# Patient Record
Sex: FEMALE | Race: WHITE | NOT HISPANIC OR LATINO | Employment: OTHER | URBAN - METROPOLITAN AREA
[De-identification: names, ages, dates, MRNs, and addresses within clinical notes are randomized per-mention and may not be internally consistent; named-entity substitution may affect disease eponyms.]

---

## 2017-04-19 ENCOUNTER — HOSPITAL ENCOUNTER (OUTPATIENT)
Dept: MAMMOGRAPHY | Facility: HOSPITAL | Age: 77
Discharge: HOME | End: 2017-04-19
Attending: FAMILY MEDICINE

## 2017-04-19 DIAGNOSIS — Z12.31 VISIT FOR SCREENING MAMMOGRAM: ICD-10-CM

## 2017-11-13 ENCOUNTER — RECORDS - HEALTHEAST (OUTPATIENT)
Dept: LAB | Facility: CLINIC | Age: 77
End: 2017-11-13

## 2017-11-13 ENCOUNTER — RECORDS - HEALTHEAST (OUTPATIENT)
Dept: ADMINISTRATIVE | Facility: OTHER | Age: 77
End: 2017-11-13

## 2017-11-13 LAB
CHOLEST SERPL-MCNC: 140 MG/DL
FASTING STATUS PATIENT QL REPORTED: NORMAL
HDLC SERPL-MCNC: 59 MG/DL
LDLC SERPL CALC-MCNC: 68 MG/DL
TRIGL SERPL-MCNC: 65 MG/DL

## 2018-05-30 ENCOUNTER — COMMUNICATION - HEALTHEAST (OUTPATIENT)
Dept: PULMONOLOGY | Facility: OTHER | Age: 78
End: 2018-05-30

## 2018-05-30 ENCOUNTER — AMBULATORY - HEALTHEAST (OUTPATIENT)
Dept: PULMONOLOGY | Facility: OTHER | Age: 78
End: 2018-05-30

## 2018-05-30 DIAGNOSIS — J44.9 COPD (CHRONIC OBSTRUCTIVE PULMONARY DISEASE) (H): ICD-10-CM

## 2018-05-31 ENCOUNTER — COMMUNICATION - HEALTHEAST (OUTPATIENT)
Dept: RESPIRATORY THERAPY | Facility: HOSPITAL | Age: 78
End: 2018-05-31

## 2018-06-04 ENCOUNTER — RECORDS - HEALTHEAST (OUTPATIENT)
Dept: LAB | Facility: CLINIC | Age: 78
End: 2018-06-04

## 2018-06-04 ENCOUNTER — COMMUNICATION - HEALTHEAST (OUTPATIENT)
Dept: RESPIRATORY THERAPY | Facility: HOSPITAL | Age: 78
End: 2018-06-04

## 2018-06-04 ENCOUNTER — RECORDS - HEALTHEAST (OUTPATIENT)
Dept: ADMINISTRATIVE | Facility: OTHER | Age: 78
End: 2018-06-04

## 2018-06-04 LAB
ANION GAP SERPL CALCULATED.3IONS-SCNC: 9 MMOL/L (ref 5–18)
BUN SERPL-MCNC: 16 MG/DL (ref 8–28)
CALCIUM SERPL-MCNC: 8.9 MG/DL (ref 8.5–10.5)
CHLORIDE BLD-SCNC: 98 MMOL/L (ref 98–107)
CO2 SERPL-SCNC: 29 MMOL/L (ref 22–31)
CREAT SERPL-MCNC: 0.93 MG/DL (ref 0.6–1.1)
GFR SERPL CREATININE-BSD FRML MDRD: 58 ML/MIN/1.73M2
GLUCOSE BLD-MCNC: 103 MG/DL (ref 70–125)
POTASSIUM BLD-SCNC: 4.5 MMOL/L (ref 3.5–5)
SODIUM SERPL-SCNC: 136 MMOL/L (ref 136–145)

## 2018-06-05 ENCOUNTER — COMMUNICATION - HEALTHEAST (OUTPATIENT)
Dept: RESPIRATORY THERAPY | Facility: HOSPITAL | Age: 78
End: 2018-06-05

## 2018-06-07 ENCOUNTER — HOSPITAL ENCOUNTER (OUTPATIENT)
Dept: MAMMOGRAPHY | Facility: CLINIC | Age: 78
Discharge: HOME | End: 2018-06-07
Attending: FAMILY MEDICINE

## 2018-06-07 DIAGNOSIS — Z12.31 VISIT FOR SCREENING MAMMOGRAM: ICD-10-CM

## 2018-06-12 ENCOUNTER — COMMUNICATION - HEALTHEAST (OUTPATIENT)
Dept: RESPIRATORY THERAPY | Facility: HOSPITAL | Age: 78
End: 2018-06-12

## 2018-06-29 ENCOUNTER — COMMUNICATION - HEALTHEAST (OUTPATIENT)
Dept: RESPIRATORY THERAPY | Facility: HOSPITAL | Age: 78
End: 2018-06-29

## 2018-07-18 ENCOUNTER — RECORDS - HEALTHEAST (OUTPATIENT)
Dept: ADMINISTRATIVE | Facility: OTHER | Age: 78
End: 2018-07-18

## 2018-07-24 ENCOUNTER — RECORDS - HEALTHEAST (OUTPATIENT)
Dept: ADMINISTRATIVE | Facility: OTHER | Age: 78
End: 2018-07-24

## 2018-07-24 ENCOUNTER — OFFICE VISIT - HEALTHEAST (OUTPATIENT)
Dept: PULMONOLOGY | Facility: OTHER | Age: 78
End: 2018-07-24

## 2018-07-24 ENCOUNTER — RECORDS - HEALTHEAST (OUTPATIENT)
Dept: PULMONOLOGY | Facility: OTHER | Age: 78
End: 2018-07-24

## 2018-07-24 DIAGNOSIS — J44.9 COPD (CHRONIC OBSTRUCTIVE PULMONARY DISEASE) (H): ICD-10-CM

## 2018-07-24 DIAGNOSIS — J44.9 CHRONIC OBSTRUCTIVE PULMONARY DISEASE, UNSPECIFIED (H): ICD-10-CM

## 2018-07-24 LAB — HGB BLD-MCNC: 13.2 G/DL

## 2018-07-24 ASSESSMENT — MIFFLIN-ST. JEOR: SCORE: 1044.9

## 2018-07-30 ENCOUNTER — COMMUNICATION - HEALTHEAST (OUTPATIENT)
Dept: RESPIRATORY THERAPY | Facility: HOSPITAL | Age: 78
End: 2018-07-30

## 2018-11-06 ENCOUNTER — OFFICE VISIT - HEALTHEAST (OUTPATIENT)
Dept: PULMONOLOGY | Facility: OTHER | Age: 78
End: 2018-11-06

## 2018-11-06 DIAGNOSIS — Z72.0 TOBACCO ABUSE: ICD-10-CM

## 2018-11-06 ASSESSMENT — MIFFLIN-ST. JEOR: SCORE: 1073.02

## 2018-11-19 ENCOUNTER — RECORDS - HEALTHEAST (OUTPATIENT)
Dept: LAB | Facility: CLINIC | Age: 78
End: 2018-11-19

## 2018-11-19 LAB
ALBUMIN SERPL-MCNC: 3.8 G/DL (ref 3.5–5)
ALP SERPL-CCNC: 83 U/L (ref 45–120)
ALT SERPL W P-5'-P-CCNC: 9 U/L (ref 0–45)
ANION GAP SERPL CALCULATED.3IONS-SCNC: 12 MMOL/L (ref 5–18)
AST SERPL W P-5'-P-CCNC: 15 U/L (ref 0–40)
BILIRUB SERPL-MCNC: 0.6 MG/DL (ref 0–1)
BUN SERPL-MCNC: 20 MG/DL (ref 8–28)
CALCIUM SERPL-MCNC: 9.3 MG/DL (ref 8.5–10.5)
CHLORIDE BLD-SCNC: 102 MMOL/L (ref 98–107)
CHOLEST SERPL-MCNC: 152 MG/DL
CO2 SERPL-SCNC: 24 MMOL/L (ref 22–31)
CREAT SERPL-MCNC: 0.92 MG/DL (ref 0.6–1.1)
FASTING STATUS PATIENT QL REPORTED: NORMAL
GFR SERPL CREATININE-BSD FRML MDRD: 59 ML/MIN/1.73M2
GLUCOSE BLD-MCNC: 98 MG/DL (ref 70–125)
HDLC SERPL-MCNC: 77 MG/DL
LDLC SERPL CALC-MCNC: 63 MG/DL
POTASSIUM BLD-SCNC: 4.2 MMOL/L (ref 3.5–5)
PROT SERPL-MCNC: 7.3 G/DL (ref 6–8)
SODIUM SERPL-SCNC: 138 MMOL/L (ref 136–145)
TRIGL SERPL-MCNC: 58 MG/DL
TSH SERPL DL<=0.005 MIU/L-ACNC: 7.36 UIU/ML (ref 0.3–5)

## 2018-11-26 ENCOUNTER — HOSPITAL ENCOUNTER (EMERGENCY)
Facility: CLINIC | Age: 78
Discharge: ANOTHER HOSPITAL | End: 2018-11-26
Attending: EMERGENCY MEDICINE | Admitting: EMERGENCY MEDICINE
Payer: COMMERCIAL

## 2018-11-26 ENCOUNTER — HOSPITAL ENCOUNTER (INPATIENT)
Facility: CLINIC | Age: 78
LOS: 4 days | Discharge: HOME | DRG: 247 | End: 2018-11-30
Attending: INTERNAL MEDICINE | Admitting: INTERNAL MEDICINE
Payer: COMMERCIAL

## 2018-11-26 VITALS
TEMPERATURE: 97.8 F | OXYGEN SATURATION: 96 % | SYSTOLIC BLOOD PRESSURE: 142 MMHG | WEIGHT: 135 LBS | BODY MASS INDEX: 23.05 KG/M2 | HEIGHT: 64 IN | DIASTOLIC BLOOD PRESSURE: 79 MMHG | RESPIRATION RATE: 23 BRPM

## 2018-11-26 DIAGNOSIS — I73.9 PAD (PERIPHERAL ARTERY DISEASE) (H): ICD-10-CM

## 2018-11-26 DIAGNOSIS — J44.1 COPD EXACERBATION (H): ICD-10-CM

## 2018-11-26 DIAGNOSIS — I48.92 ATRIAL FLUTTER WITH RAPID VENTRICULAR RESPONSE (H): ICD-10-CM

## 2018-11-26 DIAGNOSIS — I21.4 NSTEMI (NON-ST ELEVATED MYOCARDIAL INFARCTION) (H): ICD-10-CM

## 2018-11-26 DIAGNOSIS — R79.89 ELEVATED TROPONIN: ICD-10-CM

## 2018-11-26 DIAGNOSIS — I48.91 ATRIAL FIBRILLATION WITH RVR (H): Primary | ICD-10-CM

## 2018-11-26 LAB
ALBUMIN SERPL-MCNC: 3.6 G/DL (ref 3.4–5)
ALBUMIN UR-MCNC: NEGATIVE MG/DL
ALP SERPL-CCNC: 79 U/L (ref 40–150)
ALT SERPL W P-5'-P-CCNC: 26 U/L (ref 0–50)
ANION GAP SERPL CALCULATED.3IONS-SCNC: 6 MMOL/L (ref 3–14)
APPEARANCE UR: CLEAR
AST SERPL W P-5'-P-CCNC: 24 U/L (ref 0–45)
BASOPHILS # BLD AUTO: 0.1 10E9/L (ref 0–0.2)
BASOPHILS NFR BLD AUTO: 0.6 %
BILIRUB SERPL-MCNC: 0.4 MG/DL (ref 0.2–1.3)
BILIRUB UR QL STRIP: NEGATIVE
BUN SERPL-MCNC: 23 MG/DL (ref 7–30)
CALCIUM SERPL-MCNC: 8.5 MG/DL (ref 8.5–10.1)
CHLORIDE SERPL-SCNC: 104 MMOL/L (ref 94–109)
CHOLEST SERPL-MCNC: 107 MG/DL
CO2 SERPL-SCNC: 29 MMOL/L (ref 20–32)
COLOR UR AUTO: NORMAL
CREAT SERPL-MCNC: 1.01 MG/DL (ref 0.52–1.04)
DIFFERENTIAL METHOD BLD: ABNORMAL
EOSINOPHIL # BLD AUTO: 0.3 10E9/L (ref 0–0.7)
EOSINOPHIL NFR BLD AUTO: 3.3 %
ERYTHROCYTE [DISTWIDTH] IN BLOOD BY AUTOMATED COUNT: 16.3 % (ref 10–15)
GFR SERPL CREATININE-BSD FRML MDRD: 72 ML/MIN/1.7M2
GLUCOSE SERPL-MCNC: 99 MG/DL (ref 70–99)
GLUCOSE UR STRIP-MCNC: NEGATIVE MG/DL
HCT VFR BLD AUTO: 46.2 % (ref 35–47)
HDLC SERPL-MCNC: 62 MG/DL
HGB BLD-MCNC: 15 G/DL (ref 11.7–15.7)
HGB UR QL STRIP: NEGATIVE
IMM GRANULOCYTES # BLD: 0 10E9/L (ref 0–0.4)
IMM GRANULOCYTES NFR BLD: 0.2 %
KETONES UR STRIP-MCNC: NEGATIVE MG/DL
LDLC SERPL CALC-MCNC: 32 MG/DL
LEUKOCYTE ESTERASE UR QL STRIP: NEGATIVE
LMWH PPP CHRO-ACNC: 0.2 IU/ML
LYMPHOCYTES # BLD AUTO: 2.2 10E9/L (ref 0.8–5.3)
LYMPHOCYTES NFR BLD AUTO: 22.8 %
MCH RBC QN AUTO: 32.1 PG (ref 26.5–33)
MCHC RBC AUTO-ENTMCNC: 32.5 G/DL (ref 31.5–36.5)
MCV RBC AUTO: 99 FL (ref 78–100)
MONOCYTES # BLD AUTO: 1.2 10E9/L (ref 0–1.3)
MONOCYTES NFR BLD AUTO: 12 %
NEUTROPHILS # BLD AUTO: 5.9 10E9/L (ref 1.6–8.3)
NEUTROPHILS NFR BLD AUTO: 61.1 %
NITRATE UR QL: NEGATIVE
NONHDLC SERPL-MCNC: 45 MG/DL
NRBC # BLD AUTO: 0 10*3/UL
NRBC BLD AUTO-RTO: 0 /100
PH UR STRIP: 6 PH (ref 5–7)
PLATELET # BLD AUTO: 269 10E9/L (ref 150–450)
POTASSIUM SERPL-SCNC: 4.1 MMOL/L (ref 3.4–5.3)
PROT SERPL-MCNC: 7.5 G/DL (ref 6.8–8.8)
RBC # BLD AUTO: 4.67 10E12/L (ref 3.8–5.2)
SODIUM SERPL-SCNC: 139 MMOL/L (ref 133–144)
SOURCE: NORMAL
SP GR UR STRIP: 1 (ref 1–1.03)
T4 FREE SERPL-MCNC: 1.23 NG/DL (ref 0.76–1.46)
TRIGL SERPL-MCNC: 65 MG/DL
TROPONIN I SERPL-MCNC: 0.17 UG/L (ref 0–0.04)
TROPONIN I SERPL-MCNC: 0.18 UG/L (ref 0–0.04)
TROPONIN I SERPL-MCNC: 0.19 UG/L (ref 0–0.04)
TSH SERPL DL<=0.005 MIU/L-ACNC: 8.11 MU/L (ref 0.4–4)
UROBILINOGEN UR STRIP-MCNC: 0 MG/DL (ref 0–2)
WBC # BLD AUTO: 9.7 10E9/L (ref 4–11)

## 2018-11-26 PROCEDURE — 99232 SBSQ HOSP IP/OBS MODERATE 35: CPT | Performed by: INTERNAL MEDICINE

## 2018-11-26 PROCEDURE — 84439 ASSAY OF FREE THYROXINE: CPT | Performed by: EMERGENCY MEDICINE

## 2018-11-26 PROCEDURE — 84443 ASSAY THYROID STIM HORMONE: CPT | Performed by: EMERGENCY MEDICINE

## 2018-11-26 PROCEDURE — 96374 THER/PROPH/DIAG INJ IV PUSH: CPT | Performed by: EMERGENCY MEDICINE

## 2018-11-26 PROCEDURE — 80053 COMPREHEN METABOLIC PANEL: CPT | Performed by: EMERGENCY MEDICINE

## 2018-11-26 PROCEDURE — 25000125 ZZHC RX 250: Performed by: EMERGENCY MEDICINE

## 2018-11-26 PROCEDURE — 93005 ELECTROCARDIOGRAM TRACING: CPT

## 2018-11-26 PROCEDURE — 93005 ELECTROCARDIOGRAM TRACING: CPT | Performed by: EMERGENCY MEDICINE

## 2018-11-26 PROCEDURE — 25000132 ZZH RX MED GY IP 250 OP 250 PS 637: Performed by: INTERNAL MEDICINE

## 2018-11-26 PROCEDURE — 93010 ELECTROCARDIOGRAM REPORT: CPT | Performed by: INTERNAL MEDICINE

## 2018-11-26 PROCEDURE — 21000001 ZZH R&B HEART CARE

## 2018-11-26 PROCEDURE — 84484 ASSAY OF TROPONIN QUANT: CPT | Performed by: EMERGENCY MEDICINE

## 2018-11-26 PROCEDURE — 93010 ELECTROCARDIOGRAM REPORT: CPT | Mod: Z6 | Performed by: EMERGENCY MEDICINE

## 2018-11-26 PROCEDURE — 96361 HYDRATE IV INFUSION ADD-ON: CPT | Performed by: EMERGENCY MEDICINE

## 2018-11-26 PROCEDURE — 80061 LIPID PANEL: CPT | Performed by: INTERNAL MEDICINE

## 2018-11-26 PROCEDURE — 99285 EMERGENCY DEPT VISIT HI MDM: CPT | Mod: 25 | Performed by: EMERGENCY MEDICINE

## 2018-11-26 PROCEDURE — 85520 HEPARIN ASSAY: CPT | Performed by: INTERNAL MEDICINE

## 2018-11-26 PROCEDURE — 85025 COMPLETE CBC W/AUTO DIFF WBC: CPT | Performed by: EMERGENCY MEDICINE

## 2018-11-26 PROCEDURE — 99207 ZZC CDG-HISTORY COMP: MEETS EXP. PROBLEM FOCUSED-DOWN CODED LACK OF ROS: CPT | Performed by: INTERNAL MEDICINE

## 2018-11-26 PROCEDURE — 96376 TX/PRO/DX INJ SAME DRUG ADON: CPT | Performed by: EMERGENCY MEDICINE

## 2018-11-26 PROCEDURE — 81003 URINALYSIS AUTO W/O SCOPE: CPT | Performed by: EMERGENCY MEDICINE

## 2018-11-26 PROCEDURE — 25000128 H RX IP 250 OP 636: Performed by: EMERGENCY MEDICINE

## 2018-11-26 PROCEDURE — 96375 TX/PRO/DX INJ NEW DRUG ADDON: CPT | Performed by: EMERGENCY MEDICINE

## 2018-11-26 PROCEDURE — 84484 ASSAY OF TROPONIN QUANT: CPT | Performed by: INTERNAL MEDICINE

## 2018-11-26 PROCEDURE — 25000132 ZZH RX MED GY IP 250 OP 250 PS 637: Performed by: EMERGENCY MEDICINE

## 2018-11-26 PROCEDURE — 36415 COLL VENOUS BLD VENIPUNCTURE: CPT | Performed by: INTERNAL MEDICINE

## 2018-11-26 RX ORDER — AMOXICILLIN 250 MG
2 CAPSULE ORAL 2 TIMES DAILY PRN
Status: DISCONTINUED | OUTPATIENT
Start: 2018-11-26 | End: 2018-11-30 | Stop reason: HOSPADM

## 2018-11-26 RX ORDER — LIDOCAINE 40 MG/G
CREAM TOPICAL
Status: DISCONTINUED | OUTPATIENT
Start: 2018-11-26 | End: 2018-11-28

## 2018-11-26 RX ORDER — DORZOLAMIDE HCL 20 MG/ML
1 SOLUTION/ DROPS OPHTHALMIC 3 TIMES DAILY
Status: DISCONTINUED | OUTPATIENT
Start: 2018-11-26 | End: 2018-11-30 | Stop reason: HOSPADM

## 2018-11-26 RX ORDER — ACEBUTOLOL HYDROCHLORIDE 200 MG/1
400 CAPSULE ORAL EVERY MORNING
Status: DISCONTINUED | OUTPATIENT
Start: 2018-11-27 | End: 2018-11-30 | Stop reason: HOSPADM

## 2018-11-26 RX ORDER — LEVOTHYROXINE SODIUM 125 UG/1
125 TABLET ORAL DAILY
COMMUNITY
End: 2021-11-22

## 2018-11-26 RX ORDER — ALBUTEROL SULFATE 90 UG/1
2 AEROSOL, METERED RESPIRATORY (INHALATION) EVERY 4 HOURS PRN
COMMUNITY
End: 2021-09-23

## 2018-11-26 RX ORDER — METOPROLOL TARTRATE 1 MG/ML
5 INJECTION, SOLUTION INTRAVENOUS
Status: COMPLETED | OUTPATIENT
Start: 2018-11-26 | End: 2018-11-26

## 2018-11-26 RX ORDER — ALUMINA, MAGNESIA, AND SIMETHICONE 2400; 2400; 240 MG/30ML; MG/30ML; MG/30ML
30 SUSPENSION ORAL EVERY 4 HOURS PRN
Status: DISCONTINUED | OUTPATIENT
Start: 2018-11-26 | End: 2018-11-30 | Stop reason: HOSPADM

## 2018-11-26 RX ORDER — IRBESARTAN 150 MG/1
300 TABLET ORAL DAILY
Status: DISCONTINUED | OUTPATIENT
Start: 2018-11-27 | End: 2018-11-29

## 2018-11-26 RX ORDER — LATANOPROST 50 UG/ML
1 SOLUTION/ DROPS OPHTHALMIC EVERY EVENING
COMMUNITY

## 2018-11-26 RX ORDER — ACETAMINOPHEN 650 MG/1
650 SUPPOSITORY RECTAL EVERY 4 HOURS PRN
Status: DISCONTINUED | OUTPATIENT
Start: 2018-11-26 | End: 2018-11-30 | Stop reason: HOSPADM

## 2018-11-26 RX ORDER — ACEBUTOLOL HYDROCHLORIDE 400 MG/1
400 CAPSULE ORAL DAILY
COMMUNITY

## 2018-11-26 RX ORDER — AMLODIPINE BESYLATE 5 MG/1
5 TABLET ORAL DAILY
Status: ON HOLD | COMMUNITY
End: 2018-11-30

## 2018-11-26 RX ORDER — DILTIAZEM HYDROCHLORIDE 120 MG/1
120 CAPSULE, EXTENDED RELEASE ORAL DAILY
Status: DISCONTINUED | OUTPATIENT
Start: 2018-11-26 | End: 2018-11-30 | Stop reason: HOSPADM

## 2018-11-26 RX ORDER — ASPIRIN 81 MG/1
81 TABLET ORAL DAILY
Status: DISCONTINUED | OUTPATIENT
Start: 2018-11-27 | End: 2018-11-28

## 2018-11-26 RX ORDER — MULTIPLE VITAMINS W/ MINERALS TAB 9MG-400MCG
1 TAB ORAL DAILY
Status: ON HOLD | COMMUNITY
End: 2019-08-14

## 2018-11-26 RX ORDER — ONDANSETRON 2 MG/ML
4 INJECTION INTRAMUSCULAR; INTRAVENOUS EVERY 6 HOURS PRN
Status: DISCONTINUED | OUTPATIENT
Start: 2018-11-26 | End: 2018-11-30 | Stop reason: HOSPADM

## 2018-11-26 RX ORDER — LATANOPROST 50 UG/ML
1 SOLUTION/ DROPS OPHTHALMIC EVERY EVENING
Status: DISCONTINUED | OUTPATIENT
Start: 2018-11-26 | End: 2018-11-30 | Stop reason: HOSPADM

## 2018-11-26 RX ORDER — ASPIRIN 81 MG/1
162 TABLET ORAL DAILY
Status: DISCONTINUED | OUTPATIENT
Start: 2018-11-27 | End: 2018-11-26

## 2018-11-26 RX ORDER — AMOXICILLIN 250 MG
1 CAPSULE ORAL 2 TIMES DAILY PRN
Status: DISCONTINUED | OUTPATIENT
Start: 2018-11-26 | End: 2018-11-30 | Stop reason: HOSPADM

## 2018-11-26 RX ORDER — DILTIAZEM HYDROCHLORIDE 5 MG/ML
0.25 INJECTION INTRAVENOUS ONCE
Status: COMPLETED | OUTPATIENT
Start: 2018-11-26 | End: 2018-11-26

## 2018-11-26 RX ORDER — BISACODYL 10 MG
10 SUPPOSITORY, RECTAL RECTAL DAILY PRN
Status: DISCONTINUED | OUTPATIENT
Start: 2018-11-26 | End: 2018-11-30 | Stop reason: HOSPADM

## 2018-11-26 RX ORDER — PROCHLORPERAZINE MALEATE 5 MG
5 TABLET ORAL EVERY 6 HOURS PRN
Status: DISCONTINUED | OUTPATIENT
Start: 2018-11-26 | End: 2018-11-30 | Stop reason: HOSPADM

## 2018-11-26 RX ORDER — SIMVASTATIN 20 MG
20 TABLET ORAL AT BEDTIME
Status: DISCONTINUED | OUTPATIENT
Start: 2018-11-26 | End: 2018-11-26

## 2018-11-26 RX ORDER — BRIMONIDINE TARTRATE 2 MG/ML
1 SOLUTION/ DROPS OPHTHALMIC 3 TIMES DAILY
Status: DISCONTINUED | OUTPATIENT
Start: 2018-11-26 | End: 2018-11-30 | Stop reason: HOSPADM

## 2018-11-26 RX ORDER — ATORVASTATIN CALCIUM 10 MG/1
10 TABLET, FILM COATED ORAL EVERY EVENING
Status: DISCONTINUED | OUTPATIENT
Start: 2018-11-26 | End: 2018-11-30 | Stop reason: HOSPADM

## 2018-11-26 RX ORDER — ACETAMINOPHEN 500 MG
500-1000 TABLET ORAL EVERY 6 HOURS PRN
COMMUNITY

## 2018-11-26 RX ORDER — NICOTINE 21 MG/24HR
1 PATCH, TRANSDERMAL 24 HOURS TRANSDERMAL DAILY
Status: DISCONTINUED | OUTPATIENT
Start: 2018-11-26 | End: 2018-11-30 | Stop reason: HOSPADM

## 2018-11-26 RX ORDER — ASPIRIN 81 MG/1
324 TABLET, CHEWABLE ORAL ONCE
Status: COMPLETED | OUTPATIENT
Start: 2018-11-26 | End: 2018-11-26

## 2018-11-26 RX ORDER — ASPIRIN 81 MG/1
324 TABLET, CHEWABLE ORAL ONCE
Status: DISCONTINUED | OUTPATIENT
Start: 2018-11-26 | End: 2018-11-26

## 2018-11-26 RX ORDER — METOPROLOL TARTRATE 1 MG/ML
5 INJECTION, SOLUTION INTRAVENOUS ONCE
Status: COMPLETED | OUTPATIENT
Start: 2018-11-26 | End: 2018-11-26

## 2018-11-26 RX ORDER — ACETAMINOPHEN 325 MG/1
650 TABLET ORAL EVERY 4 HOURS PRN
Status: DISCONTINUED | OUTPATIENT
Start: 2018-11-26 | End: 2018-11-29

## 2018-11-26 RX ORDER — ADENOSINE 3 MG/ML
6 INJECTION, SOLUTION INTRAVENOUS ONCE
Status: COMPLETED | OUTPATIENT
Start: 2018-11-26 | End: 2018-11-26

## 2018-11-26 RX ORDER — LEVOTHYROXINE SODIUM 125 UG/1
125 TABLET ORAL DAILY
Status: DISCONTINUED | OUTPATIENT
Start: 2018-11-27 | End: 2018-11-30 | Stop reason: HOSPADM

## 2018-11-26 RX ORDER — ASPIRIN 81 MG/1
TABLET, CHEWABLE ORAL
Status: DISCONTINUED
Start: 2018-11-26 | End: 2018-11-26 | Stop reason: HOSPADM

## 2018-11-26 RX ORDER — ALBUTEROL SULFATE 90 UG/1
2 AEROSOL, METERED RESPIRATORY (INHALATION) EVERY 4 HOURS PRN
Status: DISCONTINUED | OUTPATIENT
Start: 2018-11-26 | End: 2018-11-30 | Stop reason: HOSPADM

## 2018-11-26 RX ORDER — SIMVASTATIN 20 MG
20 TABLET ORAL AT BEDTIME
Status: ON HOLD | COMMUNITY
End: 2018-11-30

## 2018-11-26 RX ORDER — PROCHLORPERAZINE 25 MG
12.5 SUPPOSITORY, RECTAL RECTAL EVERY 12 HOURS PRN
Status: DISCONTINUED | OUTPATIENT
Start: 2018-11-26 | End: 2018-11-30 | Stop reason: HOSPADM

## 2018-11-26 RX ORDER — ACETAMINOPHEN 500 MG
1000 TABLET ORAL EVERY 6 HOURS PRN
Status: DISCONTINUED | OUTPATIENT
Start: 2018-11-26 | End: 2018-11-26

## 2018-11-26 RX ORDER — SODIUM CHLORIDE 9 MG/ML
1000 INJECTION, SOLUTION INTRAVENOUS CONTINUOUS
Status: DISCONTINUED | OUTPATIENT
Start: 2018-11-26 | End: 2018-11-26 | Stop reason: HOSPADM

## 2018-11-26 RX ORDER — NITROGLYCERIN 0.4 MG/1
0.4 TABLET SUBLINGUAL EVERY 5 MIN PRN
Status: DISCONTINUED | OUTPATIENT
Start: 2018-11-26 | End: 2018-11-29

## 2018-11-26 RX ORDER — ONDANSETRON 4 MG/1
4 TABLET, ORALLY DISINTEGRATING ORAL EVERY 6 HOURS PRN
Status: DISCONTINUED | OUTPATIENT
Start: 2018-11-26 | End: 2018-11-30 | Stop reason: HOSPADM

## 2018-11-26 RX ORDER — ACEBUTOLOL HYDROCHLORIDE 400 MG/1
400 CAPSULE ORAL EVERY MORNING
COMMUNITY
End: 2019-01-28

## 2018-11-26 RX ORDER — DORZOLAMIDE HCL 20 MG/ML
1 SOLUTION/ DROPS OPHTHALMIC 3 TIMES DAILY
COMMUNITY

## 2018-11-26 RX ORDER — BRIMONIDINE TARTRATE 1.5 MG/ML
1 SOLUTION/ DROPS OPHTHALMIC 3 TIMES DAILY
COMMUNITY

## 2018-11-26 RX ORDER — IRBESARTAN 300 MG/1
300 TABLET ORAL DAILY
Status: ON HOLD | COMMUNITY
End: 2018-11-30

## 2018-11-26 RX ADMIN — ASPIRIN 81 MG 324 MG: 81 TABLET ORAL at 13:39

## 2018-11-26 RX ADMIN — SODIUM CHLORIDE 1000 ML: 9 INJECTION, SOLUTION INTRAVENOUS at 10:45

## 2018-11-26 RX ADMIN — DILTIAZEM HYDROCHLORIDE 120 MG: 120 CAPSULE, EXTENDED RELEASE ORAL at 20:41

## 2018-11-26 RX ADMIN — METOPROLOL TARTRATE 5 MG: 1 INJECTION, SOLUTION INTRAVENOUS at 10:41

## 2018-11-26 RX ADMIN — HEPARIN SODIUM 12 UNITS/KG/HR: 10000 INJECTION, SOLUTION INTRAVENOUS at 14:09

## 2018-11-26 RX ADMIN — SODIUM CHLORIDE 1000 ML: 9 INJECTION, SOLUTION INTRAVENOUS at 10:03

## 2018-11-26 RX ADMIN — DILTIAZEM HYDROCHLORIDE 5 MG: 5 INJECTION INTRAVENOUS at 13:15

## 2018-11-26 RX ADMIN — Medication 3400 UNITS: at 14:10

## 2018-11-26 RX ADMIN — ADENOSINE 6 MG: 3 INJECTION, SOLUTION INTRAVENOUS at 10:08

## 2018-11-26 RX ADMIN — METOPROLOL TARTRATE 5 MG: 1 INJECTION, SOLUTION INTRAVENOUS at 11:37

## 2018-11-26 RX ADMIN — METOPROLOL TARTRATE 5 MG: 1 INJECTION, SOLUTION INTRAVENOUS at 10:56

## 2018-11-26 RX ADMIN — ATORVASTATIN CALCIUM 10 MG: 10 TABLET, FILM COATED ORAL at 20:41

## 2018-11-26 ASSESSMENT — ENCOUNTER SYMPTOMS
EYE DISCHARGE: 0
ARTHRALGIAS: 0
CONFUSION: 0
MYALGIAS: 0
SINUS PRESSURE: 0
DYSURIA: 0
HEADACHES: 0
NECK STIFFNESS: 0
COUGH: 0
FATIGUE: 0
BRUISES/BLEEDS EASILY: 0
DIFFICULTY URINATING: 0
BLOOD IN STOOL: 0
FEVER: 0
EYE REDNESS: 0
ABDOMINAL PAIN: 0
SHORTNESS OF BREATH: 0
TROUBLE SWALLOWING: 0
CHILLS: 0
VOMITING: 0
SORE THROAT: 0
CHEST TIGHTNESS: 0
WEAKNESS: 0
WHEEZING: 0

## 2018-11-26 ASSESSMENT — ACTIVITIES OF DAILY LIVING (ADL): ADLS_ACUITY_SCORE: 9

## 2018-11-26 NOTE — ED NOTES
Called to give report, unable to take pt until after 1600. Will continue to monitor pt here until she is transferred out.

## 2018-11-26 NOTE — ED PROVIDER NOTES
History     Chief Complaint   Patient presents with     Palpitations     increased HR in 120's; some heartburn last night, resolved with antiacid;      HPI     Zee Mireles is a 78 year old female who presents for evaluation of chest discomfort and palpitations.  The patient reports from 1130 last night experiencing substernal chest discomfort described initially as indigestion and then became more pressure-like.  She also noted interscapular discomfort.  Rates discomfort 4-5/10 intensity.  At around 2 AM she noted palpitations with her heart racing.  Noted heart rate in the 120s-130s.  She has had no shortness of breath.  Since recognition of palpitations she also has had no further chest discomfort.  Patient has ongoing tobacco use disorder.  History for COPD hypertension hyperlipidemia.  No coronary disease.  Patient reports to the hypothyroidism dosing she was recently increased and Synthroid because of an elevated TSH a low and having complaint of fatigue.  Patient denies having any exertional symptoms of dizziness, lightheadedness, chest discomfort or shortness of breath.  Has not noted cold air exposure affecting her breathing.    Problem List:    There are no active problems to display for this patient.       Past Medical History:    Hypertension  Tobacco use disorder  COPD  Hyperlipidemia  Glaucoma  Past Surgical History:    No past surgical history on file.    Family History:    No family history on file.    Social History:  Marital Status:   [4]  Social History   Substance Use Topics     Smoking status: Not on file     Smokeless tobacco: Not on file     Alcohol use Not on file        Medications:      No current outpatient prescriptions on file.      Review of Systems   Constitutional: Negative for chills, fatigue and fever.   HENT: Negative for congestion, ear pain, sinus pressure, sore throat and trouble swallowing.    Eyes: Negative for discharge and redness.   Respiratory: Negative for  "cough, chest tightness, shortness of breath and wheezing.    Cardiovascular: Negative for chest pain and leg swelling.   Gastrointestinal: Negative for abdominal pain, blood in stool and vomiting.   Genitourinary: Negative for difficulty urinating, dysuria and vaginal discharge.   Musculoskeletal: Negative for arthralgias, myalgias and neck stiffness.   Skin: Negative for pallor and rash.   Neurological: Negative for weakness and headaches.   Hematological: Does not bruise/bleed easily.   Psychiatric/Behavioral: Negative for confusion.       Physical Exam   BP: (!) 154/92  Heart Rate: 133  Temp: 97.8  F (36.6  C)  Resp: 18  Height: 161.3 cm (5' 3.5\")  Weight: 61.2 kg (135 lb)  SpO2: 98 %      Physical Exam   Constitutional: He is oriented to person, place, and time. Vital signs are normal. He appears well-nourished. He does not appear ill.   HENT:   Head: Normocephalic and atraumatic.   Right Ear: External ear normal.   Left Ear: External ear normal.   Nose: Nose normal.   Mouth/Throat: Oropharynx is clear and moist and mucous membranes are normal.   Eyes: Conjunctivae, EOM and lids are normal. Pupils are equal, round, and reactive to light. No scleral icterus.   Fundoscopic exam:       The right eye shows no hemorrhage.        The left eye shows no hemorrhage.   Neck: Trachea normal. Neck supple. No JVD present. Carotid bruit is not present.   Cardiovascular: Regular rhythm, S1 normal, S2 normal and intact distal pulses.   No extrasystoles are present. Tachycardia present.  Exam reveals no friction rub.    No murmur heard.  Pulmonary/Chest: Effort normal. No respiratory distress. He has no wheezes. He has no rales.    Prolonged expiratory phase.   Abdominal: Soft. Bowel sounds are normal. He exhibits no distension. There is no splenomegaly or hepatomegaly. There is no tenderness. There is no rebound. No hernia.   Musculoskeletal: Normal range of motion.   Lymphadenopathy:     He has no cervical adenopathy. "   Neurological: He is alert and oriented to person, place, and time. He has normal strength and normal reflexes. No sensory deficit.   Skin: Skin is warm and dry. No rash noted. No pallor.   Psychiatric: He has a normal mood and affect. His speech is normal and behavior is normal. Cognition and memory are normal.   Nursing note and vitals reviewed.             ED Course     ED Course     Procedures          EKG:  Interpretation by Prem Franz DO.   Indication: Chest discomfort with palpitations.  Narrow complex tachycardia with rate of 130-140.  No old EKG available for comparison.  Impressions: Abnormal EKG         No results found for this or any previous visit (from the past 24 hour(s)).    Medications   0.9% sodium chloride BOLUS (1,000 mLs Intravenous New Bag 11/26/18 1003)     Followed by   sodium chloride 0.9% infusion (not administered)   adenosine (ADENOCARD) injection 6 mg (6 mg Intravenous Given 11/26/18 1008)       Assessments & Plan (with Medical Decision Making)  10:36 AM   78-year-old female presents with chest discomfort and recognize palpitations.  Discomfort described as indigestion and pressure started around 11 PM followed by palpitations around 2 AM.  Patient presents with no chest discomfort.  Is continued to complain of recognize tachycardia.  Examination notes patient appear comfortable.  Smiling and laughing.  She is normotensive.  Heart rates in the 130s with a narrow complex tachycardia.  Patient received adenosine 6 mg IV rapid push.  This caused a transient slowing of her heart rate down to as low as 20 bpm before it increased back to baseline of 130s.  During that slowing we were able to recognize a flutter wave consistent with underlying atrial flutter.  This does not represent an SVT.  The patient be treated with IV metoprolol to see if we can achieve persistent rate control.  If this is ineffective we will have to consider IV diltiazem.  Troponin is pending to evaluate myocardial  ischemia could either be primary process triggering the palpitations or could be related to demand ischemia from prolonged tachycardia.  Her EKG at this time is not showing any acute ST-T changes consistent with ACS.  12:44 PM  Patient remains in persistent air complex tachycardia the rate of 130s.  She is not responded to IV metoprolol 5 mg x3 doses.  For rate control will try diltiazem.  The patient does have an elevated troponin(0.182).she remains symptom-free with no complaint of dyspnea or chest discomfort.  I am uncertain if this is a primary acute coronary syndrome causing elevated troponin followed by myocardial irritation triggering your complex tachycardia/atrial flutter or if this is demand ischemia from her tachycardia.  Will discuss with cardiology next care step and transfer plan to [facility].  1:26 PM  Patient responded favorably to diltiazem.  15 mg bolus was ordered and after 5 mg rate dropped to low 80s.  No additional diltiazem administered.  Monitor appears to be atrial flutter.  Repeat EKG is pending.  2:05 PM  Rate control was achieved with IV diltiazem.  We stopped bolus at 5 mg because her heart rate dropped into the low 80s and has been maintaining a normal rate.  She is not converted and is currently in atrial flutter.  Patient has received aspirin.  Starting IV heparin low intensity cardiac protocol.  Patient be admitted at [facility] after ground transfer to Dr. Grant the hospitalist.  Cardiology has been consulted.  Spoke with cardiology by phone.  Patient and family in agreement.       I have reviewed the nursing notes.    I have reviewed the findings, diagnosis, plan and need for follow up with the patient.      New Prescriptions    No medications on file       Final diagnoses:   Elevated troponin   Atrial flutter with rapid ventricular response (H)   COPD exacerbation (H)       11/26/2018   [facility] EMERGENCY DEPARTMENT     Prem Franz,  DO  11/26/18 1407

## 2018-11-26 NOTE — ED NOTES
"Woke up during night with heartburn and felt like heart rate was increased, had pain during night between shoulder blades with a sensation under left arm, lasted until 2AM then fell asleep, pain was gone when woke up, took \"acebutalol\" at 2am and then again at 9am  "

## 2018-11-26 NOTE — IP AVS SNAPSHOT
MRN:[id number]                      After Visit Summary   11/26/2018    Zee Mireles    MRN: [id number]           Thank you!     Thank you for choosing [facility] for your care. Our goal is always to provide you with excellent care. Hearing back from our patients is one way we can continue to improve our services. Please take a few minutes to complete the written survey that you may receive in the mail after you visit with us. Thank you!        Patient Information     Date Of Birth          1940        Designated Caregiver       Most Recent Value    Caregiver    Will someone help with your care after discharge? no      About your hospital stay     You were admitted on:  November 26, 2018 You last received care in the:  [facility]    You were discharged on:  November 30, 2018        Reason for your hospital stay       Palpitations, you were identified to have an abnormal heart rhythm called atrial fibrillation. You had a workup done that indicated poor blood flow to an area of your heart that required stenting. This was likely the cause of your abnormal heart rhythm. You have been put on medications to prevent recurrence of the atrial fibrillation and to ensure the cardiac stent succeeds. You will need to follow with cardiology as scheduled for ongoing monitoring. Your medications have changed for various reasons.    1. You will need to stop aspirin to avoid significant increased risk of bleeding  2. You have been started on xarelto to prevent stroke, and plavix to prevent your cardiac stent from failing  3. You have been started on diltiazem to prevent recurrence of atrial fibrillation  4. Your cholesterol medication has been changed because your previous one interacts with the diltiazem  5. Your irbesartan has been changed due to a recent medication recall by the                   Who to Call     For medical emergencies, please call 911.  For  non-urgent questions about your medical care, please call your primary care provider or clinic, [phone]          Attending Provider     Provider Specialty    Declan Grant DO Internal Medicine    Sophia Hairston MD Internal Medicine       Primary Care Provider Office Phone # Fax #    Jessica Fitzgerald [phone]      After Care Instructions     Activity       Your activity upon discharge: activity as tolerated            Diet       Follow this diet upon discharge: Orders Placed This Encounter      Low Saturated Fat Na <2400 mg                  Follow-up Appointments     Follow-up and recommended labs and tests        Follow up with primary care provider, Jessica Fitzgerald, within 1-2 weeks, to evaluate medication change and for hospital follow- up. The following labs/tests are recommended: monitor BMP given recent addition of HCTZ.  Please call to schedule this appointment:     Primary Care Provider     Jessica Fitzgerald   [phone] [facility] [address], [place] *        Cardiology follow-up:  Karly Wood PA-C 11am with labs at 9:30 am on 12/3/18    Dr. Villanueva 9:30 am on 1/28/19 with MARTHA study beforehand                     Your next 10 appointments already scheduled     Dec 03, 2018  8:55 AM CST   LAB with [place] [facility]   [facility]    [address] [address]   [phone]           Please do not eat 10-12 hours before your appointment if you are coming in fasting for labs on lipids, cholesterol, or glucose (sugar). This does not apply to pregnant women. Water, hot tea and black coffee (with nothing added) are okay. Do not drink other fluids, diet soda or chew gum.            Dec 03, 2018  9:00 AM CST   Cardiac Evaluation with [place] CARDIAC REHAB EVALUATIONS   [facility] ([facility])    [address] [address]   [phone]            Dec 03, 2018  11:00 AM CST   Return Visit with Brianne Ching PA-C   [facility] ([facility])    [address] [address]   [phone]            Jan 03, 2019  9:40 AM CST   US MARTHA DOPPLER WITH EXERCISE BILATERAL with MEENAKSHIDANNI   [facility] ([facility])    [address] [address]   [phone]           How do I prepare for my exam? (Food and drink instructions) No caffeine or tobacco for 1 hour prior to exam.  What should I wear: Wear comfortable clothes.  How long does the exam take: Most ultrasounds take 30 to 60 minutes.  What should I bring: Bring a list of your medicines, including vitamins, minerals and over-the-counter drugs. It is safest to leave personal items at home.  Do I need a :  No  is needed.  What do I need to tell my doctor: Tell your doctor about any allergies you may have.  What should I do after the exam: No restrictions, You may resume normal activities.  What is this test: An ultrasound uses sound waves to make pictures of the body. Sound waves do not cause pain. The only discomfort may be the pressure of the wand against your skin or full bladder.  Who should I call with questions: If you have any questions, please call the Imaging Department where you will have your exam. Directions, parking instructions, and other information is available on our website, [email or web address].            Jan 28, 2019  9:30 AM CST   Return Visit with Jono Villanueva MD   [facility] ([facility])    [address] [address]   [phone]              Additional Services     CARDIAC REHAB REFERRAL       Patient may choose their preference of the site for Cardiac Rehab.            Follow-Up with Cardiac Advanced Practice Provider           Follow-Up with Cardiologist                 Future tests that were  ordered for you     Basic metabolic panel           US MARTHA Doppler with Exercise                 Further instructions from your care team       Cardiac Angiogram Discharge Instructions - FEMORAL    After you go home:      Have an adult stay with you until tomorrow.    Drink extra fluids for 2 days.    You may resume your normal diet.    No smoking       For 24 hours - due to the sedation you received:    Relax and take it easy.    Do NOT make any important or legal decisions.    Do NOT drive or operate machines at home or at work.    Do NOT drink alcohol.    Care of Wrist Puncture Site:      For the first 24 hrs - check the puncture site every 1-2 hours while awake.    It is normal to have soreness at the puncture site and mild tingling in your hand for up to 3 days.    Remove the bandaid after 24 hours. If there is minor oozing, apply another bandaid and remove it after 12 hours.    You may shower tomorrow.  Do NOT take a bath, or use a hot tub or pool for at least 3 days. Do NOT scrub the site. Do not use lotion or powder near the puncture site.           Activity:        For 2 days:      Do NOT do any heavy activity such as exercise, lifting, or straining.     Bleeding:      If you start bleeding from the site in your GROIN,  lay down and press firmly on/above the site for 10 minutes.     Once bleeding stops, keep leg still for 2 hours.     Call [facility] as soon as you can.       Call 911 right away if you have heavy bleeding or bleeding that does not stop.      Medicines:      If you are taking an antiplatelet medication such as Plavix, Brilinta or Effient, do not stop taking it until you talk to your cardiologist.        If you are on Metformin (Glucophage), do not restart it until you have blood tests (within 2 to 3 days after discharge).  After you have your blood drawn, you may restart the Metformin.     Take your medications, including blood thinners, unless your provider tells you not to.  If you take  "Coumadin (Warfarin), have your INR checked by your provider in  3-5 days. Call your clinic to schedule this.    If you have stopped any medicines, check with your provider about when to restart them.    Follow Up Appointments:      Follow up with [facility] Nurse Practitioner at [facility] of patient preference in 7-10 days.    Call the clinic if:      You have a large or growing hard lump around the site.    The site is red, swollen, hot or tender.    Blood or fluid is draining from the site.    You have chills or a fever greater than 101 F (38 C).    Your arm feels numb, cool or changes color.    You have hives, a rash or unusual itching.    Any questions or concerns.          [facility] [place]:    [phone] [facility] (7 days a week)            Pending Results     Date and Time Order Name Status Description    11/29/2018 1219 EKG 12-lead, tracing only  Preliminary     11/28/2018 0744 EKG 12-lead, tracing only Preliminary     11/26/2018 1945 EKG 12-lead, tracing only Preliminary     11/26/2018 1014 T4 free In process             Statement of Approval     Ordered          11/30/18 1140  I have reviewed and agree with all the recommendations and orders detailed in this document.  EFFECTIVE NOW     Approved and electronically signed by:  Mychal Urbina PA-C             Admission Information     Date & Time Provider Department Dept. Phone    11/26/2018 Sophia Hairston MD [facility] [phone]      Your Vitals Were     Blood Pressure Temperature Respirations Height Weight Pulse Oximetry    152/58 98.7  F (37.1  C) 18 1.613 m (5' 3.5\") 59 kg (130 lb 1.6 oz) 98%    BMI (Body Mass Index)                   22.68 kg/m2           MyChart Information     [facility] lets you send messages to your doctor, view your test results, renew your prescriptions, schedule appointments and more. To sign up, go to [email or web address] . Click on \"Log in\" on the left side " "of the screen, which will take you to the Welcome page. Then click on \"Sign up Now\" on the right side of the page.     You will be asked to enter the access code listed below, as well as some personal information. Please follow the directions to create your username and password.     Your access code is: [email or web address]  Expires: 2019  1:52 PM     Your access code will  in 90 days. If you need help or a new code, please call your [place] clinic or [phone].        Care EveryWhere ID     This is your Care EveryWhere ID. This could be used by other organizations to access your [place] medical records  [id number]        Equal Access to Services     LEEANNA TRAVIS : Owen Galo, katie ramirez, sandor gracia, elen nolen. So [facility] [phone].    ATENCIÓN: Si habla español, tiene a chowdary disposición servicios gratuitos de asistencia lingüística. Llame al [phone].    We comply with applicable federal civil rights laws and [place] laws. We do not discriminate on the basis of race, color, national origin, age, disability, sex, sexual orientation, or gender identity.               Review of your medicines      START taking        Dose / Directions    atorvastatin 10 MG tablet   Commonly known as:  LIPITOR   Used for:  NSTEMI (non-ST elevated myocardial infarction) (H)        Dose:  10 mg   Take 1 tablet (10 mg) by mouth every evening   Quantity:  30 tablet   Refills:  0       clopidogrel 75 MG tablet   Commonly known as:  PLAVIX   Used for:  NSTEMI (non-ST elevated myocardial infarction) (H)        Dose:  75 mg   Start taking on:  2018   Take 1 tablet (75 mg) by mouth daily   Quantity:  30 tablet   Refills:  0       diltiazem  MG 24 hr capsule   Commonly known as:  DILT-XR        Dose:  120 mg   Start taking on:  2018   Take 1 capsule (120 mg) by mouth daily   Quantity:  30 capsule   Refills:  0       hydrochlorothiazide 25 MG " tablet   Commonly known as:  HYDRODIURIL   Used for:  NSTEMI (non-ST elevated myocardial infarction) (H)        Dose:  25 mg   Start taking on:  12/1/2018   Take 1 tablet (25 mg) by mouth daily   Quantity:  30 tablet   Refills:  0       losartan 100 MG tablet   Commonly known as:  COZAAR   Used for:  NSTEMI (non-ST elevated myocardial infarction) (H)        Dose:  100 mg   Start taking on:  12/1/2018   Take 1 tablet (100 mg) by mouth daily   Quantity:  30 tablet   Refills:  0       rivaroxaban ANTICOAGULANT 15 MG Tabs tablet   Commonly known as:  XARELTO        Dose:  15 mg   Take 1 tablet (15 mg) by mouth daily (with dinner)   Quantity:  60 tablet   Refills:  0         CONTINUE these medicines which have NOT CHANGED        Dose / Directions    * acebutolol 400 MG capsule   Commonly known as:  SECTRAL        Dose:  400 mg   Take 400 mg by mouth every morning At 0900, may take 1 later in the day if needed   Refills:  0       * acebutolol 400 MG capsule   Commonly known as:  SECTRAL        Dose:  400 mg   Take 400 mg by mouth daily as needed (Palpitations) = extra dose in addition to scheduled daily dose   Refills:  0       acetaminophen 500 MG tablet   Commonly known as:  TYLENOL        Dose:  1000 mg   Take 1,000 mg by mouth every 6 hours as needed for mild pain   Refills:  0       albuterol 108 (90 Base) MCG/ACT inhaler   Commonly known as:  PROAIR HFA/PROVENTIL HFA/VENTOLIN HFA        Dose:  2 puff   Inhale 2 puffs into the lungs every 4 hours as needed for shortness of breath / dyspnea or wheezing   Refills:  0       BEVESPI AEROSPHERE 9-4.8 MCG/ACT oral inhaler   Generic drug:  Glycopyrrolate-Formoterol        Dose:  2 puff   Inhale 2 puffs into the lungs every evening   Refills:  0       brimonidine 0.15 % ophthalmic solution   Commonly known as:  ALPHAGAN-P        Dose:  1 drop   Place 1 drop into both eyes 3 times daily   Refills:  0       dorzolamide 2 % ophthalmic solution   Commonly known as:  TRUSOPT         Dose:  1 drop   Place 1 drop into both eyes 3 times daily   Refills:  0       latanoprost 0.005 % ophthalmic solution   Commonly known as:  XALATAN        Dose:  1 drop   Place 1 drop into both eyes every evening   Refills:  0       levothyroxine 125 MCG tablet   Commonly known as:  SYNTHROID/LEVOTHROID        Dose:  125 mcg   Take 125 mcg by mouth daily   Refills:  0       Multi-vitamin tablet   Notes to Patient:  Not given in hospital        Dose:  1 tablet   Take 1 tablet by mouth daily   Refills:  0       * Notice:  This list has 2 medication(s) that are the same as other medications prescribed for you. Read the directions carefully, and ask your doctor or other care provider to review them with you.      STOP taking     amLODIPine 5 MG tablet   Commonly known as:  NORVASC           aspirin 81 MG tablet   Commonly known as:  ASA           irbesartan 300 MG tablet   Commonly known as:  AVAPRO           simvastatin 20 MG tablet   Commonly known as:  ZOCOR                Where to get your medicines      These medications were sent to [facility] [address]  [address] [facility] [address], [facility] [address]     Phone:  [phone]     atorvastatin 10 MG tablet    clopidogrel 75 MG tablet    diltiazem  MG 24 hr capsule    hydrochlorothiazide 25 MG tablet    losartan 100 MG tablet    rivaroxaban ANTICOAGULANT 15 MG Tabs tablet                Protect others around you: Learn how to safely use, store and throw away your medicines at www.disposemymeds.org.             Medication List: This is a list of all your medications and when to take them. Check marks below indicate your daily home schedule. Keep this list as a reference.      Medications           Morning Afternoon Evening Bedtime As Needed    * acebutolol 400 MG capsule   Commonly known as:  SECTRAL   Take 400 mg by mouth every morning At 0900, may take 1 later in the day if needed   Last time this was given:  400 mg on  11/30/2018  9:32 AM   Next Dose Due:  12/1                                * acebutolol 400 MG capsule   Commonly known as:  SECTRAL   Take 400 mg by mouth daily as needed (Palpitations) = extra dose in addition to scheduled daily dose   Last time this was given:  400 mg on 11/30/2018  9:32 AM                                   acetaminophen 500 MG tablet   Commonly known as:  TYLENOL   Take 1,000 mg by mouth every 6 hours as needed for mild pain   Last time this was given:  650 mg on 11/29/2018  8:49 PM                                   albuterol 108 (90 Base) MCG/ACT inhaler   Commonly known as:  PROAIR HFA/PROVENTIL HFA/VENTOLIN HFA   Inhale 2 puffs into the lungs every 4 hours as needed for shortness of breath / dyspnea or wheezing                                   atorvastatin 10 MG tablet   Commonly known as:  LIPITOR   Take 1 tablet (10 mg) by mouth every evening   Last time this was given:  40 mg on 11/29/2018 10:14 AM   Next Dose Due:  11/30                                   BEVESPI AEROSPHERE 9-4.8 MCG/ACT oral inhaler   Inhale 2 puffs into the lungs every evening   Generic drug:  Glycopyrrolate-Formoterol   Next Dose Due:  11/30                                   brimonidine 0.15 % ophthalmic solution   Commonly known as:  ALPHAGAN-P   Place 1 drop into both eyes 3 times daily   Last time this was given:  You took a dose at 1100 11/30/18   Next Dose Due:  2nd dose today 11/30                                         clopidogrel 75 MG tablet   Commonly known as:  PLAVIX   Take 1 tablet (75 mg) by mouth daily   Start taking on:  12/1/2018   Last time this was given:  300 mg on 11/30/2018 11:13 AM   Next Dose Due:  12/1                                   diltiazem  MG 24 hr capsule   Commonly known as:  DILT-XR   Take 1 capsule (120 mg) by mouth daily   Start taking on:  12/1/2018   Last time this was given:  120 mg on 11/30/2018  9:32 AM   Next Dose Due:  12/1                                   dorzolamide  2 % ophthalmic solution   Commonly known as:  TRUSOPT   Place 1 drop into both eyes 3 times daily   Last time this was given:  1 drop on 11/30/2018 11:16 AM   Next Dose Due:  2nd dose today 11/30                                         hydrochlorothiazide 25 MG tablet   Commonly known as:  HYDRODIURIL   Take 1 tablet (25 mg) by mouth daily   Start taking on:  12/1/2018   Last time this was given:  25 mg on 11/30/2018  9:32 AM   Next Dose Due:  12/1                                   latanoprost 0.005 % ophthalmic solution   Commonly known as:  XALATAN   Place 1 drop into both eyes every evening   Last time this was given:  1 drop on 11/29/2018  9:54 PM   Next Dose Due:  11/30                                   levothyroxine 125 MCG tablet   Commonly known as:  SYNTHROID/LEVOTHROID   Take 125 mcg by mouth daily   Last time this was given:  125 mcg on 11/30/2018  7:07 AM   Next Dose Due:  12/1                                   losartan 100 MG tablet   Commonly known as:  COZAAR   Take 1 tablet (100 mg) by mouth daily   Start taking on:  12/1/2018   Last time this was given:  100 mg on 11/30/2018  9:32 AM   Next Dose Due:  12/1                                   Multi-vitamin tablet   Take 1 tablet by mouth daily   Next Dose Due:  12/1   Notes to Patient:  Not given in hospital                                   rivaroxaban ANTICOAGULANT 15 MG Tabs tablet   Commonly known as:  XARELTO   Take 1 tablet (15 mg) by mouth daily (with dinner)                    With dinner               * Notice:  This list has 2 medication(s) that are the same as other medications prescribed for you. Read the directions carefully, and ask your doctor or other care provider to review them with you.              More Information        Low-Fat Diet    A low-fat diet will help you lose weight. It also can lower cholesterol and prevent symptoms of gallbladder disease. The average American diet contains up to 50% fat. This means that 50% of all  calories come from fat (about 80 grams to 100 grams of fat per day). Choosing normal portions of foods from the list below can help lower your fat intake. Experts recommend that only 20% to 35% of your daily calories come from fat. The remaining 65% to 80% of calories will come from protein and carbohydrates. This is much healthier for you.  Breads  Ok: Whole-wheat or rye bread, ana or soda crackers, sarah toast, plain rolls, bagels, English muffins  Avoid: Rolls and breads containing whole milk or egg; waffles, pancakes, biscuits, corn bread; cheese crackers, other flavored crackers, pastries, doughnuts  Cereals  Ok: Oatmeal, whole-wheat, bran, multigrain, rice  Avoid: Granola or other cereals that have oil, coconut, or more than 2 grams of fat per serving  Cheese and eggs  Ok: Cheeses labeled low-fat; 3 whole eggs per week; egg whites and egg substitutes as desired  Avoid: All other cheeses  Desserts  Ok: Gelatin, slushy, pipo food cake, meringues, nonfat yogurt, and puddings or sherbet made with nonfat milk  Avoid: Any other store-bought desserts, or desserts that have fat, whole milk, cream, chocolate, and coconut  Drinks  Ok: Nonfat milk, coffee, tea, fizzy (carbonated) drinks  Avoid: Whole and reduced-fat milk, evaporated and condensed milk, hot chocolate mixes, milk shakes, malts, eggnog  Fats  Ok: You may have up to 3 teaspoons of fat daily. This can be butter, margarine, mayonnaise, or healthy oils (canola or olive)  Avoid: Cream, nondairy creams, cream cheese, gravies, and cream sauces  Fruits  Ok: All fruits made without fat  Avoid: Coconut, olives  Meats, poultry, fish  Ok: Limit meat to 6 ounces daily (broiled, roasted, baked, grilled, or boiled). Buy lean cuts, and trim off the fat. Try beef, fish, lamb, pork, and canned fish packed in water; also chicken and turkey with the skin removed.  Avoid: Fried meats, fish, or poultry; fried eggs, and fish canned in oils; fatty meats such as philip, sausage,  corned beef, hot dogs, and lunch meats; meats with gravies and sauces  Potatoes, beans, pasta  Ok: Dried beans, split peas, lentils, potatoes, rice, pasta made without added fat  Avoid: French fries, potato chips, potatoes prepared with butter, refried beans  Soups  Ok: Clear broth soups without fat and with allowed vegetables  Avoid: Cream-based soups  Vegetables  Ok: Fresh, frozen, canned or dried vegetables, all made without added fat  Avoid: Fried vegetables and those prepared with butter, cream, sauces  Miscellaneous  Ok: Salt, sugar, jelly, hard candy, marshmallows, honey, syrup, spices and herbs, mustard, ketchup, lemon, and vinegar. Try to limit sweets and added sugars.  Avoid: Chocolate, nuts, coconut, and cream candies; sunflower, sesame, and other seeds; fried foods; cream sauces and gravies; pizza  Date Last Reviewed: 8/1/2016 2000-2018 [employer]. [address] [place] [address]. All rights reserved. This information is not intended as a substitute for professional medical care. Always follow your healthcare professional's instructions.

## 2018-11-26 NOTE — ED NOTES
Cardizem given only gave 5mg IV and pt's HR down to 85-90, pt denies CP, SOB feels good. Monitor family has gone home and will return

## 2018-11-26 NOTE — H&P
History and Physical     Zee Mireles MRN# [id number]   YOB: 1940 Age: 78 year old      Date of Admission:  (Not on file)    Primary care provider: Jessica Fitzgerald          Assessment and Plan:   Zee Mireles is a 78 year old female with history of HTN, COPD, smoking, HLD who presents after an episode of chest and back discomfort followed by palpitations and is found to be in Afib with RVR in the 130's. Troponin elevated at 0.190. Transferred from outside [facility] ED.    Atrial fibrillation with RVR, converted to NSR without ischemic changes on subsequent EKG. CHADSVasc 4   NSTEMI, with CP preceding palpitations  HTN, HLD  - Continue PTA ASA, acebutolol 400 mg daily, irbesartan  - Stop dilt gtt and replace PTA amlodipine with diltiazem  mg daily.  - Change PTA simvastatin 20 to atorvastatin 10 mg (interaction btw diltiazem + simvastatin)   - Given preceding chest and back discomfort, multiple CAD risk factors, mild troponin elevation question underlying ischemic disease leading to atrial fibrillation, continue heparin gtt and cardiology consulted regarding ischemic workup with angiogram vs stress test.   - Tele, follow troponin.   - Echocardiogram   - Pharmacy consult for DOAC coverage at discharge, and discussed this with patient.     Hypothyroidism - TSH up  - continue PTA synthroid, dose recently increased    COPD (stable), smoker - Down to 8 cigs per day. Patient has already made a plan with her provider and has nicotine replacement at home.   - patch ordered.   - continue PRN albuterol    DVT prophylaxis   - on heparin    Code Status   - discussed, wants to be DNI. She would want only want initial attempts made at cardiovascular resuscitation but would never want to end up on a ventilator and would not want prolonged resuscitation.                   Chief Complaint:   Rapid heart rate.          History of Present Illness:   Zee Mireles is a 78 year old female with history  of HTN, COPD, smoking, HLD who presents after an episode of chest and back discomfort followed by palpitations and is found to be in afib with RVR in the 130's. Troponin elevated at 0.190. Transferred from outside [facility] ED.     In ED:  NS bolus 1 L x 1.    mg x 1  Received 6 mg IV x 2   Metoprolol 5 mg IV x 3   Diltiazem 15 mg IV bolus > 5 mg/h infusion. HR's in the 80's.   IV heparin gtt started.              Past Medical History:   HTN  COPD, smoker   Hypothyroid   Hyperlipidemia   Glaucoma         Past Surgical History:   No surgeries other than dermatologic and eye surgeries          Social History:   Lives with son and daughter in law.   Smokes 8 cigarettes today.   Ambulates independently, drives            Family History:   No known h/o heart disease but mother  suddenly in her 50's of unknown cause. She had a goiter.   Father  of stomach cancer.           Immunizations:     There is no immunization history on file for this patient.         Allergies:   No Known Allergies          Medications:     Prescription Medications as of 2018             acebutolol (SECTRAL) 400 MG capsule Take 400 mg by mouth every morning At 0900, may take 1 later in the day if needed    acetaminophen (TYLENOL) 500 MG tablet Take 1,000 mg by mouth every 6 hours as needed for mild pain    amLODIPine (NORVASC) 5 MG tablet Take 5 mg by mouth daily    aspirin (ASA) 81 MG tablet Take 81 mg by mouth daily    brimonidine (ALPHAGAN-P) 0.15 % ophthalmic solution Place 1 drop into both eyes 3 times daily    dorzolamide (TRUSOPT) 2 % ophthalmic solution Place 1 drop into both eyes 3 times daily    Glycopyrrolate-Formoterol (BEVESPI AEROSPHERE) 9-4.8 MCG/ACT oral inhaler Inhale 2 puffs into the lungs every evening    irbesartan (AVAPRO) 300 MG tablet Take 300 mg by mouth daily    latanoprost (XALATAN) 0.005 % ophthalmic solution Place 1 drop into both eyes every evening     levothyroxine (SYNTHROID/LEVOTHROID) 125  "MCG tablet Take 125 mcg by mouth daily    multivitamin, therapeutic with minerals (MULTI-VITAMIN) TABS tablet Take 1 tablet by mouth daily    simvastatin (ZOCOR) 20 MG tablet Take 20 mg by mouth At Bedtime    albuterol (PROAIR HFA/PROVENTIL HFA/VENTOLIN HFA) 108 (90 Base) MCG/ACT inhaler Inhale 2 puffs into the lungs every 4 hours as needed for shortness of breath / dyspnea or wheezing      Facility Administered Medications as of 11/26/2018             0.9% sodium chloride BOLUS Inject 1,000 mLs into the vein once    Linked Group 1:  \"Followed by\" Linked Group Details     adenosine (ADENOCARD) injection 6 mg Inject 2 mLs (6 mg) into the vein once    aspirin (ASA) 81 MG chewable tablet     aspirin (ASA) chewable tablet 324 mg Take 4 tablets (324 mg) by mouth once    diltiazem (CARDIZEM) injection 15.3 mg Inject 3.06 mLs (15.3 mg) into the vein once    heparin infusion 25,000 units in 0.45% NaCl 250 mL Inject 700 Units/hr into the vein continuous    heparin Loading Dose bolus dose from infusion pump 3,400 Units Inject 3,400 Units into the vein once    metoprolol (LOPRESSOR) injection 5 mg Inject 5 mLs (5 mg) into the vein every 5 minutes    metoprolol (LOPRESSOR) injection 5 mg Inject 5 mLs (5 mg) into the vein once    sodium chloride 0.9% infusion Inject 1,000 mLs into the vein continuous    Linked Group 1:  \"Followed by\" Linked Group Details     aspirin (ASA) chewable tablet 324 mg (Discontinued) Take 4 tablets (324 mg) by mouth once                Review of Systems:   The 10 point Review of Systems is negative other than noted in the HPI              Physical Exam:   There were no vitals taken for this visit.    Constitutional:   awake, alert, cooperative, no apparent distress, and appears stated age     Eyes:   Lids and lashes normal, pupils equal, round and reactive to light, extra ocular muscles intact, sclera clear, conjunctiva normal     ENT:   Normocephalic, without obvious abnormality, atramatic, oral " pharynx with moist mucus membranes, tonsils without erythema or exudates .     Neck:   Supple, symmetrical, trachea midline, no adenopathy, thyroid symmetric, not enlarged and no tenderness, skin normal     Lungs:   No increased work of breathing, good air exchange, clear to auscultation bilaterally, no crackles or wheezing     Cardiovascular:   Regular rate and rhythm, normal S1 and S2, no S3 or S4, and no murmur noted. Extremities are warm. No edema.      Abdomen:   Normal bowel sounds, soft, non-distended, non-tender, no masses palpated, no hepatosplenomegally     Musculoskeletal:   There is no redness, warmth, or swelling of the joints. Normal build for her age.      Neurologic:   Awake, alert, oriented to name, place and time.  Cranial nerves II-XII are grossly intact.  Moving a 4 extremities without gross focal weakness.     Neuropsychiatric:   General: normal, calm and normal eye contact, Very pleasant      Skin:   no bruising or bleeding, no redness, warmth, or swelling and no rashes            Data:     Results for orders placed or performed during the hospital encounter of 11/26/18   CBC with platelets differential   Result Value Ref Range    WBC 9.7 4.0 - 11.0 10e9/L    RBC Count 4.67 3.8 - 5.2 10e12/L    Hemoglobin 15.0 11.7 - 15.7 g/dL    Hematocrit 46.2 35.0 - 47.0 %    MCV 99 78 - 100 fl    MCH 32.1 26.5 - 33.0 pg    MCHC 32.5 31.5 - 36.5 g/dL    RDW 16.3 (H) 10.0 - 15.0 %    Platelet Count 269 150 - 450 10e9/L    Diff Method Automated Method     % Neutrophils 61.1 %    % Lymphocytes 22.8 %    % Monocytes 12.0 %    % Eosinophils 3.3 %    % Basophils 0.6 %    % Immature Granulocytes 0.2 %    Nucleated RBCs 0 0 /100    Absolute Neutrophil 5.9 1.6 - 8.3 10e9/L    Absolute Lymphocytes 2.2 0.8 - 5.3 10e9/L    Absolute Monocytes 1.2 0.0 - 1.3 10e9/L    Absolute Eosinophils 0.3 0.0 - 0.7 10e9/L    Absolute Basophils 0.1 0.0 - 0.2 10e9/L    Abs Immature Granulocytes 0.0 0 - 0.4 10e9/L    Absolute Nucleated  RBC 0.0    Comprehensive metabolic panel   Result Value Ref Range    Sodium 139 133 - 144 mmol/L    Potassium 4.1 3.4 - 5.3 mmol/L    Chloride 104 94 - 109 mmol/L    Carbon Dioxide 29 20 - 32 mmol/L    Anion Gap 6 3 - 14 mmol/L    Glucose 99 70 - 99 mg/dL    Urea Nitrogen 23 7 - 30 mg/dL    Creatinine 1.01 0.52 - 1.04 mg/dL    GFR Estimate 72 >60 mL/min/1.7m2    GFR Estimate If Black 87 >60 mL/min/1.7m2    Calcium 8.5 8.5 - 10.1 mg/dL    Bilirubin Total 0.4 0.2 - 1.3 mg/dL    Albumin 3.6 3.4 - 5.0 g/dL    Protein Total 7.5 6.8 - 8.8 g/dL    Alkaline Phosphatase 79 40 - 150 U/L    ALT 26 0 - 50 U/L    AST 24 0 - 45 U/L   Troponin I   Result Value Ref Range    Troponin I ES 0.182 (HH) 0.000 - 0.045 ug/L   TSH with free T4 reflex   Result Value Ref Range    TSH 8.11 (H) 0.40 - 4.00 mU/L   UA without Microscopic   Result Value Ref Range    Color Urine Straw     Appearance Urine Clear     Glucose Urine Negative NEG^Negative mg/dL    Bilirubin Urine Negative NEG^Negative    Ketones Urine Negative NEG^Negative mg/dL    Specific Gravity Urine 1.003 1.003 - 1.035    Blood Urine Negative NEG^Negative    pH Urine 6.0 5.0 - 7.0 pH    Protein Albumin Urine Negative NEG^Negative mg/dL    Urobilinogen mg/dL 0.0 0.0 - 2.0 mg/dL    Nitrite Urine Negative NEG^Negative    Leukocyte Esterase Urine Negative NEG^Negative    Source Midstream Urine    T4 free   Result Value Ref Range    T4 Free 1.23 0.76 - 1.46 ng/dL

## 2018-11-26 NOTE — PHARMACY-ANTICOAGULATION SERVICE
Patient to start the heparin C-V/Vascular protocol with a goal anti 10a level of 0.15-0.35. Using a HT of 63.5 inches and a WT of 61.2 kg, a dosing WT of 56.6 kg was calculated. Based on this dosing WT, give patient a heparin bolus of 3400 units from the bag and then start a drip at 700 units/hr. Check the patient's anti 10a level 6 hours after starting the drip at ~8 pm.   Per C-V/Vascular protocol.    Lida Pierre Pharm.D.

## 2018-11-26 NOTE — ED NOTES
Pt remains a/o x 4, no distress note, family at bedside with MD in room talking about plan of care. Monitor

## 2018-11-26 NOTE — ED NOTES
DATE:  11/26/2018   TIME OF RECEIPT FROM LAB:  1058  LAB TEST:  Troponin  LAB VALUE:  0.182  RESULTS GIVEN WITH READ-BACK TO (PROVIDER):  Prem Franz, *  TIME LAB VALUE REPORTED TO PROVIDER:   9726

## 2018-11-26 NOTE — ED NOTES
"Pt woke last noc 2200 with upper chest/gastric pain, she thought it was GERD, she used Pepto and had some relief, she had more upper back pain and denies chest pain, states it was \"gas like\" felt better on her right side. Had spaghetti and pie and was sure that was what caused her upper gastric pain    "

## 2018-11-26 NOTE — IP AVS SNAPSHOT
[facility]    [address] [place] [address]., [address]    [place] [address]    Phone:  [phone]                                       After Visit Summary   11/26/2018    Zee Mireles    MRN: [id number]           After Visit Summary Signature Page     I have received my discharge instructions, and my questions have been answered. I have discussed any challenges I see with this plan with the nurse or doctor.    ..........................................................................................................................................  Patient/Patient Representative Signature      ..........................................................................................................................................  Patient Representative Print Name and Relationship to Patient    ..................................................               ................................................  Date                                   Time    ..........................................................................................................................................  Reviewed by Signature/Title    ...................................................              ..............................................  Date                                               Time          22EPIC Rev 08/18

## 2018-11-26 NOTE — ED NOTES
Pt remains a/o x 4, denies Cp, SOB was up to Br with SBA only, she tolerated activity well. Monitor

## 2018-11-27 ENCOUNTER — APPOINTMENT (OUTPATIENT)
Dept: NUCLEAR MEDICINE | Facility: CLINIC | Age: 78
DRG: 247 | End: 2018-11-27
Attending: INTERNAL MEDICINE
Payer: COMMERCIAL

## 2018-11-27 ENCOUNTER — APPOINTMENT (OUTPATIENT)
Dept: CARDIOLOGY | Facility: CLINIC | Age: 78
DRG: 247 | End: 2018-11-27
Attending: INTERNAL MEDICINE
Payer: COMMERCIAL

## 2018-11-27 LAB
ANION GAP SERPL CALCULATED.3IONS-SCNC: 6 MMOL/L (ref 3–14)
BUN SERPL-MCNC: 20 MG/DL (ref 7–30)
CALCIUM SERPL-MCNC: 8.1 MG/DL (ref 8.5–10.1)
CHLORIDE SERPL-SCNC: 111 MMOL/L (ref 94–109)
CO2 SERPL-SCNC: 25 MMOL/L (ref 20–32)
CREAT SERPL-MCNC: 0.89 MG/DL (ref 0.52–1.04)
ERYTHROCYTE [DISTWIDTH] IN BLOOD BY AUTOMATED COUNT: 16.3 % (ref 10–15)
GFR SERPL CREATININE-BSD FRML MDRD: 62 ML/MIN/1.7M2
GLUCOSE SERPL-MCNC: 85 MG/DL (ref 70–99)
HCT VFR BLD AUTO: 36.8 % (ref 35–47)
HGB BLD-MCNC: 12.4 G/DL (ref 11.7–15.7)
LMWH PPP CHRO-ACNC: 0.3 IU/ML
MCH RBC QN AUTO: 31.6 PG (ref 26.5–33)
MCHC RBC AUTO-ENTMCNC: 33.7 G/DL (ref 31.5–36.5)
MCV RBC AUTO: 94 FL (ref 78–100)
PLATELET # BLD AUTO: 185 10E9/L (ref 150–450)
POTASSIUM SERPL-SCNC: 3.7 MMOL/L (ref 3.4–5.3)
RBC # BLD AUTO: 3.92 10E12/L (ref 3.8–5.2)
SODIUM SERPL-SCNC: 142 MMOL/L (ref 133–144)
TROPONIN I SERPL-MCNC: 0.14 UG/L (ref 0–0.04)
WBC # BLD AUTO: 7.6 10E9/L (ref 4–11)

## 2018-11-27 PROCEDURE — 99232 SBSQ HOSP IP/OBS MODERATE 35: CPT | Performed by: PHYSICIAN ASSISTANT

## 2018-11-27 PROCEDURE — 25000132 ZZH RX MED GY IP 250 OP 250 PS 637: Performed by: INTERNAL MEDICINE

## 2018-11-27 PROCEDURE — 93306 TTE W/DOPPLER COMPLETE: CPT | Mod: 26 | Performed by: INTERNAL MEDICINE

## 2018-11-27 PROCEDURE — 25500064 ZZH RX 255 OP 636: Performed by: INTERNAL MEDICINE

## 2018-11-27 PROCEDURE — 40000264 ECHO COMPLETE WITH OPTISON

## 2018-11-27 PROCEDURE — 80048 BASIC METABOLIC PNL TOTAL CA: CPT | Performed by: INTERNAL MEDICINE

## 2018-11-27 PROCEDURE — 84484 ASSAY OF TROPONIN QUANT: CPT | Performed by: INTERNAL MEDICINE

## 2018-11-27 PROCEDURE — 25000128 H RX IP 250 OP 636: Performed by: INTERNAL MEDICINE

## 2018-11-27 PROCEDURE — 78452 HT MUSCLE IMAGE SPECT MULT: CPT

## 2018-11-27 PROCEDURE — 78452 HT MUSCLE IMAGE SPECT MULT: CPT | Mod: 26 | Performed by: INTERNAL MEDICINE

## 2018-11-27 PROCEDURE — 85520 HEPARIN ASSAY: CPT | Performed by: INTERNAL MEDICINE

## 2018-11-27 PROCEDURE — 93016 CV STRESS TEST SUPVJ ONLY: CPT | Performed by: INTERNAL MEDICINE

## 2018-11-27 PROCEDURE — A9502 TC99M TETROFOSMIN: HCPCS | Performed by: INTERNAL MEDICINE

## 2018-11-27 PROCEDURE — 99221 1ST HOSP IP/OBS SF/LOW 40: CPT | Mod: 25 | Performed by: INTERNAL MEDICINE

## 2018-11-27 PROCEDURE — 93018 CV STRESS TEST I&R ONLY: CPT | Performed by: INTERNAL MEDICINE

## 2018-11-27 PROCEDURE — 21000001 ZZH R&B HEART CARE

## 2018-11-27 PROCEDURE — 34300033 ZZH RX 343: Performed by: INTERNAL MEDICINE

## 2018-11-27 PROCEDURE — 36415 COLL VENOUS BLD VENIPUNCTURE: CPT | Performed by: INTERNAL MEDICINE

## 2018-11-27 PROCEDURE — 40000855 ZZH STATISTIC ECHO STRESS OR NM NPI

## 2018-11-27 PROCEDURE — 85027 COMPLETE CBC AUTOMATED: CPT | Performed by: INTERNAL MEDICINE

## 2018-11-27 PROCEDURE — 25000125 ZZHC RX 250: Performed by: INTERNAL MEDICINE

## 2018-11-27 RX ORDER — ACYCLOVIR 200 MG/1
0-1 CAPSULE ORAL
Status: DISCONTINUED | OUTPATIENT
Start: 2018-11-27 | End: 2018-11-27

## 2018-11-27 RX ORDER — ALBUTEROL SULFATE 90 UG/1
2 AEROSOL, METERED RESPIRATORY (INHALATION) EVERY 5 MIN PRN
Status: DISCONTINUED | OUTPATIENT
Start: 2018-11-27 | End: 2018-11-27

## 2018-11-27 RX ORDER — REGADENOSON 0.08 MG/ML
0.4 INJECTION, SOLUTION INTRAVENOUS ONCE
Status: COMPLETED | OUTPATIENT
Start: 2018-11-27 | End: 2018-11-27

## 2018-11-27 RX ORDER — AMINOPHYLLINE 25 MG/ML
50-100 INJECTION, SOLUTION INTRAVENOUS
Status: DISCONTINUED | OUTPATIENT
Start: 2018-11-27 | End: 2018-11-27

## 2018-11-27 RX ADMIN — BRIMONIDINE TARTRATE 1 DROP: 2 SOLUTION OPHTHALMIC at 18:18

## 2018-11-27 RX ADMIN — DORZOLAMIDE HCL 1 DROP: 20 SOLUTION/ DROPS OPHTHALMIC at 18:19

## 2018-11-27 RX ADMIN — REGADENOSON 0.4 MG: 0.08 INJECTION, SOLUTION INTRAVENOUS at 14:05

## 2018-11-27 RX ADMIN — TETROFOSMIN 26.8 MCI.: 1.38 INJECTION, POWDER, LYOPHILIZED, FOR SOLUTION INTRAVENOUS at 14:08

## 2018-11-27 RX ADMIN — BRIMONIDINE TARTRATE 1 DROP: 2 SOLUTION OPHTHALMIC at 21:08

## 2018-11-27 RX ADMIN — LATANOPROST 1 DROP: 50 SOLUTION OPHTHALMIC at 21:08

## 2018-11-27 RX ADMIN — ATORVASTATIN CALCIUM 10 MG: 10 TABLET, FILM COATED ORAL at 21:08

## 2018-11-27 RX ADMIN — BRIMONIDINE TARTRATE 1 DROP: 2 SOLUTION OPHTHALMIC at 08:44

## 2018-11-27 RX ADMIN — HEPARIN SODIUM 700 UNITS/HR: 10000 INJECTION, SOLUTION INTRAVENOUS at 21:12

## 2018-11-27 RX ADMIN — DORZOLAMIDE HCL 1 DROP: 20 SOLUTION/ DROPS OPHTHALMIC at 21:08

## 2018-11-27 RX ADMIN — ASPIRIN 81 MG: 81 TABLET, COATED ORAL at 08:44

## 2018-11-27 RX ADMIN — IRBESARTAN 300 MG: 150 TABLET ORAL at 08:45

## 2018-11-27 RX ADMIN — UMECLIDINIUM BROMIDE AND VILANTEROL TRIFENATATE 1 PUFF: 62.5; 25 POWDER RESPIRATORY (INHALATION) at 21:08

## 2018-11-27 RX ADMIN — TETROFOSMIN 9.2 MCI.: 1.38 INJECTION, POWDER, LYOPHILIZED, FOR SOLUTION INTRAVENOUS at 12:00

## 2018-11-27 RX ADMIN — DILTIAZEM HYDROCHLORIDE 120 MG: 120 CAPSULE, EXTENDED RELEASE ORAL at 08:44

## 2018-11-27 RX ADMIN — HUMAN ALBUMIN MICROSPHERES AND PERFLUTREN 9 ML: 10; .22 INJECTION, SOLUTION INTRAVENOUS at 10:59

## 2018-11-27 RX ADMIN — LEVOTHYROXINE SODIUM 125 MCG: 125 TABLET ORAL at 08:45

## 2018-11-27 RX ADMIN — ACEBUTOLOL HYDROCHLORIDE 400 MG: 200 CAPSULE ORAL at 08:44

## 2018-11-27 RX ADMIN — DORZOLAMIDE HCL 1 DROP: 20 SOLUTION/ DROPS OPHTHALMIC at 08:45

## 2018-11-27 ASSESSMENT — ACTIVITIES OF DAILY LIVING (ADL)
ADLS_ACUITY_SCORE: 9

## 2018-11-27 NOTE — PROGRESS NOTES
Lexiscan Stress: Pt tolerated well. VSS. Pt denied chest pain or pressure prior to injection. After injection pt reported level 3/10 lateral neck & posterior jaw discomfort that radiated then totally into jaw - resolved within 5 minutes. Pt denied chest pain or pressure.     1415 Pt transferred back to  259-1 per w/c. Detailed report called to Alba Gutierrez RN.

## 2018-11-27 NOTE — CONSULTS
Consult Date:  11/27/2018      REASON FOR CONSULTATION:  Paroxysmal atrial fibrillation and mildly elevated troponin.       HISTORY OF PRESENT ILLNESS:  The patient is a 78-year-old female with history of hypertension, COPD, nicotine dependence and hyperlipidemia who presented to [facility] yesterday morning with complaints of heart racing.  The night before, on her presentation around 11:00, she noted a burning epigastric discomfort.  She felt like she needed to belch.  She tried that, but the discomfort persisted.  She took Tums, which did not relieve her symptoms.  She also took Pepto-Bismol, which ultimately resolved her discomfort. Around 1 am this morning she woke up with palpitations and racing heartbeat.  She checked her heart rate and it was in the 130s.       Her symptoms persisted there fore she came to the Emergency Department around 9:00 this morning.  EKG demonstrated atrial fibrillation with rapid ventricular response.  Her troponin was mildly elevated at 0.190.  This subsequently trended down to 0.136.      She denies any recent exertional chest pain or chest pressure.  She denies exertional shortness of breath.  This is the first episode of atrial fibrillation as far she knows.  The epigastric discomfort that she had is now resolved.      ALLERGIES:  NO KNOWN DRUG ALLERGIES.      REVIEW OF SYSTEMS:  Compressive review of systems was performed and essentially negative except as mentioned in the HPI.      CURRENT MEDICATIONS:   1.  Heparin infusion.   2.  Aspirin 81 mg daily.   3.  Lipitor 10 mg daily.   4.  Diltiazem 120 mg daily.   5.  Irbesartan 300 mg daily.   6.  Levothyroxine 125 mcg daily.      PAST MEDICAL HISTORY:  Hypertension, COPD, hypothyroidism, hyperlipidemia and glaucoma.      SOCIAL HISTORY:  She lives with her son.  She smokes about half pack a day.  Denies excessive alcohol use.      FAMILY HISTORY:  Negative for premature coronary artery disease.      REVIEW OF  SYSTEMS:  Comprehensive review of systems was performed and essentially negative except as mentioned in the HPI.      PHYSICAL EXAMINATION:   VITAL SIGNS:  Blood pressure is 150/66, pulse is 71.   GENERAL:  She is resting, appears to be in no acute distress.   NECK:  Jugular venous pressure is normal.  Carotid upstrokes brisk.   LUNGS:  Clear to auscultation bilaterally.   HEART:  Normal S1, S2, no murmurs, rubs or gallops.   ABDOMEN:  Soft, nontender.   EXTREMITIES:  Warm, no edema.   VESSELS:  2+ radial, 2+ femoral.   NEUROLOGIC:  No focal deficits.      IMPRESSION AND PLAN:   1.  Atrial fibrillation with rapid ventricular response, now in sinus rhythm.   2.  Mild troponin elevation, likely demand related.   3.  Chronic obstructive pulmonary disease.   4.  Nicotine dependence.   5.  Hypertension.   6.  Hyperlipidemia.      The patient spontaneously converted to sinus rhythm upon admission to the hospital and remains in normal sinus rhythm.  Post-AFib EKG shows no evidence of acute ST-T change.  There is also no evidence of Q-waves to suggest prior myocardial infarction.      She has not had any recurrent epigastric burning which she had the night before admission.  The mild troponin elevation without significant delta is likely related to demand from the atrial fibrillation.  However, given her risk factors, we will perform nuclear stress test to rule out significant underlying CAD.  If the stress test shows ischemia, then we will proceed with an angiogram.      Her CHADS-VASc score is high, therefore she will benefit from anticoagulation.  We will most likely discharge her on DOAC. Continue aspirin and beta blocker.      I appreciate the opportunity to participate in this patient's care.         THIERRY TAM MD             D: 2018   T: 2018   MT: JH      Name:     MATT JENNINGS   MRN:      [id number]        Account:       [id number]   :      1940           Consult Date:  2018       Document: [id number]

## 2018-11-27 NOTE — PLAN OF CARE
Problem: Patient Care Overview  Goal: Plan of Care/Patient Progress Review  Outcome: No Change  VSS. Monitor remains Sinus rhythm. Pt. Denies pain. Heparin drip continues at 700 unit(s)/hr. Lexiscan stress test per order. Continue to monitor.

## 2018-11-27 NOTE — PHARMACY-ADMISSION MEDICATION HISTORY
Admission Medication History    Admission medication history interview status for the 11/26/2018 admission is complete. See EPIC admission navigator for prior to admission medications     Medication history source reliability:Good    Actions taken by pharmacist (provider contacted, etc):None     Additional medication history information not noted on PTA med list :None    Medication reconciliation/reorder completed by provider prior to medication history? No    Time spent in this activity: 15 minutes    Prior to Admission medications    Medication Sig Last Dose Taking? Auth Provider   acebutolol (SECTRAL) 400 MG capsule Take 400 mg by mouth every morning At 0900, may take 1 later in the day if needed 11/26/2018 at AM Yes Reported, Patient   acebutolol (SECTRAL) 400 MG capsule Take 400 mg by mouth daily as needed (Palpitations) = extra dose in addition to scheduled daily dose 11/26/2018 at AM Yes Unknown, Entered By History   acetaminophen (TYLENOL) 500 MG tablet Take 1,000 mg by mouth every 6 hours as needed for mild pain  at PRN Yes Reported, Patient   albuterol (PROAIR HFA/PROVENTIL HFA/VENTOLIN HFA) 108 (90 Base) MCG/ACT inhaler Inhale 2 puffs into the lungs every 4 hours as needed for shortness of breath / dyspnea or wheezing  at PRN Yes Reported, Patient   amLODIPine (NORVASC) 5 MG tablet Take 5 mg by mouth daily 11/26/2018 at AM Yes Reported, Patient   aspirin (ASA) 81 MG tablet Take 81 mg by mouth daily 11/26/2018 at AM Yes Reported, Patient   brimonidine (ALPHAGAN-P) 0.15 % ophthalmic solution Place 1 drop into both eyes 3 times daily 11/26/2018 at x1 Yes Reported, Patient   dorzolamide (TRUSOPT) 2 % ophthalmic solution Place 1 drop into both eyes 3 times daily 11/26/2018 at x1 Yes Reported, Patient   Glycopyrrolate-Formoterol (BEVESPI AEROSPHERE) 9-4.8 MCG/ACT oral inhaler Inhale 2 puffs into the lungs every evening 11/25/2018 at PM Yes Deb(Chirag)[place]   irbesartan (AVAPRO) 300 MG tablet Take 300 mg  by mouth daily 11/26/2018 at AM Yes Reported, Patient   latanoprost (XALATAN) 0.005 % ophthalmic solution Place 1 drop into both eyes every evening  11/25/2018 at HS Yes Reported, Patient   levothyroxine (SYNTHROID/LEVOTHROID) 125 MCG tablet Take 125 mcg by mouth daily 11/26/2018 at AM Yes Reported, Patient   multivitamin, therapeutic with minerals (MULTI-VITAMIN) TABS tablet Take 1 tablet by mouth daily 11/25/2018 at AM Yes Reported, Patient   simvastatin (ZOCOR) 20 MG tablet Take 20 mg by mouth At Bedtime 11/25/2018 at HS Yes Reported, Patient       Mike Fuentes, PharmD

## 2018-11-27 NOTE — PLAN OF CARE
Problem: Patient Care Overview  Goal: Plan of Care/Patient Progress Review  Outcome: No Change  AOx4. VSS on RA. Heparin @ 700 units/hr. Tele SR, Denies chest pain or SOB. Up Independent to bathroom. Echo to be done this am, cards to consult/follow, discharge pending

## 2018-11-27 NOTE — PLAN OF CARE
Problem: Arrhythmia/Dysrhythmia (Symptomatic) (Adult)  Goal: Signs and Symptoms of Listed Potential Problems Will be Absent, Minimized or Managed (Arrhythmia/Dysrhythmia)  Signs and symptoms of listed potential problems will be absent, minimized or managed by discharge/transition of care (reference Arrhythmia/Dysrhythmia (Symptomatic) (Adult) CPG).   Outcome: Improving  Transfer from [facility] at 1830. A/o x4. VSS. RA. IVF heparin. Converted from Aflutter to NSR in ambulance. Tele continues to be NSR. Denies chest pain or SOB. Up Independent to bathroom. Following troponin's. Fasting Labs ordered for AM.Cardiology to consult. Echocardiogram to be completed.

## 2018-11-27 NOTE — PROGRESS NOTES
Critical Lab Result.  Troponin 2312. - 0.173.   No call to provider due to this is a decline in troponin.   Will continue to monitor patient.  Mabel Renteria RN BSN

## 2018-11-27 NOTE — CONSULTS
Medication coverage check for NOAC. Eliquis or Xarelto cost $149 monthly due to the patient being in the coverage gap. Eligible for one month free trial from discharge pharmacy. Pradaxa not preferred. Please note that in the new year, the patient will likely have a deductible to meet again.  Mery Crowley CphT  [facility] Discharge Pharmacy Liaison  Liaison Cell: [phone]

## 2018-11-27 NOTE — PROGRESS NOTES
[facility]    Hospitalist Progress Note    Assessment & Plan   Zee Mireles is a 78 year old female with history of HTN, COPD, smoking, HLD who presents after an episode of chest and back discomfort followed by palpitations and is found to be in Afib with RVR in the 130's. Troponin elevated at 0.190. Transferred from outside [facility] ED.     Atrial fibrillation with RVR, converted to NSR without ischemic changes on subsequent EKG. CHADSVasc 4   NSTEMI, with CP preceding palpitations  HTN, HLD  Troponin down-trending from peak 0.190.   - Continue PTA ASA, acebutolol 400 mg daily, irbesartan  - Stop dilt gtt and replace PTA amlodipine with diltiazem  mg daily  - Change PTA simvastatin 20 to atorvastatin 10 mg (interaction btw diltiazem + simvastatin)   - Cardiology consulted to evaluate for possible ischemic workup given multiple CAD risk factors and troponin elevation  - Remains on heparin drip  - Tele  - Echocardiogram   - Pharmacy consult for DOAC coverage at discharge     Hypothyroidism   TSH 8.11, free T4 1.23  - continue PTA synthroid, dose recently increased     COPD (stable), smoker   Down to 8 cigs per day. Patient has already made a plan with her provider and has nicotine replacement at home.   - Patch ordered.   - Continue PRN albuterol    DVT Prophylaxis: heparin drip  Code Status: DNI    Disposition: Expected discharge in 1-2 days pending cardiac workup.    Mychal Urbina PA-C    Interval History   Pt seen & evaluated. Denies further sensation of tachycardia, palpitations, chest pressure. Feels comfortable. Awaiting cardiac evaluation. IV site is bothersome and oozing, otherwise no acute concerns.    -Data reviewed today: I reviewed all new labs and imaging results over the last 24 hours. I personally reviewed no images or EKG's today.    Physical Exam   Temp: 98.5  F (36.9  C) Temp src: Oral BP: 150/66   Heart Rate: 71 Resp: 16 SpO2: 96 % O2 Device: None (Room air)     Vitals:    11/26/18 1900 11/27/18 0616   Weight: 61.7 kg (136 lb) 61.2 kg (135 lb)     Vital Signs with Ranges  Temp:  [97.8  F (36.6  C)-98.6  F (37  C)] 98.5  F (36.9  C)  Heart Rate:  [] 71  Resp:  [11-40] 16  BP: ()/() 150/66  SpO2:  [93 %-99 %] 96 %  I/O last 3 completed shifts:  In: 240 [P.O.:240]  Out: 650 [Urine:650]    GEN: well-developed, well-nourished, appears comfortable  PULM: lungs CTA bilaterally, no increased work of breathing, no wheeze, crackles, rhonchi  CV: RRR, S1 & S2, no murmur  GI: soft, nontender, nondistended  SKIN: warm & dry without rash, no pedal edema    Medications     HEParin 700 Units/hr (11/27/18 0838)     - MEDICATION INSTRUCTIONS -       - MEDICATION INSTRUCTIONS -         acebutolol  400 mg Oral QAM     aspirin  81 mg Oral Daily     atorvastatin  10 mg Oral QPM     brimonidine  1 drop Both Eyes TID     diltiazem  120 mg Oral Daily     dorzolamide  1 drop Both Eyes TID     irbesartan  300 mg Oral Daily     latanoprost  1 drop Both Eyes QPM     levothyroxine  125 mcg Oral Daily     nicotine  1 patch Transdermal Daily     nicotine   Transdermal Q8H     nicotine   Transdermal Daily     sodium chloride (PF)  3 mL Intracatheter Q8H     umeclidinium-vilanterol  1 puff Inhalation QPM       Data     Recent Labs  Lab 11/27/18  0600 11/27/18  0150 11/26/18  2205 11/26/18  1458 11/26/18  1014   WBC 7.6  --   --   --  9.7   HGB 12.4  --   --   --  15.0   MCV 94  --   --   --  99     --   --   --  269     --   --   --  139   POTASSIUM 3.7  --   --   --  4.1   CHLORIDE 111*  --   --   --  104   CO2 25  --   --   --  29   BUN 20  --   --   --  23   CR 0.89  --   --   --  1.01   ANIONGAP 6  --   --   --  6   KARI 8.1*  --   --   --  8.5   GLC 85  --   --   --  99   ALBUMIN  --   --   --   --  3.6   PROTTOTAL  --   --   --   --  7.5   BILITOTAL  --   --   --   --  0.4   ALKPHOS  --   --   --   --  79   ALT  --   --   --   --  26   AST  --   --   --   --  24   TROPI   --  0.136* 0.173* 0.190* 0.182*       No results found for this or any previous visit (from the past 24 hour(s)).

## 2018-11-28 LAB — LMWH PPP CHRO-ACNC: 0.24 IU/ML

## 2018-11-28 PROCEDURE — 25000132 ZZH RX MED GY IP 250 OP 250 PS 637: Performed by: INTERNAL MEDICINE

## 2018-11-28 PROCEDURE — 25000128 H RX IP 250 OP 636: Performed by: INTERNAL MEDICINE

## 2018-11-28 PROCEDURE — 99232 SBSQ HOSP IP/OBS MODERATE 35: CPT | Performed by: PHYSICIAN ASSISTANT

## 2018-11-28 PROCEDURE — 36415 COLL VENOUS BLD VENIPUNCTURE: CPT | Performed by: PHYSICIAN ASSISTANT

## 2018-11-28 PROCEDURE — 99233 SBSQ HOSP IP/OBS HIGH 50: CPT | Performed by: NURSE PRACTITIONER

## 2018-11-28 PROCEDURE — 93010 ELECTROCARDIOGRAM REPORT: CPT | Performed by: INTERNAL MEDICINE

## 2018-11-28 PROCEDURE — 93005 ELECTROCARDIOGRAM TRACING: CPT

## 2018-11-28 PROCEDURE — B2111ZZ FLUOROSCOPY OF MULTIPLE CORONARY ARTERIES USING LOW OSMOLAR CONTRAST: ICD-10-PCS | Performed by: INTERNAL MEDICINE

## 2018-11-28 PROCEDURE — 85520 HEPARIN ASSAY: CPT | Performed by: PHYSICIAN ASSISTANT

## 2018-11-28 PROCEDURE — 027034Z DILATION OF CORONARY ARTERY, ONE ARTERY WITH DRUG-ELUTING INTRALUMINAL DEVICE, PERCUTANEOUS APPROACH: ICD-10-PCS | Performed by: INTERNAL MEDICINE

## 2018-11-28 PROCEDURE — 21000001 ZZH R&B HEART CARE

## 2018-11-28 RX ORDER — SODIUM CHLORIDE 9 MG/ML
INJECTION, SOLUTION INTRAVENOUS CONTINUOUS
Status: DISCONTINUED | OUTPATIENT
Start: 2018-11-28 | End: 2018-11-29 | Stop reason: HOSPADM

## 2018-11-28 RX ORDER — POTASSIUM CHLORIDE 1500 MG/1
20 TABLET, EXTENDED RELEASE ORAL
Status: COMPLETED | OUTPATIENT
Start: 2018-11-28 | End: 2018-11-29

## 2018-11-28 RX ORDER — LORAZEPAM 0.5 MG/1
0.5 TABLET ORAL
Status: DISCONTINUED | OUTPATIENT
Start: 2018-11-28 | End: 2018-11-29 | Stop reason: HOSPADM

## 2018-11-28 RX ORDER — LIDOCAINE 40 MG/G
CREAM TOPICAL
Status: DISCONTINUED | OUTPATIENT
Start: 2018-11-28 | End: 2018-11-29 | Stop reason: HOSPADM

## 2018-11-28 RX ORDER — LORAZEPAM 2 MG/ML
0.5 INJECTION INTRAMUSCULAR
Status: DISCONTINUED | OUTPATIENT
Start: 2018-11-28 | End: 2018-11-29 | Stop reason: HOSPADM

## 2018-11-28 RX ADMIN — BRIMONIDINE TARTRATE 1 DROP: 2 SOLUTION OPHTHALMIC at 09:22

## 2018-11-28 RX ADMIN — SODIUM CHLORIDE: 9 INJECTION, SOLUTION INTRAVENOUS at 06:44

## 2018-11-28 RX ADMIN — DORZOLAMIDE HCL 1 DROP: 20 SOLUTION/ DROPS OPHTHALMIC at 09:22

## 2018-11-28 RX ADMIN — ATORVASTATIN CALCIUM 10 MG: 10 TABLET, FILM COATED ORAL at 20:37

## 2018-11-28 RX ADMIN — LATANOPROST 1 DROP: 50 SOLUTION OPHTHALMIC at 20:36

## 2018-11-28 RX ADMIN — DORZOLAMIDE HCL 1 DROP: 20 SOLUTION/ DROPS OPHTHALMIC at 21:35

## 2018-11-28 RX ADMIN — BRIMONIDINE TARTRATE 1 DROP: 2 SOLUTION OPHTHALMIC at 15:43

## 2018-11-28 RX ADMIN — DORZOLAMIDE HCL 1 DROP: 20 SOLUTION/ DROPS OPHTHALMIC at 15:43

## 2018-11-28 RX ADMIN — IRBESARTAN 300 MG: 150 TABLET ORAL at 09:18

## 2018-11-28 RX ADMIN — ACEBUTOLOL HYDROCHLORIDE 400 MG: 200 CAPSULE ORAL at 09:18

## 2018-11-28 RX ADMIN — ASPIRIN 325 MG: 325 TABLET, DELAYED RELEASE ORAL at 09:18

## 2018-11-28 RX ADMIN — LEVOTHYROXINE SODIUM 125 MCG: 125 TABLET ORAL at 09:18

## 2018-11-28 RX ADMIN — BRIMONIDINE TARTRATE 1 DROP: 2 SOLUTION OPHTHALMIC at 21:35

## 2018-11-28 RX ADMIN — DILTIAZEM HYDROCHLORIDE 120 MG: 120 CAPSULE, EXTENDED RELEASE ORAL at 09:18

## 2018-11-28 ASSESSMENT — ACTIVITIES OF DAILY LIVING (ADL)
ADLS_ACUITY_SCORE: 9

## 2018-11-28 NOTE — PLAN OF CARE
Problem: Patient Care Overview  Goal: Plan of Care/Patient Progress Review  Outcome: No Change  A&O. VSS. Denies pain, SOB, dizziness. LS dim. Tele: SR. Up Ind. Plan for angio today, consent on front of chart.

## 2018-11-28 NOTE — PROGRESS NOTES
[facility]    Hospitalist Progress Note    Assessment & Plan   Zee Mireles is a 78 year old female with history of HTN, COPD, smoking, HLD who presents after an episode of chest and back discomfort followed by palpitations and is found to be in Afib with RVR in the 130's. Troponin elevated at 0.190. Transferred from outside [facility] ED.      Atrial fibrillation with RVR, converted to NSR without ischemic changes on subsequent EKG. CHADSVasc 4   NSTEMI, with CP preceding palpitations  HTN, HLD  Troponin down-trending from peak 0.190.  TTE with EF 65-70%, mild LVH, no valvular abnormalities. Lexiscan 11/27 indicating mildly reversible mild to mod mid-distal anteroseptal defect.  - Continue PTA ASA, acebutolol 400 mg daily, irbesartan  - Continues on diltiazem  mg daily initiated on admission (discontinued PTA amlodipine)  - Change PTA simvastatin 20 to atorvastatin 10 mg (interaction btw diltiazem + simvastatin)   - Cardiology consulted, ischemic workup as noted above, planning angiogram today  - Remains on heparin drip  - Tele  - Discussed AC with patient and cost of appx $149/mo for NOAC, with which patient is agreeable. Will plan to initiate on Eliquis when able following angiogram.      Hypothyroidism   TSH 8.11, free T4 1.23  - continue PTA synthroid, dose recently increased      COPD (stable), smoker   Down to 8 cigs per day. Patient has already made a plan with her provider and has nicotine replacement at home.   - Patch ordered.   - Continue PRN albuterol    DVT Prophylaxis: heparin drip, plan to transition to NOAC prior to discharge  Code Status: DNI    Disposition: Expected discharge today versus tomorrow pending angiogram results.     Mychal Urbina PA-C    Interval History   Pt seen & evaluated. Continues to feel well. No cp, sob, tachycardia, palpitations. Aware of plan for angiogram today. Discussed AC and the indication for lifelong therapy given elevated  [id number].    -Data reviewed today: I reviewed all new labs and imaging results over the last 24 hours. I personally reviewed no images or EKG's today.    Physical Exam   Temp: 98.8  F (37.1  C) Temp src: Oral BP: 150/64   Heart Rate: 69 Resp: 15 SpO2: 97 % O2 Device: None (Room air)    Vitals:    11/26/18 1900 11/27/18 0616 11/28/18 0624   Weight: 61.7 kg (136 lb) 61.2 kg (135 lb) 60.4 kg (133 lb 3.2 oz)     Vital Signs with Ranges  Temp:  [98.4  F (36.9  C)-98.8  F (37.1  C)] 98.8  F (37.1  C)  Heart Rate:  [63-78] 69  Resp:  [14-16] 15  BP: (139-157)/(64-75) 150/64  SpO2:  [97 %-100 %] 97 %  I/O last 3 completed shifts:  In: 540 [P.O.:540]  Out: 900 [Urine:900]    GEN: well-developed, well-nourished, appears comfortable  PULM: lungs CTA bilaterally, no increased work of breathing, no wheeze, crackles, rhonchi  CV: RRR, S1 & S2, no murmur  GI: soft, nontender, nondistended, +BS in all 4 quadrants  SKIN: warm & dry without rash, no pedal edema    Medications     HEParin 700 Units/hr (11/28/18 0917)     - MEDICATION INSTRUCTIONS -       - MEDICATION INSTRUCTIONS -       - MEDICATION INSTRUCTIONS -       sodium chloride 75 mL/hr at 11/28/18 0916       acebutolol  400 mg Oral QAM     aspirin  325 mg Oral Daily     atorvastatin  10 mg Oral QPM     brimonidine  1 drop Both Eyes TID     diltiazem  120 mg Oral Daily     dorzolamide  1 drop Both Eyes TID     irbesartan  300 mg Oral Daily     latanoprost  1 drop Both Eyes QPM     levothyroxine  125 mcg Oral Daily     nicotine  1 patch Transdermal Daily     nicotine   Transdermal Q8H     nicotine   Transdermal Daily     sodium chloride (PF)  3 mL Intracatheter Q8H     umeclidinium-vilanterol  1 puff Inhalation QPM       Data     Recent Labs  Lab 11/27/18  0600 11/27/18  0150 11/26/18  2205 11/26/18  1458 11/26/18  1014   WBC 7.6  --   --   --  9.7   HGB 12.4  --   --   --  15.0   MCV 94  --   --   --  99     --   --   --  269     --   --   --  139   POTASSIUM  3.7  --   --   --  4.1   CHLORIDE 111*  --   --   --  104   CO2 25  --   --   --  29   BUN 20  --   --   --  23   CR 0.89  --   --   --  1.01   ANIONGAP 6  --   --   --  6   KARI 8.1*  --   --   --  8.5   GLC 85  --   --   --  99   ALBUMIN  --   --   --   --  3.6   PROTTOTAL  --   --   --   --  7.5   BILITOTAL  --   --   --   --  0.4   ALKPHOS  --   --   --   --  79   ALT  --   --   --   --  26   AST  --   --   --   --  24   TROPI  --  0.136* 0.173* 0.190* 0.182*       Recent Results (from the past 24 hour(s))   NM Lexiscan stress test (nuc card)    Narrative    GATED MYOCARDIAL PERFUSION SCINTIGRAPHY WITH INTRAVENOUS PHARMACOLOGIC  VASODILATATION LEXISCAN -ONE DAY STUDY     11/27/2018 3:24 PM  MATT JENNINGS  78 years  Female  1940.    Indication/Clinical History: Atypical chest pain    Impression  1.  Myocardial perfusion imaging using single isotope technique  demonstrated mild to moderate mid to distal anteroseptal defect with  mild reversibility. (Normal wall motion in this area, however due to  mild reversibility cannot exclude mild ischemia in this territory)   2. Gated images demonstrated normal LV cavity size with hyperdynamic  LV systolic function and normal wall motion.  The left ventricular  systolic function is 81%.  3. Compared to the prior study from no prior study .    Procedure  Pharmacologic stress testing was performed with Lexiscan at a rate of  0.08 mg/ml rapid bolus injection, for 15 seconds, 0.4 mg/5ml  intravenously. Low-level exercise was not performed along with the  vasodilator infusion.  The heart rate was 63 at baseline and magdalena to  78 beats per minute during the Lexiscan infusion. The rest blood  pressure was 156/68 mmHg and was 154/68 mm Hg during Lexiscan  infusion. The patient experienced neck pain  during the test.    Myocardial perfusion imaging was performed at rest, approximately 45  minutes after the injection intravenously of 9.2 mCi of Tc-99m  Myoview. At peak  pharmacologic effect, 10-20 seconds after Lexiscan,   the patient was injected intravenously with 26.8 mCi of  Tc-99m  Myoview. The post-stress tomographic imaging was performed  approximately 60 minutes after stress.    EKG Findings  The resting EKG demonstrated normal sinus rhythm. The stress EKG  demonstrated less than half millimeter of upsloping ST segment  depression.    Tomographic Findings  Overall, the study quality is adequate . On the stress images, mild to  moderate count reduction is seen in the mid to distal anterior septal  wall. On the rest images, mild to moderate count reduction is again  seen in the distal anteroseptal wall. There is mild reversibility  therefore cannot exclude mild ischemia in this area . Gated images  demonstrated normal LV cavity size with end-diastolic volumes  measuring 50 and 52 mL and rest and stress respectively. LV systolic  function appears hyperdynamic. The left ventricular ejection fraction  was calculated to be 81%. TID was absent.    BRIONNA WHITFEILD MD

## 2018-11-28 NOTE — PROGRESS NOTES
[facility]  Cardiology Progress Note  Date of Service: 11/28/2018    Assessment & Plan    Zee Mireles is a 78 year old female with past medical history significant for HTN, COPD, smoking, HLD, PAD admitted from [facility] on 11/26/2018 with palpitations and chest (epigastric) and back discomfort. She was found to be in atrial fibrillation with RVR.    Assessment and Plan:   1. NSTEMI: Troponin peak at 0.19. Stress test revealed mild to moderate mid to distal anteroseptal defect with mild reversibility. LVEF 81%.     Angiogram today    She reports that she needs dental extractions and will have to discuss management further if placed on DAPT.    2. Atrial fibrillation with RVR: Spontaneously converted to SR. CHADS2-VASc 5 (age, gender, HTN, vascular disease)    3. HTN:     Continue PTA Diltazem 120 mg daily and irbesartan 300 mg daily    4. HLD: Fasting labs from 11/26/18: , LDL 32, HDL 45,  Trigs 65    Continue PTA Lipitor 10 mg daily    5. Tobacco abuse: Down to 8 cigarettes daily. Nicotine patches ordered    6. PAD: patient reports iliac and lower extremity stenting in the past. Unable to find reports in Care Everywhere    DIANA ANNE CNP  Pager:  [phone]   Text Page  (7am - 5pm, M-F)    Interval History   No further epigastric discomfort. Remain in SR.    Physical Exam   Temp: 98.4  F (36.9  C) Temp src: Oral BP: 139/75   Heart Rate: 66 Resp: 15 SpO2: 98 % O2 Device: None (Room air)    Vitals:    11/26/18 1900 11/27/18 0616 11/28/18 0624   Weight: 61.7 kg (136 lb) 61.2 kg (135 lb) 60.4 kg (133 lb 3.2 oz)       Constitutional:   NAD   Skin:   Warm and dry   Head:   Nontraumatic   Neck:   no JVD   Lungs:   symmetric, clear to auscultation   Cardiovascular:   regular rate and rhythm, normal S1 and S2 and no murmur noted   Abdomen:   Benign   Extremities and Back:   No edema   Neurological:   Grossly nonfocal       Medications     HEParin 700 Units/hr (11/27/18 2112)     -  MEDICATION INSTRUCTIONS -       - MEDICATION INSTRUCTIONS -       - MEDICATION INSTRUCTIONS -       sodium chloride 150 mL/hr at 11/28/18 0644       acebutolol  400 mg Oral QAM     aspirin  325 mg Oral Daily     atorvastatin  10 mg Oral QPM     brimonidine  1 drop Both Eyes TID     diltiazem  120 mg Oral Daily     dorzolamide  1 drop Both Eyes TID     irbesartan  300 mg Oral Daily     latanoprost  1 drop Both Eyes QPM     levothyroxine  125 mcg Oral Daily     nicotine  1 patch Transdermal Daily     nicotine   Transdermal Q8H     nicotine   Transdermal Daily     sodium chloride (PF)  10 mL Intravenous Once     sodium chloride (PF)  3 mL Intracatheter Q8H     umeclidinium-vilanterol  1 puff Inhalation QPM       Data     Most Recent 3 BMP's:  Recent Labs   Lab Test  11/27/18   0600  11/26/18   1014   NA  142  139   POTASSIUM  3.7  4.1   CHLORIDE  111*  104   CO2  25  29   BUN  20  23   CR  0.89  1.01   ANIONGAP  6  6   KARI  8.1*  8.5   GLC  85  99

## 2018-11-28 NOTE — PLAN OF CARE
Problem: Patient Care Overview  Goal: Plan of Care/Patient Progress Review  Outcome: Improving  A/o x4. VSS. RA. Tele NSR. Denies CP or SOB. IVF Heparin. Cardiac diet tolerating well. Up Independent to bathroom.  Trish scan completed today. Echo completed today. Pt has orders for Angiogram, Cards still need to consent her. Pt aware to be NPO at midnight.

## 2018-11-29 ENCOUNTER — APPOINTMENT (OUTPATIENT)
Dept: CARDIOLOGY | Facility: CLINIC | Age: 78
DRG: 247 | End: 2018-11-29
Attending: INTERNAL MEDICINE
Payer: COMMERCIAL

## 2018-11-29 LAB
CHOLEST SERPL-MCNC: 141 MG/DL
HDLC SERPL-MCNC: 76 MG/DL
KCT BLD-ACNC: 156 SEC (ref 75–150)
KCT BLD-ACNC: 188 SEC (ref 75–150)
KCT BLD-ACNC: 217 SEC (ref 75–150)
KCT BLD-ACNC: 530 SEC (ref 75–150)
LDLC SERPL CALC-MCNC: 49 MG/DL
LMWH PPP CHRO-ACNC: 0.25 IU/ML
NONHDLC SERPL-MCNC: 65 MG/DL
POTASSIUM SERPL-SCNC: 3.8 MMOL/L (ref 3.4–5.3)
TRIGL SERPL-MCNC: 79 MG/DL

## 2018-11-29 PROCEDURE — C1769 GUIDE WIRE: HCPCS

## 2018-11-29 PROCEDURE — 27210759 ZZH DEVICE INFLATION CR6

## 2018-11-29 PROCEDURE — 27210744 ZZH CATH CR12

## 2018-11-29 PROCEDURE — 27210787 ZZH MANIFOLD CR2

## 2018-11-29 PROCEDURE — 84132 ASSAY OF SERUM POTASSIUM: CPT | Performed by: PHYSICIAN ASSISTANT

## 2018-11-29 PROCEDURE — 25000132 ZZH RX MED GY IP 250 OP 250 PS 637: Performed by: INTERNAL MEDICINE

## 2018-11-29 PROCEDURE — C1725 CATH, TRANSLUMIN NON-LASER: HCPCS

## 2018-11-29 PROCEDURE — 27211089 ZZH KIT ACIST INJECTOR CR3

## 2018-11-29 PROCEDURE — 99232 SBSQ HOSP IP/OBS MODERATE 35: CPT | Performed by: PHYSICIAN ASSISTANT

## 2018-11-29 PROCEDURE — C1874 STENT, COATED/COV W/DEL SYS: HCPCS

## 2018-11-29 PROCEDURE — 99152 MOD SED SAME PHYS/QHP 5/>YRS: CPT

## 2018-11-29 PROCEDURE — 93454 CORONARY ARTERY ANGIO S&I: CPT | Mod: XU

## 2018-11-29 PROCEDURE — 25000132 ZZH RX MED GY IP 250 OP 250 PS 637: Performed by: PHYSICIAN ASSISTANT

## 2018-11-29 PROCEDURE — 27210946 ZZH KIT HC TOTES DISP CR8

## 2018-11-29 PROCEDURE — 25000125 ZZHC RX 250: Performed by: INTERNAL MEDICINE

## 2018-11-29 PROCEDURE — 93005 ELECTROCARDIOGRAM TRACING: CPT

## 2018-11-29 PROCEDURE — 27210998 ZZH ACCESS HEART CATH CR6

## 2018-11-29 PROCEDURE — 25000128 H RX IP 250 OP 636: Performed by: INTERNAL MEDICINE

## 2018-11-29 PROCEDURE — 21000001 ZZH R&B HEART CARE

## 2018-11-29 PROCEDURE — 93010 ELECTROCARDIOGRAM REPORT: CPT | Performed by: INTERNAL MEDICINE

## 2018-11-29 PROCEDURE — 92928 PRQ TCAT PLMT NTRAC ST 1 LES: CPT | Mod: LD | Performed by: INTERNAL MEDICINE

## 2018-11-29 PROCEDURE — 99153 MOD SED SAME PHYS/QHP EA: CPT

## 2018-11-29 PROCEDURE — 93454 CORONARY ARTERY ANGIO S&I: CPT | Mod: 26 | Performed by: INTERNAL MEDICINE

## 2018-11-29 PROCEDURE — C1887 CATHETER, GUIDING: HCPCS

## 2018-11-29 PROCEDURE — 85520 HEPARIN ASSAY: CPT | Performed by: PHYSICIAN ASSISTANT

## 2018-11-29 PROCEDURE — C9600 PERC DRUG-EL COR STENT SING: HCPCS

## 2018-11-29 PROCEDURE — 80061 LIPID PANEL: CPT | Performed by: PHYSICIAN ASSISTANT

## 2018-11-29 PROCEDURE — 36415 COLL VENOUS BLD VENIPUNCTURE: CPT | Performed by: PHYSICIAN ASSISTANT

## 2018-11-29 PROCEDURE — 99152 MOD SED SAME PHYS/QHP 5/>YRS: CPT | Performed by: INTERNAL MEDICINE

## 2018-11-29 PROCEDURE — 99233 SBSQ HOSP IP/OBS HIGH 50: CPT | Mod: 25 | Performed by: INTERNAL MEDICINE

## 2018-11-29 PROCEDURE — 27210795 ZZH PAD DEFIB QUICK CR4

## 2018-11-29 PROCEDURE — 85347 COAGULATION TIME ACTIVATED: CPT

## 2018-11-29 RX ORDER — POTASSIUM CHLORIDE 29.8 MG/ML
20 INJECTION INTRAVENOUS
Status: DISCONTINUED | OUTPATIENT
Start: 2018-11-29 | End: 2018-11-29 | Stop reason: HOSPADM

## 2018-11-29 RX ORDER — PROPOFOL 10 MG/ML
5-75 INJECTION, EMULSION INTRAVENOUS CONTINUOUS
Status: DISCONTINUED | OUTPATIENT
Start: 2018-11-29 | End: 2018-11-29 | Stop reason: HOSPADM

## 2018-11-29 RX ORDER — METHYLPREDNISOLONE SODIUM SUCCINATE 125 MG/2ML
125 INJECTION, POWDER, LYOPHILIZED, FOR SOLUTION INTRAMUSCULAR; INTRAVENOUS
Status: DISCONTINUED | OUTPATIENT
Start: 2018-11-29 | End: 2018-11-29 | Stop reason: HOSPADM

## 2018-11-29 RX ORDER — HYDRALAZINE HYDROCHLORIDE 20 MG/ML
10-20 INJECTION INTRAMUSCULAR; INTRAVENOUS
Status: DISCONTINUED | OUTPATIENT
Start: 2018-11-29 | End: 2018-11-29 | Stop reason: HOSPADM

## 2018-11-29 RX ORDER — NALOXONE HYDROCHLORIDE 0.4 MG/ML
0.4 INJECTION, SOLUTION INTRAMUSCULAR; INTRAVENOUS; SUBCUTANEOUS EVERY 5 MIN PRN
Status: DISCONTINUED | OUTPATIENT
Start: 2018-11-29 | End: 2018-11-29 | Stop reason: HOSPADM

## 2018-11-29 RX ORDER — LIDOCAINE HYDROCHLORIDE 10 MG/ML
30 INJECTION, SOLUTION EPIDURAL; INFILTRATION; INTRACAUDAL; PERINEURAL
Status: DISCONTINUED | OUTPATIENT
Start: 2018-11-29 | End: 2018-11-29 | Stop reason: HOSPADM

## 2018-11-29 RX ORDER — DEXTROSE MONOHYDRATE 25 G/50ML
12.5-5 INJECTION, SOLUTION INTRAVENOUS EVERY 30 MIN PRN
Status: DISCONTINUED | OUTPATIENT
Start: 2018-11-29 | End: 2018-11-29 | Stop reason: HOSPADM

## 2018-11-29 RX ORDER — HYDROCHLOROTHIAZIDE 25 MG/1
25 TABLET ORAL DAILY
Status: DISCONTINUED | OUTPATIENT
Start: 2018-11-30 | End: 2018-11-30 | Stop reason: HOSPADM

## 2018-11-29 RX ORDER — CLOPIDOGREL BISULFATE 75 MG/1
75 TABLET ORAL
Status: DISCONTINUED | OUTPATIENT
Start: 2018-11-29 | End: 2018-11-29 | Stop reason: HOSPADM

## 2018-11-29 RX ORDER — PROPOFOL 10 MG/ML
10-20 INJECTION, EMULSION INTRAVENOUS EVERY 30 MIN PRN
Status: DISCONTINUED | OUTPATIENT
Start: 2018-11-29 | End: 2018-11-29 | Stop reason: HOSPADM

## 2018-11-29 RX ORDER — SODIUM CHLORIDE 9 MG/ML
INJECTION, SOLUTION INTRAVENOUS CONTINUOUS
Status: ACTIVE | OUTPATIENT
Start: 2018-11-29 | End: 2018-11-29

## 2018-11-29 RX ORDER — FLUMAZENIL 0.1 MG/ML
0.2 INJECTION, SOLUTION INTRAVENOUS
Status: DISCONTINUED | OUTPATIENT
Start: 2018-11-29 | End: 2018-11-29 | Stop reason: HOSPADM

## 2018-11-29 RX ORDER — OLMESARTAN MEDOXOMIL 20 MG/1
40 TABLET ORAL DAILY
Status: DISCONTINUED | OUTPATIENT
Start: 2018-11-30 | End: 2018-11-29

## 2018-11-29 RX ORDER — NITROGLYCERIN 20 MG/100ML
.07-2 INJECTION INTRAVENOUS CONTINUOUS PRN
Status: DISCONTINUED | OUTPATIENT
Start: 2018-11-29 | End: 2018-11-29 | Stop reason: HOSPADM

## 2018-11-29 RX ORDER — ENALAPRILAT 1.25 MG/ML
1.25-2.5 INJECTION INTRAVENOUS
Status: DISCONTINUED | OUTPATIENT
Start: 2018-11-29 | End: 2018-11-29 | Stop reason: HOSPADM

## 2018-11-29 RX ORDER — NALOXONE HYDROCHLORIDE 0.4 MG/ML
.1-.4 INJECTION, SOLUTION INTRAMUSCULAR; INTRAVENOUS; SUBCUTANEOUS
Status: DISCONTINUED | OUTPATIENT
Start: 2018-11-29 | End: 2018-11-30 | Stop reason: HOSPADM

## 2018-11-29 RX ORDER — HYDROCODONE BITARTRATE AND ACETAMINOPHEN 5; 325 MG/1; MG/1
1-2 TABLET ORAL EVERY 4 HOURS PRN
Status: DISCONTINUED | OUTPATIENT
Start: 2018-11-29 | End: 2018-11-30 | Stop reason: HOSPADM

## 2018-11-29 RX ORDER — ACETAMINOPHEN 325 MG/1
325-650 TABLET ORAL EVERY 4 HOURS PRN
Status: DISCONTINUED | OUTPATIENT
Start: 2018-11-29 | End: 2018-11-30 | Stop reason: HOSPADM

## 2018-11-29 RX ORDER — FENTANYL CITRATE 50 UG/ML
25-50 INJECTION, SOLUTION INTRAMUSCULAR; INTRAVENOUS
Status: DISPENSED | OUTPATIENT
Start: 2018-11-29 | End: 2018-11-30

## 2018-11-29 RX ORDER — FENTANYL CITRATE 50 UG/ML
25-50 INJECTION, SOLUTION INTRAMUSCULAR; INTRAVENOUS
Status: DISCONTINUED | OUTPATIENT
Start: 2018-11-29 | End: 2018-11-29 | Stop reason: HOSPADM

## 2018-11-29 RX ORDER — NITROGLYCERIN 0.4 MG/1
0.4 TABLET SUBLINGUAL EVERY 5 MIN PRN
Status: DISCONTINUED | OUTPATIENT
Start: 2018-11-29 | End: 2018-11-30 | Stop reason: HOSPADM

## 2018-11-29 RX ORDER — ATORVASTATIN CALCIUM 40 MG/1
40 TABLET, FILM COATED ORAL ONCE
Status: COMPLETED | OUTPATIENT
Start: 2018-11-29 | End: 2018-11-29

## 2018-11-29 RX ORDER — MORPHINE SULFATE 2 MG/ML
1-2 INJECTION, SOLUTION INTRAMUSCULAR; INTRAVENOUS EVERY 5 MIN PRN
Status: DISCONTINUED | OUTPATIENT
Start: 2018-11-29 | End: 2018-11-29 | Stop reason: HOSPADM

## 2018-11-29 RX ORDER — DOPAMINE HYDROCHLORIDE 160 MG/100ML
2-20 INJECTION, SOLUTION INTRAVENOUS CONTINUOUS PRN
Status: DISCONTINUED | OUTPATIENT
Start: 2018-11-29 | End: 2018-11-29 | Stop reason: HOSPADM

## 2018-11-29 RX ORDER — HEPARIN SODIUM 1000 [USP'U]/ML
1000-10000 INJECTION, SOLUTION INTRAVENOUS; SUBCUTANEOUS EVERY 5 MIN PRN
Status: DISCONTINUED | OUTPATIENT
Start: 2018-11-29 | End: 2018-11-29 | Stop reason: HOSPADM

## 2018-11-29 RX ORDER — BUPIVACAINE HYDROCHLORIDE 2.5 MG/ML
1-10 INJECTION, SOLUTION EPIDURAL; INFILTRATION; INTRACAUDAL
Status: DISCONTINUED | OUTPATIENT
Start: 2018-11-29 | End: 2018-11-29 | Stop reason: HOSPADM

## 2018-11-29 RX ORDER — NALOXONE HYDROCHLORIDE 0.4 MG/ML
.2-.4 INJECTION, SOLUTION INTRAMUSCULAR; INTRAVENOUS; SUBCUTANEOUS
Status: ACTIVE | OUTPATIENT
Start: 2018-11-29 | End: 2018-11-30

## 2018-11-29 RX ORDER — ASPIRIN 81 MG/1
81 TABLET ORAL DAILY
Status: DISCONTINUED | OUTPATIENT
Start: 2018-11-29 | End: 2018-11-30

## 2018-11-29 RX ORDER — POTASSIUM CHLORIDE 7.45 MG/ML
10 INJECTION INTRAVENOUS
Status: DISCONTINUED | OUTPATIENT
Start: 2018-11-29 | End: 2018-11-29 | Stop reason: HOSPADM

## 2018-11-29 RX ORDER — ATROPINE SULFATE 0.1 MG/ML
0.5 INJECTION INTRAVENOUS EVERY 5 MIN PRN
Status: DISPENSED | OUTPATIENT
Start: 2018-11-29 | End: 2018-11-30

## 2018-11-29 RX ORDER — DIPHENHYDRAMINE HYDROCHLORIDE 50 MG/ML
25-50 INJECTION INTRAMUSCULAR; INTRAVENOUS
Status: DISCONTINUED | OUTPATIENT
Start: 2018-11-29 | End: 2018-11-29 | Stop reason: HOSPADM

## 2018-11-29 RX ORDER — HYDRALAZINE HYDROCHLORIDE 10 MG/1
10 TABLET, FILM COATED ORAL 4 TIMES DAILY PRN
Status: DISCONTINUED | OUTPATIENT
Start: 2018-11-29 | End: 2018-11-30 | Stop reason: HOSPADM

## 2018-11-29 RX ORDER — HYDROCHLOROTHIAZIDE 25 MG/1
25 TABLET ORAL DAILY
Status: DISCONTINUED | OUTPATIENT
Start: 2018-11-30 | End: 2018-11-29

## 2018-11-29 RX ORDER — NIFEDIPINE 10 MG/1
10 CAPSULE ORAL
Status: DISCONTINUED | OUTPATIENT
Start: 2018-11-29 | End: 2018-11-29 | Stop reason: HOSPADM

## 2018-11-29 RX ORDER — LORAZEPAM 2 MG/ML
.5-2 INJECTION INTRAMUSCULAR EVERY 4 HOURS PRN
Status: DISCONTINUED | OUTPATIENT
Start: 2018-11-29 | End: 2018-11-29 | Stop reason: HOSPADM

## 2018-11-29 RX ORDER — ASPIRIN 325 MG
325 TABLET ORAL
Status: DISCONTINUED | OUTPATIENT
Start: 2018-11-29 | End: 2018-11-29 | Stop reason: HOSPADM

## 2018-11-29 RX ORDER — LOSARTAN POTASSIUM 100 MG/1
100 TABLET ORAL DAILY
Status: DISCONTINUED | OUTPATIENT
Start: 2018-11-29 | End: 2018-11-30 | Stop reason: HOSPADM

## 2018-11-29 RX ORDER — IOPAMIDOL 755 MG/ML
105 INJECTION, SOLUTION INTRAVASCULAR ONCE
Status: COMPLETED | OUTPATIENT
Start: 2018-11-29 | End: 2018-11-29

## 2018-11-29 RX ORDER — CLOPIDOGREL BISULFATE 75 MG/1
300-600 TABLET ORAL
Status: DISCONTINUED | OUTPATIENT
Start: 2018-11-29 | End: 2018-11-29 | Stop reason: HOSPADM

## 2018-11-29 RX ORDER — METOPROLOL TARTRATE 1 MG/ML
5 INJECTION, SOLUTION INTRAVENOUS EVERY 5 MIN PRN
Status: DISCONTINUED | OUTPATIENT
Start: 2018-11-29 | End: 2018-11-29 | Stop reason: HOSPADM

## 2018-11-29 RX ORDER — EPINEPHRINE 1 MG/ML
0.3 INJECTION, SOLUTION, CONCENTRATE INTRAVENOUS
Status: DISCONTINUED | OUTPATIENT
Start: 2018-11-29 | End: 2018-11-29 | Stop reason: HOSPADM

## 2018-11-29 RX ORDER — TIROFIBAN HYDROCHLORIDE 50 UG/ML
0.07 INJECTION INTRAVENOUS CONTINUOUS PRN
Status: DISCONTINUED | OUTPATIENT
Start: 2018-11-29 | End: 2018-11-29

## 2018-11-29 RX ORDER — VERAPAMIL HYDROCHLORIDE 2.5 MG/ML
1-2.5 INJECTION, SOLUTION INTRAVENOUS
Status: DISCONTINUED | OUTPATIENT
Start: 2018-11-29 | End: 2018-11-29 | Stop reason: HOSPADM

## 2018-11-29 RX ORDER — HYDROCHLOROTHIAZIDE 12.5 MG/1
12.5 CAPSULE ORAL ONCE
Status: COMPLETED | OUTPATIENT
Start: 2018-11-29 | End: 2018-11-29

## 2018-11-29 RX ORDER — ATROPINE SULFATE 0.1 MG/ML
.5-1 INJECTION INTRAVENOUS
Status: DISCONTINUED | OUTPATIENT
Start: 2018-11-29 | End: 2018-11-29 | Stop reason: HOSPADM

## 2018-11-29 RX ORDER — HYDRALAZINE HYDROCHLORIDE 20 MG/ML
20 INJECTION INTRAMUSCULAR; INTRAVENOUS EVERY 6 HOURS PRN
Status: DISCONTINUED | OUTPATIENT
Start: 2018-11-29 | End: 2018-11-30 | Stop reason: HOSPADM

## 2018-11-29 RX ORDER — NITROGLYCERIN 5 MG/ML
100-200 VIAL (ML) INTRAVENOUS
Status: DISCONTINUED | OUTPATIENT
Start: 2018-11-29 | End: 2018-11-29 | Stop reason: HOSPADM

## 2018-11-29 RX ORDER — DOBUTAMINE HYDROCHLORIDE 200 MG/100ML
2-20 INJECTION INTRAVENOUS CONTINUOUS PRN
Status: DISCONTINUED | OUTPATIENT
Start: 2018-11-29 | End: 2018-11-29 | Stop reason: HOSPADM

## 2018-11-29 RX ORDER — LOSARTAN POTASSIUM 100 MG/1
100 TABLET ORAL DAILY
Status: DISCONTINUED | OUTPATIENT
Start: 2018-11-29 | End: 2018-11-29

## 2018-11-29 RX ORDER — NICARDIPINE HYDROCHLORIDE 2.5 MG/ML
100 INJECTION INTRAVENOUS
Status: DISCONTINUED | OUTPATIENT
Start: 2018-11-29 | End: 2018-11-29 | Stop reason: HOSPADM

## 2018-11-29 RX ORDER — FLUMAZENIL 0.1 MG/ML
0.2 INJECTION, SOLUTION INTRAVENOUS
Status: ACTIVE | OUTPATIENT
Start: 2018-11-29 | End: 2018-11-30

## 2018-11-29 RX ORDER — NITROGLYCERIN 0.4 MG/1
0.4 TABLET SUBLINGUAL EVERY 5 MIN PRN
Status: DISCONTINUED | OUTPATIENT
Start: 2018-11-29 | End: 2018-11-29 | Stop reason: HOSPADM

## 2018-11-29 RX ORDER — FUROSEMIDE 10 MG/ML
20-100 INJECTION INTRAMUSCULAR; INTRAVENOUS
Status: DISCONTINUED | OUTPATIENT
Start: 2018-11-29 | End: 2018-11-29 | Stop reason: HOSPADM

## 2018-11-29 RX ORDER — OLMESARTAN MEDOXOMIL AND HYDROCHLOROTHIAZIDE 40/25 40; 25 MG/1; MG/1
1 TABLET ORAL DAILY
Status: DISCONTINUED | OUTPATIENT
Start: 2018-11-30 | End: 2018-11-29

## 2018-11-29 RX ORDER — PRASUGREL 10 MG/1
10-60 TABLET, FILM COATED ORAL
Status: DISCONTINUED | OUTPATIENT
Start: 2018-11-29 | End: 2018-11-29 | Stop reason: HOSPADM

## 2018-11-29 RX ORDER — PROTAMINE SULFATE 10 MG/ML
1-5 INJECTION, SOLUTION INTRAVENOUS
Status: DISCONTINUED | OUTPATIENT
Start: 2018-11-29 | End: 2018-11-29 | Stop reason: HOSPADM

## 2018-11-29 RX ORDER — PROTAMINE SULFATE 10 MG/ML
25-100 INJECTION, SOLUTION INTRAVENOUS EVERY 5 MIN PRN
Status: DISCONTINUED | OUTPATIENT
Start: 2018-11-29 | End: 2018-11-29 | Stop reason: HOSPADM

## 2018-11-29 RX ORDER — ONDANSETRON 2 MG/ML
4 INJECTION INTRAMUSCULAR; INTRAVENOUS EVERY 4 HOURS PRN
Status: DISCONTINUED | OUTPATIENT
Start: 2018-11-29 | End: 2018-11-29 | Stop reason: HOSPADM

## 2018-11-29 RX ORDER — ADENOSINE 3 MG/ML
12-12000 INJECTION, SOLUTION INTRAVENOUS
Status: DISCONTINUED | OUTPATIENT
Start: 2018-11-29 | End: 2018-11-29 | Stop reason: HOSPADM

## 2018-11-29 RX ORDER — ASPIRIN 81 MG/1
81-324 TABLET, CHEWABLE ORAL
Status: DISCONTINUED | OUTPATIENT
Start: 2018-11-29 | End: 2018-11-29 | Stop reason: HOSPADM

## 2018-11-29 RX ORDER — NITROGLYCERIN 5 MG/ML
100-500 VIAL (ML) INTRAVENOUS
Status: DISCONTINUED | OUTPATIENT
Start: 2018-11-29 | End: 2018-11-29 | Stop reason: HOSPADM

## 2018-11-29 RX ORDER — SODIUM NITROPRUSSIDE 25 MG/ML
100-200 INJECTION INTRAVENOUS
Status: DISCONTINUED | OUTPATIENT
Start: 2018-11-29 | End: 2018-11-29 | Stop reason: HOSPADM

## 2018-11-29 RX ORDER — PHENYLEPHRINE HCL IN 0.9% NACL 1 MG/10 ML
20-100 SYRINGE (ML) INTRAVENOUS
Status: DISCONTINUED | OUTPATIENT
Start: 2018-11-29 | End: 2018-11-29 | Stop reason: HOSPADM

## 2018-11-29 RX ORDER — LIDOCAINE HYDROCHLORIDE 10 MG/ML
1-10 INJECTION, SOLUTION INFILTRATION; PERINEURAL
Status: COMPLETED | OUTPATIENT
Start: 2018-11-29 | End: 2018-11-29

## 2018-11-29 RX ADMIN — TICAGRELOR 90 MG: 90 TABLET ORAL at 14:38

## 2018-11-29 RX ADMIN — LEVOTHYROXINE SODIUM 125 MCG: 125 TABLET ORAL at 06:21

## 2018-11-29 RX ADMIN — MIDAZOLAM 1 MG: 1 INJECTION INTRAMUSCULAR; INTRAVENOUS at 10:40

## 2018-11-29 RX ADMIN — FENTANYL CITRATE 50 MCG: 50 INJECTION, SOLUTION INTRAMUSCULAR; INTRAVENOUS at 11:20

## 2018-11-29 RX ADMIN — HYDRALAZINE HYDROCHLORIDE 10 MG: 10 TABLET ORAL at 17:39

## 2018-11-29 RX ADMIN — HEPARIN SODIUM 700 UNITS/HR: 10000 INJECTION, SOLUTION INTRAVENOUS at 09:27

## 2018-11-29 RX ADMIN — LATANOPROST 1 DROP: 50 SOLUTION OPHTHALMIC at 21:54

## 2018-11-29 RX ADMIN — POTASSIUM CHLORIDE 20 MEQ: 1500 TABLET, EXTENDED RELEASE ORAL at 09:43

## 2018-11-29 RX ADMIN — FENTANYL CITRATE 50 MCG: 50 INJECTION, SOLUTION INTRAMUSCULAR; INTRAVENOUS at 11:00

## 2018-11-29 RX ADMIN — IRBESARTAN 300 MG: 150 TABLET ORAL at 08:40

## 2018-11-29 RX ADMIN — FENTANYL CITRATE 50 MCG: 50 INJECTION, SOLUTION INTRAMUSCULAR; INTRAVENOUS at 11:40

## 2018-11-29 RX ADMIN — SODIUM CHLORIDE: 9 INJECTION, SOLUTION INTRAVENOUS at 08:35

## 2018-11-29 RX ADMIN — IOPAMIDOL 105 ML: 755 INJECTION, SOLUTION INTRAVASCULAR at 12:15

## 2018-11-29 RX ADMIN — LIDOCAINE HYDROCHLORIDE 9 ML: 10 INJECTION, SOLUTION INFILTRATION; PERINEURAL at 10:44

## 2018-11-29 RX ADMIN — FENTANYL CITRATE 25 MCG: 50 INJECTION, SOLUTION INTRAMUSCULAR; INTRAVENOUS at 22:26

## 2018-11-29 RX ADMIN — MIDAZOLAM 1 MG: 1 INJECTION INTRAMUSCULAR; INTRAVENOUS at 11:00

## 2018-11-29 RX ADMIN — LOSARTAN POTASSIUM 100 MG: 100 TABLET, FILM COATED ORAL at 19:32

## 2018-11-29 RX ADMIN — ASPIRIN 325 MG: 325 TABLET, DELAYED RELEASE ORAL at 08:41

## 2018-11-29 RX ADMIN — BRIMONIDINE TARTRATE 1 DROP: 2 SOLUTION OPHTHALMIC at 08:47

## 2018-11-29 RX ADMIN — FENTANYL CITRATE 50 MCG: 50 INJECTION, SOLUTION INTRAMUSCULAR; INTRAVENOUS at 10:40

## 2018-11-29 RX ADMIN — BRIMONIDINE TARTRATE 1 DROP: 2 SOLUTION OPHTHALMIC at 21:54

## 2018-11-29 RX ADMIN — MIDAZOLAM 1 MG: 1 INJECTION INTRAMUSCULAR; INTRAVENOUS at 11:40

## 2018-11-29 RX ADMIN — DORZOLAMIDE HCL 1 DROP: 20 SOLUTION/ DROPS OPHTHALMIC at 08:46

## 2018-11-29 RX ADMIN — HYDROCHLOROTHIAZIDE 12.5 MG: 12.5 CAPSULE ORAL at 19:32

## 2018-11-29 RX ADMIN — ACETAMINOPHEN 650 MG: 325 TABLET, FILM COATED ORAL at 20:49

## 2018-11-29 RX ADMIN — HEPARIN SODIUM 10000 UNITS: 1000 INJECTION, SOLUTION INTRAVENOUS; SUBCUTANEOUS at 10:59

## 2018-11-29 RX ADMIN — HYDRALAZINE HYDROCHLORIDE 20 MG: 20 INJECTION INTRAMUSCULAR; INTRAVENOUS at 21:48

## 2018-11-29 RX ADMIN — ATORVASTATIN CALCIUM 40 MG: 40 TABLET, FILM COATED ORAL at 10:14

## 2018-11-29 RX ADMIN — ASPIRIN 81 MG: 81 TABLET, COATED ORAL at 14:37

## 2018-11-29 RX ADMIN — MIDAZOLAM 1 MG: 1 INJECTION INTRAMUSCULAR; INTRAVENOUS at 11:20

## 2018-11-29 RX ADMIN — DILTIAZEM HYDROCHLORIDE 120 MG: 120 CAPSULE, EXTENDED RELEASE ORAL at 08:41

## 2018-11-29 RX ADMIN — DORZOLAMIDE HCL 1 DROP: 20 SOLUTION/ DROPS OPHTHALMIC at 21:54

## 2018-11-29 RX ADMIN — ACEBUTOLOL HYDROCHLORIDE 400 MG: 200 CAPSULE ORAL at 08:40

## 2018-11-29 RX ADMIN — SODIUM CHLORIDE 300 ML: 9 INJECTION, SOLUTION INTRAVENOUS at 22:45

## 2018-11-29 ASSESSMENT — ACTIVITIES OF DAILY LIVING (ADL)
ADLS_ACUITY_SCORE: 9
ADLS_ACUITY_SCORE: 10
ADLS_ACUITY_SCORE: 9

## 2018-11-29 NOTE — PROGRESS NOTES
[facility] Cardiology Progress Note  Date of Service: 11/29/2018  Primary Cardiologist: None - Consulting MD Bob       Physician Supervisory Attestation:   I have reviewed and discussed with TOSHIA Brewster the history, physical and plan and independently interviewed and examined Zee Mireles and agree with the plan as stated in the note.    -- Coronary angiogram revealed high grade LAD stenosis. She had 1 stent placed. DAPT for one year. Cardiac rehab at [facility]. MARTHA in the outpatient as she likely has significant PAD. Likely discharge tomorrow.     Rao Bob MD 11/29/2018         Zee Mireles is a 78 year old female admitted on 11/26/2018 with epigastric discomfort, palpitations, troponin rise and rapid AF.    Interim Hx: None significant.   Subjective: Feeling well, no CP or dyspnea.     Assessment:  1. Troponin rise - trop peak at 0.19. Stress test revealed mid-distal anteroseptal ischemia. Plan is for [facility] poss PCI today. On heparin, aspirin, BB, low-intensity statin.   2. AF with RVR, s/p spontaneous conversion to SR in 60's. Short runs of AF since then, nothing sustained. On diltiazem and acebutolol. TTE showed mild LVH and left atrium size at upper limits of normal. [id number] of 5.   3. HTN - intermittently uncontrolled.   4. HLP - LDL 32 on PTA Lipitor 10.   5. Ongoing tobacco abuse  6. PAD - reported iliac and LE PCI in the past, no records available. LE pulses intact on exam.   7. COPD    Plan:   1. [facility] poss PCI today. Will give one dose of high-intensity statin prior (Lipitor 40).   2. Switch Irbesartan to Olmesartan due to recent recall, add hydrochlorothiazide for additional BP control.   3. Start oral systemic anticoagulation once felt safe per interventional cardiologist.   4. Consider event monitor on discharge.   5. Tobacco cessation.     TOSHIA Herrera-C  Pager: [phone]  Text Page  (7:30am - 4pm M-F)    __________________________________________________________________________  Cardiac diagnostics reviewed:  Tele: SR 60's, intermittent short runs of SVT    Echo: 11/27/18  The visual ejection fraction is estimated at 65-70%.  Normal left ventricular wall motion  There is mild concentric left ventricular hypertrophy.  The right ventricle is normal in size and function.  Normal left atrial size by volume estimate (at upper limits normal) but  appears mildly enlarged by 2D evaluation and relative to LV size..  Right atrial size is normal.  Sinus rhythm was noted.  No hemodynamically significant valvular abnormalities on 2D or color flow  imaging.    Last ischemic eval:   Trish MPI 11/27/18 -   1.  Myocardial perfusion imaging using single isotope technique  demonstrated mild to moderate mid to distal anteroseptal defect with  mild reversibility. (Normal wall motion in this area, however due to  mild reversibility cannot exclude mild ischemia in this territory)   2. Gated images demonstrated normal LV cavity size with hyperdynamic  LV systolic function and normal wall motion.  The left ventricular  systolic function is 81%.  3. Compared to the prior study from no prior study .      Physical Exam   Temp: 98.4  F (36.9  C) Temp src: Oral BP: 143/69   Heart Rate: 63 Resp: 16 SpO2: 97 % O2 Device: None (Room air)    Vitals:    11/27/18 0616 11/28/18 0624 11/29/18 0612   Weight: 61.2 kg (135 lb) 60.4 kg (133 lb 3.2 oz) 60.4 kg (133 lb 3.2 oz)       GENERAL:  The patient is in no apparent distress.   NECK: CVP appears normal, no masses or thyromegaly.  PULMONARY:  There is a normal respiratory effort. Clear lungs to auscultation bilaterally.   CARDIOVASCULAR:  RRR, normal S1 S2, no m/r/g.  EXTREMITIES:  No clubbing, cyanosis or edema.  VASCULAR: 2+ Pulses bilaterally in upper and lower extremities.    Medications     HEParin 700 Units/hr (11/29/18 0836)     - MEDICATION INSTRUCTIONS -       - MEDICATION INSTRUCTIONS -        - MEDICATION INSTRUCTIONS -       sodium chloride 75 mL/hr at 11/29/18 0835       acebutolol  400 mg Oral QAM     aspirin  325 mg Oral Daily     atorvastatin  10 mg Oral QPM     brimonidine  1 drop Both Eyes TID     diltiazem ER  120 mg Oral Daily     dorzolamide  1 drop Both Eyes TID     irbesartan  300 mg Oral Daily     latanoprost  1 drop Both Eyes QPM     levothyroxine  125 mcg Oral Daily     nicotine  1 patch Transdermal Daily     nicotine   Transdermal Q8H     nicotine   Transdermal Daily     sodium chloride (PF)  3 mL Intracatheter Q8H     umeclidinium-vilanterol  1 puff Inhalation QPM       Data   Most Recent 3 CBC's:  Recent Labs   Lab Test  11/27/18   0600  11/26/18   1014   WBC  7.6  9.7   HGB  12.4  15.0   MCV  94  99   PLT  185  269     Most Recent 3 BMP's:  Recent Labs   Lab Test  11/29/18   0822  11/27/18   0600  11/26/18   1014   NA   --   142  139   POTASSIUM  3.8  3.7  4.1   CHLORIDE   --   111*  104   CO2   --   25  29   BUN   --   20  23   CR   --   0.89  1.01   ANIONGAP   --   6  6   KARI   --   8.1*  8.5   GLC   --   85  99     Most Recent 3 Troponin's:  Recent Labs   Lab Test  11/27/18   0150  11/26/18   2205  11/26/18   1458   TROPI  0.136*  0.173*  0.190*     Most Recent Cholesterol Panel:  Recent Labs   Lab Test  11/26/18   2205   CHOL  107   LDL  32   HDL  62   TRIG  65     Most Recent TSH and T4:  Recent Labs   Lab Test  11/26/18   1014   TSH  8.11*   T4  1.23     Most Recent Hemoglobin A1c:No lab results found.

## 2018-11-29 NOTE — PLAN OF CARE
Problem: Patient Care Overview  Goal: Plan of Care/Patient Progress Review  Outcome: No Change  Pt. A&Ox4. VSS. Denies Pain/chest/SOB. Up ind. Tele is SR, HR 70's. Hep gtt 700units/hr. Plan is angio tomorrow 11/29.

## 2018-11-29 NOTE — PROGRESS NOTES
"[facility]    Hospitalist Progress Note    Assessment & Plan   Zee Mireles is a 78 year old female with history of HTN, COPD, smoking, HLD who presents after an episode of chest and back discomfort followed by palpitations and is found to be in Afib with RVR in the 130's. Troponin elevated at 0.190. Transferred from outside [facility] ED.      Atrial fibrillation with RVR  NSTEMI s/p AGATHA to LAD on 11/29/18  HTN, HLD  Presented with chest pain, palpitations, identified to be in afib with RVR, converted to NSR following IV diltiazem. Troponin down-trending from peak 0.190.  TTE with EF 65-70%, mild LVH, no valvular abnormalities. Lexiscan 11/27 indicating mildly reversible mild to mod mid-distal anteroseptal defect.  - Continue PTA ASA, acebutolol 400 mg daily, irbesartan  - Continues on diltiazem  mg daily initiated on admission (discontinued PTA amlodipine)  - Change PTA simvastatin 20 to atorvastatin 10 mg (interaction btw diltiazem + simvastatin)   - Cardiology consulted, ischemic workup, angiogram performed 11/29 with AGATHA x1 placed to LAD  - Remains on heparin drip  - Tele  - CHADSVasc = 4, indicating AC. Discussed with patient and cost of appx $149/mo for NOAC, with which patient is agreeable. Will plan to initiate on Eliquis when able following angiogram.      Hypothyroidism   TSH 8.11, free T4 1.23  - continue PTA synthroid, dose recently increased      COPD (stable), smoker   Down to 8 cigs per day. Patient has already made a plan with her provider and has nicotine replacement at home.   - Patch ordered.   - Continue PRN albuterol    DVT Prophylaxis: heparin drip  Code Status: DNI    Disposition: Expected discharge tomorrow pending cardiology and post-PCI monitoring.    Mychal Urbina PA-C    Interval History   Pt seen & evaluated. Reports \"fogginess\" after angiogram - has hx sensitivity to medications. Denies cp, sob. Aware of stent placed, no acute concerns.    -Data reviewed " today: I reviewed all new labs and imaging results over the last 24 hours. I personally reviewed no images or EKG's today.    Physical Exam   Temp: 98.2  F (36.8  C) Temp src: Oral BP: 136/55   Heart Rate: 59 Resp: 16 SpO2: 100 % O2 Device: None (Room air)    Vitals:    11/27/18 0616 11/28/18 0624 11/29/18 0612   Weight: 61.2 kg (135 lb) 60.4 kg (133 lb 3.2 oz) 60.4 kg (133 lb 3.2 oz)     Vital Signs with Ranges  Temp:  [98.2  F (36.8  C)-98.6  F (37  C)] 98.2  F (36.8  C)  Heart Rate:  [59-66] 59  Resp:  [16] 16  BP: (127-143)/(54-73) 136/55  SpO2:  [92 %-100 %] 100 %  I/O last 3 completed shifts:  In: 300 [P.O.:300]  Out: 950 [Urine:950]    GEN: well-developed, well-nourished, appears comfortable  PULM: lungs CTA bilaterally, no increased work of breathing, no wheeze, crackles, rhonchi  CV: RRR, S1 & S2, no murmur  GI: soft, nontender, nondistended, +BS in all 4 quadrants  SKIN: warm & dry without rash, no pedal edema    Medications     Continuing ACE inhibitor/ARB/ARNI from home medication list OR ACE inhibitor/ARB order already placed during this visit       - MEDICATION INSTRUCTIONS -       - MEDICATION INSTRUCTIONS -       HEParin Stopped (11/29/18 1030)     - MEDICATION INSTRUCTIONS -       - MEDICATION INSTRUCTIONS -       sodium chloride 75 mL/hr at 11/29/18 1250     tirofiban         acebutolol  400 mg Oral QAM     aspirin  81 mg Oral Daily     atorvastatin  10 mg Oral QPM     brimonidine  1 drop Both Eyes TID     diltiazem ER  120 mg Oral Daily     dorzolamide  1 drop Both Eyes TID     [START ON 11/30/2018] olmesartan  40 mg Oral Daily    And     [START ON 11/30/2018] hydrochlorothiazide  25 mg Oral Daily     latanoprost  1 drop Both Eyes QPM     levothyroxine  125 mcg Oral Daily     nicotine  1 patch Transdermal Daily     nicotine   Transdermal Q8H     nicotine   Transdermal Daily     sodium chloride (PF)  3 mL Intracatheter Q8H     ticagrelor  90 mg Oral Q12H     umeclidinium-vilanterol  1 puff  Inhalation QPM       Data     Recent Labs  Lab 11/29/18  0822 11/27/18  0600 11/27/18  0150 11/26/18  2205 11/26/18  1458 11/26/18  1014   WBC  --  7.6  --   --   --  9.7   HGB  --  12.4  --   --   --  15.0   MCV  --  94  --   --   --  99   PLT  --  185  --   --   --  269   NA  --  142  --   --   --  139   POTASSIUM 3.8 3.7  --   --   --  4.1   CHLORIDE  --  111*  --   --   --  104   CO2  --  25  --   --   --  29   BUN  --  20  --   --   --  23   CR  --  0.89  --   --   --  1.01   ANIONGAP  --  6  --   --   --  6   KARI  --  8.1*  --   --   --  8.5   GLC  --  85  --   --   --  99   ALBUMIN  --   --   --   --   --  3.6   PROTTOTAL  --   --   --   --   --  7.5   BILITOTAL  --   --   --   --   --  0.4   ALKPHOS  --   --   --   --   --  79   ALT  --   --   --   --   --  26   AST  --   --   --   --   --  24   TROPI  --   --  0.136* 0.173* 0.190* 0.182*       No results found for this or any previous visit (from the past 24 hour(s)).

## 2018-11-29 NOTE — PLAN OF CARE
Problem: Patient Care Overview  Goal: Plan of Care/Patient Progress Review  Outcome: No Change  VSS. Patient with no complaints of pain or shortness of breath. Patient slept well through out shift. Up independently in room.

## 2018-11-29 NOTE — PLAN OF CARE
Problem: Patient Care Overview  Goal: Plan of Care/Patient Progress Review  Outcome: No Change  Pt denied pain. Vitals stable on RA. Up independent. Tele NSR Heparin 700. Following 10a rechecks. Angio scheduled for today was rescheduled for tomorrow. Continue to monitor.

## 2018-11-30 ENCOUNTER — APPOINTMENT (OUTPATIENT)
Dept: OCCUPATIONAL THERAPY | Facility: CLINIC | Age: 78
DRG: 247 | End: 2018-11-30
Attending: INTERNAL MEDICINE
Payer: COMMERCIAL

## 2018-11-30 VITALS
RESPIRATION RATE: 18 BRPM | DIASTOLIC BLOOD PRESSURE: 58 MMHG | HEIGHT: 64 IN | WEIGHT: 130.1 LBS | SYSTOLIC BLOOD PRESSURE: 152 MMHG | OXYGEN SATURATION: 98 % | TEMPERATURE: 98.7 F | BODY MASS INDEX: 22.21 KG/M2

## 2018-11-30 LAB
ANION GAP SERPL CALCULATED.3IONS-SCNC: 9 MMOL/L (ref 3–14)
BUN SERPL-MCNC: 20 MG/DL (ref 7–30)
CALCIUM SERPL-MCNC: 8.7 MG/DL (ref 8.5–10.1)
CHLORIDE SERPL-SCNC: 109 MMOL/L (ref 94–109)
CO2 SERPL-SCNC: 21 MMOL/L (ref 20–32)
CREAT SERPL-MCNC: 0.94 MG/DL (ref 0.52–1.04)
ERYTHROCYTE [DISTWIDTH] IN BLOOD BY AUTOMATED COUNT: 16.1 % (ref 10–15)
GFR SERPL CREATININE-BSD FRML MDRD: 58 ML/MIN/1.7M2
GLUCOSE SERPL-MCNC: 95 MG/DL (ref 70–99)
HCT VFR BLD AUTO: 39.5 % (ref 35–47)
HGB BLD-MCNC: 13.8 G/DL (ref 11.7–15.7)
MCH RBC QN AUTO: 32.5 PG (ref 26.5–33)
MCHC RBC AUTO-ENTMCNC: 34.9 G/DL (ref 31.5–36.5)
MCV RBC AUTO: 93 FL (ref 78–100)
PLATELET # BLD AUTO: 207 10E9/L (ref 150–450)
POTASSIUM SERPL-SCNC: 3.7 MMOL/L (ref 3.4–5.3)
RBC # BLD AUTO: 4.24 10E12/L (ref 3.8–5.2)
SODIUM SERPL-SCNC: 139 MMOL/L (ref 133–144)
WBC # BLD AUTO: 9.6 10E9/L (ref 4–11)

## 2018-11-30 PROCEDURE — 99207 ZZC CDG-CODE INCORRECT PER BILLING BASED ON TIME: CPT | Performed by: PHYSICIAN ASSISTANT

## 2018-11-30 PROCEDURE — 40000133 ZZH STATISTIC OT WARD VISIT

## 2018-11-30 PROCEDURE — 25000132 ZZH RX MED GY IP 250 OP 250 PS 637: Performed by: INTERNAL MEDICINE

## 2018-11-30 PROCEDURE — 80048 BASIC METABOLIC PNL TOTAL CA: CPT | Performed by: INTERNAL MEDICINE

## 2018-11-30 PROCEDURE — 93017 CV STRESS TEST TRACING ONLY: CPT | Performed by: INTERNAL MEDICINE

## 2018-11-30 PROCEDURE — 97165 OT EVAL LOW COMPLEX 30 MIN: CPT | Mod: GO

## 2018-11-30 PROCEDURE — 93017 CV STRESS TEST TRACING ONLY: CPT

## 2018-11-30 PROCEDURE — 25000132 ZZH RX MED GY IP 250 OP 250 PS 637: Performed by: NURSE PRACTITIONER

## 2018-11-30 PROCEDURE — 97535 SELF CARE MNGMENT TRAINING: CPT | Mod: GO

## 2018-11-30 PROCEDURE — 85027 COMPLETE CBC AUTOMATED: CPT | Performed by: INTERNAL MEDICINE

## 2018-11-30 PROCEDURE — 99232 SBSQ HOSP IP/OBS MODERATE 35: CPT | Performed by: INTERNAL MEDICINE

## 2018-11-30 PROCEDURE — 99239 HOSP IP/OBS DSCHRG MGMT >30: CPT | Performed by: PHYSICIAN ASSISTANT

## 2018-11-30 PROCEDURE — 97110 THERAPEUTIC EXERCISES: CPT | Mod: GO

## 2018-11-30 PROCEDURE — 36415 COLL VENOUS BLD VENIPUNCTURE: CPT | Performed by: INTERNAL MEDICINE

## 2018-11-30 RX ORDER — CLOPIDOGREL BISULFATE 75 MG/1
300 TABLET ORAL ONCE
Status: COMPLETED | OUTPATIENT
Start: 2018-11-30 | End: 2018-11-30

## 2018-11-30 RX ORDER — DILTIAZEM HYDROCHLORIDE 120 MG/1
120 CAPSULE, EXTENDED RELEASE ORAL DAILY
Qty: 30 CAPSULE | Refills: 0 | Status: SHIPPED | OUTPATIENT
Start: 2018-12-01 | End: 2022-11-08

## 2018-11-30 RX ORDER — LOSARTAN POTASSIUM 100 MG/1
100 TABLET ORAL DAILY
Qty: 30 TABLET | Refills: 0 | Status: SHIPPED | OUTPATIENT
Start: 2018-12-01

## 2018-11-30 RX ORDER — CLOPIDOGREL BISULFATE 75 MG/1
75 TABLET ORAL DAILY
Status: DISCONTINUED | OUTPATIENT
Start: 2018-12-01 | End: 2018-11-30 | Stop reason: HOSPADM

## 2018-11-30 RX ORDER — HYDROCHLOROTHIAZIDE 25 MG/1
25 TABLET ORAL DAILY
Qty: 30 TABLET | Refills: 0 | Status: SHIPPED | OUTPATIENT
Start: 2018-12-01 | End: 2018-12-03

## 2018-11-30 RX ORDER — CLOPIDOGREL BISULFATE 75 MG/1
75 TABLET ORAL DAILY
Qty: 30 TABLET | Refills: 0 | Status: SHIPPED | OUTPATIENT
Start: 2018-12-01 | End: 2019-10-07

## 2018-11-30 RX ORDER — ATORVASTATIN CALCIUM 10 MG/1
10 TABLET, FILM COATED ORAL EVERY EVENING
Qty: 30 TABLET | Refills: 0 | Status: SHIPPED | OUTPATIENT
Start: 2018-11-30 | End: 2019-01-28 | Stop reason: SINTOL

## 2018-11-30 RX ADMIN — BRIMONIDINE TARTRATE 1 DROP: 2 SOLUTION OPHTHALMIC at 11:16

## 2018-11-30 RX ADMIN — LEVOTHYROXINE SODIUM 125 MCG: 125 TABLET ORAL at 07:07

## 2018-11-30 RX ADMIN — DORZOLAMIDE HCL 1 DROP: 20 SOLUTION/ DROPS OPHTHALMIC at 11:16

## 2018-11-30 RX ADMIN — ACEBUTOLOL HYDROCHLORIDE 400 MG: 200 CAPSULE ORAL at 09:32

## 2018-11-30 RX ADMIN — TICAGRELOR 90 MG: 90 TABLET ORAL at 02:00

## 2018-11-30 RX ADMIN — LOSARTAN POTASSIUM 100 MG: 100 TABLET, FILM COATED ORAL at 09:32

## 2018-11-30 RX ADMIN — ASPIRIN 81 MG: 81 TABLET, COATED ORAL at 09:32

## 2018-11-30 RX ADMIN — DILTIAZEM HYDROCHLORIDE 120 MG: 120 CAPSULE, EXTENDED RELEASE ORAL at 09:32

## 2018-11-30 RX ADMIN — CLOPIDOGREL 300 MG: 75 TABLET, FILM COATED ORAL at 11:13

## 2018-11-30 RX ADMIN — HYDROCHLOROTHIAZIDE 25 MG: 25 TABLET ORAL at 09:32

## 2018-11-30 ASSESSMENT — ACTIVITIES OF DAILY LIVING (ADL)
ADLS_ACUITY_SCORE: 10

## 2018-11-30 NOTE — PLAN OF CARE
Problem: Patient Care Overview  Goal: Plan of Care/Patient Progress Review  Outcome: No Change  Pt denied pain. Room air. Currently bedrest post angio. Tele NSR. Angio today, stent to LAD, Right fem sheath still in due to elevated BP's prior to line pull. Hydralazine given but BP's remain elevated.  On call hospitalist paged. Awaiting callback.    Continue to monitor.

## 2018-11-30 NOTE — PROVIDER NOTIFICATION
MD paged for SBP sustaining in the 170's after administration of cozaar and microzide at 1938. Femoral line still in place.  0922 pm On call advised writer to call cardiology     0930 pm Cardiology paged.    0937 pm  Dr. Lemos ordered IV hydralazine for SBP > 160.  Verbal orders to pull the line in 2 hours if BP under control

## 2018-11-30 NOTE — PROVIDER NOTIFICATION
MD Notification    Notified Person: MD    Notified Person Name: on call hospitalist    Notification Date/Time: 11/29    Notification Interaction: paged    Purpose of Notification: High BP's, hydralazine given. Rt femoral sheath still in.     Orders Received: awaiting respond    Comments:

## 2018-11-30 NOTE — DISCHARGE INSTRUCTIONS
Cardiac Angiogram Discharge Instructions - FEMORAL    After you go home:      Have an adult stay with you until tomorrow.    Drink extra fluids for 2 days.    You may resume your normal diet.    No smoking       For 24 hours - due to the sedation you received:    Relax and take it easy.    Do NOT make any important or legal decisions.    Do NOT drive or operate machines at home or at work.    Do NOT drink alcohol.    Care of Wrist Puncture Site:      For the first 24 hrs - check the puncture site every 1-2 hours while awake.    It is normal to have soreness at the puncture site and mild tingling in your hand for up to 3 days.    Remove the bandaid after 24 hours. If there is minor oozing, apply another bandaid and remove it after 12 hours.    You may shower tomorrow.  Do NOT take a bath, or use a hot tub or pool for at least 3 days. Do NOT scrub the site. Do not use lotion or powder near the puncture site.           Activity:        For 2 days:      Do NOT do any heavy activity such as exercise, lifting, or straining.     Bleeding:      If you start bleeding from the site in your GROIN,  lay down and press firmly on/above the site for 10 minutes.     Once bleeding stops, keep leg still for 2 hours.     Call [facility] as soon as you can.       Call 911 right away if you have heavy bleeding or bleeding that does not stop.      Medicines:      If you are taking an antiplatelet medication such as Plavix, Brilinta or Effient, do not stop taking it until you talk to your cardiologist.        If you are on Metformin (Glucophage), do not restart it until you have blood tests (within 2 to 3 days after discharge).  After you have your blood drawn, you may restart the Metformin.     Take your medications, including blood thinners, unless your provider tells you not to.  If you take Coumadin (Warfarin), have your INR checked by your provider in  3-5 days. Call your clinic to schedule this.    If you have stopped any medicines,  check with your provider about when to restart them.    Follow Up Appointments:      Follow up with [facility] Nurse Practitioner at [facility] of patient preference in 7-10 days.    Call the clinic if:      You have a large or growing hard lump around the site.    The site is red, swollen, hot or tender.    Blood or fluid is draining from the site.    You have chills or a fever greater than 101 F (38 C).    Your arm feels numb, cool or changes color.    You have hives, a rash or unusual itching.    Any questions or concerns.          [facility] [place]:    [phone] [facility] (7 days a week)

## 2018-11-30 NOTE — PLAN OF CARE
Problem: Patient Care Overview  Goal: Plan of Care/Patient Progress Review  OT/CR: Evaluation and treatment initiated. Pt lives in a rambler style home with her son and daughter in law. Prior pt independent in all ADLs and most IADLs (driving, medication management). Pt is a 78 year old female admitted on 11/26/2018 with epigastric discomfort, palpitations, troponin rise and rapid AF. Pt with NSTEMI s/p AGATHA to LAD on 11/29/18.  Discharge Planner OT   Patient plan for discharge: home  Current status: Pt ambulated in the hallway for 300 feet with SBA, for 8 minutes. Pt completed 12 stairs with SBA.  Pt demonstrated fatigue however tolerated session well. Hypertensive at rest and with activity, see vitals flowsheet for details. Educated on MI Concepts and walking HEP.   Barriers to return to prior living situation: none anticipated   Recommendations for discharge: Home with assist for I/ADLs as needed and OP CR   Rationale for recommendations: Pt will benefit from OP CR for monitored and progressive physical activity and education.        Entered by: Akhil Castañeda 11/30/2018 10:28 AM

## 2018-11-30 NOTE — PROGRESS NOTES
X-cover note    Elevated bp in the 180-200 this evening.   She has Benicar 40mg daily and hydrochlorothiazide 25mg ordered for tomorrow am.   - ordered benicar to be given now and hydrochlorothiazide 12.5mg x1now    Yenny Nam MD  hospitalist

## 2018-11-30 NOTE — DISCHARGE SUMMARY
[facility]    Discharge Summary  Hospitalist    Date of Admission:  11/26/2018  Date of Discharge:  11/30/2018  Discharging Provider: Mychal Urbina PA-C    Discharge Diagnoses      Atrial fibrillation with RVR (H)  NSTEMI (non-ST elevated myocardial infarction) (H)  PAD (peripheral artery disease) (H)    History of Present Illness   Zee Mireles is an 78 year old female who presented with chest and back discomfort, identified to be in afib with RVR with troponin elevation. She was transferred from [facility] ED.    HPI from admission H&P:  Zee Mireles is a 78 year old female with history of HTN, COPD, smoking, HLD who presents after an episode of chest and back discomfort followed by palpitations and is found to be in afib with RVR in the 130's. Troponin elevated at 0.190. Transferred from outside [facility] ED.      In ED:  NS bolus 1 L x 1.    mg x 1  Received 6 mg IV x 2   Metoprolol 5 mg IV x 3   Diltiazem 15 mg IV bolus > 5 mg/h infusion. HR's in the 80's.   IV heparin gtt started.     Hospital Course   Zee Mireles was admitted on 11/26/2018.  The following problems were addressed during her hospitalization:    Atrial fibrillation with RVR  NSTEMI s/p AGATHA to LAD on 11/29/18  HTN, HLD  Presented with chest pain, palpitations, identified to be in afib with RVR, converted to NSR following IV diltiazem. Troponin peak 0.190.  TTE with EF 65-70%, mild LVH, no valvular abnormalities. Lexiscan 11/27 indicating mildly reversible mild to mod mid-distal anteroseptal defect. Pt is s/p angiogram with AGATHA to LAD on 11/29/18. She was rate controlled on diltiazem 120mg daily, PTA acebutolol 400mg resumed. Given elevated CHADSVasc (4), AC was discussed with pt and she was agreeable to start Xarelto for stroke prophylaxis. Due to recent stent, she was also instructed to start Plavix 75mg daily. To avoid triple-therapy, ASA was discontinued. Furthermore, her PTA simvastatin was changed to  atorvastatin due to interaction with diltiazem.      Hypothyroidism   TSH 8.11, free T4 1.23. She is continued on PTA synthroid, dose recently increased. Further management and repeat laboratory studies per PCP.      COPD (stable)  Tobacco abuse, ongoing  Down to 8 cigs per day. Patient has already made a plan with her provider and has nicotine replacement at home.     Mychal Urbina PA-C    Significant Results and Procedures   As noted    Pending Results   These results will be followed up by PCP  Unresulted Labs Ordered in the Past 30 Days of this Admission     Date and Time Order Name Status Description    11/26/2018 1014 T4 free In process           Code Status   Full Code       Primary Care Physician   Jessica Fitzgerald    Physical Exam   Temp: 98.7  F (37.1  C) Temp src: Oral BP: 152/58   Heart Rate: 77 Resp: 18 SpO2: 98 % O2 Device: None (Room air)    Vitals:    11/28/18 0624 11/29/18 0612 11/30/18 0603   Weight: 60.4 kg (133 lb 3.2 oz) 60.4 kg (133 lb 3.2 oz) 59 kg (130 lb 1.6 oz)     Vital Signs with Ranges  Temp:  [98  F (36.7  C)-98.7  F (37.1  C)] 98.7  F (37.1  C)  Heart Rate:  [51-77] 77  Resp:  [16-18] 18  BP: ()/(43-90) 152/58  SpO2:  [92 %-100 %] 98 %  I/O last 3 completed shifts:  In: -   Out: 2750 [Urine:2750]    GEN: well-developed, well-nourished, appears comfortable  PULM: lungs CTA bilaterally, no increased work of breathing, no wheeze, rales, rhonchi  CV: RRR, S1 & S2, no murmur  GI: soft, nontender, nondistended, no guarding or rigidity, +BS in all 4 quadrants  SKIN: warm & dry without rash, wound, or pedal edema    Discharge Disposition   Discharged to home  Condition at discharge: Stable    Consultations This Hospital Stay   PHARMACY TO DOSE HEPARIN  CARDIOLOGY IP CONSULT  PHARMACY LIAISON FOR MEDICATION COVERAGE CONSULT  CARDIAC REHAB IP CONSULT  NUTRITION SERVICES ADULT IP CONSULT  PHARMACY IP CONSULT  PHARMACY IP CONSULT  SMOKING CESSATION PROGRAM IP CONSULT  SMOKING CESSATION PROGRAM  IP CONSULT    Time Spent on this Encounter   I, Mychal Urbina, personally saw the patient today and spent greater than 30 minutes discharging this patient.    Discharge Orders     US MARTHA Doppler with Exercise     Basic metabolic panel     CARDIAC REHAB REFERRAL     Follow-Up with Cardiac Advanced Practice Provider     Follow-Up with Cardiologist     Reason for your hospital stay   Palpitations, you were identified to have an abnormal heart rhythm called atrial fibrillation. You had a workup done that indicated poor blood flow to an area of your heart that required stenting. This was likely the cause of your abnormal heart rhythm. You have been put on medications to prevent recurrence of the atrial fibrillation and to ensure the cardiac stent succeeds. You will need to follow with cardiology as scheduled for ongoing monitoring. Your medications have changed for various reasons.    1. You will need to stop aspirin to avoid significant increased risk of bleeding  2. You have been started on xarelto to prevent stroke, and plavix to prevent your cardiac stent from failing  3. You have been started on diltiazem to prevent recurrence of atrial fibrillation  4. Your cholesterol medication has been changed because your previous one interacts with the diltiazem  5. Your irbesartan has been changed due to a recent medication recall by the      Follow-up and recommended labs and tests    Follow up with primary care provider, Jessica Fitzgerald, within 1-2 weeks, to evaluate medication change and for hospital follow- up. The following labs/tests are recommended: monitor BMP given recent addition of HCTZ.  Cardiology follow-up:  Karly Wood PA-C 11am with labs at 9:30 am on 12/3/18    Dr. Villanueva 9:30 am on 1/28/19 with MARTHA study beforehand        Activity   Your activity upon discharge: activity as tolerated     Diet   Follow this diet upon discharge: Orders Placed This Encounter     Low Saturated Fat Na <2400 mg        Discharge Medications   Current Discharge Medication List      START taking these medications    Details   atorvastatin (LIPITOR) 10 MG tablet Take 1 tablet (10 mg) by mouth every evening  Qty: 30 tablet, Refills: 0    Comments: Future refills by PCP Dr. Jessica Fitzgerald with phone number [phone].  Associated Diagnoses: NSTEMI (non-ST elevated myocardial infarction) (H)      clopidogrel (PLAVIX) 75 MG tablet Take 1 tablet (75 mg) by mouth daily  Qty: 30 tablet, Refills: 0    Comments: Future refills by PCP Dr. Jessica Fitzgerald with phone number [phone].  Associated Diagnoses: NSTEMI (non-ST elevated myocardial infarction) (H)      diltiazem ER (DILT-XR) 120 MG 24 hr capsule Take 1 capsule (120 mg) by mouth daily  Qty: 30 capsule, Refills: 0    Comments: Future refills by PCP Dr. Jessica Fitzgerald with phone number [phone].  Associated Diagnoses: Atrial fibrillation with RVR (H)      hydrochlorothiazide (HYDRODIURIL) 25 MG tablet Take 1 tablet (25 mg) by mouth daily  Qty: 30 tablet, Refills: 0    Comments: Future refills by PCP Dr. Jessica Fitzgerald with phone number [phone].  Associated Diagnoses: NSTEMI (non-ST elevated myocardial infarction) (H)      losartan (COZAAR) 100 MG tablet Take 1 tablet (100 mg) by mouth daily  Qty: 30 tablet, Refills: 0    Comments: Future refills by PCP Dr. Jessica Fitzgerald with phone number [phone].  Associated Diagnoses: NSTEMI (non-ST elevated myocardial infarction) (H)      rivaroxaban ANTICOAGULANT (XARELTO) 15 MG TABS tablet Take 1 tablet (15 mg) by mouth daily (with dinner)  Qty: 60 tablet, Refills: 0    Comments: Future refills by PCP Dr. Jessica Fitzgerald with phone number [phone].  Associated Diagnoses: Atrial fibrillation with RVR (H)         CONTINUE these medications which have NOT CHANGED    Details   !! acebutolol (SECTRAL) 400 MG capsule Take 400 mg by mouth every morning At 0900, may take 1 later in the day if needed      !! acebutolol (SECTRAL) 400 MG  capsule Take 400 mg by mouth daily as needed (Palpitations) = extra dose in addition to scheduled daily dose      acetaminophen (TYLENOL) 500 MG tablet Take 1,000 mg by mouth every 6 hours as needed for mild pain      albuterol (PROAIR HFA/PROVENTIL HFA/VENTOLIN HFA) 108 (90 Base) MCG/ACT inhaler Inhale 2 puffs into the lungs every 4 hours as needed for shortness of breath / dyspnea or wheezing      brimonidine (ALPHAGAN-P) 0.15 % ophthalmic solution Place 1 drop into both eyes 3 times daily      dorzolamide (TRUSOPT) 2 % ophthalmic solution Place 1 drop into both eyes 3 times daily      Glycopyrrolate-Formoterol (BEVESPI AEROSPHERE) 9-4.8 MCG/ACT oral inhaler Inhale 2 puffs into the lungs every evening      latanoprost (XALATAN) 0.005 % ophthalmic solution Place 1 drop into both eyes every evening       levothyroxine (SYNTHROID/LEVOTHROID) 125 MCG tablet Take 125 mcg by mouth daily      multivitamin, therapeutic with minerals (MULTI-VITAMIN) TABS tablet Take 1 tablet by mouth daily       !! - Potential duplicate medications found. Please discuss with provider.      STOP taking these medications       amLODIPine (NORVASC) 5 MG tablet Comments:   Reason for Stopping:         aspirin (ASA) 81 MG tablet Comments:   Reason for Stopping:         irbesartan (AVAPRO) 300 MG tablet Comments:   Reason for Stopping:         simvastatin (ZOCOR) 20 MG tablet Comments:   Reason for Stopping:             Allergies   No Known Allergies  Data   Most Recent 3 CBC's:  Recent Labs   Lab Test  11/30/18   0632  11/27/18   0600  11/26/18   1014   WBC  9.6  7.6  9.7   HGB  13.8  12.4  15.0   MCV  93  94  99   PLT  207  185  269      Most Recent 3 BMP's:  Recent Labs   Lab Test  11/30/18   0632  11/29/18   0822  11/27/18   0600  11/26/18   1014   NA  139   --   142  139   POTASSIUM  3.7  3.8  3.7  4.1   CHLORIDE  109   --   111*  104   CO2  21   --   25  29   BUN  20   --   20  23   CR  0.94   --   0.89  1.01   ANIONGAP  9   --   6  6    KARI  8.7   --   8.1*  8.5   GLC  95   --   85  99     Most Recent 2 LFT's:  Recent Labs   Lab Test  11/26/18   1014   AST  24   ALT  26   ALKPHOS  79   BILITOTAL  0.4     Most Recent INR's and Anticoagulation Dosing History:  Anticoagulation Dose History     There is no flowsheet data to display.        Most Recent 3 Troponin's:  Recent Labs   Lab Test  11/27/18   0150  11/26/18   2205  11/26/18   1458   TROPI  0.136*  0.173*  0.190*     Most Recent Cholesterol Panel:  Recent Labs   Lab Test  11/29/18   0822   CHOL  141   LDL  49   HDL  76   TRIG  79     Most Recent 6 Bacteria Isolates From Any Culture (See EPIC Reports for Culture Details):No lab results found.  Most Recent TSH, T4 and A1c Labs:  Recent Labs   Lab Test  11/26/18   1014   TSH  8.11*   T4  1.23     Results for orders placed or performed during the hospital encounter of 11/26/18   NM Lexiscan stress test (nuc card)    Narrative    GATED MYOCARDIAL PERFUSION SCINTIGRAPHY WITH INTRAVENOUS PHARMACOLOGIC  VASODILATATION LEXISCAN -ONE DAY STUDY     11/27/2018 3:24 PM  MATT JENNINGS  78 years  Female  1940.    Indication/Clinical History: Atypical chest pain    Impression  1.  Myocardial perfusion imaging using single isotope technique  demonstrated mild to moderate mid to distal anteroseptal defect with  mild reversibility. (Normal wall motion in this area, however due to  mild reversibility cannot exclude mild ischemia in this territory)   2. Gated images demonstrated normal LV cavity size with hyperdynamic  LV systolic function and normal wall motion.  The left ventricular  systolic function is 81%.  3. Compared to the prior study from no prior study .    Procedure  Pharmacologic stress testing was performed with Lexiscan at a rate of  0.08 mg/ml rapid bolus injection, for 15 seconds, 0.4 mg/5ml  intravenously. Low-level exercise was not performed along with the  vasodilator infusion.  The heart rate was 63 at baseline and magdalena to  78 beats per  minute during the Lexiscan infusion. The rest blood  pressure was 156/68 mmHg and was 154/68 mm Hg during Lexiscan  infusion. The patient experienced neck pain  during the test.    Myocardial perfusion imaging was performed at rest, approximately 45  minutes after the injection intravenously of 9.2 mCi of Tc-99m  Myoview. At peak pharmacologic effect, 10-20 seconds after Lexiscan,   the patient was injected intravenously with 26.8 mCi of  Tc-99m  Myoview. The post-stress tomographic imaging was performed  approximately 60 minutes after stress.    EKG Findings  The resting EKG demonstrated normal sinus rhythm. The stress EKG  demonstrated less than half millimeter of upsloping ST segment  depression.    Tomographic Findings  Overall, the study quality is adequate . On the stress images, mild to  moderate count reduction is seen in the mid to distal anterior septal  wall. On the rest images, mild to moderate count reduction is again  seen in the distal anteroseptal wall. There is mild reversibility  therefore cannot exclude mild ischemia in this area . Gated images  demonstrated normal LV cavity size with end-diastolic volumes  measuring 50 and 52 mL and rest and stress respectively. LV systolic  function appears hyperdynamic. The left ventricular ejection fraction  was calculated to be 81%. TID was absent.    BRIONNA WHITFIELD MD

## 2018-11-30 NOTE — PROGRESS NOTES
11/30/18 0930   Quick Adds   Type of Visit Initial Occupational Therapy Evaluation   Living Environment   Lives With child(dipak), adult  (lives with son and daughter in law )   Living Arrangements house  (rambler style home )   Home Accessibility bed and bath on same level   Number of Stairs to Enter Home 2   Number of Stairs Within Home 12  (to downstairs )   Transportation Available family or friend will provide;car   Living Environment Comment Son and daughter in law available to assist as needed    Self-Care   Usual Activity Tolerance good   Current Activity Tolerance moderate   Regular Exercise no   Equipment Currently Used at Home none   Functional Level Prior   Ambulation 0-->independent   Transferring 0-->independent   Toileting 0-->independent   Bathing 0-->independent   Dressing 0-->independent   Fall history within last six months no   General Information   Onset of Illness/Injury or Date of Surgery - Date 11/26/18   Referring Physician Blaze Bush MD   Patient/Family Goals Statement Home   Additional Occupational Profile Info/Pertinent History of Current Problem Per chart: Zee Mireles is a 78 year old female admitted on 11/26/2018 with epigastric discomfort, palpitations, troponin rise and rapid AF. NSTEMI s/p AGATHA to LAD on 11/29/18.    Cognitive Status Examination   Orientation orientation to person, place and time   Level of Consciousness alert   Visual Perception   Visual Perception Wears glasses   Sensory Examination   Sensory Quick Adds No deficits were identified   Pain Assessment   Patient Currently in Pain No   Mobility   Bed Mobility Bed mobility skill: Sit to supine;Bed mobility skill: Supine to sit   Bed Mobility Skill: Sit to Supine   Level of [place]: Sit/Supine stand-by assist   Bed Mobility Skill: Supine to Sit   Level of [place]: Supine/Sit stand-by assist   Transfer Skills   Transfer Transfer Safety Analysis Bed/Chair;Transfer Skill: Stand to Sit;Transfer  "Safety Analysis Sit/Stand   Transfer Skill: Bed to Chair/Chair to Bed   Level of [place]: Bed to Chair stand-by assist   Transfer Skill: Sit to Stand   Level of [place]: Sit/Stand stand-by assist   Lower Body Dressing   Level of [place]: Dress Lower Body stand-by assist   Instrumental Activities of Daily Living (IADL)   Previous Responsibilities housekeeping;laundry;meal prep;shopping;medication management;finances;driving   General Therapy Interventions   Planned Therapy Interventions ADL retraining;IADL retraining;risk factor education;progressive activity/exercise;home program guidelines;transfer training   Clinical Impression   Criteria for Skilled Therapeutic Interventions Met yes, treatment indicated   OT Diagnosis Decreased endurance for I/ADls   Influenced by the following impairments Decreased endurance for I/ADls   Assessment of Occupational Performance 1-3 Performance Deficits   Identified Performance Deficits Decreased endurance for I/ADls (dressing, bathing, toileting)   Clinical Decision Making (Complexity) Low complexity   Therapy Frequency 2 times/day   Predicted Duration of Therapy Intervention (days/wks) 3 days   Anticipated Discharge Disposition Home with Outpatient Therapy  (OP CR )   Risks and Benefits of Treatment have been explained. Yes   Patient, Family & other staff in agreement with plan of care Yes   [facility] TM \"6 Clicks\"   2016, Trustees of [facility], under license to [employer].  All rights reserved.   6 Clicks Short Forms Daily Activity Inpatient Short Form   [facility]  \"6 Clicks\" Daily Activity Inpatient Short Form   1. Putting on and taking off regular lower body clothing? 4 - None   2. Bathing (including washing, rinsing, drying)? 4 - None   3. Toileting, which includes using toilet, bedpan or urinal? 4 - None   4. Putting on and taking off regular upper body clothing? 4 - None   5. Taking care of personal grooming such as " brushing teeth? 4 - None   6. Eating meals? 4 - None   Daily Activity Raw Score (Score out of 24.Lower scores equate to lower levels of function) 24   Total Evaluation Time   Total Evaluation Time (Minutes) 8

## 2018-11-30 NOTE — PLAN OF CARE
Problem: Cardiac: ACS (Acute Coronary Syndrome) (Adult)  Goal: Signs and Symptoms of Listed Potential Problems Will be Absent, Minimized or Managed (Cardiac: ACS)  Signs and symptoms of listed potential problems will be absent, minimized or managed by discharge/transition of care (reference Cardiac: ACS (Acute Coronary Syndrome) (Adult) CPG).   Outcome: Adequate for Discharge Date Met: 11/30/18  Discharged home with ride provided by son.  Right groin dry, no hematoma.  Reviewed post-angiogram instructions with pt

## 2018-11-30 NOTE — CONSULTS
NUTRITION EDUCATION    REASON FOR ASSESSMENT:  Nutrition education on American Heart Association (AHA) Heart Healthy Diet.    NUTRITION HISTORY:  Information obtained from Pt    -Pt states that she follows a regular diet, with no known food allergies.   -Pt lives with her son and daughter-in-law, pt states that she does a lot of the cooking and some of the grocery shopping. Pt states that she does read food labels   -Pt consumes 2-3 meals per day. Diet recall:  Breakfast: cereal with 1% milk, or toast, eggs, oatmeal, fruit and juice (grape)  Lunch: (sometimes skips): cheese and crackers  Dinner: varies, roast beef, soup, spaghetti, vegetables (frozen or canned)  -Pt reports that she rarely uses a salt shaker unless its on vegetables     CURRENT DIET ORDER:  Low saturated fat     NUTRITION DIAGNOSIS:  Food- and nutrition-related knowledge deficit R/t limited nutrition education AEB pt consume diet high in saturated fat    INTERVENTIONS:  Nutrition Prescription:    Recommended AHA Heart Healthy Diet    Implementation:     Nutrition Education (Content):  a) reviewed Heart Healthy Diet guidelines  b) provided heart healthy diet handout  Your Heart-Healthy diet  How to Read Food Labels  Tips for Heart-Healthy cooking and Eating  a. Recipe Changes for Heart-Healthy Cooking      Nutrition Education (Application):  a) Discussed current eating habits and recommended alternative food choices    Anticipate good compliance    Diet Education - refer to Education flowsheet    Goals:    Patient verbalizes understanding of diet by listing ways to reduce sodium in her diet     All of the above goals met during education session    Follow Up/Monitoring:    Provided RD contact information for future questions      Sari Marinelli RD, LD

## 2018-11-30 NOTE — PROGRESS NOTES
[facility]  Cardiology Progress Note  Date of Service: 11/30/2018       Physician Supervisory Attestation:   I have reviewed and discussed with Chela Rodriguez NP the history, physical and plan and independently interviewed and examined Zee Mireles and agree with the plan as stated in the note.    --- She developed PAF overnight and is now in NSR. Will start Xarelto and discontinue Aspirin. Continue Plavix. Ok to discharge. Follow up arranged.     Rao Bob MD 11/30/2018 11:58 AM      Assessment & Plan    Zee Mireles is a 78 year old female with past medical history significant for HTN, COPD, smoking, HLD, PAD admitted from [facility] on 11/26/2018 with palpitations and chest (epigastric) and back discomfort. She was found to be in atrial fibrillation with RVR with troponin 0.190.     Assessment and Plan:   1. NSTEMI: Troponin peak at 0.19. Stress test revealed mild to moderate mid to distal anteroseptal defect with mild reversibility. LVEF 81%. LVEF 65-70% by echo    Angiogram 11/29: revealed high grade LAD stenosis s/p AGATHA    Per PIONEER AF-PCI study and avoid triple therapy:    Stop aspirin    Transition from Brilinta to Plavix with 300 mg loading dose today. Then Plavix 75 mg daily thereafter for 1 year. After 1 year stop Plavix and restart aspirin    Start Xarelto 15 mg daily     2. Atrial fibrillation with RVR, new: Spontaneously converted to SR. CHADS2-VASc 5 (age, gender, HTN, vascular disease)    Anticoagulation with NOAC: (cost of appx $149/mo for NOAC, with which patient is agreeable).     Start Xarelto 15 mg daily as above    Consider outpatient event monitor to evaluation for potential recurrent atrial fibrillation.    3. HTN: [PTA Diltazem 120 mg daily and irbesartan 300 mg daily]    Intermittent BP spikes    Continue PTA Diltiazem 120 mg daily, hydrochlorothiazide 25 mg daily (new), losartan 100 mg daily (new)    4. HLD: Fasting labs from 11/26/18: , LDL 32, HDL 45, Trigs  65    Continue PTA Lipitor 10 mg daily    5. Tobacco abuse: Down to 8 cigarettes daily. Nicotine patches ordered    6. PAD: patient reports iliac and lower extremity stenting in the past. Unable to find reports in Care Everywhere    Diffuse and severe right femoral iliac disease on iliofemoral angiography.    Outpatient MARTHA    Follow up with Dr. Villanueva at [facility] as scheduled      [facility] follow up at [facility]:  Karly Wood PA-C 11 am labs at 9:30 am on 12/3/18  MARTHA: on 9:30 am 1/3/19  Dr. Villanueva 9:30 am on 1/28/19    DIANA ANNE CNP  Pager:  [phone]   Text Page  (7am - 5pm, M-F)    Interval History   No further epigastric discomfort. Remain in SR.    Physical Exam   Temp: 98.7  F (37.1  C) Temp src: Oral BP: 152/58   Heart Rate: 77 Resp: 18 SpO2: 98 % O2 Device: None (Room air)    Vitals:    11/28/18 0624 11/29/18 0612 11/30/18 0603   Weight: 60.4 kg (133 lb 3.2 oz) 60.4 kg (133 lb 3.2 oz) 59 kg (130 lb 1.6 oz)       Constitutional:   NAD   Skin:   Warm and dry   Head:   Nontraumatic   Neck:   no JVD   Lungs:   symmetric, clear to auscultation   Cardiovascular:   regular rate and rhythm, normal S1 and S2 and no murmur noted   Abdomen:   Benign   Extremities and Back:   No edema. Right femoral access site CDI without tenderness or hematoma   Neurological:   Grossly nonfocal       Medications     Continuing ACE inhibitor/ARB/ARNI from home medication list OR ACE inhibitor/ARB order already placed during this visit       - MEDICATION INSTRUCTIONS -       - MEDICATION INSTRUCTIONS -       HEParin Stopped (11/29/18 1030)     - MEDICATION INSTRUCTIONS -       - MEDICATION INSTRUCTIONS -         acebutolol  400 mg Oral QAM     aspirin  81 mg Oral Daily     atorvastatin  10 mg Oral QPM     brimonidine  1 drop Both Eyes TID     diltiazem ER  120 mg Oral Daily     dorzolamide  1 drop Both Eyes TID     losartan  100 mg Oral Daily    And     hydrochlorothiazide  25 mg Oral Daily     latanoprost  1 drop  Both Eyes QPM     levothyroxine  125 mcg Oral Daily     nicotine  1 patch Transdermal Daily     nicotine   Transdermal Q8H     nicotine   Transdermal Daily     sodium chloride (PF)  3 mL Intracatheter Q8H     ticagrelor  90 mg Oral Q12H     umeclidinium-vilanterol  1 puff Inhalation QPM       Data     Most Recent 3 BMP's:  Recent Labs   Lab Test  11/30/18   0632  11/29/18   0822  11/27/18   0600  11/26/18   1014   NA  139   --   142  139   POTASSIUM  3.7  3.8  3.7  4.1   CHLORIDE  109   --   111*  104   CO2  21   --   25  29   BUN  20   --   20  23   CR  0.94   --   0.89  1.01   ANIONGAP  9   --   6  6   KARI  8.7   --   8.1*  8.5   GLC  95   --   85  99

## 2018-11-30 NOTE — PLAN OF CARE
Problem: Patient Care Overview  Goal: Plan of Care/Patient Progress Review  Outcome: No Change  A&OX4,VSS, on room air. Denies SOB/CP.Tele-SR. Complains of lower back discomfort,PO tylenol administered this shift with good relief. On low sat fat diet. Right femoral sheath line pulled at 1030 pm. CMS intact, dressing CDI. On bedrest until 0530 am. IV Fentanyl 25 mcg and  300 ml NS bolus administered during femoral sheath pull. Hydralazine IV administered for high BP's prior to line pull.

## 2018-11-30 NOTE — PLAN OF CARE
Problem: Patient Care Overview  Goal: Plan of Care/Patient Progress Review  Occupational Therapy Discharge Summary    Reason for therapy discharge:    Discharged to home with outpatient therapy.    Progress towards therapy goal(s). See goals on Care Plan in [facility] electronic health record for goal details.  Goals partially met.  Barriers to achieving goals:   discharge from facility.    Therapy recommendation(s):    Continued therapy is recommended.  Rationale/Recommendations:  Home with assist for I/ADLs as needed and OP CR .

## 2018-12-03 ENCOUNTER — HOSPITAL ENCOUNTER (OUTPATIENT)
Dept: CARDIAC REHAB | Facility: CLINIC | Age: 78
End: 2018-12-03
Attending: INTERNAL MEDICINE
Payer: COMMERCIAL

## 2018-12-03 ENCOUNTER — OFFICE VISIT (OUTPATIENT)
Dept: CARDIOLOGY | Facility: CLINIC | Age: 78
End: 2018-12-03
Payer: COMMERCIAL

## 2018-12-03 VITALS
WEIGHT: 133.8 LBS | OXYGEN SATURATION: 99 % | SYSTOLIC BLOOD PRESSURE: 105 MMHG | DIASTOLIC BLOOD PRESSURE: 51 MMHG | HEART RATE: 57 BPM | BODY MASS INDEX: 23.33 KG/M2

## 2018-12-03 DIAGNOSIS — I25.10 CORONARY ARTERY DISEASE INVOLVING NATIVE HEART WITHOUT ANGINA PECTORIS, UNSPECIFIED VESSEL OR LESION TYPE: Primary | ICD-10-CM

## 2018-12-03 DIAGNOSIS — I21.4 NSTEMI (NON-ST ELEVATED MYOCARDIAL INFARCTION) (H): ICD-10-CM

## 2018-12-03 DIAGNOSIS — I48.91 ATRIAL FIBRILLATION WITH RVR (H): ICD-10-CM

## 2018-12-03 LAB
ANION GAP SERPL CALCULATED.3IONS-SCNC: 7 MMOL/L (ref 3–14)
BUN SERPL-MCNC: 30 MG/DL (ref 7–30)
CALCIUM SERPL-MCNC: 8.5 MG/DL (ref 8.5–10.1)
CHLORIDE SERPL-SCNC: 102 MMOL/L (ref 94–109)
CO2 SERPL-SCNC: 27 MMOL/L (ref 20–32)
CREAT SERPL-MCNC: 1.16 MG/DL (ref 0.52–1.04)
GFR SERPL CREATININE-BSD FRML MDRD: 45 ML/MIN/1.7M2
GLUCOSE SERPL-MCNC: 100 MG/DL (ref 70–99)
HGB BLD-MCNC: 12.5 G/DL (ref 11.7–15.7)
POTASSIUM SERPL-SCNC: 3.7 MMOL/L (ref 3.4–5.3)
SODIUM SERPL-SCNC: 136 MMOL/L (ref 133–144)

## 2018-12-03 PROCEDURE — 80048 BASIC METABOLIC PNL TOTAL CA: CPT | Performed by: NURSE PRACTITIONER

## 2018-12-03 PROCEDURE — 40000575 ZZH STATISTIC OP CARDIAC VISIT #2

## 2018-12-03 PROCEDURE — 93797 PHYS/QHP OP CAR RHAB WO ECG: CPT | Mod: XU

## 2018-12-03 PROCEDURE — 36415 COLL VENOUS BLD VENIPUNCTURE: CPT | Performed by: NURSE PRACTITIONER

## 2018-12-03 PROCEDURE — 93798 PHYS/QHP OP CAR RHAB W/ECG: CPT

## 2018-12-03 PROCEDURE — 99214 OFFICE O/P EST MOD 30 MIN: CPT | Performed by: PHYSICIAN ASSISTANT

## 2018-12-03 PROCEDURE — 40000116 ZZH STATISTIC OP CR VISIT

## 2018-12-03 PROCEDURE — 85018 HEMOGLOBIN: CPT | Performed by: PHYSICIAN ASSISTANT

## 2018-12-03 RX ORDER — HYDROCHLOROTHIAZIDE 25 MG/1
12.5 TABLET ORAL DAILY
Qty: 30 TABLET | Refills: 0 | COMMUNITY
Start: 2018-12-03 | End: 2022-11-08

## 2018-12-03 NOTE — MR AVS SNAPSHOT
"              After Visit Summary   12/3/2018    Zee Mireles    MRN: [id number]           Patient Information     Date Of Birth          1940        Visit Information        Provider Department      12/3/2018 11:00 AM Brianne Ching PA-C [facility]        Today's Diagnoses     Coronary artery disease involving native heart without angina pectoris, unspecified vessel or lesion type    -  1    NSTEMI (non-ST elevated myocardial infarction) (H)        Atrial fibrillation with RVR (H)          Care Instructions    [facility]~[address]~[place], [place]~[address]  Thank you for your  Heart Care visit today. If you have questions regarding your visit, please contact your cardiology RN's, Rain Quintero or Jeanne Seaman, at [phone]. Your provider has recommended the following:  Medication Changes:  DECREASE hydrochlorothiazide to 12.5 mg (1/2 tablet) daily. Please have lab work in about 2 weeks to recheck your kidney function.   Recommendations:  Call us with questions or concerns.  Follow-up:  See Dr. Villanueva for cardiology follow up in January as scheduled.     To schedule a future appointment, we kindly ask that you call cardiology scheduling at [phone] three months prior to requested revisit date.  [facility] cardiology clinic is staffed with \"Advance Practice Providers\". These are our cardiology Physician Assistants and Nurse Practitioners. Please call cardiology scheduling if you feel you need clinical evaluation with them at any time for any cardiac reason.                 Reminder:  For your safety, we ask that you bring in your current medication(s) or an updated list of your medications with you to EACH office visit. Include the medication name, dose of pill on bottle and how you are taking it. Include over-the-counter medications or supplements. Your provider will review this at " each visit and plan your care based on your current information.               Follow-ups after your visit        Your next 10 appointments already scheduled     Jan 03, 2019  9:40 AM CST   US MARTHA DOPPLER WITH EXERCISE BILATERAL with WYECHVUS   [facility]    [address] [address]   [phone]           How do I prepare for my exam? (Food and drink instructions) No caffeine or tobacco for 1 hour prior to exam.  What should I wear: Wear comfortable clothes.  How long does the exam take: Most ultrasounds take 30 to 60 minutes.  What should I bring: Bring a list of your medicines, including vitamins, minerals and over-the-counter drugs. It is safest to leave personal items at home.  Do I need a :  No  is needed.  What do I need to tell my doctor: Tell your doctor about any allergies you may have.  What should I do after the exam: No restrictions, You may resume normal activities.  What is this test: An ultrasound uses sound waves to make pictures of the body. Sound waves do not cause pain. The only discomfort may be the pressure of the wand against your skin or full bladder.  Who should I call with questions: If you have any questions, please call the Imaging Department where you will have your exam. Directions, parking instructions, and other information is available on our website, [employer]ink[employer] music/imaging.            Jan 28, 2019  9:30 AM CST   Return Visit with Jono Villanueva MD   [facility] ([facility])    [address] [address]   [phone]              Future tests that were ordered for you today     Open Future Orders        Priority Expected Expires Ordered    Basic metabolic panel Routine 12/17/2018 12/3/2019 12/3/2018            Who to contact     If you have questions or need follow up information about today's clinic visit or your schedule please  "contact [facility] directly at [phone].  Normal or non-critical lab and imaging results will be communicated to you by MyChart, letter or phone within 4 business days after the clinic has received the results. If you do not hear from us within 7 days, please contact the clinic through MyChart or phone. If you have a critical or abnormal lab result, we will notify you by phone as soon as possible.  Submit refill requests through [employer] or call your pharmacy and they will forward the refill request to us. Please allow 3 business days for your refill to be completed.          Additional Information About Your Visit        PharmMDhar[employer]ulink Information     [employer] lets you send messages to your doctor, view your test results, renew your prescriptions, schedule appointments and more. To sign up, go to [email or web address] . Click on \"Log in\" on the left side of the screen, which will take you to the Welcome page. Then click on \"Sign up Now\" on the right side of the page.     You will be asked to enter the access code listed below, as well as some personal information. Please follow the directions to create your username and password.     Your access code is: [email or web address]  Expires: 2019  1:52 PM     Your access code will  in 90 days. If you need help or a new code, please call your [place] clinic or [phone].        Care EveryWhere ID     This is your Care EveryWhere ID. This could be used by other organizations to access your [place] medical records  [id number]        Your Vitals Were     Pulse Pulse Oximetry BMI (Body Mass Index)             57 99% 23.33 kg/m2          Blood Pressure from Last 3 Encounters:   18 105/51   18 152/58   18 142/79    Weight from Last 3 Encounters:   18 60.7 kg (133 lb 12.8 oz)   18 59 kg (130 lb 1.6 oz)   18 61.2 kg (135 lb)              We Performed the Following     Follow-Up with Cardiac " Advanced Practice Provider     Hemoglobin          Today's Medication Changes          These changes are accurate as of 12/3/18 11:30 AM.  If you have any questions, ask your nurse or doctor.               These medicines have changed or have updated prescriptions.        Dose/Directions    hydrochlorothiazide 25 MG tablet   Commonly known as:  HYDRODIURIL   This may have changed:  how much to take   Used for:  NSTEMI (non-ST elevated myocardial infarction) (H)   Changed by:  Brianne Ching PA-C        Dose:  12.5 mg   Take 0.5 tablets (12.5 mg) by mouth daily   Quantity:  30 tablet   Refills:  0                Primary Care Provider Office Phone # Fax #    Jessica Fitzgerald [phone]       [facility] [address]  [place] [address]        Equal Access to Services     TATA TRAVIS : Owen vaughn Soanderson, waaxda luqadaha, qaybta kaalmada sallyyarick, elen hill . So [facility] [phone].    ATENCIÓN: Si habla español, tiene a chowdary disposición servicios gratuitos de asistencia lingüística. Llame al [phone].    We comply with applicable federal civil rights laws and [place] laws. We do not discriminate on the basis of race, color, national origin, age, disability, sex, sexual orientation, or gender identity.            Thank you!     Thank you for choosing [facility]  for your care. Our goal is always to provide you with excellent care. Hearing back from our patients is one way we can continue to improve our services. Please take a few minutes to complete the written survey that you may receive in the mail after your visit with us. Thank you!             Your Updated Medication List - Protect others around you: Learn how to safely use, store and throw away your medicines at www.disposemymeds.org.          This list is accurate as of 12/3/18 11:30 AM.  Always use your most recent med list.                    Brand Name Dispense Instructions for use Diagnosis    * acebutolol 400 MG capsule    SECTRAL     Take 400 mg by mouth every morning At 0900, may take 1 later in the day if needed        * acebutolol 400 MG capsule    SECTRAL     Take 400 mg by mouth daily as needed (Palpitations) = extra dose in addition to scheduled daily dose        acetaminophen 500 MG tablet    TYLENOL     Take 1,000 mg by mouth every 6 hours as needed for mild pain        albuterol 108 (90 Base) MCG/ACT inhaler    PROAIR HFA/PROVENTIL HFA/VENTOLIN HFA     Inhale 2 puffs into the lungs every 4 hours as needed for shortness of breath / dyspnea or wheezing        atorvastatin 10 MG tablet    LIPITOR    30 tablet    Take 1 tablet (10 mg) by mouth every evening    NSTEMI (non-ST elevated myocardial infarction) (H)       BEVESPI AEROSPHERE 9-4.8 MCG/ACT oral inhaler   Generic drug:  Glycopyrrolate-Formoterol      Inhale 2 puffs into the lungs every evening        brimonidine 0.15 % ophthalmic solution    ALPHAGAN-P     Place 1 drop into both eyes 3 times daily        clopidogrel 75 MG tablet    PLAVIX    30 tablet    Take 1 tablet (75 mg) by mouth daily    NSTEMI (non-ST elevated myocardial infarction) (H)       diltiazem  MG 24 hr capsule    DILT-XR    30 capsule    Take 1 capsule (120 mg) by mouth daily    Atrial fibrillation with RVR (H)       dorzolamide 2 % ophthalmic solution    TRUSOPT     Place 1 drop into both eyes 3 times daily        hydrochlorothiazide 25 MG tablet    HYDRODIURIL    30 tablet    Take 0.5 tablets (12.5 mg) by mouth daily    NSTEMI (non-ST elevated myocardial infarction) (H)       latanoprost 0.005 % ophthalmic solution    XALATAN     Place 1 drop into both eyes every evening        levothyroxine 125 MCG tablet    SYNTHROID/LEVOTHROID     Take 125 mcg by mouth daily        losartan 100 MG tablet    COZAAR    30 tablet    Take 1 tablet (100 mg) by mouth daily    NSTEMI (non-ST elevated myocardial  infarction) (H)       Multi-vitamin tablet      Take 1 tablet by mouth daily        rivaroxaban ANTICOAGULANT 15 MG Tabs tablet    XARELTO    60 tablet    Take 1 tablet (15 mg) by mouth daily (with dinner)    Atrial fibrillation with RVR (H)       * Notice:  This list has 2 medication(s) that are the same as other medications prescribed for you. Read the directions carefully, and ask your doctor or other care provider to review them with you.

## 2018-12-03 NOTE — LETTER
12/3/2018    Jessica Fitzgerald MD  [facility] [address]  [place] [address]    RE: Zee Mireles       Dear Colleague,    I had the pleasure of seeing Zee Mireles in the [facility].      Cardiology Progress Note    Date of Service: 12/03/2018  Patient seen today in follow up of: post hospital  Primary cardiologist: ian f/u with Dr. Villanueva.     HPI:  Zee Mireles is a very pleasant 78 year old female with a history of hypertension, hyperlipidemia, tobacco use, and peripheral artery disease per her report with prior lower extremity stenting in the past who is here today for post hospital follow-up. She presented to the emergency department on 11/26/18 with chest discomfort and palpitations.  Her heart rate was in the 130s on admission and she was found to be in atrial fibrillation with rapid ventricular response.  Her troponin was noted to be elevated up to 0.19.  She was seen in consultation by Dr. Bob.  Upon arrival to the hospital, she spontaneously converted to normal sinus rhythm.  A Lexiscan nuclear stress test was performed given her elevated troponin.  This showed a mild to moderate mid to distal anteroseptal defect with mild reversibility.      She was subsequently referred for coronary angiography.  She was found to have significant coronary artery disease as well as peripheral artery disease.  The left main had no significant disease.  The LAD was heavily calcified which was treated with a drug-eluting stent.  Additionally, the only one had a 70% stenosis and she also had a 60% mid RCA stenosis noted. Aspirin and Brilinta were initially started for dual antiplatelet therapy but given her atrial fibrillation, she was transitioned to Plavix and started on Xarelto to avoid triple therapy.    She was noted to have diffuse and severe right femoral iliac disease on angiography and outpatient ABIs have been arranged.    Since her hospital  discharge, she has been doing fairly well. She enrolled in cardiac rehab. She has not had any further chest discomfort. No palpitations, shortness of breath, or leg edema. Her groin site is well healed.     She reports undergoing stenting for some claudication symptoms on the right several years ago. At present, she denies any claudication symptoms.     ASSESSMENT/PLAN:  1.  Coronary artery disease. As outlined above with high grade LAD disease s/p stenting with a AGATHA. We reviewed her angiogram and echocardiogram results in detail today. She is on Xarleto in addition to plavix to avoid triple therapy. She is also on statin and beta blocker therapy. She enrolled in cardiac rehab today and plans to attend 2 - 3 times per week. .   2.  Paroxsymal trial fibrillation. She spontaneously converted on presentation to the hospital and was subsequently noted to have a few short runs on telemetry but nothing sustained. Her [id number] score is 5. Xarelto has been started for anticoagulation.  3.  Hypertension. Her blood pressure is very well controlled today on her current regiment.   4.  Hyperlipidemia. On only lipitor 10 mg daily although her LDL was very well controlled at 32 on recent lipid profile. We will continue this dose for now.   5.  Peripheral artery disease. She prior peripheral intervention. She is scheduled for ABIs next month and has follow up with Dr. Villanueva.  6.  Worsening renal insufficiency. Her basic metabolic panel today shows some worsening renal insufficiency with a creatinine of 1.16 and GFR of 45. She was started on both hydrochlorothiazide and cozaar. Her BP is excellent today. I recommended she cut the hydrochlorothiazide back to 12.5 mg daily and have a repeat basic metabolic panel drawn in two weeks.    Zee is doing well from a cardiovascular standpoint today. She will return to see Dr. Villanueva in January. Of course, I asked her to contact us sooner with any concerns.    This note was  completed in part using Dragon voice recognition software. Although reviewed after completion, some word and grammatical errors may occur.    Orders this Visit:  Orders Placed This Encounter   Procedures     Hemoglobin     Basic metabolic panel     Orders Placed This Encounter   Medications     hydrochlorothiazide (HYDRODIURIL) 25 MG tablet     Sig: Take 0.5 tablets (12.5 mg) by mouth daily     Dispense:  30 tablet     Refill:  0     Medications Discontinued During This Encounter   Medication Reason     hydrochlorothiazide (HYDRODIURIL) 25 MG tablet Reorder       CURRENT MEDICATIONS:  Current Outpatient Prescriptions   Medication Sig Dispense Refill     acebutolol (SECTRAL) 400 MG capsule Take 400 mg by mouth every morning At 0900, may take 1 later in the day if needed       acebutolol (SECTRAL) 400 MG capsule Take 400 mg by mouth daily as needed (Palpitations) = extra dose in addition to scheduled daily dose       acetaminophen (TYLENOL) 500 MG tablet Take 1,000 mg by mouth every 6 hours as needed for mild pain       albuterol (PROAIR HFA/PROVENTIL HFA/VENTOLIN HFA) 108 (90 Base) MCG/ACT inhaler Inhale 2 puffs into the lungs every 4 hours as needed for shortness of breath / dyspnea or wheezing       atorvastatin (LIPITOR) 10 MG tablet Take 1 tablet (10 mg) by mouth every evening 30 tablet 0     brimonidine (ALPHAGAN-P) 0.15 % ophthalmic solution Place 1 drop into both eyes 3 times daily       clopidogrel (PLAVIX) 75 MG tablet Take 1 tablet (75 mg) by mouth daily 30 tablet 0     diltiazem ER (DILT-XR) 120 MG 24 hr capsule Take 1 capsule (120 mg) by mouth daily 30 capsule 0     dorzolamide (TRUSOPT) 2 % ophthalmic solution Place 1 drop into both eyes 3 times daily       Glycopyrrolate-Formoterol (BEVESPI AEROSPHERE) 9-4.8 MCG/ACT oral inhaler Inhale 2 puffs into the lungs every evening       hydrochlorothiazide (HYDRODIURIL) 25 MG tablet Take 0.5 tablets (12.5 mg) by mouth daily 30 tablet 0     latanoprost (XALATAN)  "0.005 % ophthalmic solution Place 1 drop into both eyes every evening        levothyroxine (SYNTHROID/LEVOTHROID) 125 MCG tablet Take 125 mcg by mouth daily       losartan (COZAAR) 100 MG tablet Take 1 tablet (100 mg) by mouth daily 30 tablet 0     multivitamin, therapeutic with minerals (MULTI-VITAMIN) TABS tablet Take 1 tablet by mouth daily       rivaroxaban ANTICOAGULANT (XARELTO) 15 MG TABS tablet Take 1 tablet (15 mg) by mouth daily (with dinner) 60 tablet 0     [DISCONTINUED] hydrochlorothiazide (HYDRODIURIL) 25 MG tablet Take 1 tablet (25 mg) by mouth daily 30 tablet 0     ALLERGIES  No Known Allergies  PAST MEDICAL HISTORY:  Past Medical History:   Diagnosis Date     Chest pain 11/2018    burning sensation - indigestion     COPD (chronic obstructive pulmonary disease) (H)      Glaucoma      HTN (hypertension)      Hyperlipidemia      Hypothyroid      Paroxysmal atrial fibrillation (H) 11/2018     Smoker     ongoing - \"6-8 cigarettes QD\"     PAST SURGICAL HISTORY:  No past surgical history on file.  FAMILY HISTORY:  No family history on file.  SOCIAL HISTORY:  Social History     Social History     Marital status:      Spouse name: N/A     Number of children: N/A     Years of education: N/A     Social History Main Topics     Smoking status: Current Every Day Smoker     Types: Cigarettes     Smokeless tobacco: Never Used      Comment: Trying to quit 7 cigs daily     Alcohol use None     Drug use: None     Sexual activity: Not Asked     Other Topics Concern     None     Social History Narrative     Review of Systems:  Skin:  Positive for itching   Eyes:  Positive for glasses  ENT:  Negative    Respiratory:  Negative    Cardiovascular:  Negative    Gastroenterology: Positive for heartburn  Genitourinary:  Negative    Musculoskeletal:  Negative    Neurologic:  Negative    Psychiatric:  Negative    Heme/Lymph/Imm:  Negative    Endocrine:  Negative       Physical Exam:  Vitals: /51 (BP Location: Left " arm, Patient Position: Sitting, Cuff Size: Adult Regular)  Pulse 57  Wt 60.7 kg (133 lb 12.8 oz)  SpO2 99%  BMI 23.33 kg/m2   Wt Readings from Last 4 Encounters:   12/03/18 60.7 kg (133 lb 12.8 oz)   11/30/18 59 kg (130 lb 1.6 oz)   11/26/18 61.2 kg (135 lb)     GEN: well nourished, in no acute distress.  HEENT:  Pupils equal, round. Sclerae nonicteric.   NECK: Supple, no masses appreciated.   C/V:  Regular rate and rhythm, no murmur, rub or gallop.    RESP: Respirations are unlabored. Clear to auscultation bilaterally without wheezing, rales, or rhonchi.  GI: Abdomen soft, nontender.  EXTREM: No LE edema. R femoral site is well healed without hematoma. There is a bruit present  NEURO: Alert and oriented, cooperative.  SKIN: Warm and dry.     Recent Lab Results:  LIPID RESULTS:  Lab Results   Component Value Date    CHOL 141 11/29/2018    HDL 76 11/29/2018    LDL 49 11/29/2018    TRIG 79 11/29/2018     LIVER ENZYME RESULTS:  Lab Results   Component Value Date    AST 24 11/26/2018    ALT 26 11/26/2018     CBC RESULTS:  Lab Results   Component Value Date    WBC 9.6 11/30/2018    RBC 4.24 11/30/2018    HGB 12.5 12/03/2018    HCT 39.5 11/30/2018    MCV 93 11/30/2018    MCH 32.5 11/30/2018    MCHC 34.9 11/30/2018    RDW 16.1 (H) 11/30/2018     11/30/2018     BMP RESULTS:  Lab Results   Component Value Date     12/03/2018    POTASSIUM 3.7 12/03/2018    CHLORIDE 102 12/03/2018    CO2 27 12/03/2018    ANIONGAP 7 12/03/2018     (H) 12/03/2018    BUN 30 12/03/2018    CR 1.16 (H) 12/03/2018    GFRESTIMATED 45 (L) 12/03/2018    GFRESTBLACK 55 (L) 12/03/2018    KARI 8.5 12/03/2018      A1C RESULTS:  No results found for: A1C  INR RESULTS:  No results found for: INR    New/Pertinent imaging results since last visit:  None        Thank you for allowing me to participate in the care of your patient.    Sincerely,     Brianne Ching PA-C     [facility]

## 2018-12-03 NOTE — LETTER
12/3/2018    Jessica Fitzgerald MD  [facility] [address]  [place] [address]    RE: Zee Mireles       Dear Colleague,    I had the pleasure of seeing Zee Mireles in the [facility].      Cardiology Progress Note    Date of Service: 12/03/2018  Patient seen today in follow up of: post hospital  Primary cardiologist: ian f/u with Dr. Villanueva.     HPI:  Zee Mireles is a very pleasant 78 year old female with a history of hypertension, hyperlipidemia, tobacco use, and peripheral artery disease per her report with prior lower extremity stenting in the past who is here today for post hospital follow-up. She presented to the emergency department on 11/26/18 with chest discomfort and palpitations.  Her heart rate was in the 130s on admission and she was found to be in atrial fibrillation with rapid ventricular response.  Her troponin was noted to be elevated up to 0.19.  She was seen in consultation by Dr. Bob.  Upon arrival to the hospital, she spontaneously converted to normal sinus rhythm.  A Lexiscan nuclear stress test was performed given her elevated troponin.  This showed a mild to moderate mid to distal anteroseptal defect with mild reversibility.      She was subsequently referred for coronary angiography.  She was found to have significant coronary artery disease as well as peripheral artery disease.  The left main had no significant disease.  The LAD was heavily calcified which was treated with a drug-eluting stent.  Additionally, the only one had a 70% stenosis and she also had a 60% mid RCA stenosis noted. Aspirin and Brilinta were initially started for dual antiplatelet therapy but given her atrial fibrillation, she was transitioned to Plavix and started on Xarelto to avoid triple therapy.    She was noted to have diffuse and severe right femoral iliac disease on angiography and outpatient ABIs have been arranged.    Since her hospital  discharge, she has been doing fairly well. She enrolled in cardiac rehab. She has not had any further chest discomfort. No palpitations, shortness of breath, or leg edema. Her groin site is well healed.     She reports undergoing stenting for some claudication symptoms on the right several years ago. At present, she denies any claudication symptoms.     ASSESSMENT/PLAN:  1.  Coronary artery disease. As outlined above with high grade LAD disease s/p stenting with a AGATHA. We reviewed her angiogram and echocardiogram results in detail today. She is on Xarleto in addition to plavix to avoid triple therapy. She is also on statin and beta blocker therapy. She enrolled in cardiac rehab today and plans to attend 2 - 3 times per week. .   2.  Paroxsymal trial fibrillation. She spontaneously converted on presentation to the hospital and was subsequently noted to have a few short runs on telemetry but nothing sustained. Her [id number] score is 5. Xarelto has been started for anticoagulation.  3.  Hypertension. Her blood pressure is very well controlled today on her current regiment.   4.  Hyperlipidemia. On only lipitor 10 mg daily although her LDL was very well controlled at 32 on recent lipid profile. We will continue this dose for now.   5.  Peripheral artery disease. She prior peripheral intervention. She is scheduled for ABIs next month and has follow up with Dr. Villanueva.  6.  Worsening renal insufficiency. Her basic metabolic panel today shows some worsening renal insufficiency with a creatinine of 1.16 and GFR of 45. She was started on both hydrochlorothiazide and cozaar. Her BP is excellent today. I recommended she cut the hydrochlorothiazide back to 12.5 mg daily and have a repeat basic metabolic panel drawn in two weeks.    Zee is doing well from a cardiovascular standpoint today. She will return to see Dr. Villanueva in January. Of course, I asked her to contact us sooner with any concerns.    This note was  completed in part using Dragon voice recognition software. Although reviewed after completion, some word and grammatical errors may occur.    Orders this Visit:  Orders Placed This Encounter   Procedures     Hemoglobin     Basic metabolic panel     Orders Placed This Encounter   Medications     hydrochlorothiazide (HYDRODIURIL) 25 MG tablet     Sig: Take 0.5 tablets (12.5 mg) by mouth daily     Dispense:  30 tablet     Refill:  0     Medications Discontinued During This Encounter   Medication Reason     hydrochlorothiazide (HYDRODIURIL) 25 MG tablet Reorder       CURRENT MEDICATIONS:  Current Outpatient Prescriptions   Medication Sig Dispense Refill     acebutolol (SECTRAL) 400 MG capsule Take 400 mg by mouth every morning At 0900, may take 1 later in the day if needed       acebutolol (SECTRAL) 400 MG capsule Take 400 mg by mouth daily as needed (Palpitations) = extra dose in addition to scheduled daily dose       acetaminophen (TYLENOL) 500 MG tablet Take 1,000 mg by mouth every 6 hours as needed for mild pain       albuterol (PROAIR HFA/PROVENTIL HFA/VENTOLIN HFA) 108 (90 Base) MCG/ACT inhaler Inhale 2 puffs into the lungs every 4 hours as needed for shortness of breath / dyspnea or wheezing       atorvastatin (LIPITOR) 10 MG tablet Take 1 tablet (10 mg) by mouth every evening 30 tablet 0     brimonidine (ALPHAGAN-P) 0.15 % ophthalmic solution Place 1 drop into both eyes 3 times daily       clopidogrel (PLAVIX) 75 MG tablet Take 1 tablet (75 mg) by mouth daily 30 tablet 0     diltiazem ER (DILT-XR) 120 MG 24 hr capsule Take 1 capsule (120 mg) by mouth daily 30 capsule 0     dorzolamide (TRUSOPT) 2 % ophthalmic solution Place 1 drop into both eyes 3 times daily       Glycopyrrolate-Formoterol (BEVESPI AEROSPHERE) 9-4.8 MCG/ACT oral inhaler Inhale 2 puffs into the lungs every evening       hydrochlorothiazide (HYDRODIURIL) 25 MG tablet Take 0.5 tablets (12.5 mg) by mouth daily 30 tablet 0     latanoprost (XALATAN)  "0.005 % ophthalmic solution Place 1 drop into both eyes every evening        levothyroxine (SYNTHROID/LEVOTHROID) 125 MCG tablet Take 125 mcg by mouth daily       losartan (COZAAR) 100 MG tablet Take 1 tablet (100 mg) by mouth daily 30 tablet 0     multivitamin, therapeutic with minerals (MULTI-VITAMIN) TABS tablet Take 1 tablet by mouth daily       rivaroxaban ANTICOAGULANT (XARELTO) 15 MG TABS tablet Take 1 tablet (15 mg) by mouth daily (with dinner) 60 tablet 0     [DISCONTINUED] hydrochlorothiazide (HYDRODIURIL) 25 MG tablet Take 1 tablet (25 mg) by mouth daily 30 tablet 0     ALLERGIES  No Known Allergies  PAST MEDICAL HISTORY:  Past Medical History:   Diagnosis Date     Chest pain 11/2018    burning sensation - indigestion     COPD (chronic obstructive pulmonary disease) (H)      Glaucoma      HTN (hypertension)      Hyperlipidemia      Hypothyroid      Paroxysmal atrial fibrillation (H) 11/2018     Smoker     ongoing - \"6-8 cigarettes QD\"     PAST SURGICAL HISTORY:  No past surgical history on file.  FAMILY HISTORY:  No family history on file.  SOCIAL HISTORY:  Social History     Social History     Marital status:      Spouse name: N/A     Number of children: N/A     Years of education: N/A     Social History Main Topics     Smoking status: Current Every Day Smoker     Types: Cigarettes     Smokeless tobacco: Never Used      Comment: Trying to quit 7 cigs daily     Alcohol use None     Drug use: None     Sexual activity: Not Asked     Other Topics Concern     None     Social History Narrative     Review of Systems:  Skin:  Positive for itching   Eyes:  Positive for glasses  ENT:  Negative    Respiratory:  Negative    Cardiovascular:  Negative    Gastroenterology: Positive for heartburn  Genitourinary:  Negative    Musculoskeletal:  Negative    Neurologic:  Negative    Psychiatric:  Negative    Heme/Lymph/Imm:  Negative    Endocrine:  Negative       Physical Exam:  Vitals: /51 (BP Location: Left " arm, Patient Position: Sitting, Cuff Size: Adult Regular)  Pulse 57  Wt 60.7 kg (133 lb 12.8 oz)  SpO2 99%  BMI 23.33 kg/m2   Wt Readings from Last 4 Encounters:   12/03/18 60.7 kg (133 lb 12.8 oz)   11/30/18 59 kg (130 lb 1.6 oz)   11/26/18 61.2 kg (135 lb)     GEN: well nourished, in no acute distress.  HEENT:  Pupils equal, round. Sclerae nonicteric.   NECK: Supple, no masses appreciated.   C/V:  Regular rate and rhythm, no murmur, rub or gallop.    RESP: Respirations are unlabored. Clear to auscultation bilaterally without wheezing, rales, or rhonchi.  GI: Abdomen soft, nontender.  EXTREM: No LE edema. R femoral site is well healed without hematoma. There is a bruit present  NEURO: Alert and oriented, cooperative.  SKIN: Warm and dry.     Recent Lab Results:  LIPID RESULTS:  Lab Results   Component Value Date    CHOL 141 11/29/2018    HDL 76 11/29/2018    LDL 49 11/29/2018    TRIG 79 11/29/2018     LIVER ENZYME RESULTS:  Lab Results   Component Value Date    AST 24 11/26/2018    ALT 26 11/26/2018     CBC RESULTS:  Lab Results   Component Value Date    WBC 9.6 11/30/2018    RBC 4.24 11/30/2018    HGB 12.5 12/03/2018    HCT 39.5 11/30/2018    MCV 93 11/30/2018    MCH 32.5 11/30/2018    MCHC 34.9 11/30/2018    RDW 16.1 (H) 11/30/2018     11/30/2018     BMP RESULTS:  Lab Results   Component Value Date     12/03/2018    POTASSIUM 3.7 12/03/2018    CHLORIDE 102 12/03/2018    CO2 27 12/03/2018    ANIONGAP 7 12/03/2018     (H) 12/03/2018    BUN 30 12/03/2018    CR 1.16 (H) 12/03/2018    GFRESTIMATED 45 (L) 12/03/2018    GFRESTBLACK 55 (L) 12/03/2018    KARI 8.5 12/03/2018      A1C RESULTS:  No results found for: A1C  INR RESULTS:  No results found for: INR    New/Pertinent imaging results since last visit:  None    Brianne Humza PA-C  [facility]      Thank you for allowing me to participate in the care of your patient.      Sincerely,     Brianne Ching PA-C     [facility]  Heart Care    cc:   Chela Rodriguez, DIANA [facility]  [place] [facility]  [address]  [place] [id number]

## 2018-12-03 NOTE — PATIENT INSTRUCTIONS
"[facility]  [facility]~[address]~[place], [place]~[address]  Thank you for your  Heart Care visit today. If you have questions regarding your visit, please contact your cardiology RN's, Rain Quintero or Jeanne Seaman, at [phone]. Your provider has recommended the following:  Medication Changes:  DECREASE hydrochlorothiazide to 12.5 mg (1/2 tablet) daily. Please have lab work in about 2 weeks to recheck your kidney function.   Recommendations:  Call us with questions or concerns.  Follow-up:  See Dr. Villanueva for cardiology follow up in January as scheduled.     To schedule a future appointment, we kindly ask that you call cardiology scheduling at [phone] three months prior to requested revisit date.  [facility] cardiology clinic is staffed with \"Advance Practice Providers\". These are our cardiology Physician Assistants and Nurse Practitioners. Please call cardiology scheduling if you feel you need clinical evaluation with them at any time for any cardiac reason.                 Reminder:  For your safety, we ask that you bring in your current medication(s) or an updated list of your medications with you to EACH office visit. Include the medication name, dose of pill on bottle and how you are taking it. Include over-the-counter medications or supplements. Your provider will review this at each visit and plan your care based on your current information.       "

## 2018-12-03 NOTE — PROGRESS NOTES
Cardiology Progress Note    Date of Service: 12/03/2018  Patient seen today in follow up of: post hospital  Primary cardiologist: ian f/u with Dr. Villanueva.     HPI:  Zee Mireles is a very pleasant 78 year old female with a history of hypertension, hyperlipidemia, tobacco use, and peripheral artery disease per her report with prior lower extremity stenting in the past who is here today for post hospital follow-up. She presented to the emergency department on 11/26/18 with chest discomfort and palpitations.  Her heart rate was in the 130s on admission and she was found to be in atrial fibrillation with rapid ventricular response.  Her troponin was noted to be elevated up to 0.19.  She was seen in consultation by Dr. Bob.  Upon arrival to the hospital, she spontaneously converted to normal sinus rhythm.  A Lexiscan nuclear stress test was performed given her elevated troponin.  This showed a mild to moderate mid to distal anteroseptal defect with mild reversibility.      She was subsequently referred for coronary angiography.  She was found to have significant coronary artery disease as well as peripheral artery disease.  The left main had no significant disease.  The LAD was heavily calcified which was treated with a drug-eluting stent.  Additionally, the only one had a 70% stenosis and she also had a 60% mid RCA stenosis noted. Aspirin and Brilinta were initially started for dual antiplatelet therapy but given her atrial fibrillation, she was transitioned to Plavix and started on Xarelto to avoid triple therapy.    She was noted to have diffuse and severe right femoral iliac disease on angiography and outpatient ABIs have been arranged.    Since her hospital discharge, she has been doing fairly well. She enrolled in cardiac rehab. She has not had any further chest discomfort. No palpitations, shortness of breath, or leg edema. Her groin site is well healed.     She reports undergoing stenting for some  claudication symptoms on the right several years ago. At present, she denies any claudication symptoms.     ASSESSMENT/PLAN:  1.  Coronary artery disease. As outlined above with high grade LAD disease s/p stenting with a AGATHA. We reviewed her angiogram and echocardiogram results in detail today. She is on Xarleto in addition to plavix to avoid triple therapy. She is also on statin and beta blocker therapy. She enrolled in cardiac rehab today and plans to attend 2 - 3 times per week. .   2.  Paroxsymal trial fibrillation. She spontaneously converted on presentation to the hospital and was subsequently noted to have a few short runs on telemetry but nothing sustained. Her [id number] score is 5. Xarelto has been started for anticoagulation.  3.  Hypertension. Her blood pressure is very well controlled today on her current regiment.   4.  Hyperlipidemia. On only lipitor 10 mg daily although her LDL was very well controlled at 32 on recent lipid profile. We will continue this dose for now.   5.  Peripheral artery disease. She prior peripheral intervention. She is scheduled for ABIs next month and has follow up with Dr. Villanueva.  6.  Worsening renal insufficiency. Her basic metabolic panel today shows some worsening renal insufficiency with a creatinine of 1.16 and GFR of 45. She was started on both hydrochlorothiazide and cozaar. Her BP is excellent today. I recommended she cut the hydrochlorothiazide back to 12.5 mg daily and have a repeat basic metabolic panel drawn in two weeks.    Zee is doing well from a cardiovascular standpoint today. She will return to see Dr. Villanueva in January. Of course, I asked her to contact us sooner with any concerns.    This note was completed in part using Dragon voice recognition software. Although reviewed after completion, some word and grammatical errors may occur.    Orders this Visit:  Orders Placed This Encounter   Procedures     Hemoglobin     Basic metabolic panel     Orders  Placed This Encounter   Medications     hydrochlorothiazide (HYDRODIURIL) 25 MG tablet     Sig: Take 0.5 tablets (12.5 mg) by mouth daily     Dispense:  30 tablet     Refill:  0     Medications Discontinued During This Encounter   Medication Reason     hydrochlorothiazide (HYDRODIURIL) 25 MG tablet Reorder       CURRENT MEDICATIONS:  Current Outpatient Prescriptions   Medication Sig Dispense Refill     acebutolol (SECTRAL) 400 MG capsule Take 400 mg by mouth every morning At 0900, may take 1 later in the day if needed       acebutolol (SECTRAL) 400 MG capsule Take 400 mg by mouth daily as needed (Palpitations) = extra dose in addition to scheduled daily dose       acetaminophen (TYLENOL) 500 MG tablet Take 1,000 mg by mouth every 6 hours as needed for mild pain       albuterol (PROAIR HFA/PROVENTIL HFA/VENTOLIN HFA) 108 (90 Base) MCG/ACT inhaler Inhale 2 puffs into the lungs every 4 hours as needed for shortness of breath / dyspnea or wheezing       atorvastatin (LIPITOR) 10 MG tablet Take 1 tablet (10 mg) by mouth every evening 30 tablet 0     brimonidine (ALPHAGAN-P) 0.15 % ophthalmic solution Place 1 drop into both eyes 3 times daily       clopidogrel (PLAVIX) 75 MG tablet Take 1 tablet (75 mg) by mouth daily 30 tablet 0     diltiazem ER (DILT-XR) 120 MG 24 hr capsule Take 1 capsule (120 mg) by mouth daily 30 capsule 0     dorzolamide (TRUSOPT) 2 % ophthalmic solution Place 1 drop into both eyes 3 times daily       Glycopyrrolate-Formoterol (BEVESPI AEROSPHERE) 9-4.8 MCG/ACT oral inhaler Inhale 2 puffs into the lungs every evening       hydrochlorothiazide (HYDRODIURIL) 25 MG tablet Take 0.5 tablets (12.5 mg) by mouth daily 30 tablet 0     latanoprost (XALATAN) 0.005 % ophthalmic solution Place 1 drop into both eyes every evening        levothyroxine (SYNTHROID/LEVOTHROID) 125 MCG tablet Take 125 mcg by mouth daily       losartan (COZAAR) 100 MG tablet Take 1 tablet (100 mg) by mouth daily 30 tablet 0      "multivitamin, therapeutic with minerals (MULTI-VITAMIN) TABS tablet Take 1 tablet by mouth daily       rivaroxaban ANTICOAGULANT (XARELTO) 15 MG TABS tablet Take 1 tablet (15 mg) by mouth daily (with dinner) 60 tablet 0     [DISCONTINUED] hydrochlorothiazide (HYDRODIURIL) 25 MG tablet Take 1 tablet (25 mg) by mouth daily 30 tablet 0     ALLERGIES  No Known Allergies  PAST MEDICAL HISTORY:  Past Medical History:   Diagnosis Date     Chest pain 11/2018    burning sensation - indigestion     COPD (chronic obstructive pulmonary disease) (H)      Glaucoma      HTN (hypertension)      Hyperlipidemia      Hypothyroid      Paroxysmal atrial fibrillation (H) 11/2018     Smoker     ongoing - \"6-8 cigarettes QD\"     PAST SURGICAL HISTORY:  No past surgical history on file.  FAMILY HISTORY:  No family history on file.  SOCIAL HISTORY:  Social History     Social History     Marital status:      Spouse name: N/A     Number of children: N/A     Years of education: N/A     Social History Main Topics     Smoking status: Current Every Day Smoker     Types: Cigarettes     Smokeless tobacco: Never Used      Comment: Trying to quit 7 cigs daily     Alcohol use None     Drug use: None     Sexual activity: Not Asked     Other Topics Concern     None     Social History Narrative     Review of Systems:  Skin:  Positive for itching   Eyes:  Positive for glasses  ENT:  Negative    Respiratory:  Negative    Cardiovascular:  Negative    Gastroenterology: Positive for heartburn  Genitourinary:  Negative    Musculoskeletal:  Negative    Neurologic:  Negative    Psychiatric:  Negative    Heme/Lymph/Imm:  Negative    Endocrine:  Negative       Physical Exam:  Vitals: /51 (BP Location: Left arm, Patient Position: Sitting, Cuff Size: Adult Regular)  Pulse 57  Wt 60.7 kg (133 lb 12.8 oz)  SpO2 99%  BMI 23.33 kg/m2   Wt Readings from Last 4 Encounters:   12/03/18 60.7 kg (133 lb 12.8 oz)   11/30/18 59 kg (130 lb 1.6 oz)   11/26/18 " 61.2 kg (135 lb)     GEN: well nourished, in no acute distress.  HEENT:  Pupils equal, round. Sclerae nonicteric.   NECK: Supple, no masses appreciated.   C/V:  Regular rate and rhythm, no murmur, rub or gallop.    RESP: Respirations are unlabored. Clear to auscultation bilaterally without wheezing, rales, or rhonchi.  GI: Abdomen soft, nontender.  EXTREM: No LE edema. R femoral site is well healed without hematoma. There is a bruit present  NEURO: Alert and oriented, cooperative.  SKIN: Warm and dry.     Recent Lab Results:  LIPID RESULTS:  Lab Results   Component Value Date    CHOL 141 11/29/2018    HDL 76 11/29/2018    LDL 49 11/29/2018    TRIG 79 11/29/2018     LIVER ENZYME RESULTS:  Lab Results   Component Value Date    AST 24 11/26/2018    ALT 26 11/26/2018     CBC RESULTS:  Lab Results   Component Value Date    WBC 9.6 11/30/2018    RBC 4.24 11/30/2018    HGB 12.5 12/03/2018    HCT 39.5 11/30/2018    MCV 93 11/30/2018    MCH 32.5 11/30/2018    MCHC 34.9 11/30/2018    RDW 16.1 (H) 11/30/2018     11/30/2018     BMP RESULTS:  Lab Results   Component Value Date     12/03/2018    POTASSIUM 3.7 12/03/2018    CHLORIDE 102 12/03/2018    CO2 27 12/03/2018    ANIONGAP 7 12/03/2018     (H) 12/03/2018    BUN 30 12/03/2018    CR 1.16 (H) 12/03/2018    GFRESTIMATED 45 (L) 12/03/2018    GFRESTBLACK 55 (L) 12/03/2018    KARI 8.5 12/03/2018      A1C RESULTS:  No results found for: A1C  INR RESULTS:  No results found for: INR    New/Pertinent imaging results since last visit:  None    Brianne Ching PA-C  [facility]

## 2018-12-05 LAB
INTERPRETATION ECG - MUSE: NORMAL
INTERPRETATION ECG - MUSE: NORMAL

## 2018-12-06 ENCOUNTER — RECORDS - HEALTHEAST (OUTPATIENT)
Dept: LAB | Facility: CLINIC | Age: 78
End: 2018-12-06

## 2018-12-06 LAB
ALBUMIN SERPL-MCNC: 3.5 G/DL (ref 3.5–5)
ALP SERPL-CCNC: 80 U/L (ref 45–120)
ALT SERPL W P-5'-P-CCNC: 12 U/L (ref 0–45)
ANION GAP SERPL CALCULATED.3IONS-SCNC: 11 MMOL/L (ref 5–18)
AST SERPL W P-5'-P-CCNC: 12 U/L (ref 0–40)
BILIRUB SERPL-MCNC: 0.4 MG/DL (ref 0–1)
BUN SERPL-MCNC: 30 MG/DL (ref 8–28)
CALCIUM SERPL-MCNC: 9.1 MG/DL (ref 8.5–10.5)
CHLORIDE BLD-SCNC: 100 MMOL/L (ref 98–107)
CO2 SERPL-SCNC: 25 MMOL/L (ref 22–31)
CREAT SERPL-MCNC: 1.23 MG/DL (ref 0.6–1.1)
GFR SERPL CREATININE-BSD FRML MDRD: 42 ML/MIN/1.73M2
GLUCOSE BLD-MCNC: 101 MG/DL (ref 70–125)
POTASSIUM BLD-SCNC: 4 MMOL/L (ref 3.5–5)
PROT SERPL-MCNC: 6.8 G/DL (ref 6–8)
SODIUM SERPL-SCNC: 136 MMOL/L (ref 136–145)

## 2018-12-10 ENCOUNTER — SURGERY - HEALTHEAST (OUTPATIENT)
Dept: GASTROENTEROLOGY | Facility: HOSPITAL | Age: 78
End: 2018-12-10

## 2018-12-10 ASSESSMENT — MIFFLIN-ST. JEOR
SCORE: 1038.55
SCORE: 1038.55

## 2018-12-11 ENCOUNTER — ANESTHESIA - HEALTHEAST (OUTPATIENT)
Dept: GASTROENTEROLOGY | Facility: HOSPITAL | Age: 78
End: 2018-12-11

## 2018-12-11 ASSESSMENT — MIFFLIN-ST. JEOR
SCORE: 1036.74
SCORE: 1039.46
SCORE: 1036.74
SCORE: 1039.46

## 2018-12-12 ENCOUNTER — SURGERY - HEALTHEAST (OUTPATIENT)
Dept: GASTROENTEROLOGY | Facility: HOSPITAL | Age: 78
End: 2018-12-12

## 2018-12-12 LAB — INTERPRETATION ECG - MUSE: NORMAL

## 2018-12-12 ASSESSMENT — MIFFLIN-ST. JEOR
SCORE: 1033.11
SCORE: 1033.11

## 2018-12-13 ASSESSMENT — MIFFLIN-ST. JEOR
SCORE: 1036.74
SCORE: 1036.74

## 2019-01-07 NOTE — PROGRESS NOTES
Cardiac Rehab Discharge Summary    Reason for discharge:    Patient/family request discontinuation of services.    Progress towards goals:  Goals partially met.  Barriers to achieving goals:   limited tolerance for therapy and continued GI related symptoms.  Pt states she feels weak due to blood loss. .    Recommendation(s):    Patient has appointment with her cardiologist she thought at the end of the month. She will see how she is feeling and discuss returning to rehab. If patient is ready to return to rehab, we will request updated order and re evaluate patient at that time.

## 2019-01-07 NOTE — ADDENDUM NOTE
Encounter addended by: Maddy Garcia EP on: 1/7/2019 4:21 PM   Actions taken: Sign clinical note, Episode resolved

## 2019-01-11 ENCOUNTER — AMBULATORY - HEALTHEAST (OUTPATIENT)
Dept: CARDIOLOGY | Facility: CLINIC | Age: 79
End: 2019-01-11

## 2019-01-11 ENCOUNTER — RECORDS - HEALTHEAST (OUTPATIENT)
Dept: ADMINISTRATIVE | Facility: OTHER | Age: 79
End: 2019-01-11

## 2019-01-14 ENCOUNTER — RECORDS - HEALTHEAST (OUTPATIENT)
Dept: ADMINISTRATIVE | Facility: OTHER | Age: 79
End: 2019-01-14

## 2019-01-14 ENCOUNTER — AMBULATORY - HEALTHEAST (OUTPATIENT)
Dept: CARDIOLOGY | Facility: CLINIC | Age: 79
End: 2019-01-14

## 2019-01-18 ENCOUNTER — OFFICE VISIT - HEALTHEAST (OUTPATIENT)
Dept: CARDIOLOGY | Facility: CLINIC | Age: 79
End: 2019-01-18

## 2019-01-18 DIAGNOSIS — I25.84 CORONARY ARTERY DISEASE DUE TO CALCIFIED CORONARY LESION: ICD-10-CM

## 2019-01-18 DIAGNOSIS — I48.0 PAROXYSMAL ATRIAL FIBRILLATION (H): ICD-10-CM

## 2019-01-18 DIAGNOSIS — I25.10 CORONARY ARTERY DISEASE DUE TO CALCIFIED CORONARY LESION: ICD-10-CM

## 2019-01-18 DIAGNOSIS — E78.5 DYSLIPIDEMIA, GOAL LDL BELOW 70: ICD-10-CM

## 2019-01-18 ASSESSMENT — MIFFLIN-ST. JEOR: SCORE: 1019.73

## 2019-01-21 ENCOUNTER — HOSPITAL ENCOUNTER (OUTPATIENT)
Dept: ULTRASOUND IMAGING | Facility: CLINIC | Age: 79
Discharge: HOME | End: 2019-01-21
Attending: FAMILY MEDICINE | Admitting: FAMILY MEDICINE
Payer: COMMERCIAL

## 2019-01-21 ENCOUNTER — RECORDS - HEALTHEAST (OUTPATIENT)
Dept: LAB | Facility: CLINIC | Age: 79
End: 2019-01-21

## 2019-01-21 DIAGNOSIS — I21.4 NSTEMI (NON-ST ELEVATED MYOCARDIAL INFARCTION) (H): ICD-10-CM

## 2019-01-21 DIAGNOSIS — I73.9 PAD (PERIPHERAL ARTERY DISEASE) (H): ICD-10-CM

## 2019-01-21 DIAGNOSIS — I48.91 ATRIAL FIBRILLATION WITH RVR (H): ICD-10-CM

## 2019-01-21 DIAGNOSIS — I25.10 CORONARY ARTERY DISEASE INVOLVING NATIVE HEART WITHOUT ANGINA PECTORIS, UNSPECIFIED VESSEL OR LESION TYPE: ICD-10-CM

## 2019-01-21 LAB
ALBUMIN SERPL-MCNC: 3.8 G/DL (ref 3.5–5)
ALP SERPL-CCNC: 68 U/L (ref 45–120)
ALT SERPL W P-5'-P-CCNC: 9 U/L (ref 0–45)
ANION GAP SERPL CALCULATED.3IONS-SCNC: 12 MMOL/L (ref 5–18)
ANION GAP SERPL CALCULATED.3IONS-SCNC: 4 MMOL/L (ref 3–14)
AST SERPL W P-5'-P-CCNC: 15 U/L (ref 0–40)
BILIRUB SERPL-MCNC: 0.3 MG/DL (ref 0–1)
BUN SERPL-MCNC: 21 MG/DL (ref 8–28)
BUN SERPL-MCNC: 23 MG/DL (ref 7–30)
CALCIUM SERPL-MCNC: 9.1 MG/DL (ref 8.5–10.1)
CALCIUM SERPL-MCNC: 9.4 MG/DL (ref 8.5–10.5)
CHLORIDE BLD-SCNC: 99 MMOL/L (ref 98–107)
CHLORIDE SERPL-SCNC: 99 MMOL/L (ref 94–109)
CO2 SERPL-SCNC: 24 MMOL/L (ref 22–31)
CO2 SERPL-SCNC: 29 MMOL/L (ref 20–32)
CREAT SERPL-MCNC: 1.18 MG/DL (ref 0.52–1.04)
CREAT SERPL-MCNC: 1.28 MG/DL (ref 0.6–1.1)
GFR SERPL CREATININE-BSD FRML MDRD: 40 ML/MIN/1.73M2
GFR SERPL CREATININE-BSD FRML MDRD: 44 ML/MIN/{1.73_M2}
GLUCOSE BLD-MCNC: 103 MG/DL (ref 70–125)
GLUCOSE SERPL-MCNC: 99 MG/DL (ref 70–99)
MAGNESIUM SERPL-MCNC: 2 MG/DL (ref 1.8–2.6)
POTASSIUM BLD-SCNC: 4.4 MMOL/L (ref 3.5–5)
POTASSIUM SERPL-SCNC: 4.2 MMOL/L (ref 3.4–5.3)
PROT SERPL-MCNC: 7.3 G/DL (ref 6–8)
SODIUM SERPL-SCNC: 132 MMOL/L (ref 133–144)
SODIUM SERPL-SCNC: 135 MMOL/L (ref 136–145)

## 2019-01-21 PROCEDURE — 93924 LWR XTR VASC STDY BILAT: CPT

## 2019-01-21 PROCEDURE — 36415 COLL VENOUS BLD VENIPUNCTURE: CPT | Performed by: PHYSICIAN ASSISTANT

## 2019-01-21 PROCEDURE — 80048 BASIC METABOLIC PNL TOTAL CA: CPT | Performed by: PHYSICIAN ASSISTANT

## 2019-01-28 ENCOUNTER — OFFICE VISIT (OUTPATIENT)
Dept: CARDIOLOGY | Facility: CLINIC | Age: 79
End: 2019-01-28
Attending: NURSE PRACTITIONER
Payer: COMMERCIAL

## 2019-01-28 VITALS
SYSTOLIC BLOOD PRESSURE: 150 MMHG | BODY MASS INDEX: 21.97 KG/M2 | DIASTOLIC BLOOD PRESSURE: 64 MMHG | HEART RATE: 73 BPM | OXYGEN SATURATION: 100 % | WEIGHT: 126 LBS

## 2019-01-28 DIAGNOSIS — I21.4 NSTEMI (NON-ST ELEVATED MYOCARDIAL INFARCTION) (H): ICD-10-CM

## 2019-01-28 DIAGNOSIS — I48.91 ATRIAL FIBRILLATION WITH RVR (H): ICD-10-CM

## 2019-01-28 PROCEDURE — 99214 OFFICE O/P EST MOD 30 MIN: CPT | Performed by: INTERNAL MEDICINE

## 2019-01-28 RX ORDER — ROSUVASTATIN CALCIUM 5 MG/1
5 TABLET, COATED ORAL
Refills: 0 | COMMUNITY
Start: 2019-01-22

## 2019-01-28 RX ORDER — ASPIRIN 81 MG/1
81 TABLET ORAL
Status: ON HOLD | COMMUNITY
Start: 2019-01-18 | End: 2019-08-15

## 2019-01-28 NOTE — LETTER
1/28/2019    Jessica Fitzgerald MD  [facility] [address]   [place] [address]    RE: Zee Aline       Dear Colleague,    I had the pleasure of seeing Zee Mireles in the [facility].        Cardiology Consultation     Assessment & Plan     1.  ASCAD with PCI Nov 2018 of LAD  2.  A-fib with RVR presentation Nov 2018  3.  NSTEMI during admission Nov 2018  4.  Now in NSR  5.  GI bleed, recent evaluation for melena, off Xarelto now  6.  COPD  7.  HTN  8.  Hyperlipidemia  9.  PAD with intermittent claudication  10.  Tobacco use      Recommendations:    1.  Atherosclerotic coronary artery disease: Let us continue with her current medical therapy.  We will continue with aspirin and Plavix for now given her recent stent.  No return of symptoms  2.  Paroxysmal atrial fibrillation: She has an appointment with the atrial fibrillation team at [facility].  It appears they are evaluating her for possible watchman device given her GI bleed on Xarelto.  We will continue with that appointment and I will have her follow-up with me later this year  3.  PAD: Reviewed recent MARTHA.  She does not have any significant symptoms or changes above her baseline.  Reviewed limited right iliofemoral angiography performed during her coronary angiography in November 2018.  This demonstrated heavy calcification and moderate plus disease.  This is confirmed by the MARTHA as well.  As she does not have any open wounds or sores or any other changes above her baseline we will ask that she continue to walk as much as possible.  Again we also counseled her regarding smoking cessation.   4.  Return to clinic in summer 2019 to follow-up on other consultants evaluation.  Provided she is doing well we will have her return on a yearly basis.      Jono Villanueva MD        HPI:    Patient is a pleasant 78-year-old female who is establishing locally in the cardiology clinic with me.  This is the  first time I am meeting her.  She recently was admitted November 2018 with an episode of atrial fibrillation and chest pain.  This was a new diagnosis for her.  She spontaneously converted back to normal sinus rhythm during her hospital stay.  Her troponin was noted to be elevated up to 0.19.  Due to her symptoms of chest pain she underwent a nuclear stress test demonstrating mild to moderate anteroseptal wall defect with reversibility.  Subsequent coronary angiography was notable for a calcified mid LAD lesion.  She had moderate disease elsewhere.  She underwent PCI of her LAD, side branch diagonal vessel was jailed.  Patient was asymptomatic.  She was initially on Plavix and Xarelto given her elevated risk score with her atrial fibrillation.  Unfortunately a month later she had an admission for melena and drop in her hemoglobin.  Her hemoglobin eventually trended down to is 8.2.  She was transfused and placed on aspirin and Plavix and her Xarelto was discontinued.  Since that time overall has been doing well she did have some diarrhea which has since improved.  She states she is starting to feel better and has not had any return of symptoms.  During the course of her hospital stay she also met a cardiologist from the [facility] team and she is going to be seen in their atrial fibrillation clinic for consideration for watchman procedure in the next few weeks.  Here to establish local care.  She also had an MARTHA performed given her history of PAD and remote stenting on the right side.  The details are not known and this was greater than 10 years ago per patient.  She is able to walk around [employer] and do her chores without difficulty.  She is does not have any open wounds or other stigmata of peripheral arterial disease.  Unfortunately she continues to smoke although is try to cut down.  She states her walking distance is stable and has not changed over the last several years.      MARTHA Jan 2019:    FINDINGS:  The  "patient walked on a treadmill for 5 minutes at a 10% incline at  0.6 mph.  The patient had muscle soreness in her left thigh after 3  minutes..      The resting and exercise ABIs on the right are 0.85 and 0.66   respectively     The resting and exercise ABIs on the left are 0.69 and 0.50   respectively     Right:  Ankle PT  Index: 0.85   mm/H   Wave: Triphasic      Ankle DP  Index: 0.85   mm/H   Wave: Triphasic      Left:  Ankle PT  Index: 0.53   mm/H   Wave: Monophasic      Ankle DP  Index: 0.69   mm/H   Wave: Triphasic                                                                       IMPRESSION:  1.  Resting ABIs on the right compatible with moderate arterial  insufficiency that worsens with exercise.  2.  Resting ABIs on the left compatible with moderate arterial  insufficiency that becomes borderline severe with exercise. Catheter  angiogram with possible intervention could be performed for further  evaluation and treatment.       Jono Villanueva MD    Primary Care Physician   Jessica Fitzgerald      Patient Active Problem List   Diagnosis     Atrial fibrillation with RVR (H)     Coronary artery disease involving native heart without angina pectoris, unspecified vessel or lesion type       Past Medical History   I have reviewed this patient's medical history and updated it with pertinent information if needed.   Past Medical History:   Diagnosis Date     Chest pain 2018    burning sensation - indigestion     COPD (chronic obstructive pulmonary disease) (H)      Glaucoma      HTN (hypertension)      Hyperlipidemia      Hypothyroid      Paroxysmal atrial fibrillation (H) 2018     Smoker     ongoing - \"6-8 cigarettes QD\"       Past Surgical History   I have reviewed this patient's surgical history and updated it with pertinent information if needed.  No past surgical history on file.    Prior to Admission Medications   Cannot display prior to admission medications because the " patient has not been admitted in this contact.     [unfilled]  [unfilled]  Allergies   Allergies   Allergen Reactions     Atorvastatin Muscle Pain (Myalgia)     Was fine while taking simvastatin 20 mg daily.       Social History    reports that she has been smoking cigarettes.  she has never used smokeless tobacco.    Family History   No family history on file.    Review of Systems   The comprehensive 10 point Review of Systems is negative other than noted in the HPI or here.     Physical Exam   Vital Signs with Ranges  Pulse:  [73] 73  BP: (150)/(64) 150/64  SpO2:  [100 %] 100 %  Wt Readings from Last 4 Encounters:   01/28/19 57.2 kg (126 lb)   12/03/18 60.7 kg (133 lb 12.8 oz)   11/30/18 59 kg (130 lb 1.6 oz)   11/26/18 61.2 kg (135 lb)     [unfilled]      Vitals: /64 (BP Location: Right arm, Patient Position: Sitting, Cuff Size: Adult Regular)   Pulse 73   Wt 57.2 kg (126 lb)   SpO2 100%   BMI 21.97 kg/m     GEN: well nourished, in no acute distress.  HEENT:  Pupils equal, round. Sclerae nonicteric.   NECK: Supple, no masses appreciated.   C/V:  Regular rate and rhythm, no murmur, rub or gallop.    RESP: Respirations are unlabored. Clear to auscultation bilaterally without wheezing, rales, or rhonchi.  GI: Abdomen soft, nontender.  EXTREM: No LE edema. R femoral site is well healed without hematoma. There is a bruit present  NEURO: Alert and oriented, cooperative.  SKIN: Warm and dry.     @LABRCNTIPR(tropi:5,troponinies:5)@    @LABRCNTIPR(wbc:3,hgb:3,mcv:3,plt:3,inr:3,na:3,potassium:3,chloride:3,co2:3,bun:3,cr:3,gfrestimated:3,gfrestblack:3,aniongap:3,fabiola:3,glc:3,albumin:2,prottotal:2,bilitotal:2,alkphos:2,alt:2,ast:2,lipase:2,tropi:3)@  Recent Labs   Lab Test 11/29/18  0822 11/26/18  2205   CHOL 141 107   HDL 76 62   LDL 49 32   TRIG 79 65      @LABRCNTIP(wbc:3,hgb:3,hct:3,mcv:3,plt:3,iron:3,ironsat:3,reticabsct:3,retp:3,feb:3,lyn:3,b12:3,folic:3,epoe:3,morph:3)@  @LABRCNTIP(PH:3,PHV:3,PO2:3,PO2V:3,sat:3,PCO2:3,PCO2V:3,HCO3:3,HCO3V:3)@  @LABRCNTIP(NTBNPI:3,NTBNP:3)@  @LABRCNTIP(DD:1)@  @LABRCNTIP(sed:3,crp:3)@  @LABRCNTIP(PLT:3)@  @LABRCNTIP(TSH:3)@  @LABRCNTIP(color:1,appearance:1,urineg,urinebili:1,urineketone:1,s,ubld:1,urineph:1,protein:1,urobilinogen:1,nitrite:1,leukest:1,rbcu:1,wbcu:1)@    Imaging:  No results found for this or any previous visit (from the past 48 hour(s)).    Echo:  Recent Results (from the past 4320 hour(s))   ECHO COMPLETE WITH OPTISON    Narrative    [id number]  [facility]  [id number]  [id number]^ALEXIS^SURJIT^E           [facility]  Echocardiography Laboratory  [address] [place] [id number]        Name: MATT JENNINGS  MRN: [id number]  : 1940  Study Date: 2018 10:30 AM  Age: 78 yrs  Gender: Female  Patient Location: [facility]  Reason For Study: Afib  Ordering Physician: SURJIT ESPINOZA  Referring Physician: Jessica Fitzgerald  Performed By: Edna Nguyễn     BSA: 1.6 m2  Height: 63 in  Weight: 136 lb  HR: 63  BP: 165/81 mmHg  _____________________________________________________________________________  __        Procedure  Complete Portable Echo Adult. Contrast Optison.  _____________________________________________________________________________  __        Interpretation Summary     The visual ejection fraction is estimated at 65-70%.  Normal left ventricular wall motion  There is mild concentric left ventricular hypertrophy.  The right ventricle is normal in size and function.  Normal left atrial size by volume estimate (at upper limits normal) but  appears mildly enlarged by 2D evaluation and relative to LV size..  Right atrial size is normal.  Sinus rhythm was noted.  No hemodynamically significant valvular abnormalities on 2D or color  flow  imaging.  _____________________________________________________________________________  __        Left Ventricle  The left ventricle is normal in size. The left ventricular cavity is small.  There is mild concentric left ventricular hypertrophy. The visual ejection  fraction is estimated at 65-70%. Left ventricular diastolic function is  indeterminate. Diastolic Doppler findings (E/E' ratio and/or other parameters)  suggest left ventricular filling pressures are increased. Normal left  ventricular wall motion.     Right Ventricle  The right ventricle is normal in size and function.     Atria  Normal left atrial size. Right atrial size is normal. There is no color  Doppler evidence of an atrial shunt.     Mitral Valve  There is mild mitral annular calcification. The mitral valve leaflets are  mildly thickened. There is trace mitral regurgitation. There is no mitral  valve stenosis.        Tricuspid Valve  The tricuspid valve is not well visualized. There is trace tricuspid  regurgitation. The right ventricular systolic pressure is approximated at 24.5  mmHg plus the right atrial pressure. Normal IVC (1.5-2.5cm) with >50%  respiratory collapse; right atrial pressure is estimated at 5-10mmHg.     Aortic Valve  The aortic valve is trileaflet with aortic valve sclerosis. There is trace  aortic regurgitation. No aortic stenosis is present.     Pulmonic Valve  The pulmonic valve is not well visualized. There is no pulmonic valvular  regurgitation.     Vessels  The aortic root is normal size. Normal size ascending aorta. The IVC is normal  in size and reactivity with respiration, suggesting normal central venous  pressure.     Pericardium  There is no pericardial effusion.        Rhythm  Sinus rhythm was noted.  _____________________________________________________________________________  __  MMode/2D Measurements & Calculations  IVSd: 1.4 cm     LVIDd: 3.9 cm  LVIDs: 2.3 cm  LVPWd: 1.4 cm  FS: 41.4 %  LV mass(C)d:  204.3 grams  LV mass(C)dI: 124.5 grams/m2  Ao root diam: 3.1 cm  LA dimension: 3.4 cm  asc Aorta Diam: 3.3 cm  LA/Ao: 1.1  LA Volume (BP): 54.0 ml  LA Volume Index (BP): 32.9 ml/m2  RWT: 0.73           Doppler Measurements & Calculations  MV E max jordan: 94.8 cm/sec  MV A max jordan: 78.4 cm/sec  MV E/A: 1.2  MV dec time: 0.23 sec  AI P1/2t: 885.9 msec  TR max jordan: 247.2 cm/sec  TR max P.5 mmHg  E/E' av.9  Lateral E/e': 16.1  Medial E/e': 21.6           _____________________________________________________________________________  __           Report approved by: Collin Parker 2018 01:00 PM                   Thank you for allowing me to participate in the care of your patient.      Sincerely,     Jono Villanueva MD     [facility]

## 2019-01-28 NOTE — PROGRESS NOTES
Cardiology Consultation     Assessment & Plan     1.  ASCAD with PCI Nov 2018 of LAD  2.  A-fib with RVR presentation Nov 2018  3.  NSTEMI during admission Nov 2018  4.  Now in NSR  5.  GI bleed, recent evaluation for melena, off Xarelto now  6.  COPD  7.  HTN  8.  Hyperlipidemia  9.  PAD with intermittent claudication  10.  Tobacco use      Recommendations:    1.  Atherosclerotic coronary artery disease: Let us continue with her current medical therapy.  We will continue with aspirin and Plavix for now given her recent stent.  No return of symptoms  2.  Paroxysmal atrial fibrillation: She has an appointment with the atrial fibrillation team at [facility].  It appears they are evaluating her for possible watchman device given her GI bleed on Xarelto.  We will continue with that appointment and I will have her follow-up with me later this year  3.  PAD: Reviewed recent MARTHA.  She does not have any significant symptoms or changes above her baseline.  Reviewed limited right iliofemoral angiography performed during her coronary angiography in November 2018.  This demonstrated heavy calcification and moderate plus disease.  This is confirmed by the MARTHA as well.  As she does not have any open wounds or sores or any other changes above her baseline we will ask that she continue to walk as much as possible.  Again we also counseled her regarding smoking cessation.   4.  Return to clinic in summer 2019 to follow-up on other consultants evaluation.  Provided she is doing well we will have her return on a yearly basis.      Jono Villanueva MD        HPI:    Patient is a pleasant 78-year-old female who is establishing locally in the cardiology clinic with me.  This is the first time I am meeting her.  She recently was admitted November 2018 with an episode of atrial fibrillation and chest pain.  This was a new diagnosis for her.  She spontaneously converted back to normal sinus rhythm during her hospital stay.  Her  troponin was noted to be elevated up to 0.19.  Due to her symptoms of chest pain she underwent a nuclear stress test demonstrating mild to moderate anteroseptal wall defect with reversibility.  Subsequent coronary angiography was notable for a calcified mid LAD lesion.  She had moderate disease elsewhere.  She underwent PCI of her LAD, side branch diagonal vessel was jailed.  Patient was asymptomatic.  She was initially on Plavix and Xarelto given her elevated risk score with her atrial fibrillation.  Unfortunately a month later she had an admission for melena and drop in her hemoglobin.  Her hemoglobin eventually trended down to is 8.2.  She was transfused and placed on aspirin and Plavix and her Xarelto was discontinued.  Since that time overall has been doing well she did have some diarrhea which has since improved.  She states she is starting to feel better and has not had any return of symptoms.  During the course of her hospital stay she also met a cardiologist from the [facility] team and she is going to be seen in their atrial fibrillation clinic for consideration for watchman procedure in the next few weeks.  Here to establish local care.  She also had an MARTHA performed given her history of PAD and remote stenting on the right side.  The details are not known and this was greater than 10 years ago per patient.  She is able to walk around [place] and do her chores without difficulty.  She is does not have any open wounds or other stigmata of peripheral arterial disease.  Unfortunately she continues to smoke although is try to cut down.  She states her walking distance is stable and has not changed over the last several years.      MARTHA Jan 2019:    FINDINGS:  The patient walked on a treadmill for 5 minutes at a 10% incline at  0.6 mph.  The patient had muscle soreness in her left thigh after 3  minutes..      The resting and exercise ABIs on the right are 0.85 and 0.66   respectively     The resting and  "exercise ABIs on the left are 0.69 and 0.50   respectively     Right:  Ankle PT  Index: 0.85   mm/H   Wave: Triphasic      Ankle DP  Index: 0.85   mm/H   Wave: Triphasic      Left:  Ankle PT  Index: 0.53   mm/H   Wave: Monophasic      Ankle DP  Index: 0.69   mm/H   Wave: Triphasic                                                                       IMPRESSION:  1.  Resting ABIs on the right compatible with moderate arterial  insufficiency that worsens with exercise.  2.  Resting ABIs on the left compatible with moderate arterial  insufficiency that becomes borderline severe with exercise. Catheter  angiogram with possible intervention could be performed for further  evaluation and treatment.       Jono Villanueva MD    Primary Care Physician   Jessica Fitzgerald      Patient Active Problem List   Diagnosis     Atrial fibrillation with RVR (H)     Coronary artery disease involving native heart without angina pectoris, unspecified vessel or lesion type       Past Medical History   I have reviewed this patient's medical history and updated it with pertinent information if needed.   Past Medical History:   Diagnosis Date     Chest pain 2018    burning sensation - indigestion     COPD (chronic obstructive pulmonary disease) (H)      Glaucoma      HTN (hypertension)      Hyperlipidemia      Hypothyroid      Paroxysmal atrial fibrillation (H) 2018     Smoker     ongoing - \"6-8 cigarettes QD\"       Past Surgical History   I have reviewed this patient's surgical history and updated it with pertinent information if needed.  No past surgical history on file.    Prior to Admission Medications   Cannot display prior to admission medications because the patient has not been admitted in this contact.     [unfilled]  [unfilled]  Allergies   Allergies   Allergen Reactions     Atorvastatin Muscle Pain (Myalgia)     Was fine while taking simvastatin 20 mg daily.       Social History    reports " that she has been smoking cigarettes.  she has never used smokeless tobacco.    Family History   No family history on file.    Review of Systems   The comprehensive 10 point Review of Systems is negative other than noted in the HPI or here.     Physical Exam   Vital Signs with Ranges  Pulse:  [73] 73  BP: (150)/(64) 150/64  SpO2:  [100 %] 100 %  Wt Readings from Last 4 Encounters:   01/28/19 57.2 kg (126 lb)   12/03/18 60.7 kg (133 lb 12.8 oz)   11/30/18 59 kg (130 lb 1.6 oz)   11/26/18 61.2 kg (135 lb)     [unfilled]      Vitals: /64 (BP Location: Right arm, Patient Position: Sitting, Cuff Size: Adult Regular)   Pulse 73   Wt 57.2 kg (126 lb)   SpO2 100%   BMI 21.97 kg/m    GEN: well nourished, in no acute distress.  HEENT:  Pupils equal, round. Sclerae nonicteric.   NECK: Supple, no masses appreciated.   C/V:  Regular rate and rhythm, no murmur, rub or gallop.    RESP: Respirations are unlabored. Clear to auscultation bilaterally without wheezing, rales, or rhonchi.  GI: Abdomen soft, nontender.  EXTREM: No LE edema. R femoral site is well healed without hematoma. There is a bruit present  NEURO: Alert and oriented, cooperative.  SKIN: Warm and dry.     @LABRCNTIPR(tropi:5,troponinies:5)@    @LABRCNTIPR(wbc:3,hgb:3,mcv:3,plt:3,inr:3,na:3,potassium:3,chloride:3,co2:3,bun:3,cr:3,gfrestimated:3,gfrestblack:3,aniongap:3,fabiola:3,glc:3,albumin:2,prottotal:2,bilitotal:2,alkphos:2,alt:2,ast:2,lipase:2,tropi:3)@  Recent Labs   Lab Test 11/29/18  0822 11/26/18  2205   CHOL 141 107   HDL 76 62   LDL 49 32   TRIG 79 65      @LABRCNTIP(wbc:3,hgb:3,hct:3,mcv:3,plt:3,iron:3,ironsat:3,reticabsct:3,retp:3,feb:3,lyn:3,b12:3,folic:3,epoe:3,morph:3)@  @LABRCNTIP(PH:3,PHV:3,PO2:3,PO2V:3,sat:3,PCO2:3,PCO2V:3,HCO3:3,HCO3V:3)@  @LABRCNTIP(NTBNPI:3,NTBNP:3)@  @LABRCNTIP(DD:1)@  @LABRCNTIP(sed:3,crp:3)@  @LABRCNTIP(PLT:3)@  @LABRCNTIP(TSH:3)@  @LABRCNTIP(color:1,appearance:1,urineg,urinebili:1,urineketone:1,s,ubld:1,urineph:1,protein:1,urobilinogen:1,nitrite:1,leukest:1,rbcu:1,wbcu:1)@    Imaging:  No results found for this or any previous visit (from the past 48 hour(s)).    Echo:  Recent Results (from the past 4320 hour(s))   ECHO COMPLETE WITH OPTISON    Narrative    [id number]  [facility]  [id number]  [id number]^ALEXIS^SURJIT^E           [facility]  Echocardiography Laboratory  [address] [place] [id number]        Name: MATT JENNINGS  MRN: [id number]  : 1940  Study Date: 2018 10:30 AM  Age: 78 yrs  Gender: Female  Patient Location: [facility]  Reason For Study: Afib  Ordering Physician: SURJIT ESPINOZA  Referring Physician: Jessica Fitzgerald  Performed By: Edna Nguyễn     BSA: 1.6 m2  Height: 63 in  Weight: 136 lb  HR: 63  BP: 165/81 mmHg  _____________________________________________________________________________  __        Procedure  Complete Portable Echo Adult. Contrast Optison.  _____________________________________________________________________________  __        Interpretation Summary     The visual ejection fraction is estimated at 65-70%.  Normal left ventricular wall motion  There is mild concentric left ventricular hypertrophy.  The right ventricle is normal in size and function.  Normal left atrial size by volume estimate (at upper limits normal) but  appears mildly enlarged by 2D evaluation and relative to LV size..  Right atrial size is normal.  Sinus rhythm was noted.  No hemodynamically significant valvular abnormalities on 2D or color  flow  imaging.  _____________________________________________________________________________  __        Left Ventricle  The left ventricle is normal in size. The left ventricular cavity is small.  There is mild concentric left ventricular hypertrophy. The visual ejection  fraction is estimated at 65-70%. Left ventricular diastolic function is  indeterminate. Diastolic Doppler findings (E/E' ratio and/or other parameters)  suggest left ventricular filling pressures are increased. Normal left  ventricular wall motion.     Right Ventricle  The right ventricle is normal in size and function.     Atria  Normal left atrial size. Right atrial size is normal. There is no color  Doppler evidence of an atrial shunt.     Mitral Valve  There is mild mitral annular calcification. The mitral valve leaflets are  mildly thickened. There is trace mitral regurgitation. There is no mitral  valve stenosis.        Tricuspid Valve  The tricuspid valve is not well visualized. There is trace tricuspid  regurgitation. The right ventricular systolic pressure is approximated at 24.5  mmHg plus the right atrial pressure. Normal IVC (1.5-2.5cm) with >50%  respiratory collapse; right atrial pressure is estimated at 5-10mmHg.     Aortic Valve  The aortic valve is trileaflet with aortic valve sclerosis. There is trace  aortic regurgitation. No aortic stenosis is present.     Pulmonic Valve  The pulmonic valve is not well visualized. There is no pulmonic valvular  regurgitation.     Vessels  The aortic root is normal size. Normal size ascending aorta. The IVC is normal  in size and reactivity with respiration, suggesting normal central venous  pressure.     Pericardium  There is no pericardial effusion.        Rhythm  Sinus rhythm was noted.  _____________________________________________________________________________  __  MMode/2D Measurements & Calculations  IVSd: 1.4 cm     LVIDd: 3.9 cm  LVIDs: 2.3 cm  LVPWd: 1.4 cm  FS: 41.4 %  LV mass(C)d:  204.3 grams  LV mass(C)dI: 124.5 grams/m2  Ao root diam: 3.1 cm  LA dimension: 3.4 cm  asc Aorta Diam: 3.3 cm  LA/Ao: 1.1  LA Volume (BP): 54.0 ml  LA Volume Index (BP): 32.9 ml/m2  RWT: 0.73           Doppler Measurements & Calculations  MV E max jordan: 94.8 cm/sec  MV A max jordan: 78.4 cm/sec  MV E/A: 1.2  MV dec time: 0.23 sec  AI P1/2t: 885.9 msec  TR max jordan: 247.2 cm/sec  TR max P.5 mmHg  E/E' av.9  Lateral E/e': 16.1  Medial E/e': 21.6           _____________________________________________________________________________  __           Report approved by: Collin Parker 2018 01:00 PM

## 2019-01-28 NOTE — PATIENT INSTRUCTIONS
"[facility] HEART CARE  [facility]~[address]~[place], [place]~[address]  Thank you for your [facility] visit today. If you have questions regarding your visit, please contact your cardiology RN's, Rain Quintero or Jeanne Seaman, at [phone]. Your provider has recommended the following:    Medication Changes:  No Medication Changes at your Heart Care appointment today 1/28/2019    Recommendations:  No further recommendations at this time    Follow-up:  See Dr. Villanueva for cardiology follow up in June/July.    To schedule a future appointment, we kindly ask that you call cardiology scheduling at [phone] three months prior to requested revisit date  [facility] cardiology clinic is staffed with \"Advance Practice Providers\". These are our cardiology Physician Assistants and Nurse Practitioners. Please call cardiology scheduling if you feel you need clinical evaluation with them at any time for any cardiac reason.                 Reminder:  For your safety, we ask that you bring in your current medication(s) or an updated list of your medications with you to EACH office visit. Include the medication name, dose of pill on bottle and how you are taking it. Include over-the-counter medications or supplements. Your provider will review this at each visit and plan your care based on your current information.   "

## 2019-03-01 DIAGNOSIS — Z87.19 HISTORY OF GI BLEED: Primary | ICD-10-CM

## 2019-03-01 LAB — HGB BLD-MCNC: 9.2 G/DL (ref 11.7–15.7)

## 2019-03-01 PROCEDURE — 36415 COLL VENOUS BLD VENIPUNCTURE: CPT | Performed by: FAMILY MEDICINE

## 2019-03-01 PROCEDURE — 85018 HEMOGLOBIN: CPT | Performed by: FAMILY MEDICINE

## 2019-03-06 DIAGNOSIS — D50.9 ANEMIA, IRON DEFICIENCY: ICD-10-CM

## 2019-03-06 DIAGNOSIS — D50.9 ANEMIA, IRON DEFICIENCY: Primary | ICD-10-CM

## 2019-03-06 LAB — HGB BLD-MCNC: 9.4 G/DL (ref 11.7–15.7)

## 2019-03-06 PROCEDURE — 85018 HEMOGLOBIN: CPT | Performed by: FAMILY MEDICINE

## 2019-03-06 PROCEDURE — 36415 COLL VENOUS BLD VENIPUNCTURE: CPT | Performed by: FAMILY MEDICINE

## 2019-03-20 ENCOUNTER — RECORDS - HEALTHEAST (OUTPATIENT)
Dept: LAB | Facility: CLINIC | Age: 79
End: 2019-03-20

## 2019-03-20 LAB
ANION GAP SERPL CALCULATED.3IONS-SCNC: 8 MMOL/L (ref 5–18)
BUN SERPL-MCNC: 23 MG/DL (ref 8–28)
CALCIUM SERPL-MCNC: 9.1 MG/DL (ref 8.5–10.5)
CHLORIDE BLD-SCNC: 102 MMOL/L (ref 98–107)
CO2 SERPL-SCNC: 28 MMOL/L (ref 22–31)
CREAT SERPL-MCNC: 1.31 MG/DL (ref 0.6–1.1)
GFR SERPL CREATININE-BSD FRML MDRD: 39 ML/MIN/1.73M2
GLUCOSE BLD-MCNC: 110 MG/DL (ref 70–125)
POTASSIUM BLD-SCNC: 4.7 MMOL/L (ref 3.5–5)
SODIUM SERPL-SCNC: 138 MMOL/L (ref 136–145)
TSH SERPL DL<=0.005 MIU/L-ACNC: 0.86 UIU/ML (ref 0.3–5)

## 2019-04-03 DIAGNOSIS — D64.9 ANEMIA: Primary | ICD-10-CM

## 2019-04-03 DIAGNOSIS — K92.2 GI BLEED: ICD-10-CM

## 2019-04-03 LAB — HGB BLD-MCNC: 11.3 G/DL (ref 11.7–15.7)

## 2019-04-03 PROCEDURE — 36415 COLL VENOUS BLD VENIPUNCTURE: CPT | Performed by: FAMILY MEDICINE

## 2019-04-03 PROCEDURE — 85018 HEMOGLOBIN: CPT | Performed by: FAMILY MEDICINE

## 2019-04-26 ENCOUNTER — COMMUNICATION - HEALTHEAST (OUTPATIENT)
Dept: CARDIOLOGY | Facility: CLINIC | Age: 79
End: 2019-04-26

## 2019-06-27 ENCOUNTER — PATIENT OUTREACH (OUTPATIENT)
Dept: CARE COORDINATION | Facility: CLINIC | Age: 79
End: 2019-06-27

## 2019-06-27 DIAGNOSIS — I48.91 ATRIAL FIBRILLATION WITH RVR (H): Primary | ICD-10-CM

## 2019-06-27 NOTE — PROGRESS NOTES
Contact  [facility]/Voicemail    Referral Source: Care Team  Clinical Data:  Outreach  Outreach attempted x 1.  Left message on voicemail with call back information and requested return call.  Plan:   will try to reach patient again in 3-5 business days.  Social Jasmyn Gutierrez  [facility]  [phone]     Contact  [facility]/Voicemail    Referral Source: Care Team  Clinical Data:  Outreach  Outreach attempted x 2.  Left message on voicemail with call back information and requested return call.  Plan:  will mail out care coordination introduction letter with care coordinator contact information and explanation of care coordination services.  will do no further outreaches at this time.  Social Jasmyn Gutierrez  [facility]  [phone]

## 2019-07-02 ENCOUNTER — RECORDS - HEALTHEAST (OUTPATIENT)
Dept: LAB | Facility: CLINIC | Age: 79
End: 2019-07-02

## 2019-07-02 LAB
BASOPHILS # BLD AUTO: 0 THOU/UL (ref 0–0.2)
BASOPHILS NFR BLD AUTO: 1 % (ref 0–2)
C REACTIVE PROTEIN LHE: 0.3 MG/DL (ref 0–0.8)
EOSINOPHIL # BLD AUTO: 0.2 THOU/UL (ref 0–0.4)
EOSINOPHIL NFR BLD AUTO: 3 % (ref 0–6)
ERYTHROCYTE [DISTWIDTH] IN BLOOD BY AUTOMATED COUNT: 16.1 % (ref 11–14.5)
ERYTHROCYTE [SEDIMENTATION RATE] IN BLOOD BY WESTERGREN METHOD: 30 MM/HR (ref 0–20)
FIBRINOGEN PPP-MCNC: 401 MG/DL (ref 170–410)
HCT VFR BLD AUTO: 36.2 % (ref 35–47)
HGB BLD-MCNC: 11.5 G/DL (ref 12–16)
LYMPHOCYTES # BLD AUTO: 1.7 THOU/UL (ref 0.8–4.4)
LYMPHOCYTES NFR BLD AUTO: 21 % (ref 20–40)
MCH RBC QN AUTO: 30.4 PG (ref 27–34)
MCHC RBC AUTO-ENTMCNC: 31.8 G/DL (ref 32–36)
MCV RBC AUTO: 96 FL (ref 80–100)
MONOCYTES # BLD AUTO: 1.1 THOU/UL (ref 0–0.9)
MONOCYTES NFR BLD AUTO: 13 % (ref 2–10)
NEUTROPHILS # BLD AUTO: 5 THOU/UL (ref 2–7.7)
NEUTROPHILS NFR BLD AUTO: 63 % (ref 50–70)
PLATELET # BLD AUTO: 270 THOU/UL (ref 140–440)
PMV BLD AUTO: 10.6 FL (ref 8.5–12.5)
RBC # BLD AUTO: 3.78 MILL/UL (ref 3.8–5.4)
WBC: 8 THOU/UL (ref 4–11)

## 2019-07-05 ENCOUNTER — RECORDS - HEALTHEAST (OUTPATIENT)
Dept: ADMINISTRATIVE | Facility: OTHER | Age: 79
End: 2019-07-05

## 2019-07-11 ENCOUNTER — RECORDS - HEALTHEAST (OUTPATIENT)
Dept: ADMINISTRATIVE | Facility: OTHER | Age: 79
End: 2019-07-11

## 2019-07-31 ENCOUNTER — RECORDS - HEALTHEAST (OUTPATIENT)
Dept: ADMINISTRATIVE | Facility: OTHER | Age: 79
End: 2019-07-31

## 2019-07-31 ENCOUNTER — AMBULATORY - HEALTHEAST (OUTPATIENT)
Dept: PULMONOLOGY | Facility: OTHER | Age: 79
End: 2019-07-31

## 2019-07-31 ENCOUNTER — RECORDS - HEALTHEAST (OUTPATIENT)
Dept: LAB | Facility: CLINIC | Age: 79
End: 2019-07-31

## 2019-07-31 DIAGNOSIS — J44.9 COPD (CHRONIC OBSTRUCTIVE PULMONARY DISEASE) (H): ICD-10-CM

## 2019-07-31 LAB
ANION GAP SERPL CALCULATED.3IONS-SCNC: 11 MMOL/L (ref 5–18)
BUN SERPL-MCNC: 22 MG/DL (ref 8–28)
CALCIUM SERPL-MCNC: 9.7 MG/DL (ref 8.5–10.5)
CHLORIDE BLD-SCNC: 98 MMOL/L (ref 98–107)
CO2 SERPL-SCNC: 25 MMOL/L (ref 22–31)
CREAT SERPL-MCNC: 1.23 MG/DL (ref 0.6–1.1)
GFR SERPL CREATININE-BSD FRML MDRD: 42 ML/MIN/1.73M2
GLUCOSE BLD-MCNC: 98 MG/DL (ref 70–125)
POTASSIUM BLD-SCNC: 4.3 MMOL/L (ref 3.5–5)
SODIUM SERPL-SCNC: 134 MMOL/L (ref 136–145)

## 2019-08-05 ENCOUNTER — RECORDS - HEALTHEAST (OUTPATIENT)
Dept: LAB | Facility: CLINIC | Age: 79
End: 2019-08-05

## 2019-08-06 LAB — VIT B12 SERPL-MCNC: 454 PG/ML (ref 213–816)

## 2019-08-08 ENCOUNTER — RECORDS - HEALTHEAST (OUTPATIENT)
Dept: ADMINISTRATIVE | Facility: OTHER | Age: 79
End: 2019-08-08

## 2019-08-08 ENCOUNTER — AMBULATORY - HEALTHEAST (OUTPATIENT)
Dept: VASCULAR SURGERY | Facility: CLINIC | Age: 79
End: 2019-08-08

## 2019-08-08 DIAGNOSIS — H34.9 RETINAL VESSEL OCCLUSION: ICD-10-CM

## 2019-08-08 DIAGNOSIS — I65.21 CAROTID STENOSIS, RIGHT: ICD-10-CM

## 2019-08-14 ENCOUNTER — HOSPITAL ENCOUNTER (OUTPATIENT)
Facility: CLINIC | Age: 79
Setting detail: OBSERVATION
Discharge: HOME | End: 2019-08-15
Attending: EMERGENCY MEDICINE | Admitting: FAMILY MEDICINE
Payer: COMMERCIAL

## 2019-08-14 DIAGNOSIS — F17.210 CIGARETTE SMOKER: ICD-10-CM

## 2019-08-14 DIAGNOSIS — K92.2 UPPER GI BLEED: ICD-10-CM

## 2019-08-14 DIAGNOSIS — Z79.01 LONG TERM (CURRENT) USE OF ANTICOAGULANTS: ICD-10-CM

## 2019-08-14 DIAGNOSIS — I48.0 PAROXYSMAL ATRIAL FIBRILLATION (H): ICD-10-CM

## 2019-08-14 DIAGNOSIS — I10 ESSENTIAL HYPERTENSION, MALIGNANT: ICD-10-CM

## 2019-08-14 DIAGNOSIS — I25.10 CORONARY ARTERY DISEASE INVOLVING NATIVE HEART WITHOUT ANGINA PECTORIS, UNSPECIFIED VESSEL OR LESION TYPE: ICD-10-CM

## 2019-08-14 PROBLEM — E78.5 DYSLIPIDEMIA, GOAL LDL BELOW 70: Status: ACTIVE | Noted: 2019-08-14

## 2019-08-14 PROBLEM — J44.9 COPD (CHRONIC OBSTRUCTIVE PULMONARY DISEASE) (H): Status: ACTIVE | Noted: 2019-08-14

## 2019-08-14 PROBLEM — I25.84 CORONARY ARTERY DISEASE DUE TO CALCIFIED CORONARY LESION: Status: ACTIVE | Noted: 2018-11-01

## 2019-08-14 LAB
ALBUMIN SERPL-MCNC: 3.6 G/DL (ref 3.4–5)
ALP SERPL-CCNC: 67 U/L (ref 40–150)
ALT SERPL W P-5'-P-CCNC: 13 U/L (ref 0–50)
ANION GAP SERPL CALCULATED.3IONS-SCNC: 8 MMOL/L (ref 3–14)
AST SERPL W P-5'-P-CCNC: 11 U/L (ref 0–45)
BASOPHILS # BLD AUTO: 0.1 10E9/L (ref 0–0.2)
BASOPHILS NFR BLD AUTO: 0.9 %
BILIRUB SERPL-MCNC: 0.3 MG/DL (ref 0.2–1.3)
BUN SERPL-MCNC: 34 MG/DL (ref 7–30)
CALCIUM SERPL-MCNC: 8.4 MG/DL (ref 8.5–10.1)
CHLORIDE SERPL-SCNC: 100 MMOL/L (ref 94–109)
CO2 SERPL-SCNC: 25 MMOL/L (ref 20–32)
CREAT SERPL-MCNC: 1.37 MG/DL (ref 0.52–1.04)
DIFFERENTIAL METHOD BLD: ABNORMAL
EOSINOPHIL # BLD AUTO: 0.3 10E9/L (ref 0–0.7)
EOSINOPHIL NFR BLD AUTO: 4 %
ERYTHROCYTE [DISTWIDTH] IN BLOOD BY AUTOMATED COUNT: 17 % (ref 10–15)
GFR SERPL CREATININE-BSD FRML MDRD: 37 ML/MIN/{1.73_M2}
GLUCOSE SERPL-MCNC: 91 MG/DL (ref 70–99)
HCT VFR BLD AUTO: 29.5 % (ref 35–47)
HEMOCCULT STL QL: NORMAL
HGB BLD-MCNC: 9.2 G/DL (ref 11.7–15.7)
IMM GRANULOCYTES # BLD: 0 10E9/L (ref 0–0.4)
IMM GRANULOCYTES NFR BLD: 0.4 %
INR PPP: 1.06 (ref 0.86–1.14)
INTERNAL QC OK POCT: YES
LACTATE BLD-SCNC: 0.9 MMOL/L (ref 0.7–2)
LIPASE SERPL-CCNC: 190 U/L (ref 73–393)
LYMPHOCYTES # BLD AUTO: 2.3 10E9/L (ref 0.8–5.3)
LYMPHOCYTES NFR BLD AUTO: 28.3 %
MCH RBC QN AUTO: 29.5 PG (ref 26.5–33)
MCHC RBC AUTO-ENTMCNC: 31.2 G/DL (ref 31.5–36.5)
MCV RBC AUTO: 95 FL (ref 78–100)
MONOCYTES # BLD AUTO: 1.2 10E9/L (ref 0–1.3)
MONOCYTES NFR BLD AUTO: 14.8 %
NEUTROPHILS # BLD AUTO: 4.2 10E9/L (ref 1.6–8.3)
NEUTROPHILS NFR BLD AUTO: 51.6 %
NRBC # BLD AUTO: 0 10*3/UL
NRBC BLD AUTO-RTO: 0 /100
PLATELET # BLD AUTO: 313 10E9/L (ref 150–450)
POTASSIUM SERPL-SCNC: 4 MMOL/L (ref 3.4–5.3)
PROT SERPL-MCNC: 7.3 G/DL (ref 6.8–8.8)
RBC # BLD AUTO: 3.12 10E12/L (ref 3.8–5.2)
SODIUM SERPL-SCNC: 133 MMOL/L (ref 133–144)
TEST CARD LOT NUMBER: NORMAL
WBC # BLD AUTO: 8.1 10E9/L (ref 4–11)

## 2019-08-14 PROCEDURE — 86901 BLOOD TYPING SEROLOGIC RH(D): CPT | Performed by: EMERGENCY MEDICINE

## 2019-08-14 PROCEDURE — 96361 HYDRATE IV INFUSION ADD-ON: CPT | Performed by: EMERGENCY MEDICINE

## 2019-08-14 PROCEDURE — G0378 HOSPITAL OBSERVATION PER HR: HCPCS

## 2019-08-14 PROCEDURE — 82272 OCCULT BLD FECES 1-3 TESTS: CPT | Performed by: EMERGENCY MEDICINE

## 2019-08-14 PROCEDURE — 99285 EMERGENCY DEPT VISIT HI MDM: CPT | Mod: 25 | Performed by: EMERGENCY MEDICINE

## 2019-08-14 PROCEDURE — 93005 ELECTROCARDIOGRAM TRACING: CPT | Performed by: EMERGENCY MEDICINE

## 2019-08-14 PROCEDURE — 99220 ZZC INITIAL OBSERVATION CARE,LEVL III: CPT | Performed by: PHYSICIAN ASSISTANT

## 2019-08-14 PROCEDURE — 80053 COMPREHEN METABOLIC PANEL: CPT | Performed by: EMERGENCY MEDICINE

## 2019-08-14 PROCEDURE — 86923 COMPATIBILITY TEST ELECTRIC: CPT | Performed by: EMERGENCY MEDICINE

## 2019-08-14 PROCEDURE — 25000128 H RX IP 250 OP 636: Performed by: EMERGENCY MEDICINE

## 2019-08-14 PROCEDURE — 83605 ASSAY OF LACTIC ACID: CPT | Performed by: EMERGENCY MEDICINE

## 2019-08-14 PROCEDURE — 25000132 ZZH RX MED GY IP 250 OP 250 PS 637: Performed by: PHYSICIAN ASSISTANT

## 2019-08-14 PROCEDURE — 93010 ELECTROCARDIOGRAM REPORT: CPT | Mod: Z6 | Performed by: EMERGENCY MEDICINE

## 2019-08-14 PROCEDURE — 83690 ASSAY OF LIPASE: CPT | Performed by: EMERGENCY MEDICINE

## 2019-08-14 PROCEDURE — 96374 THER/PROPH/DIAG INJ IV PUSH: CPT | Performed by: EMERGENCY MEDICINE

## 2019-08-14 PROCEDURE — 86900 BLOOD TYPING SEROLOGIC ABO: CPT | Performed by: EMERGENCY MEDICINE

## 2019-08-14 PROCEDURE — 86850 RBC ANTIBODY SCREEN: CPT | Performed by: EMERGENCY MEDICINE

## 2019-08-14 PROCEDURE — 85610 PROTHROMBIN TIME: CPT | Performed by: EMERGENCY MEDICINE

## 2019-08-14 PROCEDURE — 25800030 ZZH RX IP 258 OP 636: Performed by: EMERGENCY MEDICINE

## 2019-08-14 PROCEDURE — C9113 INJ PANTOPRAZOLE SODIUM, VIA: HCPCS | Performed by: EMERGENCY MEDICINE

## 2019-08-14 PROCEDURE — 85025 COMPLETE CBC W/AUTO DIFF WBC: CPT | Performed by: EMERGENCY MEDICINE

## 2019-08-14 RX ORDER — LOSARTAN POTASSIUM 50 MG/1
100 TABLET ORAL DAILY
Status: DISCONTINUED | OUTPATIENT
Start: 2019-08-15 | End: 2019-08-15 | Stop reason: HOSPADM

## 2019-08-14 RX ORDER — SODIUM CHLORIDE 9 MG/ML
1000 INJECTION, SOLUTION INTRAVENOUS CONTINUOUS
Status: DISCONTINUED | OUTPATIENT
Start: 2019-08-14 | End: 2019-08-15 | Stop reason: HOSPADM

## 2019-08-14 RX ORDER — FERROUS SULFATE 325(65) MG
325 TABLET ORAL
COMMUNITY
End: 2021-11-22

## 2019-08-14 RX ORDER — LEVOTHYROXINE SODIUM 125 UG/1
125 TABLET ORAL
Status: DISCONTINUED | OUTPATIENT
Start: 2019-08-15 | End: 2019-08-15 | Stop reason: HOSPADM

## 2019-08-14 RX ORDER — PROCHLORPERAZINE MALEATE 5 MG
5 TABLET ORAL EVERY 6 HOURS PRN
Status: DISCONTINUED | OUTPATIENT
Start: 2019-08-14 | End: 2019-08-15 | Stop reason: HOSPADM

## 2019-08-14 RX ORDER — BRIMONIDINE TARTRATE 2 MG/ML
1 SOLUTION/ DROPS OPHTHALMIC 3 TIMES DAILY
Status: DISCONTINUED | OUTPATIENT
Start: 2019-08-14 | End: 2019-08-15 | Stop reason: HOSPADM

## 2019-08-14 RX ORDER — HYDROCHLOROTHIAZIDE 12.5 MG/1
12.5 CAPSULE ORAL DAILY
Status: DISCONTINUED | OUTPATIENT
Start: 2019-08-15 | End: 2019-08-15 | Stop reason: HOSPADM

## 2019-08-14 RX ORDER — FERROUS SULFATE 325(65) MG
325 TABLET ORAL
Status: DISCONTINUED | OUTPATIENT
Start: 2019-08-15 | End: 2019-08-15 | Stop reason: HOSPADM

## 2019-08-14 RX ORDER — ACETAMINOPHEN 325 MG/1
650 TABLET ORAL EVERY 4 HOURS PRN
Status: DISCONTINUED | OUTPATIENT
Start: 2019-08-14 | End: 2019-08-15 | Stop reason: HOSPADM

## 2019-08-14 RX ORDER — ONDANSETRON 4 MG/1
4 TABLET, ORALLY DISINTEGRATING ORAL EVERY 6 HOURS PRN
Status: DISCONTINUED | OUTPATIENT
Start: 2019-08-14 | End: 2019-08-15 | Stop reason: HOSPADM

## 2019-08-14 RX ORDER — ONDANSETRON 2 MG/ML
4 INJECTION INTRAMUSCULAR; INTRAVENOUS EVERY 6 HOURS PRN
Status: DISCONTINUED | OUTPATIENT
Start: 2019-08-14 | End: 2019-08-15 | Stop reason: HOSPADM

## 2019-08-14 RX ORDER — PROCHLORPERAZINE 25 MG
12.5 SUPPOSITORY, RECTAL RECTAL EVERY 12 HOURS PRN
Status: DISCONTINUED | OUTPATIENT
Start: 2019-08-14 | End: 2019-08-15 | Stop reason: HOSPADM

## 2019-08-14 RX ORDER — SODIUM CHLORIDE 9 MG/ML
1000 INJECTION, SOLUTION INTRAVENOUS CONTINUOUS
Status: DISCONTINUED | OUTPATIENT
Start: 2019-08-14 | End: 2019-08-14

## 2019-08-14 RX ORDER — NALOXONE HYDROCHLORIDE 0.4 MG/ML
.1-.4 INJECTION, SOLUTION INTRAMUSCULAR; INTRAVENOUS; SUBCUTANEOUS
Status: DISCONTINUED | OUTPATIENT
Start: 2019-08-14 | End: 2019-08-15 | Stop reason: HOSPADM

## 2019-08-14 RX ORDER — ACEBUTOLOL HYDROCHLORIDE 400 MG/1
400 CAPSULE ORAL DAILY
Status: DISCONTINUED | OUTPATIENT
Start: 2019-08-15 | End: 2019-08-15 | Stop reason: HOSPADM

## 2019-08-14 RX ORDER — DORZOLAMIDE HCL 20 MG/ML
1 SOLUTION/ DROPS OPHTHALMIC 3 TIMES DAILY
Status: DISCONTINUED | OUTPATIENT
Start: 2019-08-14 | End: 2019-08-15 | Stop reason: HOSPADM

## 2019-08-14 RX ORDER — LATANOPROST 50 UG/ML
1 SOLUTION/ DROPS OPHTHALMIC EVERY EVENING
Status: DISCONTINUED | OUTPATIENT
Start: 2019-08-15 | End: 2019-08-15 | Stop reason: HOSPADM

## 2019-08-14 RX ORDER — ROSUVASTATIN CALCIUM 5 MG/1
5 TABLET, COATED ORAL
Status: DISCONTINUED | OUTPATIENT
Start: 2019-08-14 | End: 2019-08-15 | Stop reason: HOSPADM

## 2019-08-14 RX ORDER — DILTIAZEM HYDROCHLORIDE 120 MG/1
120 CAPSULE, COATED, EXTENDED RELEASE ORAL DAILY
Status: DISCONTINUED | OUTPATIENT
Start: 2019-08-15 | End: 2019-08-15 | Stop reason: HOSPADM

## 2019-08-14 RX ADMIN — PANTOPRAZOLE SODIUM 40 MG: 40 INJECTION, POWDER, FOR SOLUTION INTRAVENOUS at 20:57

## 2019-08-14 RX ADMIN — SODIUM CHLORIDE 1000 ML: 9 INJECTION, SOLUTION INTRAVENOUS at 21:02

## 2019-08-14 RX ADMIN — SODIUM CHLORIDE 1000 ML: 9 INJECTION, SOLUTION INTRAVENOUS at 19:08

## 2019-08-14 RX ADMIN — ROSUVASTATIN CALCIUM 5 MG: 5 TABLET, FILM COATED ORAL at 22:17

## 2019-08-14 ASSESSMENT — ENCOUNTER SYMPTOMS
DIARRHEA: 1
FEVER: 0
ABDOMINAL PAIN: 1
SHORTNESS OF BREATH: 0
BLOOD IN STOOL: 1

## 2019-08-14 ASSESSMENT — MIFFLIN-ST. JEOR: SCORE: 1047.13

## 2019-08-15 ENCOUNTER — ANESTHESIA (OUTPATIENT)
Dept: GASTROENTEROLOGY | Facility: CLINIC | Age: 79
End: 2019-08-15
Payer: COMMERCIAL

## 2019-08-15 ENCOUNTER — ANESTHESIA EVENT (OUTPATIENT)
Dept: GASTROENTEROLOGY | Facility: CLINIC | Age: 79
End: 2019-08-15
Payer: COMMERCIAL

## 2019-08-15 VITALS
HEART RATE: 77 BPM | TEMPERATURE: 98.2 F | DIASTOLIC BLOOD PRESSURE: 65 MMHG | SYSTOLIC BLOOD PRESSURE: 147 MMHG | HEIGHT: 63 IN | RESPIRATION RATE: 20 BRPM | WEIGHT: 131.84 LBS | BODY MASS INDEX: 23.36 KG/M2 | OXYGEN SATURATION: 99 %

## 2019-08-15 LAB
ABO + RH BLD: NORMAL
ABO + RH BLD: NORMAL
ANION GAP SERPL CALCULATED.3IONS-SCNC: 6 MMOL/L (ref 3–14)
BLD GP AB SCN SERPL QL: NORMAL
BLD PROD TYP BPU: NORMAL
BLD PROD TYP BPU: NORMAL
BLD UNIT ID BPU: 0
BLOOD BANK CMNT PATIENT-IMP: NORMAL
BLOOD PRODUCT CODE: NORMAL
BPU ID: NORMAL
BUN SERPL-MCNC: 26 MG/DL (ref 7–30)
CALCIUM SERPL-MCNC: 7.7 MG/DL (ref 8.5–10.1)
CHLORIDE SERPL-SCNC: 111 MMOL/L (ref 94–109)
CO2 SERPL-SCNC: 25 MMOL/L (ref 20–32)
CREAT SERPL-MCNC: 1.11 MG/DL (ref 0.52–1.04)
ERYTHROCYTE [DISTWIDTH] IN BLOOD BY AUTOMATED COUNT: 16.3 % (ref 10–15)
GFR SERPL CREATININE-BSD FRML MDRD: 47 ML/MIN/{1.73_M2}
GLUCOSE SERPL-MCNC: 90 MG/DL (ref 70–99)
HCT VFR BLD AUTO: 30.5 % (ref 35–47)
HGB BLD-MCNC: 8 G/DL (ref 11.7–15.7)
HGB BLD-MCNC: 9.7 G/DL (ref 11.7–15.7)
MCH RBC QN AUTO: 29.5 PG (ref 26.5–33)
MCHC RBC AUTO-ENTMCNC: 31.8 G/DL (ref 31.5–36.5)
MCV RBC AUTO: 93 FL (ref 78–100)
NUM BPU REQUESTED: 1
PLATELET # BLD AUTO: 232 10E9/L (ref 150–450)
POTASSIUM SERPL-SCNC: 3.9 MMOL/L (ref 3.4–5.3)
RBC # BLD AUTO: 3.29 10E12/L (ref 3.8–5.2)
SODIUM SERPL-SCNC: 142 MMOL/L (ref 133–144)
SPECIMEN EXP DATE BLD: NORMAL
TRANSFUSION STATUS PATIENT QL: NORMAL
TRANSFUSION STATUS PATIENT QL: NORMAL
UPPER GI ENDOSCOPY: NORMAL
WBC # BLD AUTO: 7.3 10E9/L (ref 4–11)

## 2019-08-15 PROCEDURE — 25000128 H RX IP 250 OP 636: Performed by: NURSE ANESTHETIST, CERTIFIED REGISTERED

## 2019-08-15 PROCEDURE — 25000125 ZZHC RX 250: Performed by: NURSE ANESTHETIST, CERTIFIED REGISTERED

## 2019-08-15 PROCEDURE — 25000128 H RX IP 250 OP 636: Performed by: PHYSICIAN ASSISTANT

## 2019-08-15 PROCEDURE — 99207 ZZC CDG-CODE CATEGORY CHANGED: CPT | Performed by: INTERNAL MEDICINE

## 2019-08-15 PROCEDURE — 36415 COLL VENOUS BLD VENIPUNCTURE: CPT | Performed by: PHYSICIAN ASSISTANT

## 2019-08-15 PROCEDURE — 85027 COMPLETE CBC AUTOMATED: CPT | Performed by: EMERGENCY MEDICINE

## 2019-08-15 PROCEDURE — 25800030 ZZH RX IP 258 OP 636: Performed by: PHYSICIAN ASSISTANT

## 2019-08-15 PROCEDURE — 85018 HEMOGLOBIN: CPT | Mod: 91 | Performed by: PHYSICIAN ASSISTANT

## 2019-08-15 PROCEDURE — 37000008 ZZH ANESTHESIA TECHNICAL FEE, 1ST 30 MIN: Performed by: SURGERY

## 2019-08-15 PROCEDURE — C9113 INJ PANTOPRAZOLE SODIUM, VIA: HCPCS | Performed by: PHYSICIAN ASSISTANT

## 2019-08-15 PROCEDURE — 96361 HYDRATE IV INFUSION ADD-ON: CPT | Mod: 59

## 2019-08-15 PROCEDURE — 36415 COLL VENOUS BLD VENIPUNCTURE: CPT | Performed by: EMERGENCY MEDICINE

## 2019-08-15 PROCEDURE — 88305 TISSUE EXAM BY PATHOLOGIST: CPT | Mod: 26,76 | Performed by: SURGERY

## 2019-08-15 PROCEDURE — 25000132 ZZH RX MED GY IP 250 OP 250 PS 637: Performed by: PHYSICIAN ASSISTANT

## 2019-08-15 PROCEDURE — 96376 TX/PRO/DX INJ SAME DRUG ADON: CPT

## 2019-08-15 PROCEDURE — P9016 RBC LEUKOCYTES REDUCED: HCPCS | Performed by: EMERGENCY MEDICINE

## 2019-08-15 PROCEDURE — 43239 EGD BIOPSY SINGLE/MULTIPLE: CPT | Performed by: SURGERY

## 2019-08-15 PROCEDURE — 88305 TISSUE EXAM BY PATHOLOGIST: CPT | Performed by: SURGERY

## 2019-08-15 PROCEDURE — 36430 TRANSFUSION BLD/BLD COMPNT: CPT

## 2019-08-15 PROCEDURE — 99204 OFFICE O/P NEW MOD 45 MIN: CPT | Mod: 25 | Performed by: SURGERY

## 2019-08-15 PROCEDURE — 80048 BASIC METABOLIC PNL TOTAL CA: CPT | Performed by: EMERGENCY MEDICINE

## 2019-08-15 PROCEDURE — 99217 ZZC OBSERVATION CARE DISCHARGE: CPT | Performed by: INTERNAL MEDICINE

## 2019-08-15 PROCEDURE — 25000125 ZZHC RX 250: Performed by: SURGERY

## 2019-08-15 PROCEDURE — G0378 HOSPITAL OBSERVATION PER HR: HCPCS

## 2019-08-15 RX ORDER — LIDOCAINE 40 MG/G
CREAM TOPICAL
Status: DISCONTINUED | OUTPATIENT
Start: 2019-08-15 | End: 2019-08-15 | Stop reason: HOSPADM

## 2019-08-15 RX ORDER — LIDOCAINE HYDROCHLORIDE 10 MG/ML
INJECTION, SOLUTION INFILTRATION; PERINEURAL PRN
Status: DISCONTINUED | OUTPATIENT
Start: 2019-08-15 | End: 2019-08-15

## 2019-08-15 RX ORDER — PROPOFOL 10 MG/ML
INJECTION, EMULSION INTRAVENOUS CONTINUOUS PRN
Status: DISCONTINUED | OUTPATIENT
Start: 2019-08-15 | End: 2019-08-15

## 2019-08-15 RX ORDER — ASPIRIN 81 MG/1
81 TABLET ORAL DAILY
COMMUNITY
Start: 2019-08-19

## 2019-08-15 RX ORDER — PANTOPRAZOLE SODIUM 40 MG/1
40 TABLET, DELAYED RELEASE ORAL DAILY
Qty: 90 TABLET | Refills: 1 | Status: SHIPPED | OUTPATIENT
Start: 2019-08-15 | End: 2022-11-22

## 2019-08-15 RX ADMIN — PROPOFOL 80 MCG/KG/MIN: 10 INJECTION, EMULSION INTRAVENOUS at 12:12

## 2019-08-15 RX ADMIN — LEVOTHYROXINE SODIUM 125 MCG: 125 TABLET ORAL at 06:46

## 2019-08-15 RX ADMIN — DILTIAZEM HYDROCHLORIDE 120 MG: 120 CAPSULE, COATED, EXTENDED RELEASE ORAL at 08:26

## 2019-08-15 RX ADMIN — SODIUM CHLORIDE 1000 ML: 9 INJECTION, SOLUTION INTRAVENOUS at 04:14

## 2019-08-15 RX ADMIN — SODIUM CHLORIDE 1000 ML: 9 INJECTION, SOLUTION INTRAVENOUS at 08:20

## 2019-08-15 RX ADMIN — LOSARTAN POTASSIUM 100 MG: 50 TABLET ORAL at 08:26

## 2019-08-15 RX ADMIN — LIDOCAINE HYDROCHLORIDE 50 MG: 10 INJECTION, SOLUTION INFILTRATION; PERINEURAL at 12:11

## 2019-08-15 RX ADMIN — HYDROCHLOROTHIAZIDE 12.5 MG: 12.5 CAPSULE ORAL at 08:26

## 2019-08-15 RX ADMIN — PANTOPRAZOLE SODIUM 40 MG: 40 INJECTION, POWDER, FOR SOLUTION INTRAVENOUS at 08:26

## 2019-08-15 ASSESSMENT — LIFESTYLE VARIABLES: TOBACCO_USE: 1

## 2019-08-15 ASSESSMENT — ENCOUNTER SYMPTOMS: DYSRHYTHMIAS: 1

## 2019-08-15 ASSESSMENT — COPD QUESTIONNAIRES: COPD: 1

## 2019-08-15 NOTE — ANESTHESIA CARE TRANSFER NOTE
Patient: Zee Mireles    Procedure(s):  ESOPHAGOGASTRODUODENOSCOPY, WITH BIOPSY    Diagnosis: melena  Diagnosis Additional Information: No value filed.    Anesthesia Type:   MAC     Note:  Airway :Nasal Cannula  Patient transferred to:Phase II  Handoff Report: Identifed the Patient, Identified the Reponsible Provider, Reviewed the pertinent medical history, Discussed the surgical course, Reviewed Intra-OP anesthesia mangement and issues during anesthesia, Set expectations for post-procedure period and Allowed opportunity for questions and acknowledgement of understanding      Vitals: (Last set prior to Anesthesia Care Transfer)    CRNA VITALS  8/15/2019 1150 - 8/15/2019 1221      8/15/2019             Pulse:  78    SpO2:  100 %    EKG:  NSR                Electronically Signed By: Joseph Stapleton CRNA, APRN CRNA  August 15, 2019  12:21 PM

## 2019-08-15 NOTE — H&P
[facility]    History and Physical - Hospitalist Service       Date of Admission:  8/14/2019    Assessment & Plan   Zee Mireles is a 79 year old female admitted on 8/14/2019. She has history of prior GI bleed, coronary artery disease s/p stent, paroxsymal atrial fibrillation, peripheral arterial disease, hypertension, hyperlipidemia and hypothyroidism. She presents with one day of melena.    Melena  Acute blood loss anemia  1 episode of melena day prior to admission and 3 day of admission. Occult positive in the ED. Hgb 9.2. Recent baseline 11.3-11.5. Hemodynamically stable. History of GI bleed with admission to [facility] 12/2018 and no clear source identified on EGD and colonoscopy she was on xarelto at that time which has been stopped but now on dual antiplatelet with aspirin and Plavix. Few episodes in March, capsule study in April reportedly negative. Unclear etiology at this point. Discussed with surgery who will evaluate the patient in the morning for possible EGD.   - repeat hemoglobin at midnight and 6 AM  - monitor for further blood loss  - clear liquid diet, NPO at midnight for possible EGD  - Pantoprazole 40 mg IV Q 12 hours  - hold aspirin and plavix prior to EGD  - continue home iron supplement  - surgery consult, will evaluate in the morning    ADDENDUM 1:25 AM:  Hgb dropped to 8.0, plan for 1 unit of blood. Discussed with patient, including risks/benefits, consent signed.  - transfuse 1 unit pRBC  - repeat Hgb in AM    Elevated creatinine on CKD,  Creatinine 1.37, BUN 34, GFR 37. Baseline creatinine 1.18-1.3, GFR 40-45. Overall renal function very mildly worse though somewhat variable in the past year. May have a component of pre-renal.  - IVF: NS at 100 ml/hr  - daily weights  - avoid nephrotoxic agents  - continue losartan and hydrochlorothiazide for now though consider holding if worsening    Coronary Artery Disease  Chronic. History of NSTEMI with PCI to LAD  11/2018.  - continue home statin, beta blocker, ARB  - hold home aspirin and Plavix for EGD    Paroxysmal Atrial Fibrillation  Rate managed with diltiazem. Not on Xarelto due to patient preference with GI bleed. CHADVASc 5. Discussion through [facility] for possible watchman device.  - continue home diltiazem, acebutolol    Peripheral Arterial disease  Chronic.  - hold home aspirin for EGD  - continue home statin    Hypertension  Chronic. Blood pressures reviewed, stable.   - continue home hydrochlorothiazide, losartan with parameters added    Hyperlipidemia  Chronic.  - continue home rosuvastatin    Hypothyroidism  Chronic.   - continue home levothyroxine     Diet: clear liquid diet, NPO at midnight  DVT Prophylaxis: Low Risk/Ambulatory with no VTE prophylaxis indicated  Fraire Catheter: not present  Code Status: DNI, ok to intubate, discussed directly with patient on admission.    Disposition Plan   Expected discharge: Tomorrow, recommended to prior living arrangement once work up complete, hemoglobin stable, vital signs stable and no signs of significant GI bleeding.  Entered: Caty Lehman PA-C 08/14/2019, 10:38 PM     The patient's care was discussed with the Attending Physician, Dr. Chester Andino and Patient.    Caty Lehman PA-C  [facility]  ______________________________________________________________________    Chief Complaint   Melena    History is obtained from the patient    History of Present Illness   Zee Mireles is a 79 year old female who presents with 1 day of melena.    Yesterday morning she felt fatigued and noted a dark black stool. Today she had 3 more episodes of dark stools and continued fatigue. No lightheadedness, dizziness, nausea, vomiting ,chest pains or shortness of breath. She did have some very mild cramping with bowel movements, otherwise no abdominal pain. She does have heart burn and reflux symptoms at baseline with frequent belching and  "indigestion but does not currently take any medications for this as she was told to stop pantoprazole by her cardiologist. She is on aspirin and Plavix due to a cardiac stent in 11/2018. She does not take NSAIDs, drinks 1.5 cups of coffee per day and no alcohol intake. She denies any bright red blood in her stool.     She did have a hospitalization at [facility] for GI bleeding back in December of 2018, at that time she got an EGD and a colonoscopy which did not reveal a cause. She had recurrence in March of 2019 and in April she had a capsule study which reportedly did not show anything concerning. She was told that they would simply watch for further recurrence and she may need a surgical intervention if she continued to have issues with bleeding.     She otherwise has been feeling well recently and denies fever, chills, myalgias, cough, congestion, rhinorrhea, sore throat, palpitations, changes in urination, rash or wounds.    Review of Systems    The 10 point Review of Systems is negative other than noted in the HPI or here.     Past Medical History    I have reviewed this patient's medical history and updated it with pertinent information if needed.   Past Medical History:   Diagnosis Date     Chest pain 11/2018    burning sensation - indigestion     COPD (chronic obstructive pulmonary disease) (H)      Glaucoma      HTN (hypertension)      Hyperlipidemia      Hypothyroid      Paroxysmal atrial fibrillation (H) 11/2018     Smoker     ongoing - \"6-8 cigarettes QD\"     Past Surgical History   I have reviewed this patient's surgical history and updated it with pertinent information if needed.  Past Surgical History:   Procedure Laterality Date     cataract removal       COLONOSCOPY  12/2018    [facility]     CV BALLOON DILATION AND/OR STENT PLACEMENT OF VESSEL  11/2018    stent to LAD     ESOPHAGOSCOPY, GASTROSCOPY, DUODENOSCOPY (EGD), COMBINED  12/2018    [facility]     Social History   I have reviewed this " patient's social history and updated it with pertinent information if needed.  She lives in [place] with a son and daughter in law.  Social History     Tobacco Use     Smoking status: Current Every Day Smoker     Types: Cigarettes     Smokeless tobacco: Never Used     Tobacco comment: Trying to quit 7 cigs daily   Substance Use Topics     Alcohol use: None     Drug use: None     Family History   I have reviewed this patient's family history and updated it with pertinent information if needed.   Family History   Problem Relation Age of Onset     Sudden Death Mother      Goiter Mother      Stomach Cancer Father      No Known Problems Brother      Prior to Admission Medications   Prior to Admission Medications   Prescriptions Last Dose Informant Patient Reported? Taking?   Glycopyrrolate-Formoterol (BEVESPI AEROSPHERE) 9-4.8 MCG/ACT oral inhaler 8/13/2019 at Unknown time Self Yes Yes   Sig: Inhale 2 puffs into the lungs every evening   acebutolol (SECTRAL) 400 MG capsule 8/14/2019 at 0900 Self Yes Yes   Sig: Take 400 mg by mouth daily as needed (Palpitations) = extra dose in addition to scheduled daily dose   acetaminophen (TYLENOL) 500 MG tablet 8/13/2019 at Unknown time Self Yes Yes   Sig: Take 1,000 mg by mouth every 6 hours as needed for mild pain   albuterol (PROAIR HFA/PROVENTIL HFA/VENTOLIN HFA) 108 (90 Base) MCG/ACT inhaler Past Week at Unknown time Self Yes Yes   Sig: Inhale 2 puffs into the lungs every 4 hours as needed for shortness of breath / dyspnea or wheezing   aspirin 81 MG EC tablet 8/13/2019 at Unknown time  Yes Yes   Sig: Take 81 mg by mouth   brimonidine (ALPHAGAN-P) 0.15 % ophthalmic solution 8/14/2019 at 1300 Self Yes Yes   Sig: Place 1 drop into both eyes 3 times daily   clopidogrel (PLAVIX) 75 MG tablet 8/14/2019 at 0900  No Yes   Sig: Take 1 tablet (75 mg) by mouth daily   diltiazem ER (DILT-XR) 120 MG 24 hr capsule 8/14/2019 at 0900  No Yes   Sig: Take 1 capsule (120 mg) by mouth daily    dorzolamide (TRUSOPT) 2 % ophthalmic solution 8/14/2019 at 1300 Self Yes Yes   Sig: Place 1 drop into both eyes 3 times daily   ferrous sulfate (FEROSUL) 325 (65 Fe) MG tablet 8/13/2019 at 0900 Self Yes Yes   Sig: Take 325 mg by mouth daily (with breakfast)   hydrochlorothiazide (HYDRODIURIL) 25 MG tablet 8/14/2019 at 0900  Yes Yes   Sig: Take 0.5 tablets (12.5 mg) by mouth daily   latanoprost (XALATAN) 0.005 % ophthalmic solution 8/13/2019 at 1900 Self Yes Yes   Sig: Place 1 drop into both eyes every evening    levothyroxine (SYNTHROID/LEVOTHROID) 125 MCG tablet 8/14/2019 at 0630 Self Yes Yes   Sig: Take 125 mcg by mouth daily   losartan (COZAAR) 100 MG tablet 8/14/2019 at Unknown time  No Yes   Sig: Take 1 tablet (100 mg) by mouth daily   rosuvastatin (CRESTOR) 5 MG tablet 8/12/2019 at 2200  Yes No   Sig: TK 1 T PO THREE TIMES A WEEK m/w/f      Facility-Administered Medications: None     Allergies   Allergies   Allergen Reactions     Atorvastatin Muscle Pain (Myalgia)     Was fine while taking simvastatin 20 mg daily.       Physical Exam   Vital Signs: Temp: 98.1  F (36.7  C) Temp src: Oral BP: 136/49 Pulse: 74 Heart Rate: 81 Resp: 18 SpO2: 98 % O2 Device: None (Room air)    Weight: 131 lbs 13.36 oz    Constitutional: sitting up comfortably in bed, awake, alert, cooperative, no apparent distress, and appears stated age  Eyes: Lids and lashes normal, extra ocular muscles intact, sclera clear, conjunctiva normal  ENT: Normocephalic, without obvious abnormality, atraumatic, sinuses nontender on palpation, external ears without lesions, oral pharynx with moist mucous membranes, tonsils without erythema or exudates, gums normal and good dentition.  Hematologic / Lymphatic: no cervical lymphadenopathy and no supraclavicular lymphadenopathy  Respiratory: No increased work of breathing, good air exchange, clear to auscultation bilaterally, no crackles or wheezing  Cardiovascular: regular rate and rhythm, and no murmur  noted. No lower extremity edema. Radial and pedal pulses 2+ bilaterally.  GI: bowel sounds present and somewhat hyperactive, soft, non-distended, non-tender  Genitounirinary: deferred  Skin: normal skin color, texture, turgor  Musculoskeletal: Normal bulk and tone. Moves all 4 extremities appropriately.  Neurologic: Awake, alert, oriented to name, place and time.   Neuropsychiatric: calm, pleasant, cooperative, appropriate thought process and content.    Data   Data reviewed today: I reviewed all medications, new labs and imaging results over the last 24 hours. I personally reviewed no images or EKG's today.    Recent Labs   Lab 08/14/19  1852   WBC 8.1   HGB 9.2*   MCV 95      INR 1.06      POTASSIUM 4.0   CHLORIDE 100   CO2 25   BUN 34*   CR 1.37*   ANIONGAP 8   KARI 8.4*   GLC 91   ALBUMIN 3.6   PROTTOTAL 7.3   BILITOTAL 0.3   ALKPHOS 67   ALT 13   AST 11   LIPASE 190     No results found for this or any previous visit (from the past 24 hour(s)).

## 2019-08-15 NOTE — PROGRESS NOTES
"[facility] [facility] [place]MISSION NOTE    Patient admitted to room 2400 at approximately 2145 via cart from emergency room. Patient was accompanied by nurse.     Verbal SBAR report received from myself prior to patient arrival.     Patient ambulated to bed independently. Patient alert and oriented X 3. The patient is not having any pain.  . Admission vital signs: Blood pressure 125/40, pulse 83, temperature 97.3  F (36.3  C), temperature source Oral, resp. rate 18, height 1.6 m (5' 3\"), weight 59.8 kg (131 lb 13.4 oz), SpO2 97 %. Patient was oriented to plan of care, call light, bed controls, tv, telephone, bathroom and visiting hours.     Risk Assessment    The following safety risks were identified during admission: fall. Yellow risk band applied: YES.     Skin Initial Assessment    This writer admitted this patient and completed a full skin assessment and Anthony score in the Adult PCS flowsheet. Appropriate interventions initiated as needed.     Secondary skin check completed by Dia MELENDREZ.    Anthony Risk Assessment  Sensory Perception: 4-->no impairment  Moisture: 4-->rarely moist  Activity: 4-->walks frequently  Mobility: 4-->no limitation  Nutrition: 4-->excellent  Friction and Shear: 3-->no apparent problem  Anthony Score: 23    Mabel Delvalle    "

## 2019-08-15 NOTE — ANESTHESIA POSTPROCEDURE EVALUATION
Patient: Zee Mireles    Procedure(s):  ESOPHAGOGASTRODUODENOSCOPY, WITH BIOPSY    Diagnosis:melena  Diagnosis Additional Information: No value filed.    Anesthesia Type:  MAC    Note:  Anesthesia Post Evaluation    Patient location during evaluation: Phase 2 and Bedside  Patient participation: Able to fully participate in evaluation  Level of consciousness: awake and alert  Pain management: adequate  Airway patency: patent  Cardiovascular status: acceptable  Respiratory status: acceptable  Hydration status: acceptable  PONV: none     Anesthetic complications: None          Last vitals:  Vitals:    08/15/19 1143 08/15/19 1223 08/15/19 1230   BP: 138/57 96/40 121/51   Pulse: 77     Resp: 18 16 20   Temp: 36.8  C (98.2  F)     SpO2: 97% 99% 99%         Electronically Signed By: Joseph Stapleton CRNA, APRN CRNA  August 15, 2019  12:40 PM

## 2019-08-15 NOTE — BRIEF OP NOTE
[facility]    Brief Operative Note    Pre-operative diagnosis: melena  Post-operative diagnosis 1. Gastritis  2. Esophagitis, LA Grade A  Procedure: Procedure(s):  ESOPHAGOGASTRODUODENOSCOPY, WITH BIOPSY  Surgeon: Surgeon(s) and Role:     * Demario Clayton DO - Primary  Anesthesia: Monitor Anesthesia Care   Estimated blood loss: Minimal  Drains: None  Specimens:   ID Type Source Tests Collected by Time Destination   A :  Biopsy Stomach, Antrum SURGICAL PATHOLOGY EXAM Demario Clayton,  8/15/2019 12:15 PM    B :  Biopsy Gastro Esophageal Junction SURGICAL PATHOLOGY EXAM Demario Clayton DO 8/15/2019 12:18 PM      Findings:   None.  Complications: None.  Implants:  * No implants in log *

## 2019-08-15 NOTE — ED PROVIDER NOTES
History     Chief Complaint   Patient presents with     Melena     on plavix and aspirin. Started 8/13/19     HPI  Zee Mireles is a 79 year old female who has past medical history significant for hypertension, hyperlipidemia, paroxysmal atrial fibrillation, history of prior upper GI bleed, presenting to the emergency department with concerns regarding generalized fatigue, weakness, in addition to loose stool which have now been darker in color.  Patient has history of upper GI bleed, with upper GI study which was performed in December, 2018, with no obvious source of bleeding.  This occurred in the context of recent starting of blood thinning medication after patient had developed non-ST elevation MI.  Patient previously was on Xarelto, however that has been held since her upper GI bleed.  She continues on Plavix, in addition to aspirin.  Patient does have known history of iron deficiency anemia.  She called her clinic doctor, who instructed her to be evaluated, and have recheck of hemoglobin.  She denies any chest pain, shortness of breath.  No significant abdominal pain currently, however has had occasional crampiness over the past 2 days.  Denies any urinary symptoms.    Allergies:  Allergies   Allergen Reactions     Atorvastatin Muscle Pain (Myalgia)     Was fine while taking simvastatin 20 mg daily.       Problem List:    Patient Active Problem List    Diagnosis Date Noted     COPD (chronic obstructive pulmonary disease) (H) 08/14/2019     Priority: Medium     Dyslipidemia, goal LDL below 70 08/14/2019     Priority: Medium     Essential hypertension 08/14/2019     Priority: Medium     Upper GI bleed 08/14/2019     Priority: Medium     Paroxysmal atrial fibrillation (H) 11/01/2018     Priority: Medium     Overview:   CHADS2 VASC score equals 5 for age, sex, hypertension, and coronary artery disease       Coronary artery disease due to calcified coronary lesion 11/01/2018     Priority: Medium     Overview:  "  stent to LAD after NSTEMI          Past Medical History:    Past Medical History:   Diagnosis Date     Chest pain 11/2018     COPD (chronic obstructive pulmonary disease) (H)      Glaucoma      HTN (hypertension)      Hyperlipidemia      Hypothyroid      Paroxysmal atrial fibrillation (H) 11/2018     Smoker        Past Surgical History:    Past Surgical History:   Procedure Laterality Date     cataract removal       COLONOSCOPY  12/2018    [facility]     CV BALLOON DILATION AND/OR STENT PLACEMENT OF VESSEL  11/2018    stent to LAD     ESOPHAGOSCOPY, GASTROSCOPY, DUODENOSCOPY (EGD), COMBINED  12/2018    [facility]       Family History:    Family History   Problem Relation Age of Onset     Sudden Death Mother      Goiter Mother      Stomach Cancer Father      No Known Problems Brother        Social History:  Marital Status:   [4]  Social History     Tobacco Use     Smoking status: Current Every Day Smoker     Types: Cigarettes     Smokeless tobacco: Never Used     Tobacco comment: Trying to quit 7 cigs daily   Substance Use Topics     Alcohol use: None     Drug use: None        Medications:      No current outpatient medications on file.      Review of Systems   Constitutional: Negative for fever.   Respiratory: Negative for shortness of breath.    Cardiovascular: Negative for chest pain.   Gastrointestinal: Positive for abdominal pain, blood in stool and diarrhea.   All other systems reviewed and are negative.      Physical Exam   BP: (!) 175/75  Pulse: 65  Heart Rate: 68  Temp: 99.3  F (37.4  C)  Resp: 18  Height: 160 cm (5' 3\")  Weight: 59.8 kg (131 lb 13.4 oz)  SpO2: 99 %      Physical Exam  /49   Pulse 74   Temp 98.1  F (36.7  C) (Oral)   Resp 18   Ht 1.6 m (5' 3\")   Wt 59.8 kg (131 lb 13.4 oz)   SpO2 98%   BMI 23.35 kg/m    General: alert, interactive, in no apparent distress  Head: atraumatic  Nose: no rhinorrhea or epistaxis  Ears: no external auditory canal discharge or bleeding.  "   Eyes: Sclera nonicteric. Conjunctiva noninjected.    Mouth: no tonsillar erythema, edema, or exudate  Neck: supple, no palp LAD  Lungs: CTAB  CV: RRR, S1/S2; peripheral pulses palpable and symmetric  Abdomen: soft, nt, nd, no guarding or rebound. Positive bowel sounds  Rectal:  Small amounts of blackened stool.    Extremities: no cyanosis or edema  Skin: no rash or diaphoresis  Neuro:  strength 5/5 in UE and LEs bilaterally, sensation intact to light touch in UE and LEs bilaterally;       ED Course        Procedures               Critical Care time:  none               Results for orders placed or performed during the hospital encounter of 08/14/19 (from the past 24 hour(s))   CBC with platelets differential   Result Value Ref Range    WBC 8.1 4.0 - 11.0 10e9/L    RBC Count 3.12 (L) 3.8 - 5.2 10e12/L    Hemoglobin 9.2 (L) 11.7 - 15.7 g/dL    Hematocrit 29.5 (L) 35.0 - 47.0 %    MCV 95 78 - 100 fl    MCH 29.5 26.5 - 33.0 pg    MCHC 31.2 (L) 31.5 - 36.5 g/dL    RDW 17.0 (H) 10.0 - 15.0 %    Platelet Count 313 150 - 450 10e9/L    Diff Method Automated Method     % Neutrophils 51.6 %    % Lymphocytes 28.3 %    % Monocytes 14.8 %    % Eosinophils 4.0 %    % Basophils 0.9 %    % Immature Granulocytes 0.4 %    Nucleated RBCs 0 0 /100    Absolute Neutrophil 4.2 1.6 - 8.3 10e9/L    Absolute Lymphocytes 2.3 0.8 - 5.3 10e9/L    Absolute Monocytes 1.2 0.0 - 1.3 10e9/L    Absolute Eosinophils 0.3 0.0 - 0.7 10e9/L    Absolute Basophils 0.1 0.0 - 0.2 10e9/L    Abs Immature Granulocytes 0.0 0 - 0.4 10e9/L    Absolute Nucleated RBC 0.0    ABO/Rh type and screen   Result Value Ref Range    ABO A     RH(D) Pos     Antibody Screen Neg     Test Valid Only At [facility]        Specimen Expires 08/17/2019    INR   Result Value Ref Range    INR 1.06 0.86 - 1.14   Lipase   Result Value Ref Range    Lipase 190 73 - 393 U/L   Comprehensive metabolic panel   Result Value Ref Range    Sodium 133 133 - 144 mmol/L    Potassium 4.0  3.4 - 5.3 mmol/L    Chloride 100 94 - 109 mmol/L    Carbon Dioxide 25 20 - 32 mmol/L    Anion Gap 8 3 - 14 mmol/L    Glucose 91 70 - 99 mg/dL    Urea Nitrogen 34 (H) 7 - 30 mg/dL    Creatinine 1.37 (H) 0.52 - 1.04 mg/dL    GFR Estimate 37 (L) >60 mL/min/[1.73_m2]    GFR Estimate If Black 43 (L) >60 mL/min/[1.73_m2]    Calcium 8.4 (L) 8.5 - 10.1 mg/dL    Bilirubin Total 0.3 0.2 - 1.3 mg/dL    Albumin 3.6 3.4 - 5.0 g/dL    Protein Total 7.3 6.8 - 8.8 g/dL    Alkaline Phosphatase 67 40 - 150 U/L    ALT 13 0 - 50 U/L    AST 11 0 - 45 U/L   Lactic acid whole blood   Result Value Ref Range    Lactic Acid 0.9 0.7 - 2.0 mmol/L   Occult blood stool POCT   Result Value Ref Range    Occult Blood pos neg    Internal QC OK Yes     Test Card Lot Number [id number] 05-21        Medications   0.9% sodium chloride BOLUS (0 mLs Intravenous Stopped 8/14/19 2046)     Followed by   sodium chloride 0.9% infusion (0 mLs Intravenous ED Infusing on Admission/transfer 8/14/19 2159)   brimonidine (ALPHAGAN) 0.2 % ophthalmic solution 1 drop (1 drop Both Eyes Not Given 8/14/19 2217)   diltiazem ER COATED BEADS (CARDIZEM CD/CARTIA XT) 24 hr capsule 120 mg (has no administration in time range)   dorzolamide (TRUSOPT) 2 % ophthalmic solution 1 drop (1 drop Both Eyes Not Given 8/14/19 2217)   ferrous sulfate (FEROSUL) tablet 325 mg (has no administration in time range)   hydrochlorothiazide (MICROZIDE) capsule 12.5 mg (has no administration in time range)   latanoprost (XALATAN) 0.005 % ophthalmic solution 1 drop (has no administration in time range)   levothyroxine (SYNTHROID/LEVOTHROID) tablet 125 mcg (has no administration in time range)   losartan (COZAAR) tablet 100 mg (has no administration in time range)   rosuvastatin (CRESTOR) tablet 5 mg (5 mg Oral Given 8/14/19 3431)   acetaminophen (TYLENOL) tablet 650 mg (has no administration in time range)   naloxone (NARCAN) injection 0.1-0.4 mg (has no administration in time range)   melatonin tablet  1 mg (has no administration in time range)   ondansetron (ZOFRAN-ODT) ODT tab 4 mg (has no administration in time range)     Or   ondansetron (ZOFRAN) injection 4 mg (has no administration in time range)   prochlorperazine (COMPAZINE) injection 5 mg (has no administration in time range)     Or   prochlorperazine (COMPAZINE) tablet 5 mg (has no administration in time range)     Or   prochlorperazine (COMPAZINE) Suppository 12.5 mg (has no administration in time range)   pantoprazole (PROTONIX) 40 mg IV push injection (has no administration in time range)   acebutolol (SECTRAL) capsule 400 mg (has no administration in time range)   pantoprazole (PROTONIX) 40 mg IV push injection (40 mg Intravenous Given 8/14/19 2057)       Assessments & Plan (with Medical Decision Making)  79 year old female who has past medical history significant for hypertension, hyperlipidemia, paroxysmal atrial fibrillation, history of prior upper GI bleed in December, 2018, presenting to the emergency department with generalized fatigue, weakness, in addition to dark in color diarrhea, with 3 episodes during the day today.  Patient has had previous episodes of upper GI bleed, with hospitalization in December, 2018, with no obvious cause that was identified other than patient who had been on anticoagulants.  Her Eliquis was subsequently stopped.    Patient denies any chest pain, shortness of breath.  She arrives hemodynamically stable, with normal blood pressures, normal heart rate.  IV established, and labs drawn.  Patient with hemoglobin of 9.4, which is decreased compared to baseline.  She has now had multiple episodes of loose stool, black and then color, which is Hemoccult positive after exam performed with chaperone by ANEESH Oliveros.    Given patient's history of upper GI bleed, history of coronary artery disease, with other comorbid medical conditions, with no worsening diarrhea, concern is for possible ongoing bleeding, and patient will be  observed overnight, with trending of hemoglobin.  Patient with benign abdominal exam at this point.  She is on  Plavix, and aspirin.    Patient will be admitted to hospitalist service.  Has been given pantoprazole.         I have reviewed the nursing notes.    I have reviewed the findings, diagnosis, plan and need for follow up with the patient.       ED to Inpatient Handoff:    Discussed with PRAVEEN Lehman  Patient accepted for Observation Stay           Current Discharge Medication List          Final diagnoses:   Upper GI bleed   Coronary artery disease involving native heart without angina pectoris, unspecified vessel or lesion type   Paroxysmal atrial fibrillation (H)       8/14/2019   [facility] EMERGENCY DEPARTMENT     Mike Nguyễn MD  08/14/19 5368

## 2019-08-15 NOTE — DISCHARGE SUMMARY
[facility] Discharge Summary    Zee Mireles MRN# [id number]   Age: 79 year old YOB: 1940     Date of Admission:  8/14/2019  Date of Discharge::  8/15/2019  Admitting Physician:  Chester Andino MD  Discharge Physician:  Buck Jha MD  Primary Physician: Jessica Fitzgerald  Transferring Facility: N/A     Home clinic: unknown          Admission Diagnoses:   Paroxysmal atrial fibrillation (H) [I48.0]  Upper GI bleed [K92.2]  Coronary artery disease involving native heart without angina pectoris, unspecified vessel or lesion type [I25.10]          Discharge Diagnosis:   Principle diagnosis: UGI bleed from gastritis/ esophagitis likely  Secondary diagnoses:  Patient Active Problem List   Diagnosis     Paroxysmal atrial fibrillation (H)     Coronary artery disease due to calcified coronary lesion     COPD (chronic obstructive pulmonary disease) (H)     Dyslipidemia, goal LDL below 70     Essential hypertension     Upper GI bleed          Brief History of Presenting Illness:   As per admit hx  Yesterday morning she felt fatigued and noted a dark black stool. Today she had 3 more episodes of dark stools and continued fatigue. No lightheadedness, dizziness, nausea, vomiting ,chest pains or shortness of breath. She did have some very mild cramping with bowel movements, otherwise no abdominal pain. She does have heart burn and reflux symptoms at baseline with frequent belching and indigestion but does not currently take any medications for this as she was told to stop pantoprazole by her cardiologist. She is on aspirin and Plavix due to a cardiac stent in 11/2018. She does not take NSAIDs, drinks 1.5 cups of coffee per day and no alcohol intake. She denies any bright red blood in her stool.        No results found for this or any previous visit (from the past 24 hour(s)).     EGD 8/15-Normal examined duodenum.                        - Acute gastritis. Biopsied.                        - LA Grade A  reflux esophagitis. Biopsied         Hospital Course:   UGI BLEED  Presented with melena.1 episode of melena day prior to admission and 3 day of admission. Occult positive in the ED. Hgb 9.2. Recent baseline 11.3-11.5. Hemodynamically stable. History of GI bleed with admission to [place] 12/2018 and no clear source identified on EGD and colonoscopy she was on xarelto at that time which has been stopped but now on dual antiplatelet with aspirin and Plavix. Few episodes in March, capsule study in April reportedly negative.   Hg dropped to 8.0 on 8/14-s/p 1 unit blood. Hg stable today at 9.7.  EGD today as above. Has not had any more melena. Tolerating reg diet.  - continue home iron supplement/ start PPI. F/u bx results with PMD.       Elevated creatinine on CKD,  Creatinine 1.37, BUN 34, GFR 37. Baseline creatinine 1.18-1.3, GFR 40-45. Overall renal function very mildly worse though somewhat variable in the past year. May have a component of pre-renal.  Was on  IVF: NS at 100 ml/hr  Resolved to baseline.      Coronary Artery Disease  Chronic. History of NSTEMI with PCI to LAD 11/2018.  - continue home statin, beta blocker, ARB/ plavix.  - hold home aspirin x 3 days and if no more melena, could resume.     Paroxysmal Atrial Fibrillation  Rate managed with diltiazem. Not on Xarelto due to patient preference with GI bleed. CHADVASc 5. Discussion through [facility] for possible watchman device.  - continue home diltiazem, acebutolol     Peripheral Arterial disease  Chronic.  meds as above     Hypertension  Chronic. Blood pressures reviewed, stable.   - continue home hydrochlorothiazide, losartan     Hyperlipidemia  Chronic.  - continue home rosuvastatin     Hypothyroidism  Chronic.   - continue home levothyroxine          Procedures:   EGD as above         Allergies:      Allergies   Allergen Reactions     Atorvastatin Muscle Pain (Myalgia)     Was fine while taking simvastatin 20 mg daily.             Medications  Prior to Admission:     Medications Prior to Admission   Medication Sig Dispense Refill Last Dose     acebutolol (SECTRAL) 400 MG capsule Take 400 mg by mouth daily as needed (Palpitations) = extra dose in addition to scheduled daily dose   8/14/2019 at 0900     acetaminophen (TYLENOL) 500 MG tablet Take 1,000 mg by mouth every 6 hours as needed for mild pain   8/13/2019 at Unknown time     albuterol (PROAIR HFA/PROVENTIL HFA/VENTOLIN HFA) 108 (90 Base) MCG/ACT inhaler Inhale 2 puffs into the lungs every 4 hours as needed for shortness of breath / dyspnea or wheezing   Past Week at Unknown time     brimonidine (ALPHAGAN-P) 0.15 % ophthalmic solution Place 1 drop into both eyes 3 times daily   8/14/2019 at 1300     clopidogrel (PLAVIX) 75 MG tablet Take 1 tablet (75 mg) by mouth daily 30 tablet 0 8/14/2019 at 0900     diltiazem ER (DILT-XR) 120 MG 24 hr capsule Take 1 capsule (120 mg) by mouth daily 30 capsule 0 8/14/2019 at 0900     dorzolamide (TRUSOPT) 2 % ophthalmic solution Place 1 drop into both eyes 3 times daily   8/14/2019 at 1300     ferrous sulfate (FEROSUL) 325 (65 Fe) MG tablet Take 325 mg by mouth daily (with breakfast)   8/13/2019 at 0900     Glycopyrrolate-Formoterol (BEVESPI AEROSPHERE) 9-4.8 MCG/ACT oral inhaler Inhale 2 puffs into the lungs every evening   8/13/2019 at Unknown time     hydrochlorothiazide (HYDRODIURIL) 25 MG tablet Take 0.5 tablets (12.5 mg) by mouth daily 30 tablet 0 8/14/2019 at 0900     latanoprost (XALATAN) 0.005 % ophthalmic solution Place 1 drop into both eyes every evening    8/13/2019 at 1900     levothyroxine (SYNTHROID/LEVOTHROID) 125 MCG tablet Take 125 mcg by mouth daily   8/14/2019 at 0630     losartan (COZAAR) 100 MG tablet Take 1 tablet (100 mg) by mouth daily 30 tablet 0 8/14/2019 at Unknown time     rosuvastatin (CRESTOR) 5 MG tablet TK 1 T PO THREE TIMES A WEEK m/w/f  0 8/12/2019 at 2200             Discharge Medications:     Current Discharge Medication List       START taking these medications    Details   pantoprazole (PROTONIX) 40 MG EC tablet Take 1 tablet (40 mg) by mouth daily  Qty: 90 tablet, Refills: 1    Associated Diagnoses: Upper GI bleed         CONTINUE these medications which have CHANGED    Details   aspirin 81 MG EC tablet Take 1 tablet (81 mg) by mouth         CONTINUE these medications which have NOT CHANGED    Details   acebutolol (SECTRAL) 400 MG capsule Take 400 mg by mouth daily as needed (Palpitations) = extra dose in addition to scheduled daily dose      acetaminophen (TYLENOL) 500 MG tablet Take 1,000 mg by mouth every 6 hours as needed for mild pain      albuterol (PROAIR HFA/PROVENTIL HFA/VENTOLIN HFA) 108 (90 Base) MCG/ACT inhaler Inhale 2 puffs into the lungs every 4 hours as needed for shortness of breath / dyspnea or wheezing      brimonidine (ALPHAGAN-P) 0.15 % ophthalmic solution Place 1 drop into both eyes 3 times daily      clopidogrel (PLAVIX) 75 MG tablet Take 1 tablet (75 mg) by mouth daily  Qty: 30 tablet, Refills: 0    Comments: Future refills by PCP Dr. Jessica Fitzgerald with phone number [phone].  Associated Diagnoses: NSTEMI (non-ST elevated myocardial infarction) (H)      diltiazem ER (DILT-XR) 120 MG 24 hr capsule Take 1 capsule (120 mg) by mouth daily  Qty: 30 capsule, Refills: 0    Comments: Future refills by PCP Dr. Jessica Fitzgerald with phone number [phone].  Associated Diagnoses: Atrial fibrillation with RVR (H)      dorzolamide (TRUSOPT) 2 % ophthalmic solution Place 1 drop into both eyes 3 times daily      ferrous sulfate (FEROSUL) 325 (65 Fe) MG tablet Take 325 mg by mouth daily (with breakfast)      Glycopyrrolate-Formoterol (BEVESPI AEROSPHERE) 9-4.8 MCG/ACT oral inhaler Inhale 2 puffs into the lungs every evening      hydrochlorothiazide (HYDRODIURIL) 25 MG tablet Take 0.5 tablets (12.5 mg) by mouth daily  Qty: 30 tablet, Refills: 0    Associated Diagnoses: NSTEMI (non-ST elevated myocardial infarction) (H)     "  latanoprost (XALATAN) 0.005 % ophthalmic solution Place 1 drop into both eyes every evening       levothyroxine (SYNTHROID/LEVOTHROID) 125 MCG tablet Take 125 mcg by mouth daily      losartan (COZAAR) 100 MG tablet Take 1 tablet (100 mg) by mouth daily  Qty: 30 tablet, Refills: 0    Comments: Future refills by PCP Dr. Jessica Fitzgerald with phone number [phone].  Associated Diagnoses: NSTEMI (non-ST elevated myocardial infarction) (H)      rosuvastatin (CRESTOR) 5 MG tablet TK 1 T PO THREE TIMES A WEEK m/w/f  Refills: 0                   Consultations:   Consultation during this admission received from surgery            Discharge Exam:   Blood pressure (!) 147/65, pulse 77, temperature 98.2  F (36.8  C), temperature source Oral, resp. rate 20, height 1.6 m (5' 3\"), weight 59.8 kg (131 lb 13.4 oz), SpO2 99 %.  GENERAL APPEARANCE: healthy, alert and no distress  EYES: conjunctiva clear, eyes grossly normal  HENT: external ears and nose normal   NECK: supple, no masses or adenopathy  RESP: lungs clear to auscultation - no rales, rhonchi or wheezes  CV: regular rate and rhythm, normal S1 S2, no S3 or S4 and no murmur, click or rub   ABDOMEN: soft, nontender, no HSM or masses and bowel sounds normal  MS: no clubbing, cyanosis; no edema  SKIN: clear without significant rashes or lesions  NEURO: Normal strength and tone, sensory exam grossly normal, mentation intact and speech normal    Unresulted Labs Ordered in the Past 30 Days of this Admission     Date and Time Order Name Status Description    8/15/2019 1216 Surgical pathology exam In process           No results found for this or any previous visit (from the past 24 hour(s)).         Pending Tests at Discharge:   None         Discharge Instructions and Follow-Up:   Discharge diet: Regular   Discharge activity: Activity as tolerated   Discharge follow-up: Follow up with primary care provider in 1-2 weeks           Discharge Disposition:   Discharged to home    "   Attestation:  I have reviewed today's vital signs, notes, medications, labs and imaging.    Time Spent on this Encounter   I, Buck Jha, personally saw the patient today and spent greater than 30 minutes discharging this patient.    Buck Jha MD

## 2019-08-15 NOTE — PROGRESS NOTES
[facility] DISCHARGE NOTE    Patient discharged to home at 2:15 PM via wheel chair. Accompanied by daughter and staff. Discharge instructions reviewed with patient, opportunity offered to ask questions. Prescriptions sent to patients preferred pharmacy. All belongings sent with patient.    Gena Lamar

## 2019-08-15 NOTE — OR NURSING
Report received from Nakita Lamar RN. Patient settled in to room 21 in [facility].  Call light within reach.  Questions answered.  Patient alert and oriented.  Awaiting Dr Clayton.

## 2019-08-15 NOTE — CONSULTS
"Surgical Consultation/History and Physical  [facility] General Surgery    Zee is seen in consultation for melena, at the request of hospitalist service.    Chief Complaint:  Melena    History of Present Illness: Zee Mireles is a 79 year old female presents with melena.  Patient states over last 36 hours has had dark, tarry stools.  She has had this happen in past with upper and lower endoscopy in 1/2019 which were negative followed by capsule study which was also negative.  Patient has been off Xarelto since that time however takes ASA and Plavix for AGATHA placed 11/2018.  Patient denies any abdominal pain, nausea, vomiting, hematemesis, chest pain, shortness of breath, fevers, chills, or other changes in bowel habits.  She denies any use of NSAIDs other than ASA for her stent.    Patient Active Problem List   Diagnosis     Paroxysmal atrial fibrillation (H)     Coronary artery disease due to calcified coronary lesion     COPD (chronic obstructive pulmonary disease) (H)     Dyslipidemia, goal LDL below 70     Essential hypertension     Upper GI bleed       Past Medical History:   Diagnosis Date     Chest pain 11/2018    burning sensation - indigestion     COPD (chronic obstructive pulmonary disease) (H)      Glaucoma      HTN (hypertension)      Hyperlipidemia      Hypothyroid      Paroxysmal atrial fibrillation (H) 11/2018     Smoker     ongoing - \"6-8 cigarettes QD\"       Past Surgical History:   Procedure Laterality Date     cataract removal       COLONOSCOPY  12/2018    [facility]     CV BALLOON DILATION AND/OR STENT PLACEMENT OF VESSEL  11/2018    stent to LAD     ESOPHAGOSCOPY, GASTROSCOPY, DUODENOSCOPY (EGD), COMBINED  12/2018    [facility]       Family History   Problem Relation Age of Onset     Sudden Death Mother      Goiter Mother      Stomach Cancer Father      No Known Problems Brother        Social History     Tobacco Use     Smoking status: Current Every Day Smoker     Types: Cigarettes     " "Smokeless tobacco: Never Used     Tobacco comment: Trying to quit 7 cigs daily   Substance Use Topics     Alcohol use: Not on file        History   Drug Use Not on file       No current outpatient medications on file.       Allergies   Allergen Reactions     Atorvastatin Muscle Pain (Myalgia)     Was fine while taking simvastatin 20 mg daily.       Review of Systems:   10 point ROS otherwise negative    Physical Exam:  BP (!) 143/58   Pulse 83   Temp 97.2  F (36.2  C) (Oral)   Resp 18   Ht 1.6 m (5' 3\")   Wt 59.8 kg (131 lb 13.4 oz)   SpO2 97%   BMI 23.35 kg/m      Constitutional- No acute distress, well nourished, non-toxic, age appropriate  Eyes: Anicteric, no injection.  PERRL  ENT:  Normocephalic, atraumatic, Nose midline, moist mucus membranes  Neck - supple, no LAD  Respiratory- Clear to auscultation bilaterally, good inspiratory effort  Cardiovascular - Irregularly irregular, no lift's, thrills, murmurs, rubs, or gallop.  No peripheral edema.  No clubbing.  Abdomen - Soft, non-tender, +BS, no hepatosplenomegaly, no palpable masses  Neuro - No focal neuro deficits, Alert and oriented x 3  Psych: Appropriate mood and affect  Musculoskeletal: Normal gait, symmetric strength.  FROM upper and lower extremities.  Skin: Warm, Dry    Assessment:  1. Melena  2. Acute blood loss anemia      Plan:   Ms. Mireles presents with a 36 hour history of melena in setting of negative work-up within the past 8 months including capsule endoscopy.  I discussed she is at risk of gastritis/PUD or perhaps a dieulafoy's lesion within her stomach and upper endoscopy would rule out active bleeding source.  She is hemodynamically stable, however was transfused 1U PRBC for hemoglobin of 8 which has responded appropriately. She has been on PPI and ASA/Plavix has been held at this point.  I discussed the indications, risks, benefits, alternatives to upper endoscopy under sedation with the patient and she wishes to proceed.  Risks " discussed include but are not limited to: bleeding, perforation, missed lesion, non therapeutic procedure, and cardiopulmonary complications associated with sedation.  She expressed understanding.           Demario Clayton,  on 8/15/2019 at 8:02 AM

## 2019-08-15 NOTE — PLAN OF CARE
A&Ox4, VSS on RA. Tele-SR, Denies pain. No Bms overnight. Hbg down to 8.0, transfused 1 unit PRBCs & tolerated well, re-check this am. NPO since 0000 for possible scope-surgical consult pending. SBA, voiding adequate amounts.

## 2019-08-15 NOTE — ANESTHESIA PREPROCEDURE EVALUATION
"Anesthesia Pre-Procedure Evaluation    Patient: Zee Mireles   MRN: [id number] : 1940          Preoperative Diagnosis: melena    Procedure(s):  ESOPHAGOGASTRODUODENOSCOPY (EGD)    Past Medical History:   Diagnosis Date     Chest pain 2018    burning sensation - indigestion     COPD (chronic obstructive pulmonary disease) (H)      Glaucoma      HTN (hypertension)      Hyperlipidemia      Hypothyroid      Paroxysmal atrial fibrillation (H) 2018     Smoker     ongoing - \"6-8 cigarettes QD\"     Past Surgical History:   Procedure Laterality Date     cataract removal       COLONOSCOPY  2018    [facility]     CV BALLOON DILATION AND/OR STENT PLACEMENT OF VESSEL  2018    stent to LAD     ESOPHAGOSCOPY, GASTROSCOPY, DUODENOSCOPY (EGD), COMBINED  2018    [facility]       Anesthesia Evaluation     . Pt has had prior anesthetic. Type: General and MAC    No history of anesthetic complications          ROS/MED HX    ENT/Pulmonary:     (+)tobacco use, Current use COPD, , . .    Neurologic:  - neg neurologic ROS     Cardiovascular:     (+) Dyslipidemia, hypertension--CAD, angina--stent,. : . . . :. dysrhythmias a-fib, . Previous cardiac testing Echodate:18results:Interpretation Summary     The visual ejection fraction is estimated at 65-70%.  Normal left ventricular wall motion  There is mild concentric left ventricular hypertrophy.  The right ventricle is normal in size and function.  Normal left atrial size by volume estimate (at upper limits normal) but  appears mildly enlarged by 2D evaluation and relative to LV size..  Right atrial size is normal.  Sinus rhythm was noted.  No hemodynamically significant valvular abnormalities on 2D or color flow  imaging.  _____________________________________________________________________________  __        Left Ventricle  The left ventricle is normal in size. The left ventricular cavity is small.  There is mild concentric left ventricular hypertrophy. The " visual ejection  fraction is estimated at 65-70%. Left ventricular diastolic function is  indeterminate. Diastolic Doppler findings (E/E' ratio and/or other parameters)  suggest left ventricular filling pressures are increased. Normal left  ventricular wall motion.     Right Ventricle  The right ventricle is normal in size and function.     Atria  Normal left atrial size. Right atrial size is normal. There is no color  Doppler evidence of an atrial shunt.     Mitral Valve  There is mild mitral annular calcification. The mitral valve leaflets are  mildly thickened. There is trace mitral regurgitation. There is no mitral  valve stenosis.        Tricuspid Valve  The tricuspid valve is not well visualized. There is trace tricuspid  regurgitation. The right ventricular systolic pressure is approximated at 24.5  mmHg plus the right atrial pressure. Normal IVC (1.5-2.5cm) with >50%  respiratory collapse; right atrial pressure is estimated at 5-10mmHg.     Aortic Valve  The aortic valve is trileaflet with aortic valve sclerosis. There is trace  aortic regurgitation. No aortic stenosis is present.     Pulmonic Valve  The pulmonic valve is not well visualized. There is no pulmonic valvular  regurgitation.     Vessels  The aortic root is normal size. Normal size ascending aorta. The IVC is normal  in size and reactivity with respiration, suggesting normal central venous  pressure.     Pericardium  There is no pericardial effusion.        Rhythm  Sinus rhythm was noted.date: results:ECG reviewed date:8/14/19 results:Sinus Rhythm   WITHIN NORMAL LIMITS date: results:          METS/Exercise Tolerance:  >4 METS   Hematologic:     (+) History of Transfusion -      Musculoskeletal:  - neg musculoskeletal ROS       GI/Hepatic:     (+) Other GI/Hepatic upper GI bleed      Renal/Genitourinary:  - ROS Renal section negative       Endo:     (+) thyroid problem hypothyroidism, .      Psychiatric:  - neg psychiatric ROS       Infectious  "Disease:  - neg infectious disease ROS       Malignancy:      - no malignancy   Other: Comment: Glaucoma                         Physical Exam  Normal systems: cardiovascular, pulmonary and dental    Airway   Mallampati: II  TM distance: >3 FB  Neck ROM: full    Dental     Cardiovascular   Rhythm and rate: regular and normal      Pulmonary    breath sounds clear to auscultation            Lab Results   Component Value Date    WBC 7.3 08/15/2019    HGB 9.7 (L) 08/15/2019    HCT 30.5 (L) 08/15/2019     08/15/2019     08/15/2019    POTASSIUM 3.9 08/15/2019    CHLORIDE 111 (H) 08/15/2019    CO2 25 08/15/2019    BUN 26 08/15/2019    CR 1.11 (H) 08/15/2019    GLC 90 08/15/2019    KARI 7.7 (L) 08/15/2019    ALBUMIN 3.6 08/14/2019    PROTTOTAL 7.3 08/14/2019    ALT 13 08/14/2019    AST 11 08/14/2019    ALKPHOS 67 08/14/2019    BILITOTAL 0.3 08/14/2019    LIPASE 190 08/14/2019    INR 1.06 08/14/2019    TSH 8.11 (H) 11/26/2018    T4 1.23 11/26/2018       Preop Vitals  BP Readings from Last 3 Encounters:   08/15/19 (!) 143/58   01/28/19 150/64   12/03/18 105/51    Pulse Readings from Last 3 Encounters:   08/15/19 83   01/28/19 73   12/03/18 57      Resp Readings from Last 3 Encounters:   08/15/19 18   11/30/18 18   11/26/18 23    SpO2 Readings from Last 3 Encounters:   08/15/19 97%   01/28/19 100%   12/03/18 99%      Temp Readings from Last 1 Encounters:   08/15/19 36.2  C (97.2  F) (Oral)    Ht Readings from Last 1 Encounters:   08/14/19 1.6 m (5' 3\")      Wt Readings from Last 1 Encounters:   08/14/19 59.8 kg (131 lb 13.4 oz)    Estimated body mass index is 23.35 kg/m  as calculated from the following:    Height as of this encounter: 1.6 m (5' 3\").    Weight as of this encounter: 59.8 kg (131 lb 13.4 oz).       Anesthesia Plan      History & Physical Review  History and physical reviewed and following examination; no interval change.    ASA Status:  3 emergent.    NPO Status:  > 8 hours    Plan for MAC Reason for " MAC:  Procedure to face, neck, head or breast and Chronic cardiopulmonary disease (G9)         Postoperative Care      Consents  Anesthetic plan, risks, benefits and alternatives discussed with:  Patient..                 Vandana Romano APRN CRNA

## 2019-08-15 NOTE — H&P
[facility] Hospitalist Discharge Summary    Zee Mireles MRN# [id number]   Age: 79 year old YOB: 1940     Date of Admission:  8/14/2019  Date of Discharge::  8/15/2019  Admitting Physician:  Chester Andino MD  Discharge Physician:  Buck Jha MD  Primary Physician: Jessica Fitzgerald  Transferring Facility: N/A     Home clinic: unknown          Admission Diagnoses:   Paroxysmal atrial fibrillation (H) [I48.0]  Upper GI bleed [K92.2]  Coronary artery disease involving native heart without angina pectoris, unspecified vessel or lesion type [I25.10]          Discharge Diagnosis:   Principle diagnosis: UGI bleed from gastritis/ esophagitis likely  Secondary diagnoses:  Patient Active Problem List   Diagnosis     Paroxysmal atrial fibrillation (H)     Coronary artery disease due to calcified coronary lesion     COPD (chronic obstructive pulmonary disease) (H)     Dyslipidemia, goal LDL below 70     Essential hypertension     Upper GI bleed          Brief History of Presenting Illness:   As per admit hx  Yesterday morning she felt fatigued and noted a dark black stool. Today she had 3 more episodes of dark stools and continued fatigue. No lightheadedness, dizziness, nausea, vomiting ,chest pains or shortness of breath. She did have some very mild cramping with bowel movements, otherwise no abdominal pain. She does have heart burn and reflux symptoms at baseline with frequent belching and indigestion but does not currently take any medications for this as she was told to stop pantoprazole by her cardiologist. She is on aspirin and Plavix due to a cardiac stent in 11/2018. She does not take NSAIDs, drinks 1.5 cups of coffee per day and no alcohol intake. She denies any bright red blood in her stool.        No results found for this or any previous visit (from the past 24 hour(s)).     EGD 8/15-Normal examined duodenum.                        - Acute gastritis. Biopsied.                        - LA Grade A  reflux esophagitis. Biopsied         Hospital Course:   UGI BLEED  Presented with melena.1 episode of melena day prior to admission and 3 day of admission. Occult positive in the ED. Hgb 9.2. Recent baseline 11.3-11.5. Hemodynamically stable. History of GI bleed with admission to [place] 12/2018 and no clear source identified on EGD and colonoscopy she was on xarelto at that time which has been stopped but now on dual antiplatelet with aspirin and Plavix. Few episodes in March, capsule study in April reportedly negative.   Hg dropped to 8.0 on 8/14-s/p 1 unit blood. Hg stable today at 9.7.  EGD today as above. Has not had any more melena. Tolerating reg diet.  - continue home iron supplement/ start PPI       Elevated creatinine on CKD,  Creatinine 1.37, BUN 34, GFR 37. Baseline creatinine 1.18-1.3, GFR 40-45. Overall renal function very mildly worse though somewhat variable in the past year. May have a component of pre-renal.  Was on  IVF: NS at 100 ml/hr  Resolved to baseline.      Coronary Artery Disease  Chronic. History of NSTEMI with PCI to LAD 11/2018.  - continue home statin, beta blocker, ARB/ plavix.  - hold home aspirin x 3 days and if no more melena, could resume.     Paroxysmal Atrial Fibrillation  Rate managed with diltiazem. Not on Xarelto due to patient preference with GI bleed. CHADVASc 5. Discussion through [facility] for possible watchman device.  - continue home diltiazem, acebutolol     Peripheral Arterial disease  Chronic.  meds as above     Hypertension  Chronic. Blood pressures reviewed, stable.   - continue home hydrochlorothiazide, losartan     Hyperlipidemia  Chronic.  - continue home rosuvastatin     Hypothyroidism  Chronic.   - continue home levothyroxine          Procedures:   EGD as above         Allergies:      Allergies   Allergen Reactions     Atorvastatin Muscle Pain (Myalgia)     Was fine while taking simvastatin 20 mg daily.             Medications Prior to Admission:      Medications Prior to Admission   Medication Sig Dispense Refill Last Dose     acebutolol (SECTRAL) 400 MG capsule Take 400 mg by mouth daily as needed (Palpitations) = extra dose in addition to scheduled daily dose   8/14/2019 at 0900     acetaminophen (TYLENOL) 500 MG tablet Take 1,000 mg by mouth every 6 hours as needed for mild pain   8/13/2019 at Unknown time     albuterol (PROAIR HFA/PROVENTIL HFA/VENTOLIN HFA) 108 (90 Base) MCG/ACT inhaler Inhale 2 puffs into the lungs every 4 hours as needed for shortness of breath / dyspnea or wheezing   Past Week at Unknown time     brimonidine (ALPHAGAN-P) 0.15 % ophthalmic solution Place 1 drop into both eyes 3 times daily   8/14/2019 at 1300     clopidogrel (PLAVIX) 75 MG tablet Take 1 tablet (75 mg) by mouth daily 30 tablet 0 8/14/2019 at 0900     diltiazem ER (DILT-XR) 120 MG 24 hr capsule Take 1 capsule (120 mg) by mouth daily 30 capsule 0 8/14/2019 at 0900     dorzolamide (TRUSOPT) 2 % ophthalmic solution Place 1 drop into both eyes 3 times daily   8/14/2019 at 1300     ferrous sulfate (FEROSUL) 325 (65 Fe) MG tablet Take 325 mg by mouth daily (with breakfast)   8/13/2019 at 0900     Glycopyrrolate-Formoterol (BEVESPI AEROSPHERE) 9-4.8 MCG/ACT oral inhaler Inhale 2 puffs into the lungs every evening   8/13/2019 at Unknown time     hydrochlorothiazide (HYDRODIURIL) 25 MG tablet Take 0.5 tablets (12.5 mg) by mouth daily 30 tablet 0 8/14/2019 at 0900     latanoprost (XALATAN) 0.005 % ophthalmic solution Place 1 drop into both eyes every evening    8/13/2019 at 1900     levothyroxine (SYNTHROID/LEVOTHROID) 125 MCG tablet Take 125 mcg by mouth daily   8/14/2019 at 0630     losartan (COZAAR) 100 MG tablet Take 1 tablet (100 mg) by mouth daily 30 tablet 0 8/14/2019 at Unknown time     rosuvastatin (CRESTOR) 5 MG tablet TK 1 T PO THREE TIMES A WEEK m/w/f  0 8/12/2019 at 2200             Discharge Medications:     Current Discharge Medication List      START taking these  medications    Details   pantoprazole (PROTONIX) 40 MG EC tablet Take 1 tablet (40 mg) by mouth daily  Qty: 90 tablet, Refills: 1    Associated Diagnoses: Upper GI bleed         CONTINUE these medications which have CHANGED    Details   aspirin 81 MG EC tablet Take 1 tablet (81 mg) by mouth         CONTINUE these medications which have NOT CHANGED    Details   acebutolol (SECTRAL) 400 MG capsule Take 400 mg by mouth daily as needed (Palpitations) = extra dose in addition to scheduled daily dose      acetaminophen (TYLENOL) 500 MG tablet Take 1,000 mg by mouth every 6 hours as needed for mild pain      albuterol (PROAIR HFA/PROVENTIL HFA/VENTOLIN HFA) 108 (90 Base) MCG/ACT inhaler Inhale 2 puffs into the lungs every 4 hours as needed for shortness of breath / dyspnea or wheezing      brimonidine (ALPHAGAN-P) 0.15 % ophthalmic solution Place 1 drop into both eyes 3 times daily      clopidogrel (PLAVIX) 75 MG tablet Take 1 tablet (75 mg) by mouth daily  Qty: 30 tablet, Refills: 0    Comments: Future refills by PCP Dr. Jessica Fitzgerald with phone number [phone].  Associated Diagnoses: NSTEMI (non-ST elevated myocardial infarction) (H)      diltiazem ER (DILT-XR) 120 MG 24 hr capsule Take 1 capsule (120 mg) by mouth daily  Qty: 30 capsule, Refills: 0    Comments: Future refills by PCP Dr. Jessica Fitzgerald with phone number [phone].  Associated Diagnoses: Atrial fibrillation with RVR (H)      dorzolamide (TRUSOPT) 2 % ophthalmic solution Place 1 drop into both eyes 3 times daily      ferrous sulfate (FEROSUL) 325 (65 Fe) MG tablet Take 325 mg by mouth daily (with breakfast)      Glycopyrrolate-Formoterol (BEVESPI AEROSPHERE) 9-4.8 MCG/ACT oral inhaler Inhale 2 puffs into the lungs every evening      hydrochlorothiazide (HYDRODIURIL) 25 MG tablet Take 0.5 tablets (12.5 mg) by mouth daily  Qty: 30 tablet, Refills: 0    Associated Diagnoses: NSTEMI (non-ST elevated myocardial infarction) (H)      latanoprost (XALATAN)  "0.005 % ophthalmic solution Place 1 drop into both eyes every evening       levothyroxine (SYNTHROID/LEVOTHROID) 125 MCG tablet Take 125 mcg by mouth daily      losartan (COZAAR) 100 MG tablet Take 1 tablet (100 mg) by mouth daily  Qty: 30 tablet, Refills: 0    Comments: Future refills by PCP Dr. Jessica Fitzgerald with phone number [phone].  Associated Diagnoses: NSTEMI (non-ST elevated myocardial infarction) (H)      rosuvastatin (CRESTOR) 5 MG tablet TK 1 T PO THREE TIMES A WEEK m/w/f  Refills: 0                   Consultations:   Consultation during this admission received from surgery            Discharge Exam:   Blood pressure (!) 147/65, pulse 77, temperature 98.2  F (36.8  C), temperature source Oral, resp. rate 20, height 1.6 m (5' 3\"), weight 59.8 kg (131 lb 13.4 oz), SpO2 99 %.  GENERAL APPEARANCE: healthy, alert and no distress  EYES: conjunctiva clear, eyes grossly normal  HENT: external ears and nose normal   NECK: supple, no masses or adenopathy  RESP: lungs clear to auscultation - no rales, rhonchi or wheezes  CV: regular rate and rhythm, normal S1 S2, no S3 or S4 and no murmur, click or rub   ABDOMEN: soft, nontender, no HSM or masses and bowel sounds normal  MS: no clubbing, cyanosis; no edema  SKIN: clear without significant rashes or lesions  NEURO: Normal strength and tone, sensory exam grossly normal, mentation intact and speech normal    Unresulted Labs Ordered in the Past 30 Days of this Admission     Date and Time Order Name Status Description    8/15/2019 1216 Surgical pathology exam In process           No results found for this or any previous visit (from the past 24 hour(s)).         Pending Tests at Discharge:   None         Discharge Instructions and Follow-Up:   Discharge diet: Regular   Discharge activity: Activity as tolerated   Discharge follow-up: Follow up with primary care provider in 1-2 weeks           Discharge Disposition:   Discharged to home      Attestation:  I have " reviewed today's vital signs, notes, medications, labs and imaging.    Time Spent on this Encounter   IBuck, personally saw the patient today and spent greater than 30 minutes discharging this patient.    Buck Jha MD

## 2019-08-19 ENCOUNTER — OFFICE VISIT - HEALTHEAST (OUTPATIENT)
Dept: PULMONOLOGY | Facility: OTHER | Age: 79
End: 2019-08-19

## 2019-08-19 DIAGNOSIS — J41.0 SIMPLE CHRONIC BRONCHITIS (H): ICD-10-CM

## 2019-08-20 LAB — COPATH REPORT: NORMAL

## 2019-08-21 ENCOUNTER — RECORDS - HEALTHEAST (OUTPATIENT)
Dept: LAB | Facility: CLINIC | Age: 79
End: 2019-08-21

## 2019-08-21 LAB
ANION GAP SERPL CALCULATED.3IONS-SCNC: 9 MMOL/L (ref 5–18)
BUN SERPL-MCNC: 18 MG/DL (ref 8–28)
CALCIUM SERPL-MCNC: 9.1 MG/DL (ref 8.5–10.5)
CHLORIDE BLD-SCNC: 100 MMOL/L (ref 98–107)
CO2 SERPL-SCNC: 26 MMOL/L (ref 22–31)
CREAT SERPL-MCNC: 1.17 MG/DL (ref 0.6–1.1)
GFR SERPL CREATININE-BSD FRML MDRD: 45 ML/MIN/1.73M2
GLUCOSE BLD-MCNC: 101 MG/DL (ref 70–125)
POTASSIUM BLD-SCNC: 4.4 MMOL/L (ref 3.5–5)
SODIUM SERPL-SCNC: 135 MMOL/L (ref 136–145)

## 2019-08-26 ENCOUNTER — OFFICE VISIT - HEALTHEAST (OUTPATIENT)
Dept: VASCULAR SURGERY | Facility: CLINIC | Age: 79
End: 2019-08-26

## 2019-08-26 DIAGNOSIS — H34.9 RETINAL VESSEL OCCLUSION: ICD-10-CM

## 2019-08-26 DIAGNOSIS — I65.21 CAROTID STENOSIS, RIGHT: ICD-10-CM

## 2019-09-10 DIAGNOSIS — D64.9 ANEMIA: Primary | ICD-10-CM

## 2019-09-10 DIAGNOSIS — Z87.19 H/O: GI BLEED: ICD-10-CM

## 2019-09-10 LAB — HGB BLD-MCNC: 10.2 G/DL (ref 11.7–15.7)

## 2019-09-10 PROCEDURE — 85018 HEMOGLOBIN: CPT | Performed by: FAMILY MEDICINE

## 2019-09-10 PROCEDURE — 36415 COLL VENOUS BLD VENIPUNCTURE: CPT | Performed by: FAMILY MEDICINE

## 2019-09-16 ENCOUNTER — MEDICAL CORRESPONDENCE (OUTPATIENT)
Dept: HEALTH INFORMATION MANAGEMENT | Facility: CLINIC | Age: 79
End: 2019-09-16

## 2019-09-16 DIAGNOSIS — Z87.19 HISTORY OF GI BLEED: ICD-10-CM

## 2019-09-16 DIAGNOSIS — D62 ANEMIA DUE TO BLOOD LOSS, ACUTE: Primary | ICD-10-CM

## 2019-09-26 DIAGNOSIS — D62 ANEMIA DUE TO BLOOD LOSS, ACUTE: ICD-10-CM

## 2019-09-26 DIAGNOSIS — Z87.19 HISTORY OF GI BLEED: ICD-10-CM

## 2019-09-26 LAB — HGB BLD-MCNC: 8.8 G/DL (ref 11.7–15.7)

## 2019-09-26 PROCEDURE — 85018 HEMOGLOBIN: CPT | Performed by: FAMILY MEDICINE

## 2019-09-26 PROCEDURE — 36415 COLL VENOUS BLD VENIPUNCTURE: CPT | Performed by: FAMILY MEDICINE

## 2019-10-04 DIAGNOSIS — K92.2 GI BLEED: Primary | ICD-10-CM

## 2019-10-04 LAB — HGB BLD-MCNC: 9.6 G/DL (ref 11.7–15.7)

## 2019-10-04 PROCEDURE — 36415 COLL VENOUS BLD VENIPUNCTURE: CPT | Performed by: FAMILY MEDICINE

## 2019-10-04 PROCEDURE — 85018 HEMOGLOBIN: CPT | Performed by: FAMILY MEDICINE

## 2019-10-07 ENCOUNTER — OFFICE VISIT (OUTPATIENT)
Dept: CARDIOLOGY | Facility: CLINIC | Age: 79
End: 2019-10-07
Attending: INTERNAL MEDICINE
Payer: COMMERCIAL

## 2019-10-07 VITALS
WEIGHT: 130 LBS | BODY MASS INDEX: 23.03 KG/M2 | SYSTOLIC BLOOD PRESSURE: 158 MMHG | DIASTOLIC BLOOD PRESSURE: 59 MMHG | HEART RATE: 64 BPM | OXYGEN SATURATION: 100 %

## 2019-10-07 DIAGNOSIS — I10 ESSENTIAL HYPERTENSION: ICD-10-CM

## 2019-10-07 DIAGNOSIS — I48.0 PAROXYSMAL ATRIAL FIBRILLATION (H): ICD-10-CM

## 2019-10-07 DIAGNOSIS — E78.5 HYPERLIPIDEMIA LDL GOAL <70: ICD-10-CM

## 2019-10-07 DIAGNOSIS — I73.9 PAD (PERIPHERAL ARTERY DISEASE) (H): ICD-10-CM

## 2019-10-07 DIAGNOSIS — I25.10 CORONARY ARTERY DISEASE INVOLVING NATIVE CORONARY ARTERY OF NATIVE HEART WITHOUT ANGINA PECTORIS: Primary | ICD-10-CM

## 2019-10-07 PROCEDURE — 99214 OFFICE O/P EST MOD 30 MIN: CPT | Performed by: NURSE PRACTITIONER

## 2019-10-07 RX ORDER — AMLODIPINE BESYLATE 2.5 MG/1
2.5 TABLET ORAL DAILY
Qty: 30 TABLET | Refills: 11 | Status: ON HOLD | OUTPATIENT
Start: 2019-10-07 | End: 2022-12-04

## 2019-10-07 RX ORDER — CLOPIDOGREL BISULFATE 75 MG/1
75 TABLET ORAL DAILY
Qty: 30 TABLET | Refills: 0
Start: 2019-10-07 | End: 2021-11-22

## 2019-10-07 NOTE — PROGRESS NOTES
Cardiology Clinic Progress Note  Zee Mireles MRN# [id number]   YOB: 1940 Age: 79 year old      Reason For Visit: 6 month follow-up   Primary Cardiologist:   Dr. Villanueva          History of Presenting Illness:      Zee Mireles is a pleasant 79 year old patient with a past cardiac history significant for CAD, paroxysmal atrial fibrillation, PAD, carotid artery disease, hyperlipidemia, hypertension.  Past medical history is significant for GI bleed on anticoagulation, COPD, mild renal insufficiency, and ongoing tobacco use.     She was hospitalized in November 2018 with chest discomfort and found to be in A. Fib RVR with elevated troponin. She spontaneously converted to sinus rhythm.  She had abnormal stress test.  Coronary angiogram with LAD stent with residual 60% mid RCA stenosis. She was started on anticoagulation.     Unfortunately, a month later, she had admission for GI bleed with hemoglobin as low as 8.2 requiring transfusion.  Her Xarelto was discontinued and started on Plavix with her aspirin. Since she had been hospitalized with [facility], she saw their cardiologist who recommended follow-up to discuss watchman procedure.    Pt was last seen by Dr. Villanueva in January 2019.  She denied any further bleeding difficulty.  She was previously noted to have peripheral artery disease and history of stenting on the right side (details are unknown greater than 10 years ago).  She denied any claudication and was able to perform daily activities without difficulty.  ABIs showed moderate arterial insufficiency bilaterally. Given that she was asymptomatic and did not have any open wounds or sores, he recommended a walking program and smoking cessation.     She was seen by Dr. Pulliam in August 2019.  He did not feel that she needed carotid endarterectomy at this time. She followed-up with pulmonologist in August 2019 and no changes were made.  She presented to the ED in August again with melena. She  was found to have gastritis/esophagitis with biopsies taken.  Aspirin was decreased to 81 mg daily and  started on Protonix.    Pt presents today for six month follow-up. She recently had an echocardiogram through [facility]. Unfortunately, I cannot find any results of this.  Blood pressure today is elevated and she reports home blood pressures of 138-140 systolic.  She is agreeable to starting amlodipine.  She prefers to call if blood pressures remain elevated rather than coming back for revisit.  She denies any anginal symptoms.  She was told to wait to have her watchman procedure done but has not followed back up with [facility] cardiology yet.  She feels occasional palpitations but does not believe she has had any significant episodes of atrial fibrillation.  She has not had any further bleeding since her most recent hospitalization in August.  Hemoglobin last week was 9.6.  She has followed with her PCP and GI. Patient reports no chest pain, shortness of breath, PND, orthopnea, presyncope, syncope, edema, heart racing.    Unfortunately, she has her PCP through [facility] and 2 different cardiology groups currently, which makes it more difficult to obtain all of her information.  At this time, she has not considered moving to a single cardiology group.      Current Cardiac Medications   Aspirin 81 mg daily  Plavix 75 mg daily  Diltiazem  mg daily  hydrochlorothiazide 12.5 mg daily  Losartan 100 mg daily  Rosuvastatin 5 mg three times weekly                   Assessment and Plan:       Plan  1.  Start amlodipine 2.5 mg daily for hypertension  2.  Call if blood pressures remain elevated  3.  Follow-up with Dr. Villanueva in six months  4.  Call in you want to set up watchman consultation with us  5.  May discontinue Plavix after November 2019 and continue aspirin lifelong      1. CAD     AGATHA to LAD with asymptomatic jailed diagonal and residual moderate disease    No angina      continue statin, aspirin, ARB,  CCB    Continue DAPT, uninterrupted, for at least one year (11/2019)      2. hypertension    Not well Controlled    Continue diltiazem, hydrochlorothiazide, losartan, start amlodipine       3. hyperlipidemia     last LDL 49 on 11/2018    Continue rosuvastatin 5 mg three times weekly      4. Paroxysmal atrial fibrillation    Onset 11/2018 heart racing     GI bleed with anticoagulation    Considering watchman with [facility]     Continue diltiazem for rate control      5. PAD    H/o of right sided stenting more than 10 years ago    Moderate arterial insufficiency on ABIs but patient asymptomatic and no sores or wounds    Home walking program     Carotid artery disease     Following with vascular surgery    Continue statin, ASA          Thank you for allowing me to participate in this delightful patient's care.      This note was completed in part using Dragon voice recognition software. Although reviewed after completion, some word and grammatical errors may occur.    Bebe Goetz, APRN CNP, APRN, CNP           Data:   All laboratory data reviewed        HPI and Plan:   See dictation    Orders Placed This Encounter   Procedures     Follow-Up with Cardiologist       Orders Placed This Encounter   Medications     amLODIPine (NORVASC) 2.5 MG tablet     Sig: Take 1 tablet (2.5 mg) by mouth daily     Dispense:  30 tablet     Refill:  11     clopidogrel (PLAVIX) 75 MG tablet     Sig: Take 1 tablet (75 mg) by mouth daily May discontinue after Nov 2019     Dispense:  30 tablet     Refill:  0       Medications Discontinued During This Encounter   Medication Reason     clopidogrel (PLAVIX) 75 MG tablet          Encounter Diagnoses   Name Primary?     Essential hypertension      Paroxysmal atrial fibrillation (H)      Hyperlipidemia LDL goal <70      Coronary artery disease involving native coronary artery of native heart without angina pectoris Yes     PAD (peripheral artery disease) (H)        CURRENT  MEDICATIONS:  Current Outpatient Medications   Medication Sig Dispense Refill     acebutolol (SECTRAL) 400 MG capsule Take 400 mg by mouth daily as needed (Palpitations) = extra dose in addition to scheduled daily dose       acetaminophen (TYLENOL) 500 MG tablet Take 1,000 mg by mouth every 6 hours as needed for mild pain       albuterol (PROAIR HFA/PROVENTIL HFA/VENTOLIN HFA) 108 (90 Base) MCG/ACT inhaler Inhale 2 puffs into the lungs every 4 hours as needed for shortness of breath / dyspnea or wheezing       amLODIPine (NORVASC) 2.5 MG tablet Take 1 tablet (2.5 mg) by mouth daily 30 tablet 11     aspirin 81 MG EC tablet Take 1 tablet (81 mg) by mouth       brimonidine (ALPHAGAN-P) 0.15 % ophthalmic solution Place 1 drop into both eyes 3 times daily       clopidogrel (PLAVIX) 75 MG tablet Take 1 tablet (75 mg) by mouth daily May discontinue after Nov 2019 30 tablet 0     diltiazem ER (DILT-XR) 120 MG 24 hr capsule Take 1 capsule (120 mg) by mouth daily 30 capsule 0     dorzolamide (TRUSOPT) 2 % ophthalmic solution Place 1 drop into both eyes 3 times daily       ferrous sulfate (FEROSUL) 325 (65 Fe) MG tablet Take 325 mg by mouth daily (with breakfast)       Glycopyrrolate-Formoterol (BEVESPI AEROSPHERE) 9-4.8 MCG/ACT oral inhaler Inhale 2 puffs into the lungs every evening       hydrochlorothiazide (HYDRODIURIL) 25 MG tablet Take 0.5 tablets (12.5 mg) by mouth daily 30 tablet 0     latanoprost (XALATAN) 0.005 % ophthalmic solution Place 1 drop into both eyes every evening        levothyroxine (SYNTHROID/LEVOTHROID) 125 MCG tablet Take 125 mcg by mouth daily       losartan (COZAAR) 100 MG tablet Take 1 tablet (100 mg) by mouth daily 30 tablet 0     pantoprazole (PROTONIX) 40 MG EC tablet Take 1 tablet (40 mg) by mouth daily 90 tablet 1     rosuvastatin (CRESTOR) 5 MG tablet TK 1 T PO THREE TIMES A WEEK m/w/f  0       ALLERGIES     Allergies   Allergen Reactions     Atorvastatin Muscle Pain (Myalgia)     Was fine  "while taking simvastatin 20 mg daily.       PAST MEDICAL HISTORY:  Past Medical History:   Diagnosis Date     Chest pain 11/2018    burning sensation - indigestion     COPD (chronic obstructive pulmonary disease) (H)      Glaucoma      HTN (hypertension)      Hyperlipidemia      Hypothyroid      Paroxysmal atrial fibrillation (H) 11/2018     Smoker     ongoing - \"6-8 cigarettes QD\"       PAST SURGICAL HISTORY:  Past Surgical History:   Procedure Laterality Date     cataract removal       COLONOSCOPY  12/2018    [facility]     CV BALLOON DILATION AND/OR STENT PLACEMENT OF VESSEL  11/2018    stent to LAD     ESOPHAGOSCOPY, GASTROSCOPY, DUODENOSCOPY (EGD), COMBINED  12/2018    [facility]     ESOPHAGOSCOPY, GASTROSCOPY, DUODENOSCOPY (EGD), COMBINED N/A 8/15/2019    Procedure: ESOPHAGOGASTRODUODENOSCOPY, WITH BIOPSY;  Surgeon: Demario Clayton DO;  Location: [facility]       FAMILY HISTORY:  Family History   Problem Relation Age of Onset     Sudden Death Mother      Goiter Mother      Stomach Cancer Father      No Known Problems Brother        SOCIAL HISTORY:  Social History     Socioeconomic History     Marital status:      Spouse name: None     Number of children: None     Years of education: None     Highest education level: None   Occupational History     None   Social Needs     Financial resource strain: None     Food insecurity:     Worry: None     Inability: None     Transportation needs:     Medical: None     Non-medical: None   Tobacco Use     Smoking status: Current Every Day Smoker     Types: Cigarettes     Smokeless tobacco: Never Used     Tobacco comment: Trying to quit 7 cigs daily   Substance and Sexual Activity     Alcohol use: None     Drug use: None     Sexual activity: None   Lifestyle     Physical activity:     Days per week: None     Minutes per session: None     Stress: None   Relationships     Social connections:     Talks on phone: None     Gets together: None     Attends Hinduism " service: None     Active member of club or organization: None     Attends meetings of clubs or organizations: None     Relationship status: None     Intimate partner violence:     Fear of current or ex partner: None     Emotionally abused: None     Physically abused: None     Forced sexual activity: None   Other Topics Concern     None   Social History Narrative     None       Review of Systems:  Skin:  Positive for itching;rash     Eyes:  Positive for glasses    ENT:  Positive for tinnitus    Respiratory:  Positive for cough     Cardiovascular:    Positive for;fatigue    Gastroenterology: Positive for heartburn    Genitourinary:  Negative      Musculoskeletal:  Negative      Neurologic:  Negative      Psychiatric:  Negative      Heme/Lymph/Imm:  Negative      Endocrine:  Positive for thyroid disorder      Physical Exam:  Vitals: BP (!) 158/59   Pulse 64   Wt 59 kg (130 lb)   SpO2 100%   BMI 23.03 kg/m      Constitutional:  cooperative, alert and oriented, well developed, well nourished, in no acute distress        Skin:  warm and dry to the touch          Head:  normocephalic        Eyes:  sclera white        Lymph:      ENT:  no pallor or cyanosis        Neck:  no stiffness        Respiratory:  clear to auscultation;normal symmetry         Cardiac: regular rhythm;normal S1 and S2                pulses full and equal                                        GI:  abdomen soft        Extremities and Muscular Skeletal:  no edema              Neurological:  affect appropriate;no gross motor deficits        Psych:  Alert and Oriented x 3        CC  Jono Villanueva MD  [address]  [place] [id number]

## 2019-10-07 NOTE — PATIENT INSTRUCTIONS
"[facility]  [facility]~[address]~[place], [place]~[address]  Thank you for your [facility] visit today. If you have questions regarding your visit, please contact your cardiology RN's, Jeanne Seaman, at [phone]. Your provider has recommended the following:  Medication Changes:  1. START amlodipine 2.5 mg daily  2. STOP plavix at the end of November 2019    Recommendations:  1. Call if blood pressure is less than 90 on top or less than 100 with lightheadedness.    2. Check blood pressure at least 1 hour after medications. Call the clinic if your blood pressure is consistently greater than 130/80.   3. Have your home blood pressure monitor checked for accuracy   Follow-up:  1. Call if you want to set up an appt to discuss Watchman with our cardiologist   2. See Dr. Villanueva for cardiology follow up at [facility]: April 2020  Call in Jan, to schedule the appointment.  To schedule a future appointment, we kindly ask that you call cardiology scheduling at [phone] three months prior to requested revisit date.      [facility] cardiology clinic is staffed with \"Advance Practice Providers\". These are our cardiology Physician Assistants and Nurse Practitioners.   Please call cardiology scheduling if you feel you need clinical evaluation with them at any time for any cardiac reason.   Reminder:  For your safety, we ask that you bring in your current medication(s) or an updated list of your medications with you to EACH office visit. Include the medication name, dose of pill on bottle and how you are taking it. Include over-the-counter medications or supplements. Your provider will review this at each visit and plan your care based on your current information.   ~~~~~~~~~~~~~~~~~~~~~~~~~~~~~~~~~~~~~~~  \"[facility]\" [place] telephone numbers for reference:  Cardiology Scheduling~[phone]  Diagnostic Imaging Scheduling~[phone]  Lab " Scheduling~[phone]  Anticoagulation Clinic~[phone]  Cardiac Rehabilitation~[phone]  [facility] RN's~[phone] (at [facility])  Cardiology Clinic RN's~[phone] (Jeanne Seaman RN)  ~~~~~~~~~~~~~~~~~~~~~~~~~~~~~~~~~~~~~~~~

## 2019-10-07 NOTE — LETTER
10/7/2019    Jessica Fitzgerald MD  [facility] [address]  [place] [address]    RE: Zee Mireles       Dear Colleague,    I had the pleasure of seeing Zee Mireles in the [facility].    Cardiology Clinic Progress Note  Zee Mireles MRN# [id number]   YOB: 1940 Age: 79 year old      Reason For Visit: 6 month follow-up   Primary Cardiologist:   Dr. Villanueva          History of Presenting Illness:      Zee Mireles is a pleasant 79 year old patient with a past cardiac history significant for CAD, paroxysmal atrial fibrillation, PAD, carotid artery disease, hyperlipidemia, hypertension.  Past medical history is significant for GI bleed on anticoagulation, COPD, mild renal insufficiency, and ongoing tobacco use.     She was hospitalized in November 2018 with chest discomfort and found to be in A. Fib RVR with elevated troponin. She spontaneously converted to sinus rhythm.  She had abnormal stress test.  Coronary angiogram with LAD stent with residual 60% mid RCA stenosis. She was started on anticoagulation.     Unfortunately, a month later, she had admission for GI bleed with hemoglobin as low as 8.2 requiring transfusion.  Her Xarelto was discontinued and started on Plavix with her aspirin. Since she had been hospitalized with [facility], she saw their cardiologist who recommended follow-up to discuss watchman procedure.    Pt was last seen by Dr. Villanueva in January 2019.  She denied any further bleeding difficulty.  She was previously noted to have peripheral artery disease and history of stenting on the right side (details are unknown greater than 10 years ago).  She denied any claudication and was able to perform daily activities without difficulty.  ABIs showed moderate arterial insufficiency bilaterally. Given that she was asymptomatic and did not have any open wounds or sores, he recommended a walking program and smoking cessation.      She was seen by Dr. Pulliam in August 2019.  He did not feel that she needed carotid endarterectomy at this time. She followed-up with pulmonologist in August 2019 and no changes were made.  She presented to the ED in August again with melena. She was found to have gastritis/esophagitis with biopsies taken.  Aspirin was decreased to 81 mg daily and  started on Protonix.    Pt presents today for six month follow-up. She recently had an echocardiogram through [facility]. Unfortunately, I cannot find any results of this.  Blood pressure today is elevated and she reports home blood pressures of 138-140 systolic.  She is agreeable to starting amlodipine.  She prefers to call if blood pressures remain elevated rather than coming back for revisit.  She denies any anginal symptoms.  She was told to wait to have her watchman procedure done but has not followed back up with [facility] cardiology yet.  She feels occasional palpitations but does not believe she has had any significant episodes of atrial fibrillation.  She has not had any further bleeding since her most recent hospitalization in August.  Hemoglobin last week was 9.6.  She has followed with her PCP and GI. Patient reports no chest pain, shortness of breath, PND, orthopnea, presyncope, syncope, edema, heart racing.    Unfortunately, she has her PCP through [facility] and 2 different cardiology groups currently, which makes it more difficult to obtain all of her information.  At this time, she has not considered moving to a single cardiology group.      Current Cardiac Medications   Aspirin 81 mg daily  Plavix 75 mg daily  Diltiazem  mg daily  hydrochlorothiazide 12.5 mg daily  Losartan 100 mg daily  Rosuvastatin 5 mg three times weekly                   Assessment and Plan:       Plan  1.  Start amlodipine 2.5 mg daily for hypertension  2.  Call if blood pressures remain elevated  3.  Follow-up with Dr. Villanueva in six months  4.  Call in you want to set up  watchman consultation with us  5.  May discontinue Plavix after November 2019 and continue aspirin lifelong      1. CAD     AGATHA to LAD with asymptomatic jailed diagonal and residual moderate disease    No angina      continue statin, aspirin, ARB, CCB    Continue DAPT, uninterrupted, for at least one year (11/2019)      2. hypertension    Not well Controlled    Continue diltiazem, hydrochlorothiazide, losartan, start amlodipine       3. hyperlipidemia     last LDL 49 on 11/2018    Continue rosuvastatin 5 mg three times weekly      4. Paroxysmal atrial fibrillation    Onset 11/2018 heart racing     GI bleed with anticoagulation    Considering watchman with [facility]     Continue diltiazem for rate control      5. PAD    H/o of right sided stenting more than 10 years ago    Moderate arterial insufficiency on ABIs but patient asymptomatic and no sores or wounds    Home walking program     Carotid artery disease     Following with vascular surgery    Continue statin, ASA          Thank you for allowing me to participate in this delightful patient's care.      This note was completed in part using Dragon voice recognition software. Although reviewed after completion, some word and grammatical errors may occur.    Bebe Goetz, APRN CNP, APRN, CNP           Data:   All laboratory data reviewed        HPI and Plan:   See dictation    Orders Placed This Encounter   Procedures     Follow-Up with Cardiologist       Orders Placed This Encounter   Medications     amLODIPine (NORVASC) 2.5 MG tablet     Sig: Take 1 tablet (2.5 mg) by mouth daily     Dispense:  30 tablet     Refill:  11     clopidogrel (PLAVIX) 75 MG tablet     Sig: Take 1 tablet (75 mg) by mouth daily May discontinue after Nov 2019     Dispense:  30 tablet     Refill:  0       Medications Discontinued During This Encounter   Medication Reason     clopidogrel (PLAVIX) 75 MG tablet          Encounter Diagnoses   Name Primary?     Essential  hypertension      Paroxysmal atrial fibrillation (H)      Hyperlipidemia LDL goal <70      Coronary artery disease involving native coronary artery of native heart without angina pectoris Yes     PAD (peripheral artery disease) (H)        CURRENT MEDICATIONS:  Current Outpatient Medications   Medication Sig Dispense Refill     acebutolol (SECTRAL) 400 MG capsule Take 400 mg by mouth daily as needed (Palpitations) = extra dose in addition to scheduled daily dose       acetaminophen (TYLENOL) 500 MG tablet Take 1,000 mg by mouth every 6 hours as needed for mild pain       albuterol (PROAIR HFA/PROVENTIL HFA/VENTOLIN HFA) 108 (90 Base) MCG/ACT inhaler Inhale 2 puffs into the lungs every 4 hours as needed for shortness of breath / dyspnea or wheezing       amLODIPine (NORVASC) 2.5 MG tablet Take 1 tablet (2.5 mg) by mouth daily 30 tablet 11     aspirin 81 MG EC tablet Take 1 tablet (81 mg) by mouth       brimonidine (ALPHAGAN-P) 0.15 % ophthalmic solution Place 1 drop into both eyes 3 times daily       clopidogrel (PLAVIX) 75 MG tablet Take 1 tablet (75 mg) by mouth daily May discontinue after Nov 2019 30 tablet 0     diltiazem ER (DILT-XR) 120 MG 24 hr capsule Take 1 capsule (120 mg) by mouth daily 30 capsule 0     dorzolamide (TRUSOPT) 2 % ophthalmic solution Place 1 drop into both eyes 3 times daily       ferrous sulfate (FEROSUL) 325 (65 Fe) MG tablet Take 325 mg by mouth daily (with breakfast)       Glycopyrrolate-Formoterol (BEVESPI AEROSPHERE) 9-4.8 MCG/ACT oral inhaler Inhale 2 puffs into the lungs every evening       hydrochlorothiazide (HYDRODIURIL) 25 MG tablet Take 0.5 tablets (12.5 mg) by mouth daily 30 tablet 0     latanoprost (XALATAN) 0.005 % ophthalmic solution Place 1 drop into both eyes every evening        levothyroxine (SYNTHROID/LEVOTHROID) 125 MCG tablet Take 125 mcg by mouth daily       losartan (COZAAR) 100 MG tablet Take 1 tablet (100 mg) by mouth daily 30 tablet 0     pantoprazole (PROTONIX)  "40 MG EC tablet Take 1 tablet (40 mg) by mouth daily 90 tablet 1     rosuvastatin (CRESTOR) 5 MG tablet TK 1 T PO THREE TIMES A WEEK m/w/f  0       ALLERGIES     Allergies   Allergen Reactions     Atorvastatin Muscle Pain (Myalgia)     Was fine while taking simvastatin 20 mg daily.       PAST MEDICAL HISTORY:  Past Medical History:   Diagnosis Date     Chest pain 11/2018    burning sensation - indigestion     COPD (chronic obstructive pulmonary disease) (H)      Glaucoma      HTN (hypertension)      Hyperlipidemia      Hypothyroid      Paroxysmal atrial fibrillation (H) 11/2018     Smoker     ongoing - \"6-8 cigarettes QD\"       PAST SURGICAL HISTORY:  Past Surgical History:   Procedure Laterality Date     cataract removal       COLONOSCOPY  12/2018    [facility]     CV BALLOON DILATION AND/OR STENT PLACEMENT OF VESSEL  11/2018    stent to LAD     ESOPHAGOSCOPY, GASTROSCOPY, DUODENOSCOPY (EGD), COMBINED  12/2018    [facility]     ESOPHAGOSCOPY, GASTROSCOPY, DUODENOSCOPY (EGD), COMBINED N/A 8/15/2019    Procedure: ESOPHAGOGASTRODUODENOSCOPY, WITH BIOPSY;  Surgeon: Demario Clayton DO;  Location: [facility]       FAMILY HISTORY:  Family History   Problem Relation Age of Onset     Sudden Death Mother      Goiter Mother      Stomach Cancer Father      No Known Problems Brother        SOCIAL HISTORY:  Social History     Socioeconomic History     Marital status:      Spouse name: None     Number of children: None     Years of education: None     Highest education level: None   Occupational History     None   Social Needs     Financial resource strain: None     Food insecurity:     Worry: None     Inability: None     Transportation needs:     Medical: None     Non-medical: None   Tobacco Use     Smoking status: Current Every Day Smoker     Types: Cigarettes     Smokeless tobacco: Never Used     Tobacco comment: Trying to quit 7 cigs daily   Substance and Sexual Activity     Alcohol use: None     Drug use: None "     Sexual activity: None   Lifestyle     Physical activity:     Days per week: None     Minutes per session: None     Stress: None   Relationships     Social connections:     Talks on phone: None     Gets together: None     Attends Buddhism service: None     Active member of club or organization: None     Attends meetings of clubs or organizations: None     Relationship status: None     Intimate partner violence:     Fear of current or ex partner: None     Emotionally abused: None     Physically abused: None     Forced sexual activity: None   Other Topics Concern     None   Social History Narrative     None       Review of Systems:  Skin:  Positive for itching;rash     Eyes:  Positive for glasses    ENT:  Positive for tinnitus    Respiratory:  Positive for cough     Cardiovascular:    Positive for;fatigue    Gastroenterology: Positive for heartburn    Genitourinary:  Negative      Musculoskeletal:  Negative      Neurologic:  Negative      Psychiatric:  Negative      Heme/Lymph/Imm:  Negative      Endocrine:  Positive for thyroid disorder      Physical Exam:  Vitals: BP (!) 158/59   Pulse 64   Wt 59 kg (130 lb)   SpO2 100%   BMI 23.03 kg/m       Constitutional:  cooperative, alert and oriented, well developed, well nourished, in no acute distress        Skin:  warm and dry to the touch          Head:  normocephalic        Eyes:  sclera white        Lymph:      ENT:  no pallor or cyanosis        Neck:  no stiffness        Respiratory:  clear to auscultation;normal symmetry         Cardiac: regular rhythm;normal S1 and S2                pulses full and equal                                        GI:  abdomen soft        Extremities and Muscular Skeletal:  no edema              Neurological:  affect appropriate;no gross motor deficits        Psych:  Alert and Oriented x 3          Thank you for allowing me to participate in the care of your patient.    Sincerely,     Bebe Goetz, DIANA CNP      [facility]

## 2019-12-12 ENCOUNTER — AMBULATORY - HEALTHEAST (OUTPATIENT)
Dept: CARDIOLOGY | Facility: CLINIC | Age: 79
End: 2019-12-12

## 2019-12-12 ENCOUNTER — RECORDS - HEALTHEAST (OUTPATIENT)
Dept: ADMINISTRATIVE | Facility: OTHER | Age: 79
End: 2019-12-12

## 2019-12-16 ENCOUNTER — OFFICE VISIT - HEALTHEAST (OUTPATIENT)
Dept: CARDIOLOGY | Facility: CLINIC | Age: 79
End: 2019-12-16

## 2019-12-16 DIAGNOSIS — I25.10 CORONARY ARTERY DISEASE DUE TO CALCIFIED CORONARY LESION: ICD-10-CM

## 2019-12-16 DIAGNOSIS — E78.5 DYSLIPIDEMIA, GOAL LDL BELOW 70: ICD-10-CM

## 2019-12-16 DIAGNOSIS — I25.84 CORONARY ARTERY DISEASE DUE TO CALCIFIED CORONARY LESION: ICD-10-CM

## 2019-12-16 ASSESSMENT — MIFFLIN-ST. JEOR: SCORE: 1037.88

## 2020-03-17 ENCOUNTER — AMBULATORY - HEALTHEAST (OUTPATIENT)
Dept: PULMONOLOGY | Facility: OTHER | Age: 80
End: 2020-03-17

## 2020-03-17 DIAGNOSIS — J44.9 COPD (CHRONIC OBSTRUCTIVE PULMONARY DISEASE) (H): ICD-10-CM

## 2020-05-11 ENCOUNTER — RECORDS - HEALTHEAST (OUTPATIENT)
Dept: LAB | Facility: CLINIC | Age: 80
End: 2020-05-11

## 2020-05-11 LAB
ANION GAP SERPL CALCULATED.3IONS-SCNC: 9 MMOL/L (ref 5–18)
BUN SERPL-MCNC: 22 MG/DL (ref 8–28)
CALCIUM SERPL-MCNC: 9.1 MG/DL (ref 8.5–10.5)
CHLORIDE BLD-SCNC: 98 MMOL/L (ref 98–107)
CHOLEST SERPL-MCNC: 144 MG/DL
CO2 SERPL-SCNC: 28 MMOL/L (ref 22–31)
CREAT SERPL-MCNC: 1.19 MG/DL (ref 0.6–1.1)
FASTING STATUS PATIENT QL REPORTED: NORMAL
GFR SERPL CREATININE-BSD FRML MDRD: 44 ML/MIN/1.73M2
GLUCOSE BLD-MCNC: 66 MG/DL (ref 70–125)
HDLC SERPL-MCNC: 73 MG/DL
LDLC SERPL CALC-MCNC: 59 MG/DL
POTASSIUM BLD-SCNC: 4.5 MMOL/L (ref 3.5–5)
SODIUM SERPL-SCNC: 135 MMOL/L (ref 136–145)
T4 FREE SERPL-MCNC: 1.5 NG/DL (ref 0.7–1.8)
TRIGL SERPL-MCNC: 62 MG/DL
TSH SERPL DL<=0.005 MIU/L-ACNC: 0.12 UIU/ML (ref 0.3–5)

## 2020-07-13 ENCOUNTER — RECORDS - HEALTHEAST (OUTPATIENT)
Dept: LAB | Facility: CLINIC | Age: 80
End: 2020-07-13

## 2020-07-13 LAB
T4 FREE SERPL-MCNC: 1.5 NG/DL (ref 0.7–1.8)
TSH SERPL DL<=0.005 MIU/L-ACNC: 0.29 UIU/ML (ref 0.3–5)

## 2020-08-24 ENCOUNTER — OFFICE VISIT - HEALTHEAST (OUTPATIENT)
Dept: PULMONOLOGY | Facility: OTHER | Age: 80
End: 2020-08-24

## 2020-08-24 DIAGNOSIS — J44.9 CHRONIC OBSTRUCTIVE PULMONARY DISEASE, UNSPECIFIED COPD TYPE (H): ICD-10-CM

## 2020-08-24 ASSESSMENT — MIFFLIN-ST. JEOR: SCORE: 1037.88

## 2020-09-10 ENCOUNTER — PRE VISIT (OUTPATIENT)
Dept: DERMATOLOGY | Facility: CLINIC | Age: 80
End: 2020-09-10

## 2020-09-10 NOTE — LETTER
[facility]  Dermatology  [address]  [address]rd [place] [address]  Phone: [phone]  Fax: [phone]           Zee Mireles  [address] [place] [address]              Dear Zee Mireles,    Thank you for your interest in the [facility]. Dr. Beronica Rivers has reviewed your referral from [facility] and would like to see you in our clinic. We are currently booked through the end of January.  Appointments for February will open the first day of October.  Please call on or shortly after 10/1/2020 to schedule an appointment in February.     To make your appointment, please call: [phone] (option 1), Monday-Friday 7am - 5pm, or Saturday 8am - 12pm (Central Time Zone).         Thank you,  [facility]

## 2020-09-17 NOTE — TELEPHONE ENCOUNTER
I called and left a VM asking for Zee to give us a call back to get scheduled for a new hair loss consult. Informed pt that right now we are scheduling for January.    I will also send a letter.     HEIKE Kennedy

## 2020-10-01 ENCOUNTER — RECORDS - HEALTHEAST (OUTPATIENT)
Dept: LAB | Facility: CLINIC | Age: 80
End: 2020-10-01

## 2020-10-01 LAB — TSH SERPL DL<=0.005 MIU/L-ACNC: 0.57 UIU/ML (ref 0.3–5)

## 2021-03-10 ENCOUNTER — IMMUNIZATION (OUTPATIENT)
Dept: FAMILY MEDICINE | Facility: CLINIC | Age: 81
End: 2021-03-10
Payer: COMMERCIAL

## 2021-03-10 PROCEDURE — 0011A PR COVID VAC MODERNA 100 MCG/0.5 ML IM: CPT

## 2021-03-10 PROCEDURE — 91301 PR COVID VAC MODERNA 100 MCG/0.5 ML IM: CPT

## 2021-03-29 ENCOUNTER — AMBULATORY - HEALTHEAST (OUTPATIENT)
Dept: MULTI SPECIALTY CLINIC | Facility: CLINIC | Age: 81
End: 2021-03-29

## 2021-03-29 DIAGNOSIS — J44.9 COPD (CHRONIC OBSTRUCTIVE PULMONARY DISEASE) (H): ICD-10-CM

## 2021-04-07 ENCOUNTER — IMMUNIZATION (OUTPATIENT)
Dept: FAMILY MEDICINE | Facility: CLINIC | Age: 81
End: 2021-04-07
Attending: FAMILY MEDICINE
Payer: COMMERCIAL

## 2021-04-07 PROCEDURE — 0012A PR COVID VAC MODERNA 100 MCG/0.5 ML IM: CPT

## 2021-04-07 PROCEDURE — 91301 PR COVID VAC MODERNA 100 MCG/0.5 ML IM: CPT

## 2021-05-03 ENCOUNTER — COMMUNICATION - HEALTHEAST (OUTPATIENT)
Dept: CARDIOLOGY | Facility: CLINIC | Age: 81
End: 2021-05-03

## 2021-05-24 ENCOUNTER — RECORDS - HEALTHEAST (OUTPATIENT)
Dept: ADMINISTRATIVE | Facility: CLINIC | Age: 81
End: 2021-05-24

## 2021-05-28 ENCOUNTER — RECORDS - HEALTHEAST (OUTPATIENT)
Dept: ADMINISTRATIVE | Facility: CLINIC | Age: 81
End: 2021-05-28

## 2021-05-28 NOTE — TELEPHONE ENCOUNTER
Attempted to reach pt on 1/31/19 and again on 3/28/19 for possible LAAO consult per request of Dr. Luna.   Pt has not returned phone call. Will send out brochure and let pt reach out us should she decide to explore further.

## 2021-05-29 ENCOUNTER — RECORDS - HEALTHEAST (OUTPATIENT)
Dept: ADMINISTRATIVE | Facility: CLINIC | Age: 81
End: 2021-05-29

## 2021-05-30 ENCOUNTER — RECORDS - HEALTHEAST (OUTPATIENT)
Dept: ADMINISTRATIVE | Facility: CLINIC | Age: 81
End: 2021-05-30

## 2021-05-31 NOTE — PROGRESS NOTES
Assessment and Plan:Zee Mireles is a 79 y.o. F with a past medical history significant for COPD who presents to clinic today for follow up.  She seems to be doing quite well from a lung standpoint on Bevespi.  I don't see any need to change this medication.  She continues to smoke but is getting some exercise.  1) COPD - continue bevespi 1 puff two times a day as current.  As needed albuterol.  2) Dyspnea -from COPD and anemia from GI bleed.  Gradual increase in exercise as tolerated.  3) Smoking - active smoker again with two quits since I saw her last.  Continue to work on stopping.  4) RTC in 1 year           CCx: COPD    HPI: Ms. Mireles is a 79 year old female with COPD who returns for follow up.  Since I saw her last she has had no exacerbations and has remained on Bevespi as the cost was only upfront, and now it is only $40 a month. She has only used her albuterol sparingly maybe once a month.  She is able to climb steps and do most of what she wants.  She had another GI Bleed last week, and received 1 unit of blood.  She is anemic and on iron to restore her blood count.  She is fatigued from this.  She doesn't have much of a cough and is back to smoking about 10 cigarettes per day.  She quit twice since we saw her last.    ROS:  Review of Systems - History obtained from the patient  General ROS: negative  Psychological ROS: negative  ENT ROS: negative  Allergy and Immunology ROS: negative  Endocrine ROS: negative  Respiratory ROS: positive for - cough and shortness of breath  negative for - hemoptysis, orthopnea, pleuritic pain or stridor  Cardiovascular ROS: no chest pain or palpitations  Gastrointestinal ROS: no abdominal pain, change in bowel habits, or black or bloody stools  Genito-Urinary ROS: no dysuria, trouble voiding, or hematuria  Musculoskeletal ROS: negative  Neurological ROS: no TIA or stroke symptoms  Dermatological ROS: negative      Current Meds:  Current Outpatient Medications    Medication Sig     acebutolol (SECTRAL) 400 MG capsule Take 400 mg by mouth daily.     acetaminophen (TYLENOL) 325 MG tablet Take 650 mg by mouth every 6 (six) hours as needed for pain.     albuterol (PROAIR HFA;PROVENTIL HFA;VENTOLIN HFA) 90 mcg/actuation inhaler Inhale 2 puffs every 6 (six) hours as needed for wheezing.     brimonidine (ALPHAGAN) 0.15 % ophthalmic solution Administer 1 drop to both eyes 3 (three) times a day.     clopidogrel (PLAVIX) 75 mg tablet Take 75 mg by mouth daily.     DILT- mg 24 hr capsule Take 120 mg by mouth daily.     dorzolamide (TRUSOPT) 2 % ophthalmic solution Administer 1 drop to both eyes 3 (three) times a day .           glycopyrrolate-formoterol (BEVESPI AEROSPHERE) 9-4.8 mcg HFAA Inhale 2 puffs 2 (two) times a day.     latanoprost (XALATAN) 0.005 % ophthalmic solution Administer 1 drop to both eyes at bedtime .           levothyroxine (SYNTHROID, LEVOTHROID) 125 MCG tablet Take 125 mcg by mouth Daily at 6:00 am.     losartan (COZAAR) 100 MG tablet Take 100 mg by mouth daily.     pantoprazole (PROTONIX) 40 MG tablet Take 40 mg by mouth.     aspirin 81 MG EC tablet Take 1 tablet (81 mg total) by mouth daily.     rosuvastatin (CRESTOR) 5 MG tablet Take 1 tablet (5 mg total) by mouth once a week.       Labs:  No results found for this or any previous visit (from the past 72 hour(s)).    I have personally reviewed all pertinent imaging studies and PFT results unless otherwise noted.    Imaging studies:  No results found.      Physical Exam:  /58   Pulse 70   Resp 19   Wt 132 lb (59.9 kg)   LMP 01/27/1995   SpO2 98% Comment: RA  BMI 23.38 kg/m    General - Well nourished  Ears/Mouth -  OP pink moist, no thrush  Neck - no cervical lymphadenopathy  Lungs - Clear to ausculation bilaterally  CVS - regular rhythm with no murmurs, rubs or gallups  Abdomen - soft, NT, ND, NABS  Ext - no cyanosis, clubbing or edema  Skin - no rash  Psychology - alert and oriented,  answers appropriate        Electronically signed by:    Rolando Cantor MD PhD  [facility]

## 2021-05-31 NOTE — PROGRESS NOTES
CONSULT. Referred by KAREEN ELLIOTT. Carotid stenosis, right. 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

## 2021-05-31 NOTE — PATIENT INSTRUCTIONS - HE
1) I think your COPD seems to be under good control with the bevespi  2) I don't think we need to change anything today  3) I would continue to work on stopping smoking and getting exercise.

## 2021-05-31 NOTE — PROGRESS NOTES
VASCULAR SURGERY CONSULTATION NOTE    HPI:  Zee Mireles is a 79 y.o. year old female who was seen in April by a retinal specialist for spots in her vision in her R eye. She reports that during that evaluation she was told she had a possible focal embolic event to a branch of the retinal artery. Since then her vision had been improving. She does not have a history of stroke or TIA. She denies any other symptoms. She does have afib but also a history of GIB with melena for which she recently underwent an EGD that showed gastritis. She is on ASA/Plavix and has not tolerated Eliquis in the past which led to significant bleeding. She is being considered for a Watchmen. Her duplex from OSH showed 50% stenosis on the R and <50% stenosis on the L.    Review Of Systems:   General: Denies F/C  Respiratory: Denies SOB  Cardio: see HPI  Gastrointestinal: Denies N/V  Genitourinary: Denies recent change in urination  Musculoskeletal: Denies joint/muscle pain  Neurologic: see HPI  Psychiatric: Denies confusion  Hematology/immunology: no unexpected bruising    PAST MEDICAL HISTORY:  Past Medical History:   Diagnosis Date     Asthma      Colitis      COPD (chronic obstructive pulmonary disease) (H)      Coronary artery disease due to calcified coronary lesion 11/2018    stent to LAD after NSTEMI     DNI (do not intubate)      Dyslipidemia, goal LDL below 70      Essential hypertension      GERD (gastroesophageal reflux disease)      NSTEMI (non-ST elevation myocardial infarction) (H) 11/2018     PAD (peripheral artery disease) (H)      Paroxysmal atrial fibrillation (H) 11/2018       PAST SURGICAL HISTORY:  Past Surgical History:   Procedure Laterality Date     CATARACT EXTRACTION       COLONOSCOPY N/A 12/12/2018    COLONOSCOPY with polypectomy; Damien Denton MD;  [facility];  Service: Gastroenterology     CORONARY STENT PLACEMENT  11/2018     ESOPHAGOGASTRODUODENOSCOPY N/A 12/10/2018    Damien Denton MD; [facility];  Service: Gastroenterology       FAMILY HISTORY:  Family History   Problem Relation Age of Onset     No Medical Problems Son      Sudden death Mother 55        no autopsy     Stomach cancer Father 72     Other Brother          in [place]     No Medical Problems Son        SOCIAL HISTORY:   Social History     Tobacco Use     Smoking status: Current Every Day Smoker     Packs/day: 0.50     Types: Cigarettes     Smokeless tobacco: Never Used     Tobacco comment: slowly trying to wean from smoking.    Substance Use Topics     Alcohol use: No     Alcohol/week: 0.6 oz     Types: 1 Glasses of wine per week       HOME MEDICATIONS:  Prior to Admission medications    Medication Sig Start Date End Date Taking? Authorizing Provider   acebutolol (SECTRAL) 400 MG capsule Take 400 mg by mouth daily.   Yes PROVIDER, HISTORICAL   acetaminophen (TYLENOL) 325 MG tablet Take 650 mg by mouth every 6 (six) hours as needed for pain.   Yes PROVIDER, HISTORICAL   albuterol (PROAIR HFA;PROVENTIL HFA;VENTOLIN HFA) 90 mcg/actuation inhaler Inhale 2 puffs every 6 (six) hours as needed for wheezing.   Yes PROVIDER, HISTORICAL   aspirin 81 MG EC tablet Take 1 tablet (81 mg total) by mouth daily. 19 Yes Kevin Luan MD   brimonidine (ALPHAGAN) 0.15 % ophthalmic solution Administer 1 drop to both eyes 3 (three) times a day.   Yes PROVIDER, HISTORICAL   clopidogrel (PLAVIX) 75 mg tablet Take 75 mg by mouth daily. 18  Yes PROVIDER, HISTORICAL   DILT- mg 24 hr capsule Take 120 mg by mouth daily. 18  Yes PROVIDER, HISTORICAL   dorzolamide (TRUSOPT) 2 % ophthalmic solution Administer 1 drop to both eyes 3 (three) times a day .         Yes PROVIDER, HISTORICAL   glycopyrrolate-formoterol (BEVESPI AEROSPHERE) 9-4.8 mcg HFAA Inhale 2 puffs 2 (two) times a day. 19 Yes Rolando Cantor MD   latanoprost (XALATAN) 0.005 % ophthalmic solution Administer 1 drop to both eyes at bedtime .          Yes PROVIDER, HISTORICAL   levothyroxine (SYNTHROID, LEVOTHROID) 125 MCG tablet Take 125 mcg by mouth Daily at 6:00 am.   Yes PROVIDER, HISTORICAL   losartan (COZAAR) 100 MG tablet Take 100 mg by mouth daily. 11/30/18  Yes PROVIDER, HISTORICAL   pantoprazole (PROTONIX) 40 MG tablet Take 40 mg by mouth. 8/15/19  Yes PROVIDER, HISTORICAL   rosuvastatin (CRESTOR) 5 MG tablet Take 1 tablet (5 mg total) by mouth once a week. 12/18/18 1/17/19  Brenda Kelly MD       VITAL SIGNS:  /62   Pulse 74   Resp 14   Wt 131 lb (59.4 kg)   LMP 01/27/1995   BMI 23.21 kg/m      PHYSICAL EXAM:  Constitutional: healthy, alert, no acute distress and cooperative   Cardiovascular: RRR  Respiratory: CTAB anteriorly, breathing unlabored without secondary muscle use  Psychiatric: mentation appears normal and affect normal/bright  Neck: no asymmetry  Neurology: no focal deficits  MSK: able to move all extremities without weakness or ataxia      Patient Active Problem List   Diagnosis     Chronic bronchitis (H)     Essential hypertension     Coronary artery disease due to calcified coronary lesion     Paroxysmal atrial fibrillation (H)     DNI (do not intubate)     Dyslipidemia, goal LDL below 70     COPD (chronic obstructive pulmonary disease) (H)       ASSESSMENT:  79F with no significant carotid stenosis seen on duplex imaging.      PLAN:  No need for surgical intervention. She is already on maximal medical management for atherosclerotic disease. Her atrial fibrillation does put her at risk of embolic stroke but we defer the complex decision-making regarding her anticoagulation to her cardiologist and gastroenterologist. Follow-up as needed. Otherwise no routine follow up necessary.    Essence Antunez MD  Vascular Surgery Fellow  [facility]     Vascular surgery attending staff note:  I have seen and examined the patient myself.  I agree with the documentation by our fellow Dr. Antunez.  He had some visual changes in the  right eye by 1 of our ophthalmology colleagues where she was noted to have a small focal embolic event into 1 of the branch retinal arteries.  That time her vision has been improving.  Carotid duplex sonography shows less than 50% stenosis.  No surgical treatment is advised.  No further follow-up with vascular surgery is necessary.    OMAR JHAVERI M.D.

## 2021-06-01 VITALS — BODY MASS INDEX: 22.5 KG/M2 | WEIGHT: 131.8 LBS | HEIGHT: 64 IN

## 2021-06-02 VITALS
HEIGHT: 64 IN | BODY MASS INDEX: 22.2 KG/M2 | WEIGHT: 130 LBS | HEIGHT: 64 IN | WEIGHT: 130 LBS | BODY MASS INDEX: 22.2 KG/M2

## 2021-06-02 VITALS — HEIGHT: 63 IN | WEIGHT: 128 LBS | BODY MASS INDEX: 22.68 KG/M2

## 2021-06-02 VITALS — HEIGHT: 64 IN | WEIGHT: 138 LBS | BODY MASS INDEX: 23.56 KG/M2

## 2021-06-03 VITALS — BODY MASS INDEX: 23.21 KG/M2 | WEIGHT: 131 LBS

## 2021-06-03 VITALS — BODY MASS INDEX: 23.38 KG/M2 | WEIGHT: 132 LBS

## 2021-06-04 VITALS
SYSTOLIC BLOOD PRESSURE: 158 MMHG | HEIGHT: 63 IN | HEART RATE: 70 BPM | BODY MASS INDEX: 23.39 KG/M2 | DIASTOLIC BLOOD PRESSURE: 74 MMHG | WEIGHT: 132 LBS | OXYGEN SATURATION: 97 %

## 2021-06-04 VITALS
SYSTOLIC BLOOD PRESSURE: 156 MMHG | HEIGHT: 63 IN | BODY MASS INDEX: 23.39 KG/M2 | HEART RATE: 62 BPM | WEIGHT: 132 LBS | DIASTOLIC BLOOD PRESSURE: 60 MMHG | RESPIRATION RATE: 14 BRPM

## 2021-06-04 NOTE — PATIENT INSTRUCTIONS - HE
Zee,    It was a pleasure to see you today at [facility] [place].    We will arrange a consult with our Atrial Fibrillation Clinic to see if you would qualify for the Watchman device.    You will see Dr. Luna in one year.     Please call us if you have any questions or concerns about your heart.      Otf Luna M.D.

## 2021-06-08 ENCOUNTER — RECORDS - HEALTHEAST (OUTPATIENT)
Dept: CARDIOLOGY | Facility: CLINIC | Age: 81
End: 2021-06-08

## 2021-06-08 ENCOUNTER — RECORDS - HEALTHEAST (OUTPATIENT)
Dept: ADMINISTRATIVE | Facility: OTHER | Age: 81
End: 2021-06-08

## 2021-06-09 ENCOUNTER — OFFICE VISIT - HEALTHEAST (OUTPATIENT)
Dept: CARDIOLOGY | Facility: CLINIC | Age: 81
End: 2021-06-09

## 2021-06-09 DIAGNOSIS — I25.84 CORONARY ARTERY DISEASE DUE TO CALCIFIED CORONARY LESION: ICD-10-CM

## 2021-06-09 DIAGNOSIS — I48.0 PAROXYSMAL ATRIAL FIBRILLATION (H): ICD-10-CM

## 2021-06-09 DIAGNOSIS — I10 ESSENTIAL HYPERTENSION: ICD-10-CM

## 2021-06-09 DIAGNOSIS — N18.30 CHRONIC KIDNEY DISEASE, STAGE 3 (H): ICD-10-CM

## 2021-06-09 DIAGNOSIS — I25.10 CORONARY ARTERY DISEASE DUE TO CALCIFIED CORONARY LESION: ICD-10-CM

## 2021-06-09 DIAGNOSIS — E78.5 DYSLIPIDEMIA, GOAL LDL BELOW 70: ICD-10-CM

## 2021-06-09 ASSESSMENT — MIFFLIN-ST. JEOR: SCORE: 1060.56

## 2021-06-10 NOTE — PATIENT INSTRUCTIONS - HE
1) It sounds like you are quite stable from a breathing standpoint  2) I will refill the albuterol for you  3) The stiolto seems to be working so I would not change it

## 2021-06-10 NOTE — PROGRESS NOTES
Assessment and Plan:Zee Mireles is a 80 y.o. F with a past medical history significant for COPD who presents to clinic today for yearly follow up.  Over the last year she has had little to no change in her symptoms and handled a transition of bevespi to stiolto without a problem.      1) COPD - continue maintenance with stiolto.  Refill albuterol for as needed use given her last inhaler has  from disuse.  2) Tobacco abuse - continues to be an active smoker, does not appear ready to quit yet.  3) RTC in 1 year  .           CCx: COPD    HPI: Ms. Mireles is a 80 year old female with a history of COPD who presents for annual follow up.  Over the last year she claims to have had no issues with her lungs.  She doesn't get short of breath, and hasn't used her albuterol at all.  She is now on stiolto for formulary issues, and has no problems with this medication instead of bevespi.  She did have a small GI bleed again last fall and needed a unit of blood.  She also has developed alopecia for unclear reasons.      ROS:  Review of Systems - History obtained from the patient  General ROS: negative, alopecia  Psychological ROS: negative  ENT ROS: negative  Allergy and Immunology ROS: negative  Endocrine ROS: negative  Respiratory ROS: positive for - shortness of breath  negative for - cough, hemoptysis, orthopnea, pleuritic pain, tachypnea or wheezing  Cardiovascular ROS: no chest pain or palpitations  Gastrointestinal ROS: no abdominal pain, change in bowel habits, or black or bloody stools  Genito-Urinary ROS: no dysuria, trouble voiding, or hematuria  Musculoskeletal ROS: negative  Neurological ROS: no TIA or stroke symptoms  Dermatological ROS: negative      Current Meds:  Current Outpatient Medications   Medication Sig     acebutolol (SECTRAL) 400 MG capsule Take 400 mg by mouth daily.     acetaminophen (TYLENOL) 325 MG tablet Take 650 mg by mouth every 6 (six) hours as needed for pain.     albuterol (PROAIR  "HFA;PROVENTIL HFA;VENTOLIN HFA) 90 mcg/actuation inhaler Inhale 2 puffs every 6 (six) hours as needed for wheezing.     brimonidine (ALPHAGAN) 0.15 % ophthalmic solution Administer 1 drop to both eyes 3 (three) times a day.     DILT- mg 24 hr capsule Take 120 mg by mouth daily.     dorzolamide (TRUSOPT) 2 % ophthalmic solution Administer 1 drop to both eyes 3 (three) times a day .           latanoprost (XALATAN) 0.005 % ophthalmic solution Administer 1 drop to both eyes at bedtime .           levothyroxine (SYNTHROID, LEVOTHROID) 125 MCG tablet Take 125 mcg by mouth Daily at 6:00 am.     losartan (COZAAR) 100 MG tablet Take 100 mg by mouth daily.     pantoprazole (PROTONIX) 40 MG tablet Take 40 mg by mouth.     rosuvastatin (CRESTOR) 5 MG tablet Take 1 tablet (5 mg total) by mouth 3 (three) times a week.     tiotropium-olodateroL (STIOLTO RESPIMAT) 2.5-2.5 mcg/actuation Mist inhaler Inhale 2 Inhalation daily.       Labs:  No results found for this or any previous visit (from the past 72 hour(s)).    I have personally reviewed all pertinent imaging studies and PFT results unless otherwise noted.    Imaging studies:  No results found.      Physical Exam:  /74   Pulse 70   Ht 5' 3\" (1.6 m)   Wt 132 lb (59.9 kg)   LMP 01/27/1995   SpO2 97% Comment: RA  BMI 23.38 kg/m    General - Well nourished  Ears/Mouth -  OP pink moist, no thrush  Neck - no cervical lymphadenopathy  Lungs - Clear to ausculation bilaterally  CVS - regular rhythm with no murmurs, rubs or gallups  Abdomen - soft, NT, ND, NABS  Ext - no cyanosis, clubbing or edema  Skin - no rash  Psychology - alert and oriented, answers appropriate        Electronically signed by:    Rolando Cantor MD PhD  [facility] Pulmonary and Critical Care Medicine  "

## 2021-06-19 NOTE — PROGRESS NOTES
Assessment and Plan:Zee Mireles is a 78 y.o. with a past medical history significant for glaucoma and presumed COPD who presents to clinic today for evaluation.  She had a recent exacerbation treated with a short course of prednisone.  She is a lifelong, active smoker, but does not have profound baseline symptoms of COPD. She does have reduced airflows on her PFTs but no definite obstruction to confirm COPD.  She likely does have emphysema with a reduced DLCO.  .   1. Chronic bronchitis/emphysema - recommend a controller medication to reduce daily symptoms and reduce exacerbations.  She would like to avoid inhaled steroids given her glaucoma.  Will prescribe Bevespi which has a LAMA and LABA in it, but no ICS.  Two puffs twice daily, may start at one puff twice daily and work up.  Continue albuterol as needed.  2. Tobacco abuse - stopping smoking will have a bigger impact than the inhaled medications on her daily symptoms and prognosis.  Spent over 10 minutes today discussing smoking cessation.  3. Cough - this may be post nasal drip, try a short course of OTC nasal steroids to see if this helps  4. 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  She was told she has COPD by her primary doctor and given albuterol last year per her recall.  She was recently in the hospital with acute shortness of breath and was treated with solumedrol then prednisone which helped quite a bit.  She did not have a pneumonia or other infectious symptoms.  She was suspected of having an allergic or environmental trigger.   She states she used to get bronchitis a fair bit, but hasn't needed medical help for her lungs until recently.  She is still smoking about 10 cigarettes per day, and has smoked her whole life.    Her daily symptoms include a cough, and some dyspnea with exertion.  She doesn't wheeze.  She thinks her cough is more of a  tickle from something draining from her nose.    PMH:  No past medical history on file.   Hypothyroid  Glaucoma  hypertension    PSH:  No past surgical history on file.    SH:  Social History     Social History     Marital status:      Spouse name: N/A     Number of children: N/A     Years of education: N/A     Occupational History     Not on file.     Social History Main Topics     Smoking status: Current Every Day Smoker     Smokeless tobacco: Never Used     Alcohol use 0.6 oz/week     1 Glasses of wine per week     Drug use: No     Sexual activity: Not on file     Other Topics Concern     Not on file     Social History Narrative       Family history:   The family history was reviewed and is not pertinent to the chief complaint or HPI.    ROS:  Review of Systems - History obtained from the patient  General ROS: negative  Psychological ROS: negative  ENT ROS: negative  Allergy and Immunology ROS: negative  Endocrine ROS: negative  Respiratory ROS: positive for - cough and shortness of breath  negative for - sputum changes, stridor, tachypnea or wheezing  Cardiovascular ROS: no chest pain or palpitations  Gastrointestinal ROS: no abdominal pain, change in bowel habits, or black or bloody stools  Genito-Urinary ROS: no dysuria, trouble voiding, or hematuria  Musculoskeletal ROS: negative  Neurological ROS: no TIA or stroke symptoms  Dermatological ROS: negative      Current Meds:  Current Outpatient Prescriptions   Medication Sig Note     acebutolol (SECTRAL) 400 MG capsule Take 400 mg by mouth daily.      acetaminophen (TYLENOL) 325 MG tablet Take 650 mg by mouth every 6 (six) hours as needed for pain.      albuterol (PROAIR HFA;PROVENTIL HFA;VENTOLIN HFA) 90 mcg/actuation inhaler Inhale 2 puffs every 6 (six) hours as needed for wheezing.      amLODIPine (NORVASC) 5 MG tablet Take 5 mg by mouth daily.      aspirin 81 MG EC tablet Take 81 mg by mouth daily.      brimonidine (ALPHAGAN P) 0.1 % Drop  5/28/2018:  Family will bring in eye drops- please verify sigs when medications available.     dorzolamide (TRUSOPT) 2 % ophthalmic solution 1 drop. 5/28/2018: Family will bring in eye drops- please verify sigs when medications available.     ibuprofen (ADVIL,MOTRIN) 200 MG tablet Take 400 mg by mouth every 8 (eight) hours as needed for pain.      latanoprost (XALATAN) 0.005 % ophthalmic solution 1 drop. 5/28/2018: Family will bring in eye drops- please verify sigs when medications available.     levothyroxine (SYNTHROID, LEVOTHROID) 112 MCG tablet Take 112 mcg by mouth Daily at 6:00 am.      simvastatin (ZOCOR) 20 MG tablet Take 20 mg by mouth at bedtime.      valsartan (DIOVAN) 320 MG tablet Take 1 tablet (320 mg total) by mouth daily.      glycopyrrolate-formoterol (BEVESPI AEROSPHERE) 9-4.8 mcg HFAA Inhale 2 puffs 2 (two) times a day.        Labs:  Recent Results (from the past 72 hour(s))   POCT hemoglobin   Result Value Ref Range    Hgb 13.2 7.0 g/dL       I have personally reviewed all imaging and PFT data available pertinent to this visit.    Imaging studies:  Mammo Screening Bilateral    Result Date: 6/7/2018  BILATERAL FULL FIELD DIGITAL SCREENING MAMMOGRAM Performed on: 6/7/18 Compared to: 04/19/2017 Mammo Screening Bilateral, 03/10/2016 Mammo Screening Bilateral, and 01/27/2015 Mammo Screening Bilateral Findings:The breasts are heterogeneously dense, which may obscure small masses.There is no radiographic evidence of malignancy. This study was evaluated with the assistance of Computer-Aided Detection. Continue routine screening mammogram as recommended. ACR BI-RADS Category 2: Benign      PFTs:  FEV1/FVC is 71% and is normal.  FEV1 is 1.29L (65%) predicted and is reduced.  FVC is 1.80L (69%) predicted and reduced.  There was no improvement in spirometry after a single inhaled dose of bronchodilator.  TLC is 5.05L (103%) predicted and is normal.  RV is 3.09L (142%) predicted and is increased.  DLCO is 10.27ml/min/hg  "(52%) predicted and is reduced when it is corrected for hemoglobin.  Flow volume loops indicate normal spirometric pattern with suggestion of obstruction.    Impression:  Full Pulmonary Function Test is abnormal.  PFTs do not show classic obstruction with a preserved FEV1 to FVC ratio, but the airflows are reduced, there is air trapping, and evidence of likely emphysema with a reduced moderately reduced DLCO.    Rolando Cantor  [facility]          Physical Exam:  /58  Pulse 68  Resp 20  Ht 5' 3.5\" (1.613 m)  Wt 131 lb 12.8 oz (59.8 kg)  LMP 01/27/1995  SpO2 97% Comment: RA  BMI 22.98 kg/m2  General - Well nourished  Ears/Mouth - OP pink moist, no thrush  Neck - no cervical lymphadenopathy  Lungs - Clear to ausculation bilaterally- slight rale in left base  CVS - regular rhythm with no murmurs, rubs or gallups  Abdomen - soft, NT, ND, NABS  Ext - no cyanosis, clubbing or edema  Skin - no rash  Psychology - alert and oriented, answers appropriate        Electronically signed by:    Rolando Cantor MD PhD  [facility] Pulmonary and Critical Care Medicine  "

## 2021-06-19 NOTE — LETTER
Letter by Rolando Cantor MD at      Author: Rolando Cantor MD Service: -- Author Type: --    Filed:  Encounter Date: 8/19/2019 Status: (Other)         Jessica Fitzgerald MD  [address]  [place] [address]                                  August 19, 2019    Patient: Zee Mireles   MR Number: [id number]   YOB: 1940   Date of Visit: 8/19/2019     Dear Dr. Amilcar MD:    Thank you for referring Zee Mireles to me for evaluation. Below are the relevant portions of my assessment and plan of care.    If you have questions, please do not hesitate to call me. I look forward to following Zee along with you.    Sincerely,        Rolando Cantor MD          CC  No Recipients  Rolando Cantor MD  8/19/2019 12:02 PM  Sign at close encounter  Assessment and Plan:Zee Mireles is a 79 y.o. F with a past medical history significant for COPD who presents to clinic today for follow up.  She seems to be doing quite well from a lung standpoint on Bevespi.  I don't see any need to change this medication.  She continues to smoke but is getting some exercise.  1) COPD - continue bevespi 1 puff two times a day as current.  As needed albuterol.  2) Dyspnea -from COPD and anemia from GI bleed.  Gradual increase in exercise as tolerated.  3) Smoking - active smoker again with two quits since I saw her last.  Continue to work on stopping.  4) RTC in 1 year           CCx: COPD    HPI: Ms. Mireles is a 79 year old female with COPD who returns for follow up.  Since I saw her last she has had no exacerbations and has remained on Bevespi as the cost was only upfront, and now it is only $40 a month. She has only used her albuterol sparingly maybe once a month.  She is able to climb steps and do most of what she wants.  She had another GI Bleed last week, and received 1 unit of blood.  She is anemic and on iron to restore her blood count.  She is fatigued from this.  She doesn't  have much of a cough and is back to smoking about 10 cigarettes per day.  She quit twice since we saw her last.    ROS:  Review of Systems - History obtained from the patient  General ROS: negative  Psychological ROS: negative  ENT ROS: negative  Allergy and Immunology ROS: negative  Endocrine ROS: negative  Respiratory ROS: positive for - cough and shortness of breath  negative for - hemoptysis, orthopnea, pleuritic pain or stridor  Cardiovascular ROS: no chest pain or palpitations  Gastrointestinal ROS: no abdominal pain, change in bowel habits, or black or bloody stools  Genito-Urinary ROS: no dysuria, trouble voiding, or hematuria  Musculoskeletal ROS: negative  Neurological ROS: no TIA or stroke symptoms  Dermatological ROS: negative      Current Meds:  Current Outpatient Medications   Medication Sig   ? acebutolol (SECTRAL) 400 MG capsule Take 400 mg by mouth daily.   ? acetaminophen (TYLENOL) 325 MG tablet Take 650 mg by mouth every 6 (six) hours as needed for pain.   ? albuterol (PROAIR HFA;PROVENTIL HFA;VENTOLIN HFA) 90 mcg/actuation inhaler Inhale 2 puffs every 6 (six) hours as needed for wheezing.   ? brimonidine (ALPHAGAN) 0.15 % ophthalmic solution Administer 1 drop to both eyes 3 (three) times a day.   ? clopidogrel (PLAVIX) 75 mg tablet Take 75 mg by mouth daily.   ? DILT- mg 24 hr capsule Take 120 mg by mouth daily.   ? dorzolamide (TRUSOPT) 2 % ophthalmic solution Administer 1 drop to both eyes 3 (three) times a day .         ? glycopyrrolate-formoterol (BEVESPI AEROSPHERE) 9-4.8 mcg HFAA Inhale 2 puffs 2 (two) times a day.   ? latanoprost (XALATAN) 0.005 % ophthalmic solution Administer 1 drop to both eyes at bedtime .         ? levothyroxine (SYNTHROID, LEVOTHROID) 125 MCG tablet Take 125 mcg by mouth Daily at 6:00 am.   ? losartan (COZAAR) 100 MG tablet Take 100 mg by mouth daily.   ? pantoprazole (PROTONIX) 40 MG tablet Take 40 mg by mouth.   ? aspirin 81 MG EC tablet Take 1 tablet (81 mg  total) by mouth daily.   ? rosuvastatin (CRESTOR) 5 MG tablet Take 1 tablet (5 mg total) by mouth once a week.       Labs:  No results found for this or any previous visit (from the past 72 hour(s)).    I have personally reviewed all pertinent imaging studies and PFT results unless otherwise noted.    Imaging studies:  No results found.      Physical Exam:  /58   Pulse 70   Resp 19   Wt 132 lb (59.9 kg)   LMP 01/27/1995   SpO2 98% Comment: RA  BMI 23.38 kg/m     General - Well nourished  Ears/Mouth -  OP pink moist, no thrush  Neck - no cervical lymphadenopathy  Lungs - Clear to ausculation bilaterally  CVS - regular rhythm with no murmurs, rubs or gallups  Abdomen - soft, NT, ND, NABS  Ext - no cyanosis, clubbing or edema  Skin - no rash  Psychology - alert and oriented, answers appropriate        Electronically signed by:    Rolando Cantor MD PhD  [facility] Pulmonary and Critical Care Medicine

## 2021-06-21 NOTE — LETTER
Letter by Kevin Luna MD at      Author: Kevin Luna MD Service: -- Author Type: --    Filed:  Encounter Date: 5/3/2021 Status: (Other)         Zee LINDA [place]  [address] [facility] [address]      May 3, 2021      Dear Zee,    This letter is to remind you that you are due for your follow up appointment with Dr. Kevin Luna. To help ensure you are in the best health possible, a regular follow-up with your cardiologist is essential.     Please call our Patient Scheduling Line at [phone] to schedule your appointment at your earliest convenience.  If you have recently scheduled an appointment, please disregard this letter.    We look forward to seeing you again. As always, we are available at the number  above for any questions or concerns you may have.      Sincerely,     The Physicians and Staff of [facility]

## 2021-06-21 NOTE — PROGRESS NOTES
Assessment and Plan:Zee Mireles is a 78 y.o. female with a past medical history significant for COPD who presents to clinic today for follow up.  She has been stable on Bevespi with no exacerbation and no need of her albuterol inhaler.  She is still smoking however, and admits this is probably still contributing to some of her daily symptoms.    1) COPD - continue Bevespi and Albuterol.  Will ask her pharmacist to determine if Stiolto respimat could be cheaper for her as she finds the bevespi to be quite expensive.  2) Tobacco abuse - spent over ten minutes today counseling on tobacco cessation.  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  Since her last visit she feels she has done quite well.  She is tolerating the Bevespi, but is a little put off by the cost. She is worried she won't be able to afford it as it consumes half of her prescription benefit.  She has had no real side effects from it.  She is not using her albuterol as she doesn't need it.  She has a mild cough which she thinks could be from smoking.  She is still smoking, ~1/2ppd.  She quit for a week but then started again.  She gets some exercise with walking, but not enough she admits.  This will be harder in the winter.  She plans to get her flu shot with her primary doctor soon.      ROS:  Review of Systems - History obtained from the patient  General ROS: negative  Psychological ROS: negative  ENT ROS: negative  Allergy and Immunology ROS: negative  Endocrine ROS: negative  Respiratory ROS: positive for - cough and shortness of breath  negative for - hemoptysis, orthopnea or pleuritic pain  Cardiovascular ROS: no chest pain or palpitations  Gastrointestinal ROS: no abdominal pain, change in bowel habits, or black or bloody stools  Genito-Urinary ROS: no dysuria, trouble voiding, or hematuria  Musculoskeletal ROS:  negative  Neurological ROS: no TIA or stroke symptoms  Dermatological ROS: negative      Current Meds:  Current Outpatient Prescriptions   Medication Sig Note     acebutolol (SECTRAL) 400 MG capsule Take 400 mg by mouth daily.      acetaminophen (TYLENOL) 325 MG tablet Take 650 mg by mouth every 6 (six) hours as needed for pain.      albuterol (PROAIR HFA;PROVENTIL HFA;VENTOLIN HFA) 90 mcg/actuation inhaler Inhale 2 puffs every 6 (six) hours as needed for wheezing.      amLODIPine (NORVASC) 5 MG tablet Take 5 mg by mouth daily.      aspirin 81 MG EC tablet Take 81 mg by mouth daily.      brimonidine (ALPHAGAN P) 0.1 % Drop  5/28/2018: Family will bring in eye drops- please verify sigs when medications available.     dorzolamide (TRUSOPT) 2 % ophthalmic solution 1 drop. 5/28/2018: Family will bring in eye drops- please verify sigs when medications available.     glycopyrrolate-formoterol (BEVESPI AEROSPHERE) 9-4.8 mcg HFAA Inhale 2 puffs 2 (two) times a day.      ibuprofen (ADVIL,MOTRIN) 200 MG tablet Take 400 mg by mouth every 8 (eight) hours as needed for pain.      latanoprost (XALATAN) 0.005 % ophthalmic solution 1 drop. 5/28/2018: Family will bring in eye drops- please verify sigs when medications available.     levothyroxine (SYNTHROID, LEVOTHROID) 112 MCG tablet Take 112 mcg by mouth Daily at 6:00 am.      simvastatin (ZOCOR) 20 MG tablet Take 20 mg by mouth at bedtime.        Labs:  No results found for this or any previous visit (from the past 72 hour(s)).    I have personally reviewed all pertinent imaging studies and PFT results unless otherwise noted.    Imaging studies:  Mammo Screening Bilateral    Result Date: 6/7/2018  BILATERAL FULL FIELD DIGITAL SCREENING MAMMOGRAM Performed on: 6/7/18 Compared to: 04/19/2017 Mammo Screening Bilateral, 03/10/2016 Mammo Screening Bilateral, and 01/27/2015 Mammo Screening Bilateral Findings:The breasts are heterogeneously dense, which may obscure small masses.There is  "no radiographic evidence of malignancy. This study was evaluated with the assistance of Computer-Aided Detection. Continue routine screening mammogram as recommended. ACR BI-RADS Category 2: Benign        Physical Exam:  /70  Pulse 71  Resp 14  Ht 5' 3.5\" (1.613 m)  Wt 138 lb (62.6 kg)  LMP 01/27/1995  SpO2 97%  Breastfeeding? No  BMI 24.06 kg/m2  General - Well nourished  Ears/Mouth -  OP pink moist, no thrush  Neck - no cervical lymphadenopathy  Lungs - Clear to ausculation bilaterally   CVS - regular rhythm with no murmurs, rubs or gallups  Abdomen - soft, NT, ND, NABS  Ext - no cyanosis, clubbing or edema  Skin - no rash  Psychology - alert and oriented, answers appropriate        Electronically signed by:    Rolando Cantor MD PhD  [facility] Pulmonary and Critical Care Medicine    "

## 2021-06-23 NOTE — PATIENT INSTRUCTIONS - HE
Zee,    It was a pleasure to see you today at the [facility].     Please restart the aspirin 81 mg orally daily as you are not taking the Xarelto.     We will arrange for you to be seen in the Atrial Fibrillation Clinic to discuss the left atrial appendage occlusion device. This device can help selected patients to avoid the use of warfarin and other blood thinners like Xarelto.     You will see Dr. Luna in November.     Please call us if you have any questions or concerns about your heart.      Otf Luna M.D.

## 2021-06-26 NOTE — PATIENT INSTRUCTIONS - HE
Zee,    It was a pleasure to see you today at [facility].    You will see our Watchman team to discuss how that device could lower your risk of stroke.    You will see Dr. Kanu Stvoer in one year.    Please call us if you have any questions or concerns about your heart.      Otf Luna M.D.  [facility] [place]

## 2021-06-27 NOTE — PROGRESS NOTES
Progress Notes by Kevin Luna MD at 1/18/2019  1:10 PM     Author: Kevin Luna MD Service: -- Author Type: Physician    Filed: 1/18/2019  1:41 PM Encounter Date: 1/18/2019 Status: Signed    : Kevin Luna MD (Physician)           Click to link to [facility] Note      Assessment:    1. Coronary artery disease due to calcified coronary lesion  aspirin 81 MG EC tablet   2. Dyslipidemia, goal LDL below 70     3. Paroxysmal atrial fibrillation (H)         Plan:    1. Zee is very concerned about restarting Xarelto, therefore, we will put her back on dual antiplatelet therapy by starting aspirin again.  We will schedule her for a visit in the atrial fibrillation clinic to discuss her candidacy for the left atrial appendage occlusion device.  2. Return to see Dr. Luna in November of this year or around the one year anniversary of her coronary stenting.    An After Visit Summary was printed and given to the patient.    Subjective:    Zee Mireles returned for a planned hospital follow up visit.    She states she has done well since discharge except for some diarrhea which she is controlling by eating more bananas and rice.  She states she is very nervous about going back on Xarelto and her family physician wanted her to meet with me today to consider her alternatives.  Her colonoscopy found a polyp which was removed but a cause of her melena was not definitely determined.    She does not experience any cardiac symptoms such as angina, orthopnea, palpitations, lightheadedness or lower extremity edema.    Past Medical History:    Patient Active Problem List   Diagnosis   ? Chronic bronchitis (H)   ? Essential hypertension   ? Coronary artery disease due to calcified coronary lesion   ? Paroxysmal atrial fibrillation (H)   ? DNI (do not intubate)   ? Dyslipidemia, goal LDL below 70   ? COPD (chronic obstructive pulmonary disease) (H)       Past  Medical History:   Diagnosis Date   ? Asthma    ? Colitis    ? COPD (chronic obstructive pulmonary disease) (H)    ? Coronary artery disease due to calcified coronary lesion 2018    stent to LAD after NSTEMI   ? DNI (do not intubate)    ? Dyslipidemia, goal LDL below 70    ? Essential hypertension    ? GERD (gastroesophageal reflux disease)    ? NSTEMI (non-ST elevation myocardial infarction) (H) 2018   ? PAD (peripheral artery disease) (H)    ? Paroxysmal atrial fibrillation (H) 2018       Past Surgical History:   Procedure Laterality Date   ? CATARACT EXTRACTION     ? COLONOSCOPY N/A 2018    COLONOSCOPY with polypectomy; Damien Denton MD;  [facility];  Service: Gastroenterology   ? CORONARY STENT PLACEMENT  2018   ? ESOPHAGOGASTRODUODENOSCOPY N/A 12/10/2018    Damien Denton MD; [facility];  Service: Gastroenterology        reports that she has been smoking cigarettes.  She has been smoking about 0.25 packs per day. she has never used smokeless tobacco. She reports that she does not drink alcohol or use drugs.    Family History   Problem Relation Age of Onset   ? No Medical Problems Son    ? Sudden death Mother 55        no autopsy   ? Stomach cancer Father 72   ? Other Brother          in [place]   ? No Medical Problems Son        Outpatient Encounter Medications as of 2019   Medication Sig Dispense Refill   ? acebutolol (SECTRAL) 400 MG capsule Take 400 mg by mouth daily.     ? acetaminophen (TYLENOL) 325 MG tablet Take 650 mg by mouth every 6 (six) hours as needed for pain.     ? albuterol (PROAIR HFA;PROVENTIL HFA;VENTOLIN HFA) 90 mcg/actuation inhaler Inhale 2 puffs every 6 (six) hours as needed for wheezing.     ? brimonidine (ALPHAGAN) 0.15 % ophthalmic solution Administer 1 drop to both eyes 3 (three) times a day.     ? clopidogrel (PLAVIX) 75 mg tablet Take 75 mg by mouth daily.     ? DILT- mg 24 hr capsule Take 120 mg by mouth daily.     ? dorzolamide  "(TRUSOPT) 2 % ophthalmic solution Administer 1 drop to both eyes 3 (three) times a day .           ? latanoprost (XALATAN) 0.005 % ophthalmic solution Administer 1 drop to both eyes at bedtime .           ? levothyroxine (SYNTHROID, LEVOTHROID) 125 MCG tablet Take 125 mcg by mouth Daily at 6:00 am.     ? losartan (COZAAR) 100 MG tablet Take 100 mg by mouth daily.     ? aspirin 81 MG EC tablet Take 1 tablet (81 mg total) by mouth daily. 100 tablet 3   ? glycopyrrolate-formoterol (BEVESPI AEROSPHERE) 9-4.8 mcg HFAA Inhale 2 puffs 2 (two) times a day. (Patient taking differently: Inhale 2 puffs at bedtime .      ) 10.7 g 6   ? rosuvastatin (CRESTOR) 5 MG tablet Take 1 tablet (5 mg total) by mouth once a week. 4 tablet 0   ? [DISCONTINUED] rivaroxaban 15 mg Tab Take 1 tablet (15 mg total) by mouth daily with breakfast.  0     No facility-administered encounter medications on file as of 1/18/2019.        Review of Systems:     General: Weight Loss     Ears/Nose/Throat: WNL  Lungs: WNL  Heart: WNL  Stomach: Diarrhea  Bladder: WNL  Muscle/Joints: WNL  Skin: Poor Wound Healing  Nervous System: WNL  Mental Health: WNL     Blood: WNL    Objective:     /62 (Patient Site: Right Arm, Patient Position: Sitting, Cuff Size: Adult Regular)   Pulse 72   Resp 16   Ht 5' 3\" (1.6 m)   Wt 128 lb (58.1 kg)   LMP 01/27/1995   BMI 22.67 kg/m    Wt Readings from Last 5 Encounters:   01/18/19 128 lb (58.1 kg)   12/13/18 130 lb (59 kg)   11/06/18 138 lb (62.6 kg)   07/24/18 131 lb 12.8 oz (59.8 kg)   05/28/18 125 lb (56.7 kg)        Physical Exam:    GENERAL APPEARANCE: alert, no apparent distress  EYES: no scleral icterus  NECK: no carotid bruits or adenopathy, jugular venous pressure is less than 5 cm  CHEST: symmetric, the lungs are clear to auscultation  CARDIOVASCULAR: regular rhythm without murmur or gallop  EXTREMITIES: no cyanosis, clubbing or edema  SKIN: no xanthelasma  NEUROLOGIC: normal gait and " coordination  PSYCHIATRIC: mood and affect are normal    Cardiac Testing:    No new testing.    Imaging:    No results found.    Lab Results:    Lab Results   Component Value Date    CREATININE 1.03 12/11/2018    BUN 25 12/11/2018     12/11/2018    K 4.1 12/11/2018     12/11/2018    CO2 24 12/11/2018     Lab Results   Component Value Date    CHOL 152 11/19/2018    TRIG 58 11/19/2018    HDL 77 11/19/2018     BNP (pg/mL)   Date Value   05/28/2018 374 (H)     Creatinine (mg/dL)   Date Value   12/11/2018 1.03   12/10/2018 1.27 (H)   12/06/2018 1.23 (H)   11/19/2018 0.92     LDL Calculated (mg/dL)   Date Value   11/19/2018 63   11/13/2017 68   10/26/2016 63     Lab Results   Component Value Date    WBC 10.2 12/13/2018    HGB 9.3 (L) 12/13/2018    HCT 28.1 (L) 12/13/2018    MCV 96 12/13/2018     12/13/2018           Much or all of the text in this note was generated through the use of the Dragon Dictate voice-to-text software. Errors in spelling or words which seem out of context are unintentional. Sound alike errors, in particular, may have escaped editing.

## 2021-06-28 NOTE — PROGRESS NOTES
Progress Notes by Kevin Luna MD at 12/16/2019 11:30 AM     Author: Kevin Luna MD Service: -- Author Type: Physician    Filed: 12/16/2019 12:01 PM Encounter Date: 12/16/2019 Status: Signed    : Kevin Luna MD (Physician)             Assessment:    1. Coronary artery disease due to calcified coronary lesion     2. Dyslipidemia, goal LDL below 70  rosuvastatin (CRESTOR) 5 MG tablet       Plan:    1. Continue current cardiovascular medical therapy.  2. She will recheck her lipid profile at her next visit with Dr. Jessica Fitzgerald.  3. Consultation with the atrial fibrillation team to discuss the watchman device will be arranged.  4. Return to see Dr. Luna in 1 year.    An After Visit Summary was printed and given to the patient.    Subjective:    Zee Mireles returned for a planned 10-month follow up visit.    She had significant gastrointestinal hemorrhage last year and is reluctant to go on warfarin or direct oral anticoagulant.  She received information about the watchman device but then family matters kept her from coming to see the atrial fibrillation team to discuss that.  She would like to go ahead with that discussion at this time.    She stopped clopidogrel last month around the one-year anniversary of her coronary stenting at [facility].     She denies chest discomfort, palpitations, lightheadedness, syncope, orthopnea, or lower extremity edema.    Zee reports no difficulties in taking her cardiovascular medications.    Past Medical History:    Patient Active Problem List   Diagnosis   ? Chronic bronchitis (H)   ? Essential hypertension   ? Coronary artery disease due to calcified coronary lesion   ? Paroxysmal atrial fibrillation (H)   ? DNI (do not intubate)   ? Dyslipidemia, goal LDL below 70   ? COPD (chronic obstructive pulmonary disease) (H)       Past Medical History:   Diagnosis Date   ? Asthma    ? Colitis    ? COPD (chronic obstructive pulmonary  disease) (H)    ? Coronary artery disease due to calcified coronary lesion 2018    stent to LAD after NSTEMI   ? DNI (do not intubate)    ? Dyslipidemia, goal LDL below 70    ? Essential hypertension    ? GERD (gastroesophageal reflux disease)    ? NSTEMI (non-ST elevation myocardial infarction) (H) 2018   ? PAD (peripheral artery disease) (H)    ? Paroxysmal atrial fibrillation (H) 2018       Past Surgical History:   Procedure Laterality Date   ? CATARACT EXTRACTION     ? COLONOSCOPY N/A 2018    COLONOSCOPY with polypectomy; Damien Denton MD;  [facility];  Service: Gastroenterology   ? CORONARY STENT PLACEMENT  2018    at [facility] in [place]   ? ESOPHAGOGASTRODUODENOSCOPY N/A 12/10/2018    Damien Denton MD; [facility];  Service: Gastroenterology        reports that she has been smoking cigarettes. She has been smoking about 0.50 packs per day. She has never used smokeless tobacco. She reports that she does not drink alcohol or use drugs.    Family History   Problem Relation Age of Onset   ? Sudden death Mother 55        no autopsy   ? Stomach cancer Father 72   ? Other Brother          in [place]   ? Ulcers Son         Gastric ulcer was perforated; had surgery. His wife passed suddenly at 49 at home in 2016.   ? No Medical Problems Son        Outpatient Encounter Medications as of 2019   Medication Sig Dispense Refill   ? acebutolol (SECTRAL) 400 MG capsule Take 400 mg by mouth daily.     ? acetaminophen (TYLENOL) 325 MG tablet Take 650 mg by mouth every 6 (six) hours as needed for pain.     ? albuterol (PROAIR HFA;PROVENTIL HFA;VENTOLIN HFA) 90 mcg/actuation inhaler Inhale 2 puffs every 6 (six) hours as needed for wheezing.     ? aspirin 81 MG EC tablet Take 1 tablet (81 mg total) by mouth daily. 100 tablet 3   ? brimonidine (ALPHAGAN) 0.15 % ophthalmic solution Administer 1 drop to both eyes 3 (three) times a day.     ? DILT- mg 24 hr capsule Take 120 mg by  "mouth daily.     ? dorzolamide (TRUSOPT) 2 % ophthalmic solution Administer 1 drop to both eyes 3 (three) times a day .           ? glycopyrrolate-formoterol (BEVESPI AEROSPHERE) 9-4.8 mcg HFAA Inhale 2 puffs 2 (two) times a day. 10.7 g 6   ? latanoprost (XALATAN) 0.005 % ophthalmic solution Administer 1 drop to both eyes at bedtime .           ? levothyroxine (SYNTHROID, LEVOTHROID) 125 MCG tablet Take 125 mcg by mouth Daily at 6:00 am.     ? losartan (COZAAR) 100 MG tablet Take 100 mg by mouth daily.     ? pantoprazole (PROTONIX) 40 MG tablet Take 40 mg by mouth.     ? [DISCONTINUED] clopidogrel (PLAVIX) 75 mg tablet Take 75 mg by mouth daily.     ? rosuvastatin (CRESTOR) 5 MG tablet Take 1 tablet (5 mg total) by mouth 3 (three) times a week. 40 tablet 3   ? [DISCONTINUED] rosuvastatin (CRESTOR) 5 MG tablet Take 1 tablet (5 mg total) by mouth once a week. 4 tablet 0     No facility-administered encounter medications on file as of 12/16/2019.        Review of Systems:     General: Weight Loss  Eyes: WNL  Ears/Nose/Throat: WNL  Lungs: WNL  Heart: WNL  Stomach: WNL  Bladder: WNL  Muscle/Joints: WNL  Skin: Poor Wound Healing, Rash  Nervous System: WNL  Mental Health: WNL     Blood: Easy Bruising    Objective:     /60 (Patient Site: Left Arm, Patient Position: Sitting, Cuff Size: Adult Regular)   Pulse 62   Resp 14   Ht 5' 3\" (1.6 m)   Wt 132 lb (59.9 kg)   LMP 01/27/1995   BMI 23.38 kg/m    Wt Readings from Last 5 Encounters:   12/16/19 132 lb (59.9 kg)   08/26/19 131 lb (59.4 kg)   08/19/19 132 lb (59.9 kg)   01/18/19 128 lb (58.1 kg)   12/13/18 130 lb (59 kg)        Physical Exam:    GENERAL APPEARANCE: alert, no apparent distress  EYES: no scleral icterus  NECK: no carotid bruits or adenopathy, jugular venous pressure is less than 5 cm  CHEST: symmetric, the lungs are clear to auscultation  CARDIOVASCULAR: regular rhythm without murmur or gallop  EXTREMITIES: no cyanosis, clubbing or edema  SKIN: no " xanthelasma  NEUROLOGIC: normal gait and coordination  PSYCHIATRIC: mood and affect are normal    Cardiac Testing:    No new testing.    Imaging:    No results found.    Lab Results:    Lab Results   Component Value Date    CREATININE 1.17 (H) 08/21/2019    BUN 18 08/21/2019     (L) 08/21/2019    K 4.4 08/21/2019     08/21/2019    CO2 26 08/21/2019     Lab Results   Component Value Date    CHOL 152 11/19/2018    TRIG 58 11/19/2018    HDL 77 11/19/2018     BNP (pg/mL)   Date Value   05/28/2018 374 (H)     Creatinine (mg/dL)   Date Value   08/21/2019 1.17 (H)   07/31/2019 1.23 (H)   03/20/2019 1.31 (H)   01/21/2019 1.28 (H)     Magnesium (mg/dL)   Date Value   01/21/2019 2.0     LDL Calculated (mg/dL)   Date Value   11/19/2018 63   11/13/2017 68   10/26/2016 63     Lab Results   Component Value Date    WBC 8.0 07/02/2019    HGB 11.5 (L) 07/02/2019    HCT 36.2 07/02/2019    MCV 96 07/02/2019     07/02/2019           Much or all of the text in this note was generated through the use of the Dragon Dictate voice-to-text software. Errors in spelling or words which seem out of context are unintentional. Sound alike errors, in particular, may have escaped editing.

## 2021-07-04 NOTE — PROGRESS NOTES
Progress Notes by Kevin Luna MD at 6/9/2021 10:30 AM     Author: Kevin Luna MD Service: -- Author Type: Physician    Filed: 6/9/2021 11:20 AM Encounter Date: 6/9/2021 Status: Signed    : Kevin Luna MD (Physician)             Assessment:    1. Coronary artery disease due to calcified coronary lesion     2. Dyslipidemia, goal LDL below 70     3. Essential hypertension     4. Paroxysmal atrial fibrillation (H)     5. Chronic kidney disease, stage 3         Plan:    1. Continue current cardiovascular medical therapy.  2. Consultation with the Watchman team to consider device implant (please see the AYLIN form below).  3. I explained to Zee that I will retire in August and our team will make arrangements for her to see my colleague, Dr. Kanu Stover, in 1 year.    An After Visit Summary was printed and given to the patient.    Subjective:    Zee Mireles returned for a planned annual follow up visit.    She states she lost almost all of the hair on her head last year and saw a special dermatologist who did not find a cause for this.  She has reduced her cigarette intake to 0.3 packs/day for the last few years but does not have a plan to quit.  We reviewed the importance of complete cessation of cigarette use given her history of cardiac disease.  Her annual LDL checks have shown an LDL below 70 mg/dL since 2017.  She takes rosuvastatin 3 times weekly as daily intake caused muscle and joint aches.    When we last met it was just before the Covid pandemic and we have plans for her to meet with the Watchman team but that never happened.  We discussed the risk of cardioembolism today I think her annual risk is approaching 6% without anticoagulation or the Watchman device.  She agreed to meet with that team to discuss the device.    Zee has documented nonvalvular atrial fibrillation (NVAF) and is currently not on oral anticoagulant therapy per her choice.   [id number] -VASc = 5     Indication(s) to consider non-oral anticoagulation therapy: elderly woman with a risk of falls and reluctance to take anticoagulation long term  Pt has no contra-indication to come off oral anti-coagulant therapy if LAAC device is successfully placed.     I have personally reviewed the patients chart and discussed the following with the patient/family; 1) The choices available for reducing stroke risk from atrial fibrillation, 2) Treatment options available including respective risk/benefits, and 3) What factors are most important for the patient in making their decision.  The ACC shared decision making tool https://www.cardiosmart.org/SDM/Decision-Aids/Find-Decision-Aids/Atrial-Fibrillation  was used to guide this conversation.   The patient was counseled that their decision could be made at this time or in the future if more time was needed to consider their decision.     The patient is an appropriate candidate to proceed with left atrial appendage screening and implant.       Past Medical History:    Patient Active Problem List   Diagnosis   ? Chronic bronchitis (H)   ? Essential hypertension   ? Coronary artery disease due to calcified coronary lesion   ? Paroxysmal atrial fibrillation (H)   ? DNI (do not intubate)   ? Dyslipidemia, goal LDL below 70   ? COPD (chronic obstructive pulmonary disease) (H)   ? Chronic kidney disease, stage 3   ? Alopecia areata   ? Stenosis of right carotid artery       Past Medical History:   Diagnosis Date   ? Asthma    ? Colitis    ? COPD (chronic obstructive pulmonary disease) (H)    ? Coronary artery disease due to calcified coronary lesion 11/2018    stent to LAD after NSTEMI   ? DNI (do not intubate)    ? Dyslipidemia, goal LDL below 70    ? Essential hypertension    ? GERD (gastroesophageal reflux disease)    ? NSTEMI (non-ST elevation myocardial infarction) (H) 11/2018   ? PAD (peripheral artery disease) (H)    ? Paroxysmal atrial fibrillation (H) 11/2018   ? Partial  retinal artery occlusion 6/9/2021       Past Surgical History:   Procedure Laterality Date   ? CATARACT EXTRACTION     ? COLONOSCOPY N/A 12/12/2018    COLONOSCOPY with polypectomy; Damien Denton MD;  [facility];  Service: Gastroenterology   ? CORONARY STENT PLACEMENT  11/2018    at [facility] in [place]   ? ESOPHAGOGASTRODUODENOSCOPY N/A 12/10/2018    Damien Denton MD; [facility];  Service: Gastroenterology        reports that she has been smoking cigarettes. She has a 60.00 pack-year smoking history. She has never used smokeless tobacco. She reports that she does not drink alcohol or use drugs.  Social History     Socioeconomic History   ? Marital status:      Spouse name: Not on file   ? Number of children: 2   ? Years of education: Not on file   ? Highest education level: Not on file   Occupational History   ? Occupation: LPN     Employer: RETIRED     Comment: 27 years at [employer]   Social Needs   ? Financial resource strain: Not on file   ? Food insecurity     Worry: Not on file     Inability: Not on file   ? Transportation needs     Medical: Not on file     Non-medical: Not on file   Tobacco Use   ? Smoking status: Current Every Day Smoker     Packs/day: 1.00     Years: 60.00     Pack years: 60.00     Types: Cigarettes   ? Smokeless tobacco: Never Used   ? Tobacco comment: down to 0.3 ppd since 2017   Substance and Sexual Activity   ? Alcohol use: No     Alcohol/week: 1.0 standard drinks     Types: 1 Glasses of wine per week   ? Drug use: No   ? Sexual activity: Not Currently     Partners: Male   Lifestyle   ? Physical activity     Days per week: Not on file     Minutes per session: Not on file   ? Stress: Not on file   Relationships   ? Social connections     Talks on phone: Not on file     Gets together: Not on file     Attends Hoahaoism service: Not on file     Active member of club or organization: Not on file     Attends meetings of clubs or organizations: Not on file      Relationship status: Not on file   ? Intimate partner violence     Fear of current or ex partner: Not on file     Emotionally abused: Not on file     Physically abused: Not on file     Forced sexual activity: Not on file   Other Topics Concern   ? Not on file   Social History Narrative    Zee lives with her son, Braden, and his wife.       Family History   Problem Relation Age of Onset   ? Sudden death Mother 55        no autopsy   ? Stomach cancer Father 72   ? Other Brother          in [place]   ? Ulcers Son         Gastric ulcer was perforated; had surgery. His wife passed suddenly at 49 at home in 2016.   ? Atrial fibrillation Son        Outpatient Encounter Medications as of 2021   Medication Sig Dispense Refill   ? acebutolol (SECTRAL) 400 MG capsule Take 400 mg by mouth daily.     ? acetaminophen (TYLENOL) 325 MG tablet Take 650 mg by mouth every 6 (six) hours as needed for pain.     ? albuterol (PROAIR HFA;PROVENTIL HFA;VENTOLIN HFA) 90 mcg/actuation inhaler Inhale 2 puffs every 6 (six) hours as needed for wheezing. 8.5 g 12   ? amLODIPine (NORVASC) 2.5 MG tablet 1 tablet daily.     ? aspirin 81 mg chewable tablet Chew 81 mg daily.     ? brimonidine (ALPHAGAN) 0.15 % ophthalmic solution Administer 1 drop to both eyes 3 (three) times a day.     ? dorzolamide (TRUSOPT) 2 % ophthalmic solution Administer 1 drop to both eyes 3 (three) times a day .           ? hydroCHLOROthiazide (HYDRODIURIL) 25 MG tablet 12.5 mg daily.      ? latanoprost (XALATAN) 0.005 % ophthalmic solution Administer 1 drop to both eyes at bedtime .           ? levothyroxine (SYNTHROID, LEVOTHROID) 112 MCG tablet 1 tablet daily.     ? losartan (COZAAR) 100 MG tablet Take 100 mg by mouth daily.     ? pantoprazole (PROTONIX) 40 MG tablet Take 40 mg by mouth.     ? rosuvastatin (CRESTOR) 5 MG tablet Take 1 tablet (5 mg total) by mouth 3 (three) times a week. 40 tablet 3   ? tiotropium-olodateroL (STIOLTO RESPIMAT) 2.5-2.5  "mcg/actuation Mist inhaler Inhale 2 Inhalation daily. 4 g 11   ? levothyroxine (SYNTHROID, LEVOTHROID) 125 MCG tablet Take 125 mcg by mouth Daily at 6:00 am.     ? [DISCONTINUED] DILT- mg 24 hr capsule Take 120 mg by mouth daily.       No facility-administered encounter medications on file as of 6/9/2021.        Review of Systems:                                              Objective:     /76 (Patient Site: Left Arm, Patient Position: Sitting, Cuff Size: Adult Regular)   Pulse 68   Resp 16   Ht 5' 3\" (1.6 m)   Wt 137 lb (62.1 kg)   LMP 01/27/1995   BMI 24.27 kg/m    Wt Readings from Last 5 Encounters:   06/09/21 137 lb (62.1 kg)   08/24/20 132 lb (59.9 kg)   12/16/19 132 lb (59.9 kg)   08/26/19 131 lb (59.4 kg)   08/19/19 132 lb (59.9 kg)        Physical Exam:    GENERAL APPEARANCE: alert, no apparent distress  EYES: no scleral icterus  NECK: no carotid bruits or adenopathy, jugular venous pressure is less than 5 cm  CHEST: symmetric, the lungs are clear to auscultation  CARDIOVASCULAR: regular rhythm without murmur or gallop  EXTREMITIES: no cyanosis, clubbing or edema  SKIN: no xanthelasma; she is wearing special skullcap in the setting of alopecia  NEUROLOGIC: normal gait and coordination  PSYCHIATRIC: mood and affect are normal    Cardiac Testing:    EKG: Sinus rhythm with possible left atrial enlargement and minor nonspecific ST segment changes per my personal review.    Imaging:    No results found.    Lab Results:    Lab Results   Component Value Date    CREATININE 1.19 (H) 05/11/2020    BUN 22 05/11/2020     (L) 05/11/2020    K 4.5 05/11/2020    CL 98 05/11/2020    CO2 28 05/11/2020     Lab Results   Component Value Date    CHOL 144 05/11/2020    TRIG 62 05/11/2020    HDL 73 05/11/2020     BNP (pg/mL)   Date Value   05/28/2018 374 (H)     Creatinine (mg/dL)   Date Value   05/11/2020 1.19 (H)   08/21/2019 1.17 (H)   07/31/2019 1.23 (H)   03/20/2019 1.31 (H)     Magnesium (mg/dL) "   Date Value   01/21/2019 2.0     LDL Calculated (mg/dL)   Date Value   05/11/2020 59   11/19/2018 63   11/13/2017 68     Lab Results   Component Value Date    WBC 8.0 07/02/2019    HGB 11.5 (L) 07/02/2019    HCT 36.2 07/02/2019    MCV 96 07/02/2019     07/02/2019           Much or all of the text in this note was generated through the use of the Dragon Dictate voice-to-text software. Errors in spelling or words which seem out of context are unintentional. Sound alike errors, in particular, may have escaped editing.

## 2021-07-06 VITALS
BODY MASS INDEX: 24.27 KG/M2 | DIASTOLIC BLOOD PRESSURE: 76 MMHG | HEART RATE: 68 BPM | SYSTOLIC BLOOD PRESSURE: 170 MMHG | HEIGHT: 63 IN | WEIGHT: 137 LBS | RESPIRATION RATE: 16 BRPM

## 2021-07-07 ENCOUNTER — RECORDS - HEALTHEAST (OUTPATIENT)
Dept: LAB | Facility: CLINIC | Age: 81
End: 2021-07-07

## 2021-07-07 LAB
ALBUMIN SERPL-MCNC: 3.7 G/DL (ref 3.5–5)
ALP SERPL-CCNC: 76 U/L (ref 45–120)
ALT SERPL W P-5'-P-CCNC: <9 U/L (ref 0–45)
ANION GAP SERPL CALCULATED.3IONS-SCNC: 12 MMOL/L (ref 5–18)
AST SERPL W P-5'-P-CCNC: 14 U/L (ref 0–40)
BILIRUB SERPL-MCNC: 0.4 MG/DL (ref 0–1)
BUN SERPL-MCNC: 20 MG/DL (ref 8–28)
CALCIUM SERPL-MCNC: 9 MG/DL (ref 8.5–10.5)
CHLORIDE BLD-SCNC: 95 MMOL/L (ref 98–107)
CHOLEST SERPL-MCNC: 146 MG/DL
CO2 SERPL-SCNC: 24 MMOL/L (ref 22–31)
CREAT SERPL-MCNC: 1 MG/DL (ref 0.6–1.1)
ERYTHROCYTE [DISTWIDTH] IN BLOOD BY AUTOMATED COUNT: 15.3 % (ref 11–14.5)
FASTING STATUS PATIENT QL REPORTED: NO
GFR SERPL CREATININE-BSD FRML MDRD: 53 ML/MIN/1.73M2
GLUCOSE BLD-MCNC: 104 MG/DL (ref 70–125)
HCT VFR BLD AUTO: 38.7 % (ref 35–47)
HDLC SERPL-MCNC: 70 MG/DL
HGB BLD-MCNC: 13 G/DL (ref 12–16)
LDLC SERPL CALC-MCNC: 62 MG/DL
MCH RBC QN AUTO: 31.8 PG (ref 27–34)
MCHC RBC AUTO-ENTMCNC: 33.6 G/DL (ref 32–36)
MCV RBC AUTO: 95 FL (ref 80–100)
PLATELET # BLD AUTO: 269 THOU/UL (ref 140–440)
PMV BLD AUTO: 10.2 FL (ref 8.5–12.5)
POTASSIUM BLD-SCNC: 4.6 MMOL/L (ref 3.5–5)
PROT SERPL-MCNC: 6.7 G/DL (ref 6–8)
RBC # BLD AUTO: 4.09 MILL/UL (ref 3.8–5.4)
SODIUM SERPL-SCNC: 131 MMOL/L (ref 136–145)
T4 FREE SERPL-MCNC: 1.6 NG/DL (ref 0.7–1.8)
TRIGL SERPL-MCNC: 72 MG/DL
TSH SERPL DL<=0.005 MIU/L-ACNC: 0.24 UIU/ML (ref 0.3–5)
WBC: 7.4 THOU/UL (ref 4–11)

## 2021-07-09 ENCOUNTER — COMMUNICATION - HEALTHEAST (OUTPATIENT)
Dept: CARDIOLOGY | Facility: CLINIC | Age: 81
End: 2021-07-09

## 2021-07-09 DIAGNOSIS — I48.0 PAROXYSMAL ATRIAL FIBRILLATION (H): Primary | ICD-10-CM

## 2021-07-09 NOTE — TELEPHONE ENCOUNTER
Telephone Encounter by Dayanna Gomez RN at 7/9/2021 12:11 PM     Author: Dayanna Gomez RN Service: -- Author Type: Registered Nurse    Filed: 7/9/2021 12:36 PM Encounter Date: 7/9/2021 Status: Signed    : Dayanna Gomez RN (Registered Nurse)       From: Candace Elmore PA-C  Sent: 7/9/2021  10:45 AM CDT  To: Dayanna Gomez RN    I would actually do a MEREDITH on her with her kidney numbers looking back    Candace  ----- Message -----  From: Dayanna Gomez RN  Sent: 7/9/2021  10:26 AM CDT  To: Candace Elmore PA-C    Pt had a BMP at her PCP on 7/7. Pt ok for CT?    Thanks,  Dayanna

## 2021-07-09 NOTE — TELEPHONE ENCOUNTER
Telephone Encounter by Dayanna Gomez RN at 7/9/2021 12:34 PM     Author: Dayanna Gomez RN Service: -- Author Type: Registered Nurse    Filed: 7/9/2021 12:36 PM Encounter Date: 7/9/2021 Status: Signed    : Dayanna Gomez RN (Registered Nurse)       Called pt and discussed MEREDITH. Pt agrees and understands process. Will have Yana call the pt to setup. Patient verbalizes understanding and agrees with plan. No further questions or concerns at this time.

## 2021-07-12 ENCOUNTER — TELEPHONE (OUTPATIENT)
Dept: CARDIOLOGY | Facility: CLINIC | Age: 81
End: 2021-07-12

## 2021-07-12 NOTE — TELEPHONE ENCOUNTER
From: Candace Elmore PA-C  Sent: 7/9/2021  10:45 AM CDT  To: Dayanna Gomez RN     I would actually do a MEREDITH on her with her kidney numbers looking back     Candace  ----- Message -----  From: Dayanna Gomez RN  Sent: 7/9/2021  10:26 AM CDT  To: Candace Elmore PA-C     Pt had a BMP at her PCP on 7/7. Pt ok for CT?     Thanks,  Dayanna

## 2021-07-12 NOTE — TELEPHONE ENCOUNTER
You  Yana Cosme 3 days ago     Please call the pt and setup MEREDITH. Pt understands they will need a covid test. Pt was in clinic with Maryuri 6/22/21. Pt also recently seen by their PCP per the pt. Would need to obtain a copy of that visit if used as H&P for MEREDITH.     ThanksDayanna comment    You 3 days ago        Called pt and discussed MEREDITH. Pt agrees and understands process. Will have Yana call the pt to setup. Patient verbalizes understanding and agrees with plan. No further questions or concerns at this time.

## 2021-07-14 PROBLEM — K92.1 MELENA: Status: RESOLVED | Noted: 2018-12-04 | Resolved: 2019-01-18

## 2021-07-14 PROBLEM — J44.1 COPD EXACERBATION (H): Status: RESOLVED | Noted: 2018-05-28 | Resolved: 2018-12-10

## 2021-07-14 PROBLEM — I10 HTN (HYPERTENSION): Status: RESOLVED | Noted: 2018-05-28 | Resolved: 2018-12-10

## 2021-07-21 ENCOUNTER — RECORDS - HEALTHEAST (OUTPATIENT)
Dept: ADMINISTRATIVE | Facility: CLINIC | Age: 81
End: 2021-07-21

## 2021-08-03 PROBLEM — I48.0 PAROXYSMAL ATRIAL FIBRILLATION (H): Status: RESOLVED | Noted: 2018-11-26 | Resolved: 2018-12-10

## 2021-09-23 DIAGNOSIS — J44.9 COPD (CHRONIC OBSTRUCTIVE PULMONARY DISEASE) (H): Primary | ICD-10-CM

## 2021-09-23 RX ORDER — ALBUTEROL SULFATE 90 UG/1
2 AEROSOL, METERED RESPIRATORY (INHALATION) EVERY 4 HOURS PRN
Qty: 18 G | Refills: 0 | Status: SHIPPED | OUTPATIENT
Start: 2021-09-23 | End: 2022-11-22

## 2021-11-22 ENCOUNTER — OFFICE VISIT (OUTPATIENT)
Dept: PULMONOLOGY | Facility: OTHER | Age: 81
End: 2021-11-22
Payer: COMMERCIAL

## 2021-11-22 VITALS
BODY MASS INDEX: 22.85 KG/M2 | WEIGHT: 129 LBS | OXYGEN SATURATION: 97 % | HEART RATE: 66 BPM | SYSTOLIC BLOOD PRESSURE: 160 MMHG | DIASTOLIC BLOOD PRESSURE: 62 MMHG

## 2021-11-22 DIAGNOSIS — J43.8 OTHER EMPHYSEMA (H): Primary | ICD-10-CM

## 2021-11-22 PROCEDURE — 99214 OFFICE O/P EST MOD 30 MIN: CPT | Performed by: INTERNAL MEDICINE

## 2021-11-22 RX ORDER — TIOTROPIUM BROMIDE AND OLODATEROL 3.124; 2.736 UG/1; UG/1
2 SPRAY, METERED RESPIRATORY (INHALATION) DAILY
Qty: 4 G | Refills: 11 | Status: SHIPPED | OUTPATIENT
Start: 2021-11-22 | End: 2021-12-22

## 2021-11-22 NOTE — PROGRESS NOTES
Assessment and Plan:Zee Mireles is a 81 year old female with a past medical history significant for COPD who presents to clinic today for follow up.  She is doing well on Stiolto therapy and very intermittent albuterol use.  She is still smoking, and I suspect her cough and mucous are mostly related to this and not likely to respond to aggressive increases in medication.  She is due for a COVID booster and flu shot which she assures me she will get tomorrow.    1) COPD - stable with no exacerbations.  Continue Stiolto and as needed albuterol.  2) Cough - likely from tobacco abuse, encourage her to try to quit, but she continues to show signs she is not ready to quit  3) RTC in 1 year.   4) Need for immunization - she needs both influenza and a COVID booster        CCx: copd    HPI:Ms. Mireles is a 81 year old female with a history of copd who presents for a one year follow up.  Since I saw her last time, she does not have any new complaints, nor any pulmonary setbacks.  Her main physical limitation is poor circulation in her legs and she doesn't really get short of breath. She does cough up some mucous from time to time.  She takes her Stiolto everyday without issue, and her albuterol maybe once a week.  She is using social distancing and has received her first two covid shots, she is headed to [facility] tomorrow for her booster.    ROS:  Review of Systems - History obtained from the patient  General ROS: negative  Psychological ROS: negative  ENT ROS: negative  Allergy and Immunology ROS: negative  Endocrine ROS: negative  Respiratory ROS: positive for - cough and sputum changes  negative for - pleuritic pain or shortness of breath  Cardiovascular ROS: no chest pain or palpitations  Gastrointestinal ROS: no abdominal pain, change in bowel habits, or black or bloody stools  Genito-Urinary ROS: no dysuria, trouble voiding, or hematuria  Musculoskeletal ROS: negative  Neurological ROS: no TIA or stroke  symptoms  Dermatological ROS: negative      Current Meds:  Current Outpatient Medications   Medication Sig Dispense Refill     acebutolol (SECTRAL) 400 MG capsule Take 400 mg by mouth daily as needed (Palpitations) = extra dose in addition to scheduled daily dose       acetaminophen (TYLENOL) 500 MG tablet Take 1,000 mg by mouth every 6 hours as needed for mild pain       albuterol (PROAIR HFA/PROVENTIL HFA/VENTOLIN HFA) 108 (90 Base) MCG/ACT inhaler Inhale 2 puffs into the lungs every 4 hours as needed for shortness of breath / dyspnea or wheezing OFFICE VISIT NEEDED FOR ANY FURTHER REFILLS 18 g 0     amLODIPine (NORVASC) 2.5 MG tablet Take 1 tablet (2.5 mg) by mouth daily 30 tablet 11     aspirin 81 MG EC tablet Take 1 tablet (81 mg) by mouth       brimonidine (ALPHAGAN-P) 0.15 % ophthalmic solution Place 1 drop into both eyes 3 times daily       diltiazem ER (DILT-XR) 120 MG 24 hr capsule Take 1 capsule (120 mg) by mouth daily 30 capsule 0     dorzolamide (TRUSOPT) 2 % ophthalmic solution Place 1 drop into both eyes 3 times daily       hydrochlorothiazide (HYDRODIURIL) 25 MG tablet Take 0.5 tablets (12.5 mg) by mouth daily 30 tablet 0     imiquimod (ALDARA) 5 % external cream APPLY TOPICALLY TO ENTIRE FOREARMS ONCE DAILY       latanoprost (XALATAN) 0.005 % ophthalmic solution Place 1 drop into both eyes every evening        levothyroxine (SYNTHROID/LEVOTHROID) 112 MCG tablet 1 tablet daily       losartan (COZAAR) 100 MG tablet Take 1 tablet (100 mg) by mouth daily 30 tablet 0     pantoprazole (PROTONIX) 40 MG EC tablet Take 1 tablet (40 mg) by mouth daily 90 tablet 1     rosuvastatin (CRESTOR) 5 MG tablet TK 1 T PO THREE TIMES A WEEK m/w/f  0     tiotropium-olodaterol (STIOLTO RESPIMAT) 2.5-2.5 MCG/ACT AERS Inhale 2 puffs into the lungs daily 4 g 11     triamcinolone (KENALOG) 0.025 % external ointment APPLY TWICE DAILY TO ARMS TAPER TO EVERY DAY         Labs:  @melita@  Lab Results   Component Value Date    WBC  7.4 07/07/2021    ANEU 4.2 08/14/2019    HGB 13.0 07/07/2021    HCT 38.7 07/07/2021     07/07/2021     (L) 07/07/2021    POTASSIUM 4.6 07/07/2021    CHLORIDE 95 (L) 07/07/2021    CO2 24 07/07/2021     07/07/2021    BUN 20 07/07/2021    CR 1.00 07/07/2021    MAG 2.0 01/21/2019    INR 1.06 08/14/2019    BILITOTAL 0.4 07/07/2021    AST 14 07/07/2021    ALT <9 07/07/2021    ALKPHOS 76 07/07/2021    PROTTOTAL 6.7 07/07/2021    ALBUMIN 3.7 07/07/2021       I have personally reviewed all pertinent imaging studies and PFT results unless otherwise noted.    Imaging studies:  No results found.      Physical Exam:  BP (!) 160/62   Pulse 66   Wt 58.5 kg (129 lb)   SpO2 97%   BMI 22.85 kg/m    General - Well nourished  Ears/Mouth -  Deferred given mask use during pandemic  Neck - no cervical lymphadenopathy  Lungs - Clear to ausculation bilaterally   CVS - regular rhythm with no murmurs, rubs or gallups  Abdomen - soft, NT, ND, NABS  Ext - no cyanosis, clubbing or edema  Skin - no rash  Psychology - alert and oriented, answers appropriate        Electronically signed by:    Rolando Cantor MD PhD  [facility]

## 2021-11-22 NOTE — PATIENT INSTRUCTIONS
1) You look to be doing very well  2) I will refill your inhaled medication  3) Try your best to cut back on the smoking  4) Try to get some exercise  5) Thank you for getting your vaccines ASAP

## 2022-01-20 ENCOUNTER — LAB REQUISITION (OUTPATIENT)
Dept: LAB | Facility: CLINIC | Age: 82
End: 2022-01-20

## 2022-01-20 DIAGNOSIS — I10 ESSENTIAL (PRIMARY) HYPERTENSION: ICD-10-CM

## 2022-01-20 DIAGNOSIS — Z87.19 PERSONAL HISTORY OF OTHER DISEASES OF THE DIGESTIVE SYSTEM: ICD-10-CM

## 2022-01-20 DIAGNOSIS — E03.9 HYPOTHYROIDISM, UNSPECIFIED: ICD-10-CM

## 2022-01-20 LAB
ERYTHROCYTE [DISTWIDTH] IN BLOOD BY AUTOMATED COUNT: 15.7 % (ref 10–15)
HCT VFR BLD AUTO: 41.3 % (ref 35–47)
HGB BLD-MCNC: 13.7 G/DL (ref 11.7–15.7)
MCH RBC QN AUTO: 32.5 PG (ref 26.5–33)
MCHC RBC AUTO-ENTMCNC: 33.2 G/DL (ref 31.5–36.5)
MCV RBC AUTO: 98 FL (ref 78–100)
PLATELET # BLD AUTO: 263 10E3/UL (ref 150–450)
RBC # BLD AUTO: 4.22 10E6/UL (ref 3.8–5.2)
TSH SERPL DL<=0.005 MIU/L-ACNC: 0.54 UIU/ML (ref 0.3–5)
WBC # BLD AUTO: 7.6 10E3/UL (ref 4–11)

## 2022-01-20 PROCEDURE — 84443 ASSAY THYROID STIM HORMONE: CPT | Performed by: FAMILY MEDICINE

## 2022-01-20 PROCEDURE — 80048 BASIC METABOLIC PNL TOTAL CA: CPT | Performed by: FAMILY MEDICINE

## 2022-01-20 PROCEDURE — 85027 COMPLETE CBC AUTOMATED: CPT | Performed by: FAMILY MEDICINE

## 2022-01-21 LAB
ANION GAP SERPL CALCULATED.3IONS-SCNC: 15 MMOL/L (ref 5–18)
BUN SERPL-MCNC: 17 MG/DL (ref 8–28)
CALCIUM SERPL-MCNC: 9.4 MG/DL (ref 8.5–10.5)
CHLORIDE BLD-SCNC: 94 MMOL/L (ref 98–107)
CO2 SERPL-SCNC: 22 MMOL/L (ref 22–31)
CREAT SERPL-MCNC: 0.86 MG/DL (ref 0.6–1.1)
GFR SERPL CREATININE-BSD FRML MDRD: 68 ML/MIN/1.73M2
GLUCOSE BLD-MCNC: 106 MG/DL (ref 70–125)
POTASSIUM BLD-SCNC: 4.2 MMOL/L (ref 3.5–5)
SODIUM SERPL-SCNC: 131 MMOL/L (ref 136–145)

## 2022-06-17 ENCOUNTER — OFFICE VISIT (OUTPATIENT)
Dept: DERMATOLOGY | Facility: CLINIC | Age: 82
End: 2022-06-17
Payer: COMMERCIAL

## 2022-06-17 DIAGNOSIS — L30.9 DERMATITIS: ICD-10-CM

## 2022-06-17 PROCEDURE — 99203 OFFICE O/P NEW LOW 30 MIN: CPT | Mod: 25 | Performed by: PHYSICIAN ASSISTANT

## 2022-06-17 PROCEDURE — 11102 TANGNTL BX SKIN SINGLE LES: CPT | Performed by: PHYSICIAN ASSISTANT

## 2022-06-17 PROCEDURE — 88312 SPECIAL STAINS GROUP 1: CPT | Performed by: PATHOLOGY

## 2022-06-17 PROCEDURE — 88305 TISSUE EXAM BY PATHOLOGIST: CPT | Performed by: PATHOLOGY

## 2022-06-17 RX ORDER — TRIAMCINOLONE ACETONIDE 1 MG/G
CREAM TOPICAL
Qty: 453.6 G | Refills: 5 | Status: SHIPPED | OUTPATIENT
Start: 2022-06-17 | End: 2022-11-22

## 2022-06-17 ASSESSMENT — PAIN SCALES - GENERAL: PAINLEVEL: NO PAIN (0)

## 2022-06-17 NOTE — PATIENT INSTRUCTIONS
Wound Care Instructions     FOR SUPERFICIAL WOUNDS     [facility] [phone]    [facility] [phone]                       AFTER 24 HOURS YOU SHOULD REMOVE THE BANDAGE AND BEGIN DAILY DRESSING CHANGES AS FOLLOWS:     1) Remove Dressing.     2) Clean and dry the area with tap water using a Q-tip or sterile gauze pad.     3) Apply Vaseline, Aquaphor, Polysporin ointment or Bacitracin ointment over entire wound.  Do NOT use Neosporin ointment.     4) Cover the wound with a band-aid, or a sterile non-stick gauze pad and micropore paper tape      REPEAT THESE INSTRUCTIONS AT LEAST ONCE A DAY UNTIL THE WOUND HAS COMPLETELY HEALED.    It is an old wives tale that a wound heals better when it is exposed to air and allowed to dry out. The wound will heal faster with a better cosmetic result if it is kept moist with ointment and covered with a bandage.    **Do not let the wound dry out.**      Supplies Needed:      *Cotton tipped applicators (Q-tips)    *Polysporin Ointment or Bacitracin Ointment (NOT NEOSPORIN)    *Band-aids or non-stick gauze pads and micropore paper tape.      PATIENT INFORMATION:    During the healing process you will notice a number of changes. All wounds develop a small halo of redness surrounding the wound.  This means healing is occurring. Severe itching with extensive redness usually indicates sensitivity to the ointment or bandage tape used to dress the wound.  You should call our office if this develops.      Swelling  and/or discoloration around your surgical site is common, particularly when performed around the eye.    All wounds normally drain.  The larger the wound the more drainage there will be.  After 7-10 days, you will notice the wound beginning to shrink and new skin will begin to grow.  The wound is healed when you can see skin has formed over the entire area.  A healed wound has a healthy, shiny look to the surface and is red to dark pink in color  to normalize.  Wounds may take approximately 4-6 weeks to heal.  Larger wounds may take 6-8 weeks.  After the wound is healed you may discontinue dressing changes.    You may experience a sensation of tightness as your wound heals. This is normal and will gradually subside.    Your healed wound may be sensitive to temperature changes. This sensitivity improves with time, but if you re having a lot of discomfort, try to avoid temperature extremes.    Patients frequently experience itching after their wound appears to have healed because of the continue healing under the skin.  Plain Vaseline will help relieve the itching.        POSSIBLE COMPLICATIONS    BLEEDING:    Leave the bandage in place.  Use tightly rolled up gauze or a cloth to apply direct pressure over the bandage for 30  minutes.  Reapply pressure for an additional 30 minutes if necessary  Use additional gauze and tape to maintain pressure once the bleeding has stopped.

## 2022-06-17 NOTE — LETTER
"    6/17/2022         RE: Zee Mireles  [address] [facility] [address]        Dear Colleague,    Thank you for referring your patient, Zee Mireles, to the [facility]. Please see a copy of my visit note below.    Zee Mireles is an extremely pleasant 82 year old year old female patient here today for rash on arms and legs. She notes has is itchy. Present for past 1.5 years. [facility] She reports that biopsy came back as a wart and then was treated with efudex. She notes it didn't help. She feels like rash worsened. She is currently using cortisone with mild improvements.  Patient has no other skin complaints today.  Remainder of the HPI, Meds, PMH, Allergies, FH, and SH was reviewed in chart.    Past Medical History:   Diagnosis Date     Asthma      Chest pain 11/2018    burning sensation - indigestion     Colitis      COPD (chronic obstructive pulmonary disease) (H)      COPD (chronic obstructive pulmonary disease) (H)      Coronary artery disease due to calcified coronary lesion 11/2018    stent to LAD after NSTEMI     DNI (do not intubate)      Dyslipidemia, goal LDL below 70      Essential hypertension      GERD (gastroesophageal reflux disease)      Glaucoma      HTN (hypertension)      Hyperlipidemia      Hypothyroid      NSTEMI (non-ST elevation myocardial infarction) (H) 11/2018     PAD (peripheral artery disease) (H)      Paroxysmal atrial fibrillation (H) 11/2018     Paroxysmal atrial fibrillation (H) 11/2018     Partial retinal artery occlusion 6/9/2021     Smoker     ongoing - \"6-8 cigarettes QD\"       Past Surgical History:   Procedure Laterality Date     CATARACT EXTRACTION       cataract removal       COLONOSCOPY  12/2018    [facility]     COLONOSCOPY N/A 12/12/2018    COLONOSCOPY with polypectomy; Damien Denton MD;  [facility];  Service: Gastroenterology     CORONARY STENT PLACEMENT  11/2018    at [facility] in " [place]     CV BALLOON DILATION AND/OR STENT PLACEMENT OF VESSEL  2018    stent to LAD     ESOPHAGOSCOPY, GASTROSCOPY, DUODENOSCOPY (EGD), COMBINED  2018    [facility]     ESOPHAGOSCOPY, GASTROSCOPY, DUODENOSCOPY (EGD), COMBINED N/A 8/15/2019    Procedure: ESOPHAGOGASTRODUODENOSCOPY, WITH BIOPSY;  Surgeon: Demario Clayton DO;  Location: [facility]     ESOPHAGOSCOPY, GASTROSCOPY, DUODENOSCOPY (EGD), COMBINED N/A 12/10/2018    Damien Denton MD; [facility];  Service: Gastroenterology     IR ABDOMINAL AORTOGRAM  7/3/2006     IR EXTREMITY ANGIOGRAM BILATERAL  7/3/2006     IR MISCELLANEOUS PROCEDURE  7/3/2006     IR MISCELLANEOUS PROCEDURE  2006        Family History   Problem Relation Age of Onset     Sudden Death Mother 55.00        no autopsy     Goiter Mother      Stomach Cancer Father 72.00     No Known Problems Brother      Other - See Comments Brother          in [place] War     Ulcers Son         Gastric ulcer was perforated; had surgery. His wife passed suddenly at 49 at home in .     Atrial fibrillation Son        Social History     Socioeconomic History     Marital status:      Spouse name: Not on file     Number of children: 2     Years of education: Not on file     Highest education level: Not on file   Occupational History     Not on file   Tobacco Use     Smoking status: Current Every Day Smoker     Packs/day: 1.00     Years: 60.00     Pack years: 60.00     Types: Cigarettes, Cigarettes, Cigarettes     Smokeless tobacco: Never Used     Tobacco comment: down to 0.3 ppd since 2017   Substance and Sexual Activity     Alcohol use: No     Alcohol/week: 1.0 standard drink     Drug use: No     Sexual activity: Not Currently     Partners: Male   Other Topics Concern     Not on file   Social History Narrative    Zee lives with her son, Braden, and his wife.     Social Determinants of Health     Financial Resource Strain: Not on file   Food Insecurity: Not on file   Transportation  Needs: Not on file   Physical Activity: Not on file   Stress: Not on file   Social Connections: Not on file   Intimate Partner Violence: Not on file   Housing Stability: Not on file       Outpatient Encounter Medications as of 6/17/2022   Medication Sig Dispense Refill     acebutolol (SECTRAL) 400 MG capsule Take 400 mg by mouth daily as needed (Palpitations) = extra dose in addition to scheduled daily dose       acetaminophen (TYLENOL) 500 MG tablet Take 1,000 mg by mouth every 6 hours as needed for mild pain       albuterol (PROAIR HFA/PROVENTIL HFA/VENTOLIN HFA) 108 (90 Base) MCG/ACT inhaler Inhale 2 puffs into the lungs every 4 hours as needed for shortness of breath / dyspnea or wheezing OFFICE VISIT NEEDED FOR ANY FURTHER REFILLS 18 g 0     amLODIPine (NORVASC) 2.5 MG tablet Take 1 tablet (2.5 mg) by mouth daily 30 tablet 11     aspirin 81 MG EC tablet Take 1 tablet (81 mg) by mouth       brimonidine (ALPHAGAN-P) 0.15 % ophthalmic solution Place 1 drop into both eyes 3 times daily       diltiazem ER (DILT-XR) 120 MG 24 hr capsule Take 1 capsule (120 mg) by mouth daily 30 capsule 0     dorzolamide (TRUSOPT) 2 % ophthalmic solution Place 1 drop into both eyes 3 times daily       hydrochlorothiazide (HYDRODIURIL) 25 MG tablet Take 0.5 tablets (12.5 mg) by mouth daily 30 tablet 0     imiquimod (ALDARA) 5 % external cream APPLY TOPICALLY TO ENTIRE FOREARMS ONCE DAILY       latanoprost (XALATAN) 0.005 % ophthalmic solution Place 1 drop into both eyes every evening        levothyroxine (SYNTHROID/LEVOTHROID) 112 MCG tablet 1 tablet daily       losartan (COZAAR) 100 MG tablet Take 1 tablet (100 mg) by mouth daily 30 tablet 0     pantoprazole (PROTONIX) 40 MG EC tablet Take 1 tablet (40 mg) by mouth daily 90 tablet 1     rosuvastatin (CRESTOR) 5 MG tablet TK 1 T PO THREE TIMES A WEEK m/w/f  0     triamcinolone (KENALOG) 0.025 % external ointment APPLY TWICE DAILY TO ARMS TAPER TO EVERY DAY       triamcinolone (KENALOG)  0.1 % external cream Apply twice to three times daily as needed on arms and legs for next 4 weeks. 453.6 g 5     No facility-administered encounter medications on file as of 6/17/2022.             O:   NAD, WDWN, Alert & Oriented, Mood & Affect wnl, Vitals stable   Here today alone   There were no vitals taken for this visit.   General appearance normal   Vitals stable   Alert, oriented and in no acute distress     Pink scaly flat topped papules and plaques on arms, legs       Eyes: Conjunctivae/lids:Normal     ENT: Lips: normal    MSK:Normal    Pulm: Breathing Normal    Neuro/Psych: Orientation:Alert and Orientedx3 ; Mood/Affect:normal   A/P:  1. R/O hypertrophic lichen planus   Biopsied left forearm   TANGENTIAL BIOPSY SENT OUT:  After consent, anesthesia with LEC and prep, tangential excision performed and specimen sent out for permanent section histology.  No complications and routine wound care. Patient told to call our office in 1-2 weeks for result.      If biopsy comes back consistent with lichen planus, recommend intralesional steroid injections.   Apply tac as needed.         Again, thank you for allowing me to participate in the care of your patient.        Sincerely,        Melody Pan PA-C

## 2022-06-17 NOTE — NURSING NOTE
Chief Complaint   Patient presents with     Derm Problem     Bumps on arms and legs, dry callous on feet       There were no vitals filed for this visit.  Wt Readings from Last 1 Encounters:   11/22/21 58.5 kg (129 lb)       Yue Farrell LPN .................6/17/2022

## 2022-06-19 NOTE — PROGRESS NOTES
"Zee Mireles is an extremely pleasant 82 year old year old female patient here today for rash on arms and legs. She notes has is itchy. Present for past 1.5 years. [facility] She reports that biopsy came back as a wart and then was treated with efudex. She notes it didn't help. She feels like rash worsened. She is currently using cortisone with mild improvements.  Patient has no other skin complaints today.  Remainder of the HPI, Meds, PMH, Allergies, FH, and SH was reviewed in chart.    Past Medical History:   Diagnosis Date     Asthma      Chest pain 11/2018    burning sensation - indigestion     Colitis      COPD (chronic obstructive pulmonary disease) (H)      COPD (chronic obstructive pulmonary disease) (H)      Coronary artery disease due to calcified coronary lesion 11/2018    stent to LAD after NSTEMI     DNI (do not intubate)      Dyslipidemia, goal LDL below 70      Essential hypertension      GERD (gastroesophageal reflux disease)      Glaucoma      HTN (hypertension)      Hyperlipidemia      Hypothyroid      NSTEMI (non-ST elevation myocardial infarction) (H) 11/2018     PAD (peripheral artery disease) (H)      Paroxysmal atrial fibrillation (H) 11/2018     Paroxysmal atrial fibrillation (H) 11/2018     Partial retinal artery occlusion 6/9/2021     Smoker     ongoing - \"6-8 cigarettes QD\"       Past Surgical History:   Procedure Laterality Date     CATARACT EXTRACTION       cataract removal       COLONOSCOPY  12/2018    [facility]     COLONOSCOPY N/A 12/12/2018    COLONOSCOPY with polypectomy; Damien Denton MD;  [facility];  Service: Gastroenterology     CORONARY STENT PLACEMENT  11/2018    at [facility] in [facility] BALLOON DILATION AND/OR STENT PLACEMENT OF VESSEL  11/2018    stent to LAD     ESOPHAGOSCOPY, GASTROSCOPY, DUODENOSCOPY (EGD), COMBINED  12/2018    [facility]     ESOPHAGOSCOPY, GASTROSCOPY, DUODENOSCOPY (EGD), COMBINED N/A 8/15/2019    " Procedure: ESOPHAGOGASTRODUODENOSCOPY, WITH BIOPSY;  Surgeon: Demario Clayton DO;  Location: [facility]     ESOPHAGOSCOPY, GASTROSCOPY, DUODENOSCOPY (EGD), COMBINED N/A 12/10/2018    Damien Denton MD; [facility];  Service: Gastroenterology     IR ABDOMINAL AORTOGRAM  7/3/2006     IR EXTREMITY ANGIOGRAM BILATERAL  7/3/2006     IR MISCELLANEOUS PROCEDURE  7/3/2006     IR MISCELLANEOUS PROCEDURE  2006        Family History   Problem Relation Age of Onset     Sudden Death Mother 55.00        no autopsy     Goiter Mother      Stomach Cancer Father 72.00     No Known Problems Brother      Other - See Comments Brother          in [place] War     Ulcers Son         Gastric ulcer was perforated; had surgery. His wife passed suddenly at 49 at home in 2016.     Atrial fibrillation Son        Social History     Socioeconomic History     Marital status:      Spouse name: Not on file     Number of children: 2     Years of education: Not on file     Highest education level: Not on file   Occupational History     Not on file   Tobacco Use     Smoking status: Current Every Day Smoker     Packs/day: 1.00     Years: 60.00     Pack years: 60.00     Types: Cigarettes, Cigarettes, Cigarettes     Smokeless tobacco: Never Used     Tobacco comment: down to 0.3 ppd since 2017   Substance and Sexual Activity     Alcohol use: No     Alcohol/week: 1.0 standard drink     Drug use: No     Sexual activity: Not Currently     Partners: Male   Other Topics Concern     Not on file   Social History Narrative    Zee lives with her son, Braden, and his wife.     Social Determinants of Health     Financial Resource Strain: Not on file   Food Insecurity: Not on file   Transportation Needs: Not on file   Physical Activity: Not on file   Stress: Not on file   Social Connections: Not on file   Intimate Partner Violence: Not on file   Housing Stability: Not on file       Outpatient Encounter Medications as of 2022   Medication  Sig Dispense Refill     acebutolol (SECTRAL) 400 MG capsule Take 400 mg by mouth daily as needed (Palpitations) = extra dose in addition to scheduled daily dose       acetaminophen (TYLENOL) 500 MG tablet Take 1,000 mg by mouth every 6 hours as needed for mild pain       albuterol (PROAIR HFA/PROVENTIL HFA/VENTOLIN HFA) 108 (90 Base) MCG/ACT inhaler Inhale 2 puffs into the lungs every 4 hours as needed for shortness of breath / dyspnea or wheezing OFFICE VISIT NEEDED FOR ANY FURTHER REFILLS 18 g 0     amLODIPine (NORVASC) 2.5 MG tablet Take 1 tablet (2.5 mg) by mouth daily 30 tablet 11     aspirin 81 MG EC tablet Take 1 tablet (81 mg) by mouth       brimonidine (ALPHAGAN-P) 0.15 % ophthalmic solution Place 1 drop into both eyes 3 times daily       diltiazem ER (DILT-XR) 120 MG 24 hr capsule Take 1 capsule (120 mg) by mouth daily 30 capsule 0     dorzolamide (TRUSOPT) 2 % ophthalmic solution Place 1 drop into both eyes 3 times daily       hydrochlorothiazide (HYDRODIURIL) 25 MG tablet Take 0.5 tablets (12.5 mg) by mouth daily 30 tablet 0     imiquimod (ALDARA) 5 % external cream APPLY TOPICALLY TO ENTIRE FOREARMS ONCE DAILY       latanoprost (XALATAN) 0.005 % ophthalmic solution Place 1 drop into both eyes every evening        levothyroxine (SYNTHROID/LEVOTHROID) 112 MCG tablet 1 tablet daily       losartan (COZAAR) 100 MG tablet Take 1 tablet (100 mg) by mouth daily 30 tablet 0     pantoprazole (PROTONIX) 40 MG EC tablet Take 1 tablet (40 mg) by mouth daily 90 tablet 1     rosuvastatin (CRESTOR) 5 MG tablet TK 1 T PO THREE TIMES A WEEK m/w/f  0     triamcinolone (KENALOG) 0.025 % external ointment APPLY TWICE DAILY TO ARMS TAPER TO EVERY DAY       triamcinolone (KENALOG) 0.1 % external cream Apply twice to three times daily as needed on arms and legs for next 4 weeks. 453.6 g 5     No facility-administered encounter medications on file as of 6/17/2022.             O:   NAD, WDWN, Alert & Oriented, Mood & Affect wnl,  Vitals stable   Here today alone   There were no vitals taken for this visit.   General appearance normal   Vitals stable   Alert, oriented and in no acute distress     Pink scaly flat topped papules and plaques on arms, legs       Eyes: Conjunctivae/lids:Normal     ENT: Lips: normal    MSK:Normal    Pulm: Breathing Normal    Neuro/Psych: Orientation:Alert and Orientedx3 ; Mood/Affect:normal   A/P:  1. R/O hypertrophic lichen planus   Biopsied left forearm   TANGENTIAL BIOPSY SENT OUT:  After consent, anesthesia with LEC and prep, tangential excision performed and specimen sent out for permanent section histology.  No complications and routine wound care. Patient told to call our office in 1-2 weeks for result.      If biopsy comes back consistent with lichen planus, recommend intralesional steroid injections.   Apply tac as needed.

## 2022-06-22 LAB
PATH REPORT.COMMENTS IMP SPEC: NORMAL
PATH REPORT.COMMENTS IMP SPEC: NORMAL
PATH REPORT.FINAL DX SPEC: NORMAL
PATH REPORT.GROSS SPEC: NORMAL
PATH REPORT.MICROSCOPIC SPEC OTHER STN: NORMAL
PATH REPORT.RELEVANT HX SPEC: NORMAL

## 2022-06-24 ENCOUNTER — TELEPHONE (OUTPATIENT)
Dept: DERMATOLOGY | Facility: CLINIC | Age: 82
End: 2022-06-24

## 2022-06-24 NOTE — TELEPHONE ENCOUNTER
Called patient back and gave results. Patient was scheduled for injection with Pelon Farrell LPN

## 2022-06-24 NOTE — TELEPHONE ENCOUNTER
[facility] Call Center    Phone Message    May a detailed message be left on voicemail: no     Reason for Call: Other: Please call Zee/ Pt back about results.  Call [phone]     Action Taken: Message routed to:  [facility]    Travel Screening: Not Applicable

## 2022-07-08 ENCOUNTER — OFFICE VISIT (OUTPATIENT)
Dept: DERMATOLOGY | Facility: CLINIC | Age: 82
End: 2022-07-08
Payer: COMMERCIAL

## 2022-07-08 DIAGNOSIS — L30.9 DERMATITIS: Primary | ICD-10-CM

## 2022-07-08 DIAGNOSIS — L28.1 PRURIGO NODULARIS: ICD-10-CM

## 2022-07-08 PROCEDURE — 17110 DESTRUCTION B9 LES UP TO 14: CPT | Performed by: PHYSICIAN ASSISTANT

## 2022-07-08 PROCEDURE — 11900 INJECT SKIN LESIONS </W 7: CPT | Mod: 59 | Performed by: PHYSICIAN ASSISTANT

## 2022-07-08 PROCEDURE — 99207 PR DROP WITH A PROCEDURE: CPT | Performed by: PHYSICIAN ASSISTANT

## 2022-07-08 ASSESSMENT — PAIN SCALES - GENERAL: PAINLEVEL: NO PAIN (0)

## 2022-07-08 NOTE — LETTER
"    7/8/2022         RE: Zee Mireles  [address] [facility] [address]        Dear Colleague,    Thank you for referring your patient, Zee Mireles, to the [facility]. Please see a copy of my visit note below.    Zee Mireles is an extremely pleasant 82 year old year old female patient here today for follow-up. She had biopsy lov which was consistent with prurigo nodularis. She  Is here today for il kenalog injections. Patient has no other skin complaints today.  Remainder of the HPI, Meds, PMH, Allergies, FH, and SH was reviewed in chart.    Past Medical History:   Diagnosis Date     Asthma      Chest pain 11/2018    burning sensation - indigestion     Colitis      COPD (chronic obstructive pulmonary disease) (H)      COPD (chronic obstructive pulmonary disease) (H)      Coronary artery disease due to calcified coronary lesion 11/2018    stent to LAD after NSTEMI     DNI (do not intubate)      Dyslipidemia, goal LDL below 70      Essential hypertension      GERD (gastroesophageal reflux disease)      Glaucoma      HTN (hypertension)      Hyperlipidemia      Hypothyroid      NSTEMI (non-ST elevation myocardial infarction) (H) 11/2018     PAD (peripheral artery disease) (H)      Paroxysmal atrial fibrillation (H) 11/2018     Paroxysmal atrial fibrillation (H) 11/2018     Partial retinal artery occlusion 6/9/2021     Smoker     ongoing - \"6-8 cigarettes QD\"       Past Surgical History:   Procedure Laterality Date     CATARACT EXTRACTION       cataract removal       COLONOSCOPY  12/2018    [facility]     COLONOSCOPY N/A 12/12/2018    COLONOSCOPY with polypectomy; Damien Denton MD;  [facility];  Service: Gastroenterology     CORONARY STENT PLACEMENT  11/2018    at [facility] in [place]     CV BALLOON DILATION AND/OR STENT PLACEMENT OF VESSEL  11/2018    stent to LAD     ESOPHAGOSCOPY, GASTROSCOPY, DUODENOSCOPY (EGD), COMBINED  12/2018    [facility]     " ESOPHAGOSCOPY, GASTROSCOPY, DUODENOSCOPY (EGD), COMBINED N/A 8/15/2019    Procedure: ESOPHAGOGASTRODUODENOSCOPY, WITH BIOPSY;  Surgeon: Demario Clayton DO;  Location: [facility]     ESOPHAGOSCOPY, GASTROSCOPY, DUODENOSCOPY (EGD), COMBINED N/A 12/10/2018    Damien Denton MD; [facility];  Service: Gastroenterology     IR ABDOMINAL AORTOGRAM  7/3/2006     IR EXTREMITY ANGIOGRAM BILATERAL  7/3/2006     IR MISCELLANEOUS PROCEDURE  7/3/2006     IR MISCELLANEOUS PROCEDURE  2006        Family History   Problem Relation Age of Onset     Sudden Death Mother 55.00        no autopsy     Goiter Mother      Stomach Cancer Father 72.00     No Known Problems Brother      Other - See Comments Brother          in [place] War     Ulcers Son         Gastric ulcer was perforated; had surgery. His wife passed suddenly at 49 at home in 2016.     Atrial fibrillation Son        Social History     Socioeconomic History     Marital status:      Spouse name: Not on file     Number of children: 2     Years of education: Not on file     Highest education level: Not on file   Occupational History     Not on file   Tobacco Use     Smoking status: Current Every Day Smoker     Packs/day: 1.00     Years: 60.00     Pack years: 60.00     Types: Cigarettes, Cigarettes, Cigarettes     Smokeless tobacco: Never Used     Tobacco comment: down to 0.3 ppd since 2017   Substance and Sexual Activity     Alcohol use: No     Alcohol/week: 1.0 standard drink     Drug use: No     Sexual activity: Not Currently     Partners: Male   Other Topics Concern     Not on file   Social History Narrative    Zee lives with her son, Braden, and his wife.     Social Determinants of Health     Financial Resource Strain: Not on file   Food Insecurity: Not on file   Transportation Needs: Not on file   Physical Activity: Not on file   Stress: Not on file   Social Connections: Not on file   Intimate Partner Violence: Not on file   Housing Stability: Not on  file       Outpatient Encounter Medications as of 2022   Medication Sig Dispense Refill     acebutolol (SECTRAL) 400 MG capsule Take 400 mg by mouth daily as needed (Palpitations) = extra dose in addition to scheduled daily dose       acetaminophen (TYLENOL) 500 MG tablet Take 1,000 mg by mouth every 6 hours as needed for mild pain       albuterol (PROAIR HFA/PROVENTIL HFA/VENTOLIN HFA) 108 (90 Base) MCG/ACT inhaler Inhale 2 puffs into the lungs every 4 hours as needed for shortness of breath / dyspnea or wheezing OFFICE VISIT NEEDED FOR ANY FURTHER REFILLS 18 g 0     amLODIPine (NORVASC) 2.5 MG tablet Take 1 tablet (2.5 mg) by mouth daily 30 tablet 11     aspirin 81 MG EC tablet Take 1 tablet (81 mg) by mouth       brimonidine (ALPHAGAN-P) 0.15 % ophthalmic solution Place 1 drop into both eyes 3 times daily       diltiazem ER (DILT-XR) 120 MG 24 hr capsule Take 1 capsule (120 mg) by mouth daily 30 capsule 0     dorzolamide (TRUSOPT) 2 % ophthalmic solution Place 1 drop into both eyes 3 times daily       hydrochlorothiazide (HYDRODIURIL) 25 MG tablet Take 0.5 tablets (12.5 mg) by mouth daily 30 tablet 0     imiquimod (ALDARA) 5 % external cream APPLY TOPICALLY TO ENTIRE FOREARMS ONCE DAILY       latanoprost (XALATAN) 0.005 % ophthalmic solution Place 1 drop into both eyes every evening        levothyroxine (SYNTHROID/LEVOTHROID) 112 MCG tablet 1 tablet daily       losartan (COZAAR) 100 MG tablet Take 1 tablet (100 mg) by mouth daily 30 tablet 0     pantoprazole (PROTONIX) 40 MG EC tablet Take 1 tablet (40 mg) by mouth daily 90 tablet 1     rosuvastatin (CRESTOR) 5 MG tablet TK 1 T PO THREE TIMES A WEEK m/w/f  0     triamcinolone (KENALOG) 0.025 % external ointment APPLY TWICE DAILY TO ARMS TAPER TO EVERY DAY       triamcinolone (KENALOG) 0.1 % external cream Apply twice to three times daily as needed on arms and legs for next 4 weeks. 453.6 g 5     [] triamcinolone acetonide (KENALOG-10) injection 10 mg         No facility-administered encounter medications on file as of 7/8/2022.             O:   NAD, WDWN, Alert & Oriented, Mood & Affect wnl, Vitals stable   Here today alone   There were no vitals taken for this visit.   General appearance normal   Vitals stable   Alert, oriented and in no acute distress     Pink scaly papules and small plaques on arms, legs      Eyes: Conjunctivae/lids:Normal     ENT: Lips: normal    MSK:Normal    Pulm: Breathing Normal    Neuro/Psych: Orientation:Alert and Orientedx3 ; Mood/Affect:normal   A/P:  1. 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  Risks including but not limited to injection site reaction, bruising, no resolution.  All questions answered and entertained to patient s satisfaction.  Informed consent obtained.  IL TAC in concentration of 10mg/ml was injected ID to PN on arms and right dorsal hand.  Total injected was  2 ml.  Patient tolerated without complications and given wound care instructions, including not to move product around.  Return in 4 weeks for follow-up and possible additional IL TAC.    2, prurigo nodularis on left dorsal hand and upper shins x 10  LN2:  Treated with LN2 for 5s for 1-2 cycles. Warned risks of blistering, pain, pigment change, scarring, and incomplete resolution.  Advised patient to return if lesions do not completely resolve.  Wound care sheet given.  Recheck in 6 weeks      Again, thank you for allowing me to participate in the care of your patient.        Sincerely,        Melody Pan PA-C

## 2022-07-08 NOTE — NURSING NOTE
Chief Complaint   Patient presents with     Derm Problem     PN follow up, injections       There were no vitals filed for this visit.  Wt Readings from Last 1 Encounters:   11/22/21 58.5 kg (129 lb)       Yue Farrell LPN .................7/8/2022

## 2022-07-11 NOTE — PROGRESS NOTES
"Zee Mireles is an extremely pleasant 82 year old year old female patient here today for follow-up. She had biopsy lov which was consistent with prurigo nodularis. She  Is here today for il kenalog injections. Patient has no other skin complaints today.  Remainder of the HPI, Meds, PMH, Allergies, FH, and SH was reviewed in chart.    Past Medical History:   Diagnosis Date     Asthma      Chest pain 11/2018    burning sensation - indigestion     Colitis      COPD (chronic obstructive pulmonary disease) (H)      COPD (chronic obstructive pulmonary disease) (H)      Coronary artery disease due to calcified coronary lesion 11/2018    stent to LAD after NSTEMI     DNI (do not intubate)      Dyslipidemia, goal LDL below 70      Essential hypertension      GERD (gastroesophageal reflux disease)      Glaucoma      HTN (hypertension)      Hyperlipidemia      Hypothyroid      NSTEMI (non-ST elevation myocardial infarction) (H) 11/2018     PAD (peripheral artery disease) (H)      Paroxysmal atrial fibrillation (H) 11/2018     Paroxysmal atrial fibrillation (H) 11/2018     Partial retinal artery occlusion 6/9/2021     Smoker     ongoing - \"6-8 cigarettes QD\"       Past Surgical History:   Procedure Laterality Date     CATARACT EXTRACTION       cataract removal       COLONOSCOPY  12/2018    [facility]     COLONOSCOPY N/A 12/12/2018    COLONOSCOPY with polypectomy; Damien Denton MD;  [facility];  Service: Gastroenterology     CORONARY STENT PLACEMENT  11/2018    at [facility] in [facility] BALLOON DILATION AND/OR STENT PLACEMENT OF VESSEL  11/2018    stent to LAD     ESOPHAGOSCOPY, GASTROSCOPY, DUODENOSCOPY (EGD), COMBINED  12/2018    [facility]     ESOPHAGOSCOPY, GASTROSCOPY, DUODENOSCOPY (EGD), COMBINED N/A 8/15/2019    Procedure: ESOPHAGOGASTRODUODENOSCOPY, WITH BIOPSY;  Surgeon: Demario Clayton DO;  Location: [facility]     ESOPHAGOSCOPY, GASTROSCOPY, DUODENOSCOPY (EGD), COMBINED N/A 12/10/2018    " Damien Denton MD; [facility];  Service: Gastroenterology     IR ABDOMINAL AORTOGRAM  7/3/2006     IR EXTREMITY ANGIOGRAM BILATERAL  7/3/2006     IR MISCELLANEOUS PROCEDURE  7/3/2006     IR MISCELLANEOUS PROCEDURE  2006        Family History   Problem Relation Age of Onset     Sudden Death Mother 55.00        no autopsy     Goiter Mother      Stomach Cancer Father 72.00     No Known Problems Brother      Other - See Comments Brother          in [place] War     Ulcers Son         Gastric ulcer was perforated; had surgery. His wife passed suddenly at 49 at home in 2016.     Atrial fibrillation Son        Social History     Socioeconomic History     Marital status:      Spouse name: Not on file     Number of children: 2     Years of education: Not on file     Highest education level: Not on file   Occupational History     Not on file   Tobacco Use     Smoking status: Current Every Day Smoker     Packs/day: 1.00     Years: 60.00     Pack years: 60.00     Types: Cigarettes, Cigarettes, Cigarettes     Smokeless tobacco: Never Used     Tobacco comment: down to 0.3 ppd since 2017   Substance and Sexual Activity     Alcohol use: No     Alcohol/week: 1.0 standard drink     Drug use: No     Sexual activity: Not Currently     Partners: Male   Other Topics Concern     Not on file   Social History Narrative    Zee lives with her son, Braden, and his wife.     Social Determinants of Health     Financial Resource Strain: Not on file   Food Insecurity: Not on file   Transportation Needs: Not on file   Physical Activity: Not on file   Stress: Not on file   Social Connections: Not on file   Intimate Partner Violence: Not on file   Housing Stability: Not on file       Outpatient Encounter Medications as of 2022   Medication Sig Dispense Refill     acebutolol (SECTRAL) 400 MG capsule Take 400 mg by mouth daily as needed (Palpitations) = extra dose in addition to scheduled daily dose       acetaminophen (TYLENOL)  500 MG tablet Take 1,000 mg by mouth every 6 hours as needed for mild pain       albuterol (PROAIR HFA/PROVENTIL HFA/VENTOLIN HFA) 108 (90 Base) MCG/ACT inhaler Inhale 2 puffs into the lungs every 4 hours as needed for shortness of breath / dyspnea or wheezing OFFICE VISIT NEEDED FOR ANY FURTHER REFILLS 18 g 0     amLODIPine (NORVASC) 2.5 MG tablet Take 1 tablet (2.5 mg) by mouth daily 30 tablet 11     aspirin 81 MG EC tablet Take 1 tablet (81 mg) by mouth       brimonidine (ALPHAGAN-P) 0.15 % ophthalmic solution Place 1 drop into both eyes 3 times daily       diltiazem ER (DILT-XR) 120 MG 24 hr capsule Take 1 capsule (120 mg) by mouth daily 30 capsule 0     dorzolamide (TRUSOPT) 2 % ophthalmic solution Place 1 drop into both eyes 3 times daily       hydrochlorothiazide (HYDRODIURIL) 25 MG tablet Take 0.5 tablets (12.5 mg) by mouth daily 30 tablet 0     imiquimod (ALDARA) 5 % external cream APPLY TOPICALLY TO ENTIRE FOREARMS ONCE DAILY       latanoprost (XALATAN) 0.005 % ophthalmic solution Place 1 drop into both eyes every evening        levothyroxine (SYNTHROID/LEVOTHROID) 112 MCG tablet 1 tablet daily       losartan (COZAAR) 100 MG tablet Take 1 tablet (100 mg) by mouth daily 30 tablet 0     pantoprazole (PROTONIX) 40 MG EC tablet Take 1 tablet (40 mg) by mouth daily 90 tablet 1     rosuvastatin (CRESTOR) 5 MG tablet TK 1 T PO THREE TIMES A WEEK m/w/f  0     triamcinolone (KENALOG) 0.025 % external ointment APPLY TWICE DAILY TO ARMS TAPER TO EVERY DAY       triamcinolone (KENALOG) 0.1 % external cream Apply twice to three times daily as needed on arms and legs for next 4 weeks. 453.6 g 5     [] triamcinolone acetonide (KENALOG-10) injection 10 mg        No facility-administered encounter medications on file as of 2022.             O:   NAD, WDWN, Alert & Oriented, Mood & Affect wnl, Vitals stable   Here today alone   There were no vitals taken for this visit.   General appearance normal   Vitals  stable   Alert, oriented and in no acute distress     Pink scaly papules and small plaques on arms, legs      Eyes: Conjunctivae/lids:Normal     ENT: Lips: normal    MSK:Normal    Pulm: Breathing Normal    Neuro/Psych: Orientation:Alert and Orientedx3 ; Mood/Affect:normal   A/P:  1. 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  Risks including but not limited to injection site reaction, bruising, no resolution.  All questions answered and entertained to patient s satisfaction.  Informed consent obtained.  IL TAC in concentration of 10mg/ml was injected ID to PN on arms and right dorsal hand.  Total injected was  2 ml.  Patient tolerated without complications and given wound care instructions, including not to move product around.  Return in 4 weeks for follow-up and possible additional IL TAC.    2, prurigo nodularis on left dorsal hand and upper shins x 10  LN2:  Treated with LN2 for 5s for 1-2 cycles. Warned risks of blistering, pain, pigment change, scarring, and incomplete resolution.  Advised patient to return if lesions do not completely resolve.  Wound care sheet given.  Recheck in 6 weeks

## 2022-08-18 ENCOUNTER — TELEPHONE (OUTPATIENT)
Dept: DERMATOLOGY | Facility: CLINIC | Age: 82
End: 2022-08-18

## 2022-08-18 NOTE — TELEPHONE ENCOUNTER
[facility] Call Center    Phone Message    May a detailed message be left on voicemail: yes     Reason for Call: Other: Pt needs to speak with [facility] team. Please call [phone]. Thanks!     Action Taken: Message routed to:  [facility] DERM    Travel Screening: Not Applicable

## 2022-08-19 NOTE — TELEPHONE ENCOUNTER
Pt thought her appt in derm was for today and it was yesterday. She would like to be fit in to have the injections done on her legs. She stated the ones on her arms worked so well and she did not want to wait until December to be seen again. Please advise.   Ora MORTON RN BSN PHN  Specialty Clinics

## 2022-09-07 ENCOUNTER — LAB REQUISITION (OUTPATIENT)
Dept: LAB | Facility: CLINIC | Age: 82
End: 2022-09-07

## 2022-09-07 DIAGNOSIS — R63.4 ABNORMAL WEIGHT LOSS: ICD-10-CM

## 2022-09-07 DIAGNOSIS — I10 ESSENTIAL (PRIMARY) HYPERTENSION: ICD-10-CM

## 2022-09-07 DIAGNOSIS — M85.89 OTHER SPECIFIED DISORDERS OF BONE DENSITY AND STRUCTURE, MULTIPLE SITES: ICD-10-CM

## 2022-09-07 LAB
ALBUMIN SERPL BCG-MCNC: 4.1 G/DL (ref 3.5–5.2)
ALP SERPL-CCNC: 75 U/L (ref 35–104)
ALT SERPL W P-5'-P-CCNC: 11 U/L (ref 10–35)
ANION GAP SERPL CALCULATED.3IONS-SCNC: 13 MMOL/L (ref 7–15)
AST SERPL W P-5'-P-CCNC: 20 U/L (ref 10–35)
BILIRUB SERPL-MCNC: 0.3 MG/DL
BUN SERPL-MCNC: 17.2 MG/DL (ref 8–23)
CALCIUM SERPL-MCNC: 9.2 MG/DL (ref 8.8–10.2)
CHLORIDE SERPL-SCNC: 89 MMOL/L (ref 98–107)
CREAT SERPL-MCNC: 0.86 MG/DL (ref 0.51–0.95)
DEPRECATED HCO3 PLAS-SCNC: 26 MMOL/L (ref 22–29)
ERYTHROCYTE [DISTWIDTH] IN BLOOD BY AUTOMATED COUNT: 14.9 % (ref 10–15)
GFR SERPL CREATININE-BSD FRML MDRD: 67 ML/MIN/1.73M2
GLUCOSE SERPL-MCNC: 95 MG/DL (ref 70–99)
HCT VFR BLD AUTO: 41.6 % (ref 35–47)
HGB BLD-MCNC: 13.7 G/DL (ref 11.7–15.7)
MCH RBC QN AUTO: 32 PG (ref 26.5–33)
MCHC RBC AUTO-ENTMCNC: 32.9 G/DL (ref 31.5–36.5)
MCV RBC AUTO: 97 FL (ref 78–100)
PLATELET # BLD AUTO: 276 10E3/UL (ref 150–450)
POTASSIUM SERPL-SCNC: 4.4 MMOL/L (ref 3.4–5.3)
PROT SERPL-MCNC: 6.7 G/DL (ref 6.4–8.3)
RBC # BLD AUTO: 4.28 10E6/UL (ref 3.8–5.2)
SODIUM SERPL-SCNC: 128 MMOL/L (ref 136–145)
WBC # BLD AUTO: 6.6 10E3/UL (ref 4–11)

## 2022-09-07 PROCEDURE — 80053 COMPREHEN METABOLIC PANEL: CPT | Performed by: FAMILY MEDICINE

## 2022-09-07 PROCEDURE — 84439 ASSAY OF FREE THYROXINE: CPT | Performed by: FAMILY MEDICINE

## 2022-09-07 PROCEDURE — 82306 VITAMIN D 25 HYDROXY: CPT | Performed by: FAMILY MEDICINE

## 2022-09-07 PROCEDURE — 85027 COMPLETE CBC AUTOMATED: CPT | Performed by: FAMILY MEDICINE

## 2022-09-07 PROCEDURE — 84443 ASSAY THYROID STIM HORMONE: CPT | Performed by: FAMILY MEDICINE

## 2022-09-08 LAB
DEPRECATED CALCIDIOL+CALCIFEROL SERPL-MC: 35 UG/L (ref 20–75)
T4 FREE SERPL-MCNC: 2.04 NG/DL (ref 0.9–1.7)
TSH SERPL DL<=0.005 MIU/L-ACNC: 0.25 UIU/ML (ref 0.3–4.2)

## 2022-09-15 ENCOUNTER — OFFICE VISIT (OUTPATIENT)
Dept: DERMATOLOGY | Facility: CLINIC | Age: 82
End: 2022-09-15
Payer: COMMERCIAL

## 2022-09-15 DIAGNOSIS — L28.1 PRURIGO NODULARIS: Primary | ICD-10-CM

## 2022-09-15 PROCEDURE — 99207 PR DROP WITH A PROCEDURE: CPT | Performed by: PHYSICIAN ASSISTANT

## 2022-09-15 PROCEDURE — 11900 INJECT SKIN LESIONS </W 7: CPT | Performed by: PHYSICIAN ASSISTANT

## 2022-09-15 ASSESSMENT — PAIN SCALES - GENERAL: PAINLEVEL: NO PAIN (0)

## 2022-09-15 NOTE — PROGRESS NOTES
"Zee Mireles is an extremely pleasant 82 year old year old female patient here today for prurigo on legs and left hand. She notes intralesional steroid injections worked well on arms. We tried cryo which did not help as much with spots on legs. She would like to try ilk on legs.  Patient has no other skin complaints today.  Remainder of the HPI, Meds, PMH, Allergies, FH, and SH was reviewed in chart.   Past Medical History:   Diagnosis Date     Asthma      Chest pain 11/2018    burning sensation - indigestion     Colitis      COPD (chronic obstructive pulmonary disease) (H)      COPD (chronic obstructive pulmonary disease) (H)      Coronary artery disease due to calcified coronary lesion 11/01/2018    stent to LAD after NSTEMI     DNI (do not intubate)      Dyslipidemia, goal LDL below 70      Essential hypertension      GERD (gastroesophageal reflux disease)      Glaucoma      HTN (hypertension)      Hyperlipidemia      Hypothyroid      NSTEMI (non-ST elevation myocardial infarction) (H) 11/01/2018     PAD (peripheral artery disease) (H)      Paroxysmal atrial fibrillation (H) 11/2018     Paroxysmal atrial fibrillation (H) 11/01/2018     Partial retinal artery occlusion 06/09/2021     Skin cancer      Smoker     ongoing - \"6-8 cigarettes QD\"       Past Surgical History:   Procedure Laterality Date     CATARACT EXTRACTION       cataract removal       COLONOSCOPY  12/2018    [facility]     COLONOSCOPY N/A 12/12/2018    COLONOSCOPY with polypectomy; Damien Denton MD;  [facility];  Service: Gastroenterology     CORONARY STENT PLACEMENT  11/2018    at [facility] in [facility] BALLOON DILATION AND/OR STENT PLACEMENT OF VESSEL  11/2018    stent to LAD     ESOPHAGOSCOPY, GASTROSCOPY, DUODENOSCOPY (EGD), COMBINED  12/2018    [facility]     ESOPHAGOSCOPY, GASTROSCOPY, DUODENOSCOPY (EGD), COMBINED N/A 8/15/2019    Procedure: ESOPHAGOGASTRODUODENOSCOPY, WITH BIOPSY;  Surgeon: Demario Clayton, DO;  " Location: [facility]     ESOPHAGOSCOPY, GASTROSCOPY, DUODENOSCOPY (EGD), COMBINED N/A 12/10/2018    Damien Denton MD; [facility];  Service: Gastroenterology     IR ABDOMINAL AORTOGRAM  7/3/2006     IR EXTREMITY ANGIOGRAM BILATERAL  7/3/2006     IR MISCELLANEOUS PROCEDURE  7/3/2006     IR MISCELLANEOUS PROCEDURE  2006        Family History   Problem Relation Age of Onset     Sudden Death Mother 55.00        no autopsy     Goiter Mother      Stomach Cancer Father 72.00     No Known Problems Brother      Other - See Comments Brother          in [place] War     Ulcers Son         Gastric ulcer was perforated; had surgery. His wife passed suddenly at 49 at home in 2016.     Atrial fibrillation Son        Social History     Socioeconomic History     Marital status:      Spouse name: Not on file     Number of children: 2     Years of education: Not on file     Highest education level: Not on file   Occupational History     Not on file   Tobacco Use     Smoking status: Current Every Day Smoker     Packs/day: 1.00     Years: 60.00     Pack years: 60.00     Types: Cigarettes     Smokeless tobacco: Never Used     Tobacco comment: down to 0.3 ppd since 2017   Substance and Sexual Activity     Alcohol use: No     Alcohol/week: 1.0 standard drink     Drug use: No     Sexual activity: Not Currently     Partners: Male   Other Topics Concern     Not on file   Social History Narrative    Zee lives with her son, Braden, and his wife.     Social Determinants of Health     Financial Resource Strain: Not on file   Food Insecurity: Not on file   Transportation Needs: Not on file   Physical Activity: Not on file   Stress: Not on file   Social Connections: Not on file   Intimate Partner Violence: Not on file   Housing Stability: Not on file       Outpatient Encounter Medications as of 9/15/2022   Medication Sig Dispense Refill     acebutolol (SECTRAL) 400 MG capsule Take 400 mg by mouth daily as needed (Palpitations) =  extra dose in addition to scheduled daily dose       acetaminophen (TYLENOL) 500 MG tablet Take 1,000 mg by mouth every 6 hours as needed for mild pain       albuterol (PROAIR HFA/PROVENTIL HFA/VENTOLIN HFA) 108 (90 Base) MCG/ACT inhaler Inhale 2 puffs into the lungs every 4 hours as needed for shortness of breath / dyspnea or wheezing OFFICE VISIT NEEDED FOR ANY FURTHER REFILLS 18 g 0     amLODIPine (NORVASC) 2.5 MG tablet Take 1 tablet (2.5 mg) by mouth daily 30 tablet 11     aspirin 81 MG EC tablet Take 1 tablet (81 mg) by mouth       brimonidine (ALPHAGAN-P) 0.15 % ophthalmic solution Place 1 drop into both eyes 3 times daily       diltiazem ER (DILT-XR) 120 MG 24 hr capsule Take 1 capsule (120 mg) by mouth daily 30 capsule 0     dorzolamide (TRUSOPT) 2 % ophthalmic solution Place 1 drop into both eyes 3 times daily       hydrochlorothiazide (HYDRODIURIL) 25 MG tablet Take 0.5 tablets (12.5 mg) by mouth daily 30 tablet 0     imiquimod (ALDARA) 5 % external cream APPLY TOPICALLY TO ENTIRE FOREARMS ONCE DAILY       latanoprost (XALATAN) 0.005 % ophthalmic solution Place 1 drop into both eyes every evening        levothyroxine (SYNTHROID/LEVOTHROID) 112 MCG tablet 1 tablet daily       losartan (COZAAR) 100 MG tablet Take 1 tablet (100 mg) by mouth daily 30 tablet 0     pantoprazole (PROTONIX) 40 MG EC tablet Take 1 tablet (40 mg) by mouth daily 90 tablet 1     rosuvastatin (CRESTOR) 5 MG tablet TK 1 T PO THREE TIMES A WEEK m/w/f  0     triamcinolone (KENALOG) 0.025 % external ointment APPLY TWICE DAILY TO ARMS TAPER TO EVERY DAY       triamcinolone (KENALOG) 0.1 % external cream Apply twice to three times daily as needed on arms and legs for next 4 weeks. 453.6 g 5     No facility-administered encounter medications on file as of 9/15/2022.             O:   NAD, WDWN, Alert & Oriented, Mood & Affect wnl, Vitals stable   Here today alone   There were no vitals taken for this visit.   General appearance  normal   Vitals stable   Alert, oriented and in no acute distress     Pink scaly excoriated papules on legs, dorsal feet, and left hand/wrist       Eyes: Conjunctivae/lids:Normal     ENT: Lips normal    MSK:Normal    Pulm: Breathing Normal    Neuro/Psych: Orientation:Alert and Orientedx3 ; Mood/Affect:normal   A/P:  1. 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  Risks including but not limited to injection site reaction, bruising, no resolution.  All questions answered and entertained to patient s satisfaction.  Informed consent obtained.  IL TAC in concentration of 10mg/ml was injected ID to prurigo.  Total injected was  3 ml.  Patient tolerated without complications and given wound care instructions, including not to move product around.  Return in 4 weeks for follow-up and possible additional IL TAC.

## 2022-09-15 NOTE — LETTER
"    9/15/2022         RE: Zee Mireles  [address] [facility] [address]        Dear Colleague,    Thank you for referring your patient, Zee Mireles, to the [facility]. Please see a copy of my visit note below.    Zee Mireles is an extremely pleasant 82 year old year old female patient here today for prurigo on legs and left hand. She notes intralesional steroid injections worked well on arms. We tried cryo which did not help as much with spots on legs. She would like to try ilk on legs.  Patient has no other skin complaints today.  Remainder of the HPI, Meds, PMH, Allergies, FH, and SH was reviewed in chart.   Past Medical History:   Diagnosis Date     Asthma      Chest pain 11/2018    burning sensation - indigestion     Colitis      COPD (chronic obstructive pulmonary disease) (H)      COPD (chronic obstructive pulmonary disease) (H)      Coronary artery disease due to calcified coronary lesion 11/01/2018    stent to LAD after NSTEMI     DNI (do not intubate)      Dyslipidemia, goal LDL below 70      Essential hypertension      GERD (gastroesophageal reflux disease)      Glaucoma      HTN (hypertension)      Hyperlipidemia      Hypothyroid      NSTEMI (non-ST elevation myocardial infarction) (H) 11/01/2018     PAD (peripheral artery disease) (H)      Paroxysmal atrial fibrillation (H) 11/2018     Paroxysmal atrial fibrillation (H) 11/01/2018     Partial retinal artery occlusion 06/09/2021     Skin cancer      Smoker     ongoing - \"6-8 cigarettes QD\"       Past Surgical History:   Procedure Laterality Date     CATARACT EXTRACTION       cataract removal       COLONOSCOPY  12/2018    [facility]     COLONOSCOPY N/A 12/12/2018    COLONOSCOPY with polypectomy; Damien Denton MD;  [facility];  Service: Gastroenterology     CORONARY STENT PLACEMENT  11/2018    at [facility] in [place]     CV BALLOON DILATION AND/OR STENT PLACEMENT OF VESSEL  11/2018    stent to LAD "     ESOPHAGOSCOPY, GASTROSCOPY, DUODENOSCOPY (EGD), COMBINED  2018    [facility]     ESOPHAGOSCOPY, GASTROSCOPY, DUODENOSCOPY (EGD), COMBINED N/A 8/15/2019    Procedure: ESOPHAGOGASTRODUODENOSCOPY, WITH BIOPSY;  Surgeon: Demario Clayton DO;  Location: [facility]     ESOPHAGOSCOPY, GASTROSCOPY, DUODENOSCOPY (EGD), COMBINED N/A 12/10/2018    Damien Denton MD; [facility];  Service: Gastroenterology     IR ABDOMINAL AORTOGRAM  7/3/2006     IR EXTREMITY ANGIOGRAM BILATERAL  7/3/2006     IR MISCELLANEOUS PROCEDURE  7/3/2006     IR MISCELLANEOUS PROCEDURE  2006        Family History   Problem Relation Age of Onset     Sudden Death Mother 55.00        no autopsy     Goiter Mother      Stomach Cancer Father 72.00     No Known Problems Brother      Other - See Comments Brother          in [place] War     Ulcers Son         Gastric ulcer was perforated; had surgery. His wife passed suddenly at 49 at home in .     Atrial fibrillation Son        Social History     Socioeconomic History     Marital status:      Spouse name: Not on file     Number of children: 2     Years of education: Not on file     Highest education level: Not on file   Occupational History     Not on file   Tobacco Use     Smoking status: Current Every Day Smoker     Packs/day: 1.00     Years: 60.00     Pack years: 60.00     Types: Cigarettes     Smokeless tobacco: Never Used     Tobacco comment: down to 0.3 ppd since 2017   Substance and Sexual Activity     Alcohol use: No     Alcohol/week: 1.0 standard drink     Drug use: No     Sexual activity: Not Currently     Partners: Male   Other Topics Concern     Not on file   Social History Narrative    Zee lives with her son, Braden, and his wife.     Social Determinants of Health     Financial Resource Strain: Not on file   Food Insecurity: Not on file   Transportation Needs: Not on file   Physical Activity: Not on file   Stress: Not on file   Social Connections: Not on file    Intimate Partner Violence: Not on file   Housing Stability: Not on file       Outpatient Encounter Medications as of 9/15/2022   Medication Sig Dispense Refill     acebutolol (SECTRAL) 400 MG capsule Take 400 mg by mouth daily as needed (Palpitations) = extra dose in addition to scheduled daily dose       acetaminophen (TYLENOL) 500 MG tablet Take 1,000 mg by mouth every 6 hours as needed for mild pain       albuterol (PROAIR HFA/PROVENTIL HFA/VENTOLIN HFA) 108 (90 Base) MCG/ACT inhaler Inhale 2 puffs into the lungs every 4 hours as needed for shortness of breath / dyspnea or wheezing OFFICE VISIT NEEDED FOR ANY FURTHER REFILLS 18 g 0     amLODIPine (NORVASC) 2.5 MG tablet Take 1 tablet (2.5 mg) by mouth daily 30 tablet 11     aspirin 81 MG EC tablet Take 1 tablet (81 mg) by mouth       brimonidine (ALPHAGAN-P) 0.15 % ophthalmic solution Place 1 drop into both eyes 3 times daily       diltiazem ER (DILT-XR) 120 MG 24 hr capsule Take 1 capsule (120 mg) by mouth daily 30 capsule 0     dorzolamide (TRUSOPT) 2 % ophthalmic solution Place 1 drop into both eyes 3 times daily       hydrochlorothiazide (HYDRODIURIL) 25 MG tablet Take 0.5 tablets (12.5 mg) by mouth daily 30 tablet 0     imiquimod (ALDARA) 5 % external cream APPLY TOPICALLY TO ENTIRE FOREARMS ONCE DAILY       latanoprost (XALATAN) 0.005 % ophthalmic solution Place 1 drop into both eyes every evening        levothyroxine (SYNTHROID/LEVOTHROID) 112 MCG tablet 1 tablet daily       losartan (COZAAR) 100 MG tablet Take 1 tablet (100 mg) by mouth daily 30 tablet 0     pantoprazole (PROTONIX) 40 MG EC tablet Take 1 tablet (40 mg) by mouth daily 90 tablet 1     rosuvastatin (CRESTOR) 5 MG tablet TK 1 T PO THREE TIMES A WEEK m/w/f  0     triamcinolone (KENALOG) 0.025 % external ointment APPLY TWICE DAILY TO ARMS TAPER TO EVERY DAY       triamcinolone (KENALOG) 0.1 % external cream Apply twice to three times daily as needed on arms and legs for next 4 weeks. 453.6 g  5     No facility-administered encounter medications on file as of 9/15/2022.             O:   NAD, WDWN, Alert & Oriented, Mood & Affect wnl, Vitals stable   Here today alone   There were no vitals taken for this visit.   General appearance normal   Vitals stable   Alert, oriented and in no acute distress     Pink scaly excoriated papules on legs, dorsal feet, and left hand/wrist       Eyes: Conjunctivae/lids:Normal     ENT: Lips normal    MSK:Normal    Pulm: Breathing Normal    Neuro/Psych: Orientation:Alert and Orientedx3 ; Mood/Affect:normal   A/P:  1. 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  Risks including but not limited to injection site reaction, bruising, no resolution.  All questions answered and entertained to patient s satisfaction.  Informed consent obtained.  IL TAC in concentration of 10mg/ml was injected ID to prurigo.  Total injected was  3 ml.  Patient tolerated without complications and given wound care instructions, including not to move product around.  Return in 4 weeks for follow-up and possible additional IL TAC.          Again, thank you for allowing me to participate in the care of your patient.        Sincerely,        Melody Pan PA-C

## 2022-09-16 ENCOUNTER — TELEPHONE (OUTPATIENT)
Dept: DERMATOLOGY | Facility: CLINIC | Age: 82
End: 2022-09-16

## 2022-09-16 NOTE — TELEPHONE ENCOUNTER
[facility] Call Center    Phone Message    May a detailed message be left on voicemail: yes     Reason for Call: Other: Pt calling to schedule follow up appt for a month out per  [facility]. Please call [phone]. Thanks!     Action Taken: Message routed to:  [facility] DERM    Travel Screening: Not Applicable

## 2022-09-29 ENCOUNTER — IMMUNIZATION (OUTPATIENT)
Dept: FAMILY MEDICINE | Facility: CLINIC | Age: 82
End: 2022-09-29
Payer: COMMERCIAL

## 2022-09-29 PROCEDURE — 0124A COVID-19,PF,PFIZER BOOSTER BIVALENT: CPT

## 2022-09-29 PROCEDURE — 91312 COVID-19,PF,PFIZER BOOSTER BIVALENT: CPT

## 2022-10-13 ENCOUNTER — LAB REQUISITION (OUTPATIENT)
Dept: LAB | Facility: CLINIC | Age: 82
End: 2022-10-13

## 2022-10-13 ENCOUNTER — OFFICE VISIT (OUTPATIENT)
Dept: DERMATOLOGY | Facility: CLINIC | Age: 82
End: 2022-10-13
Payer: COMMERCIAL

## 2022-10-13 DIAGNOSIS — I10 ESSENTIAL (PRIMARY) HYPERTENSION: ICD-10-CM

## 2022-10-13 DIAGNOSIS — L28.1 PRURIGO NODULARIS: Primary | ICD-10-CM

## 2022-10-13 LAB
ANION GAP SERPL CALCULATED.3IONS-SCNC: 14 MMOL/L (ref 7–15)
BUN SERPL-MCNC: 13.6 MG/DL (ref 8–23)
CALCIUM SERPL-MCNC: 9.4 MG/DL (ref 8.8–10.2)
CHLORIDE SERPL-SCNC: 91 MMOL/L (ref 98–107)
CREAT SERPL-MCNC: 0.74 MG/DL (ref 0.51–0.95)
DEPRECATED HCO3 PLAS-SCNC: 24 MMOL/L (ref 22–29)
GFR SERPL CREATININE-BSD FRML MDRD: 81 ML/MIN/1.73M2
GLUCOSE SERPL-MCNC: 125 MG/DL (ref 70–99)
POTASSIUM SERPL-SCNC: 5 MMOL/L (ref 3.4–5.3)
SODIUM SERPL-SCNC: 129 MMOL/L (ref 136–145)

## 2022-10-13 PROCEDURE — 11900 INJECT SKIN LESIONS </W 7: CPT | Performed by: PHYSICIAN ASSISTANT

## 2022-10-13 PROCEDURE — 99207 PR DROP WITH A PROCEDURE: CPT | Performed by: PHYSICIAN ASSISTANT

## 2022-10-13 PROCEDURE — 80048 BASIC METABOLIC PNL TOTAL CA: CPT | Performed by: FAMILY MEDICINE

## 2022-10-13 ASSESSMENT — PAIN SCALES - GENERAL: PAINLEVEL: NO PAIN (0)

## 2022-10-13 NOTE — PROGRESS NOTES
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 She is here today for more intralesional injections.  Patient has no other skin complaints today.  Remainder of the HPI, Meds, PMH, Allergies, FH, and SH was reviewed in chart.    Past Medical History:   Diagnosis Date     Asthma      Chest pain 11/2018    burning sensation - indigestion     Colitis      COPD (chronic obstructive pulmonary disease) (H)      COPD (chronic obstructive pulmonary disease) (H)      Coronary artery disease due to calcified coronary lesion 11/01/2018    stent to LAD after NSTEMI     DNI (do not intubate)      Dyslipidemia, goal LDL below 70      Essential hypertension      GERD (gastroesophageal reflux disease)      Glaucoma      HTN (hypertension)      Hyperlipidemia      Hypothyroid      NSTEMI (non-ST elevation myocardial infarction) (H) 11/01/2018     PAD (peripheral artery disease) (H)      Paroxysmal atrial fibrillation (H) 11/2018     Paroxysmal atrial fibrillation (H) 11/01/2018     Partial retinal artery occlusion 06/09/2021     Skin cancer      Smoker     ongoing - \"6-8 cigarettes QD\"       Past Surgical History:   Procedure Laterality Date     CATARACT EXTRACTION       cataract removal       COLONOSCOPY  12/2018    [facility]     COLONOSCOPY N/A 12/12/2018    COLONOSCOPY with polypectomy; Damien Denton MD;  [facility];  Service: Gastroenterology     CORONARY STENT PLACEMENT  11/2018    at [facility] in [facility] BALLOON DILATION AND/OR STENT PLACEMENT OF VESSEL  11/2018    stent to LAD     ESOPHAGOSCOPY, GASTROSCOPY, DUODENOSCOPY (EGD), COMBINED  12/2018    [facility]     ESOPHAGOSCOPY, GASTROSCOPY, DUODENOSCOPY (EGD), COMBINED N/A 8/15/2019    Procedure: ESOPHAGOGASTRODUODENOSCOPY, WITH BIOPSY;  Surgeon: Demario Clayton DO;  Location: [facility]     ESOPHAGOSCOPY, GASTROSCOPY, DUODENOSCOPY (EGD), COMBINED N/A 12/10/2018    Corrie, " Damien KRUEGER MD; [facility];  Service: Gastroenterology     IR ABDOMINAL AORTOGRAM  7/3/2006     IR EXTREMITY ANGIOGRAM BILATERAL  7/3/2006     IR MISCELLANEOUS PROCEDURE  7/3/2006     IR MISCELLANEOUS PROCEDURE  2006        Family History   Problem Relation Age of Onset     Sudden Death Mother 55.00        no autopsy     Goiter Mother      Stomach Cancer Father 72.00     No Known Problems Brother      Other - See Comments Brother          in [place] War     Ulcers Son         Gastric ulcer was perforated; had surgery. His wife passed suddenly at 49 at home in 2016.     Atrial fibrillation Son        Social History     Socioeconomic History     Marital status:      Spouse name: Not on file     Number of children: 2     Years of education: Not on file     Highest education level: Not on file   Occupational History     Not on file   Tobacco Use     Smoking status: Every Day     Packs/day: 1.00     Years: 60.00     Pack years: 60.00     Types: Cigarettes     Smokeless tobacco: Never     Tobacco comments:     down to 0.3 ppd since 2017   Substance and Sexual Activity     Alcohol use: No     Alcohol/week: 1.0 standard drink     Drug use: No     Sexual activity: Not Currently     Partners: Male   Other Topics Concern     Not on file   Social History Narrative    Zee lives with her son, Braden, and his wife.     Social Determinants of Health     Financial Resource Strain: Not on file   Food Insecurity: Not on file   Transportation Needs: Not on file   Physical Activity: Not on file   Stress: Not on file   Social Connections: Not on file   Intimate Partner Violence: Not on file   Housing Stability: Not on file       Outpatient Encounter Medications as of 10/13/2022   Medication Sig Dispense Refill     acebutolol (SECTRAL) 400 MG capsule Take 400 mg by mouth daily as needed (Palpitations) = extra dose in addition to scheduled daily dose       acetaminophen (TYLENOL) 500 MG tablet Take 1,000 mg by mouth every 6  hours as needed for mild pain       albuterol (PROAIR HFA/PROVENTIL HFA/VENTOLIN HFA) 108 (90 Base) MCG/ACT inhaler Inhale 2 puffs into the lungs every 4 hours as needed for shortness of breath / dyspnea or wheezing OFFICE VISIT NEEDED FOR ANY FURTHER REFILLS 18 g 0     amLODIPine (NORVASC) 2.5 MG tablet Take 1 tablet (2.5 mg) by mouth daily (Patient taking differently: Take 5 mg by mouth daily) 30 tablet 11     aspirin 81 MG EC tablet Take 1 tablet (81 mg) by mouth       brimonidine (ALPHAGAN-P) 0.15 % ophthalmic solution Place 1 drop into both eyes 3 times daily       diltiazem ER (DILT-XR) 120 MG 24 hr capsule Take 1 capsule (120 mg) by mouth daily 30 capsule 0     dorzolamide (TRUSOPT) 2 % ophthalmic solution Place 1 drop into both eyes 3 times daily       imiquimod (ALDARA) 5 % external cream APPLY TOPICALLY TO ENTIRE FOREARMS ONCE DAILY       latanoprost (XALATAN) 0.005 % ophthalmic solution Place 1 drop into both eyes every evening        levothyroxine (SYNTHROID/LEVOTHROID) 112 MCG tablet 1 tablet daily       losartan (COZAAR) 100 MG tablet Take 1 tablet (100 mg) by mouth daily 30 tablet 0     pantoprazole (PROTONIX) 40 MG EC tablet Take 1 tablet (40 mg) by mouth daily 90 tablet 1     rosuvastatin (CRESTOR) 5 MG tablet TK 1 T PO THREE TIMES A WEEK m/w/f  0     triamcinolone (KENALOG) 0.025 % external ointment APPLY TWICE DAILY TO ARMS TAPER TO EVERY DAY       triamcinolone (KENALOG) 0.1 % external cream Apply twice to three times daily as needed on arms and legs for next 4 weeks. 453.6 g 5     hydrochlorothiazide (HYDRODIURIL) 25 MG tablet Take 0.5 tablets (12.5 mg) by mouth daily (Patient not taking: Reported on 10/13/2022) 30 tablet 0     Facility-Administered Encounter Medications as of 10/13/2022   Medication Dose Route Frequency Provider Last Rate Last Admin     [COMPLETED] triamcinolone acetonide (KENALOG-10) injection 21 mg  21 mg Intra-Lesional Once Melody Odonnell PA-C   21 mg at 10/13/22  1049             O:   NAD, WDWN, Alert & Oriented, Mood & Affect wnl, Vitals stable   Here today alone   There were no vitals taken for this visit.   General appearance normal   Vitals stable   Alert, oriented and in no acute distress     Pink scaly papule on legs and left wrist       Eyes: Conjunctivae/lids:Normal     ENT: Lips: normal    MSK:Normal    Pulm: Breathing Normal    Neuro/Psych: Orientation:Alert and Orientedx3 ; Mood/Affect:normal  A/P:  1. Prurigo nodularis on left wrist, legs   IL TAC: PGACAC discussed.  Risks including but not limited to injection site reaction, bruising, no resolution.  All questions answered and entertained to patient s satisfaction.  Informed consent obtained.  IL TAC in concentration of 10mg/ml was injected ID to prurigo.  Total injected was  2.5 ml.  Patient tolerated without complications and given wound care instructions, including not to move product around.  Return in 4 weeks for follow-up and possible additional IL TAC.

## 2022-10-13 NOTE — LETTER
"    10/13/2022         RE: Zee Mireles  [address] [facility] [address]        Dear Colleague,    Thank you for referring your patient, Zee Mireles, to the [facility]. Please see a copy of my visit note below.    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 She is here today for more intralesional injections.  Patient has no other skin complaints today.  Remainder of the HPI, Meds, PMH, Allergies, FH, and SH was reviewed in chart.    Past Medical History:   Diagnosis Date     Asthma      Chest pain 11/2018    burning sensation - indigestion     Colitis      COPD (chronic obstructive pulmonary disease) (H)      COPD (chronic obstructive pulmonary disease) (H)      Coronary artery disease due to calcified coronary lesion 11/01/2018    stent to LAD after NSTEMI     DNI (do not intubate)      Dyslipidemia, goal LDL below 70      Essential hypertension      GERD (gastroesophageal reflux disease)      Glaucoma      HTN (hypertension)      Hyperlipidemia      Hypothyroid      NSTEMI (non-ST elevation myocardial infarction) (H) 11/01/2018     PAD (peripheral artery disease) (H)      Paroxysmal atrial fibrillation (H) 11/2018     Paroxysmal atrial fibrillation (H) 11/01/2018     Partial retinal artery occlusion 06/09/2021     Skin cancer      Smoker     ongoing - \"6-8 cigarettes QD\"       Past Surgical History:   Procedure Laterality Date     CATARACT EXTRACTION       cataract removal       COLONOSCOPY  12/2018    [facility]     COLONOSCOPY N/A 12/12/2018    COLONOSCOPY with polypectomy; Damien Denton MD;  [facility];  Service: Gastroenterology     CORONARY STENT PLACEMENT  11/2018    at [facility] in [place]     CV BALLOON DILATION AND/OR STENT PLACEMENT OF VESSEL  11/2018    stent to LAD     ESOPHAGOSCOPY, GASTROSCOPY, DUODENOSCOPY (EGD), COMBINED  12/2018    [facility]     ESOPHAGOSCOPY, " GASTROSCOPY, DUODENOSCOPY (EGD), COMBINED N/A 8/15/2019    Procedure: ESOPHAGOGASTRODUODENOSCOPY, WITH BIOPSY;  Surgeon: Demario Clayton DO;  Location: [facility]     ESOPHAGOSCOPY, GASTROSCOPY, DUODENOSCOPY (EGD), COMBINED N/A 12/10/2018    Damien Denton MD; [facility];  Service: Gastroenterology     IR ABDOMINAL AORTOGRAM  7/3/2006     IR EXTREMITY ANGIOGRAM BILATERAL  7/3/2006     IR MISCELLANEOUS PROCEDURE  7/3/2006     IR MISCELLANEOUS PROCEDURE  2006        Family History   Problem Relation Age of Onset     Sudden Death Mother 55.00        no autopsy     Goiter Mother      Stomach Cancer Father 72.00     No Known Problems Brother      Other - See Comments Brother          in [place] War     Ulcers Son         Gastric ulcer was perforated; had surgery. His wife passed suddenly at 49 at home in 2016.     Atrial fibrillation Son        Social History     Socioeconomic History     Marital status:      Spouse name: Not on file     Number of children: 2     Years of education: Not on file     Highest education level: Not on file   Occupational History     Not on file   Tobacco Use     Smoking status: Every Day     Packs/day: 1.00     Years: 60.00     Pack years: 60.00     Types: Cigarettes     Smokeless tobacco: Never     Tobacco comments:     down to 0.3 ppd since 2017   Substance and Sexual Activity     Alcohol use: No     Alcohol/week: 1.0 standard drink     Drug use: No     Sexual activity: Not Currently     Partners: Male   Other Topics Concern     Not on file   Social History Narrative    Zee lives with her son, Braden, and his wife.     Social Determinants of Health     Financial Resource Strain: Not on file   Food Insecurity: Not on file   Transportation Needs: Not on file   Physical Activity: Not on file   Stress: Not on file   Social Connections: Not on file   Intimate Partner Violence: Not on file   Housing Stability: Not on file       Outpatient Encounter Medications as of  10/13/2022   Medication Sig Dispense Refill     acebutolol (SECTRAL) 400 MG capsule Take 400 mg by mouth daily as needed (Palpitations) = extra dose in addition to scheduled daily dose       acetaminophen (TYLENOL) 500 MG tablet Take 1,000 mg by mouth every 6 hours as needed for mild pain       albuterol (PROAIR HFA/PROVENTIL HFA/VENTOLIN HFA) 108 (90 Base) MCG/ACT inhaler Inhale 2 puffs into the lungs every 4 hours as needed for shortness of breath / dyspnea or wheezing OFFICE VISIT NEEDED FOR ANY FURTHER REFILLS 18 g 0     amLODIPine (NORVASC) 2.5 MG tablet Take 1 tablet (2.5 mg) by mouth daily (Patient taking differently: Take 5 mg by mouth daily) 30 tablet 11     aspirin 81 MG EC tablet Take 1 tablet (81 mg) by mouth       brimonidine (ALPHAGAN-P) 0.15 % ophthalmic solution Place 1 drop into both eyes 3 times daily       diltiazem ER (DILT-XR) 120 MG 24 hr capsule Take 1 capsule (120 mg) by mouth daily 30 capsule 0     dorzolamide (TRUSOPT) 2 % ophthalmic solution Place 1 drop into both eyes 3 times daily       imiquimod (ALDARA) 5 % external cream APPLY TOPICALLY TO ENTIRE FOREARMS ONCE DAILY       latanoprost (XALATAN) 0.005 % ophthalmic solution Place 1 drop into both eyes every evening        levothyroxine (SYNTHROID/LEVOTHROID) 112 MCG tablet 1 tablet daily       losartan (COZAAR) 100 MG tablet Take 1 tablet (100 mg) by mouth daily 30 tablet 0     pantoprazole (PROTONIX) 40 MG EC tablet Take 1 tablet (40 mg) by mouth daily 90 tablet 1     rosuvastatin (CRESTOR) 5 MG tablet TK 1 T PO THREE TIMES A WEEK m/w/f  0     triamcinolone (KENALOG) 0.025 % external ointment APPLY TWICE DAILY TO ARMS TAPER TO EVERY DAY       triamcinolone (KENALOG) 0.1 % external cream Apply twice to three times daily as needed on arms and legs for next 4 weeks. 453.6 g 5     hydrochlorothiazide (HYDRODIURIL) 25 MG tablet Take 0.5 tablets (12.5 mg) by mouth daily (Patient not taking: Reported on 10/13/2022) 30 tablet 0      Facility-Administered Encounter Medications as of 10/13/2022   Medication Dose Route Frequency Provider Last Rate Last Admin     [COMPLETED] triamcinolone acetonide (KENALOG-10) injection 21 mg  21 mg Intra-Lesional Once Melody Odonnell PA-C   21 mg at 10/13/22 1049             O:   NAD, WDWN, Alert & Oriented, Mood & Affect wnl, Vitals stable   Here today alone   There were no vitals taken for this visit.   General appearance normal   Vitals stable   Alert, oriented and in no acute distress     Pink scaly papule on legs and left wrist       Eyes: Conjunctivae/lids:Normal     ENT: Lips: normal    MSK:Normal    Pulm: Breathing Normal    Neuro/Psych: Orientation:Alert and Orientedx3 ; Mood/Affect:normal  A/P:  1. Prurigo nodularis on left wrist, legs   IL TAC: PGACAC discussed.  Risks including but not limited to injection site reaction, bruising, no resolution.  All questions answered and entertained to patient s satisfaction.  Informed consent obtained.  IL TAC in concentration of 10mg/ml was injected ID to prurigo.  Total injected was  2.5 ml.  Patient tolerated without complications and given wound care instructions, including not to move product around.  Return in 4 weeks for follow-up and possible additional IL TAC.          Again, thank you for allowing me to participate in the care of your patient.        Sincerely,        Melody Odonnell PA-C

## 2022-10-26 ENCOUNTER — HOSPITAL ENCOUNTER (EMERGENCY)
Facility: CLINIC | Age: 82
Discharge: HOME | End: 2022-10-26
Attending: NURSE PRACTITIONER | Admitting: NURSE PRACTITIONER
Payer: COMMERCIAL

## 2022-10-26 ENCOUNTER — APPOINTMENT (OUTPATIENT)
Dept: GENERAL RADIOLOGY | Facility: CLINIC | Age: 82
End: 2022-10-26
Attending: NURSE PRACTITIONER
Payer: COMMERCIAL

## 2022-10-26 VITALS
DIASTOLIC BLOOD PRESSURE: 67 MMHG | SYSTOLIC BLOOD PRESSURE: 160 MMHG | RESPIRATION RATE: 28 BRPM | HEIGHT: 63 IN | HEART RATE: 61 BPM | BODY MASS INDEX: 21.09 KG/M2 | WEIGHT: 119 LBS | OXYGEN SATURATION: 95 % | TEMPERATURE: 98.5 F

## 2022-10-26 DIAGNOSIS — R07.9 CHEST PAIN: ICD-10-CM

## 2022-10-26 DIAGNOSIS — L03.116 CELLULITIS OF FOOT, LEFT: ICD-10-CM

## 2022-10-26 DIAGNOSIS — E87.1 HYPONATREMIA: ICD-10-CM

## 2022-10-26 LAB
ALBUMIN SERPL BCG-MCNC: 4 G/DL (ref 3.5–5.2)
ALP SERPL-CCNC: 79 U/L (ref 35–104)
ALT SERPL W P-5'-P-CCNC: 11 U/L (ref 10–35)
ANION GAP SERPL CALCULATED.3IONS-SCNC: 13 MMOL/L (ref 7–15)
AST SERPL W P-5'-P-CCNC: 21 U/L (ref 10–35)
BASOPHILS # BLD AUTO: 0.1 10E3/UL (ref 0–0.2)
BASOPHILS NFR BLD AUTO: 1 %
BILIRUB SERPL-MCNC: 0.3 MG/DL
BUN SERPL-MCNC: 14.1 MG/DL (ref 8–23)
CALCIUM SERPL-MCNC: 9.2 MG/DL (ref 8.8–10.2)
CHLORIDE SERPL-SCNC: 91 MMOL/L (ref 98–107)
CREAT SERPL-MCNC: 0.96 MG/DL (ref 0.51–0.95)
DEPRECATED HCO3 PLAS-SCNC: 21 MMOL/L (ref 22–29)
EOSINOPHIL # BLD AUTO: 0.2 10E3/UL (ref 0–0.7)
EOSINOPHIL NFR BLD AUTO: 2 %
ERYTHROCYTE [DISTWIDTH] IN BLOOD BY AUTOMATED COUNT: 14.4 % (ref 10–15)
GFR SERPL CREATININE-BSD FRML MDRD: 59 ML/MIN/1.73M2
GLUCOSE SERPL-MCNC: 147 MG/DL (ref 70–99)
HCT VFR BLD AUTO: 41.4 % (ref 35–47)
HGB BLD-MCNC: 14.3 G/DL (ref 11.7–15.7)
HOLD SPECIMEN: NORMAL
IMM GRANULOCYTES # BLD: 0.1 10E3/UL
IMM GRANULOCYTES NFR BLD: 1 %
LYMPHOCYTES # BLD AUTO: 1.3 10E3/UL (ref 0.8–5.3)
LYMPHOCYTES NFR BLD AUTO: 17 %
MCH RBC QN AUTO: 33.3 PG (ref 26.5–33)
MCHC RBC AUTO-ENTMCNC: 34.5 G/DL (ref 31.5–36.5)
MCV RBC AUTO: 97 FL (ref 78–100)
MONOCYTES # BLD AUTO: 1.1 10E3/UL (ref 0–1.3)
MONOCYTES NFR BLD AUTO: 13 %
NEUTROPHILS # BLD AUTO: 5.3 10E3/UL (ref 1.6–8.3)
NEUTROPHILS NFR BLD AUTO: 66 %
NRBC # BLD AUTO: 0 10E3/UL
NRBC BLD AUTO-RTO: 0 /100
PLATELET # BLD AUTO: 241 10E3/UL (ref 150–450)
POTASSIUM SERPL-SCNC: 4.6 MMOL/L (ref 3.4–5.3)
PROT SERPL-MCNC: 7.3 G/DL (ref 6.4–8.3)
RBC # BLD AUTO: 4.29 10E6/UL (ref 3.8–5.2)
SODIUM SERPL-SCNC: 125 MMOL/L (ref 136–145)
TROPONIN T SERPL HS-MCNC: 11 NG/L
WBC # BLD AUTO: 8 10E3/UL (ref 4–11)

## 2022-10-26 PROCEDURE — 80053 COMPREHEN METABOLIC PANEL: CPT | Performed by: NURSE PRACTITIONER

## 2022-10-26 PROCEDURE — 250N000013 HC RX MED GY IP 250 OP 250 PS 637: Performed by: NURSE PRACTITIONER

## 2022-10-26 PROCEDURE — 36415 COLL VENOUS BLD VENIPUNCTURE: CPT | Performed by: EMERGENCY MEDICINE

## 2022-10-26 PROCEDURE — 93010 ELECTROCARDIOGRAM REPORT: CPT | Performed by: NURSE PRACTITIONER

## 2022-10-26 PROCEDURE — 85014 HEMATOCRIT: CPT | Performed by: EMERGENCY MEDICINE

## 2022-10-26 PROCEDURE — 84484 ASSAY OF TROPONIN QUANT: CPT | Performed by: NURSE PRACTITIONER

## 2022-10-26 PROCEDURE — 99285 EMERGENCY DEPT VISIT HI MDM: CPT | Performed by: NURSE PRACTITIONER

## 2022-10-26 PROCEDURE — 99285 EMERGENCY DEPT VISIT HI MDM: CPT | Mod: 25 | Performed by: NURSE PRACTITIONER

## 2022-10-26 PROCEDURE — 84484 ASSAY OF TROPONIN QUANT: CPT | Performed by: EMERGENCY MEDICINE

## 2022-10-26 PROCEDURE — 93005 ELECTROCARDIOGRAM TRACING: CPT | Performed by: NURSE PRACTITIONER

## 2022-10-26 PROCEDURE — 71046 X-RAY EXAM CHEST 2 VIEWS: CPT

## 2022-10-26 PROCEDURE — 85025 COMPLETE CBC W/AUTO DIFF WBC: CPT | Performed by: NURSE PRACTITIONER

## 2022-10-26 RX ORDER — DOXYCYCLINE 100 MG/1
100 CAPSULE ORAL 2 TIMES DAILY
Qty: 14 CAPSULE | Refills: 0 | Status: SHIPPED | OUTPATIENT
Start: 2022-10-26 | End: 2022-11-02

## 2022-10-26 RX ORDER — DILTIAZEM HCL 60 MG
60 TABLET ORAL ONCE
Status: COMPLETED | OUTPATIENT
Start: 2022-10-26 | End: 2022-10-26

## 2022-10-26 RX ADMIN — DILTIAZEM HYDROCHLORIDE 60 MG: 60 TABLET, FILM COATED ORAL at 11:25

## 2022-10-26 ASSESSMENT — ENCOUNTER SYMPTOMS
WEAKNESS: 0
EYE DISCHARGE: 0
ARTHRALGIAS: 0
NUMBNESS: 0
VOMITING: 0
EYE REDNESS: 0
DIZZINESS: 0
MYALGIAS: 0
FATIGUE: 0
PALPITATIONS: 0
COUGH: 0
ABDOMINAL PAIN: 0
SHORTNESS OF BREATH: 0
RHINORRHEA: 0
FEVER: 0
NAUSEA: 0
WOUND: 1
JOINT SWELLING: 0
DYSURIA: 0
COLOR CHANGE: 1
CHILLS: 0
LIGHT-HEADEDNESS: 0
HEADACHES: 0
SINUS PRESSURE: 0
TROUBLE SWALLOWING: 0
SINUS PAIN: 0
SORE THROAT: 0

## 2022-10-26 ASSESSMENT — ACTIVITIES OF DAILY LIVING (ADL)
ADLS_ACUITY_SCORE: 35
ADLS_ACUITY_SCORE: 35

## 2022-10-26 NOTE — ED NOTES
Pt's Na-125. CNP instructed that staff stress fluid restriction, and pt is to follow up with re-check as outpatient.

## 2022-10-26 NOTE — ED PROVIDER NOTES
History     Chief Complaint   Patient presents with     Chest Pain     HPI  Zee Mireles is a 82 year old female who presents emergency department for evaluation of chest pressure since yesterday evening.  Reports it is a constant chest pressure that has not worsened with exertion.  Patient felt this was either due to her A. fib or hypertension.  Denies accompanying headache, dizziness, lightheadedness, diaphoresis, fatigue, swelling, shortness of breath, abdominal pain, nausea, vomiting, diarrhea.  Discontinued her hydrochlorothiazide 2 weeks ago due to hyponatremia.    Patient also notes 2 weeks ago a small zit on the left ankle. She 'popped' this area with small amount of purulent drainage. She was seen by derm 5 days ago for Kenalog injections for her prurigo nodularis and was placed on Bactrim DS at that time. Reports that there has been no increase in pain but continued erythema and increased drainage.       Allergies:  Allergies   Allergen Reactions     Atorvastatin Muscle Pain (Myalgia)     Was fine while taking simvastatin 20 mg daily.     Zoledronic Acid      Other reaction(s): SEVERE REACTION  Other reaction(s): SEVERE REACTION         Problem List:    Patient Active Problem List    Diagnosis Date Noted     Coronary artery disease involving native coronary artery of native heart without angina pectoris 10/07/2019     Priority: Medium     PAD (peripheral artery disease) (H) 10/07/2019     Priority: Medium     COPD (chronic obstructive pulmonary disease) (H) 08/14/2019     Priority: Medium     Hyperlipidemia LDL goal <70 08/14/2019     Priority: Medium     Essential hypertension 08/14/2019     Priority: Medium     Upper GI bleed 08/14/2019     Priority: Medium     Paroxysmal atrial fibrillation (H) 11/01/2018     Priority: Medium     Overview:   CHADS2 VASC score equals 5 for age, sex, hypertension, and coronary artery disease       Coronary artery disease due to calcified coronary lesion 11/01/2018      Priority: Medium     Overview:   stent to LAD after NSTEMI          Past Medical History:    Past Medical History:   Diagnosis Date     Asthma      Chest pain 11/2018     Colitis      COPD (chronic obstructive pulmonary disease) (H)      COPD (chronic obstructive pulmonary disease) (H)      Coronary artery disease due to calcified coronary lesion 11/01/2018     DNI (do not intubate)      Dyslipidemia, goal LDL below 70      Essential hypertension      GERD (gastroesophageal reflux disease)      Glaucoma      HTN (hypertension)      Hyperlipidemia      Hypothyroid      NSTEMI (non-ST elevation myocardial infarction) (H) 11/01/2018     PAD (peripheral artery disease) (H)      Paroxysmal atrial fibrillation (H) 11/2018     Paroxysmal atrial fibrillation (H) 11/01/2018     Partial retinal artery occlusion 06/09/2021     Skin cancer      Smoker        Past Surgical History:    Past Surgical History:   Procedure Laterality Date     CATARACT EXTRACTION       cataract removal       COLONOSCOPY  12/2018    [facility]     COLONOSCOPY N/A 12/12/2018    COLONOSCOPY with polypectomy; Damien Denton MD;  [facility] GI;  Service: Gastroenterology     CORONARY STENT PLACEMENT  11/2018    at [facility] in [facility] BALLOON DILATION AND/OR STENT PLACEMENT OF VESSEL  11/2018    stent to LAD     ESOPHAGOSCOPY, GASTROSCOPY, DUODENOSCOPY (EGD), COMBINED  12/2018    [facility]     ESOPHAGOSCOPY, GASTROSCOPY, DUODENOSCOPY (EGD), COMBINED N/A 8/15/2019    Procedure: ESOPHAGOGASTRODUODENOSCOPY, WITH BIOPSY;  Surgeon: Demario Clayton DO;  Location: [facility]     ESOPHAGOSCOPY, GASTROSCOPY, DUODENOSCOPY (EGD), COMBINED N/A 12/10/2018    Damien Denton MD; [facility] GI;  Service: Gastroenterology     IR ABDOMINAL AORTOGRAM  7/3/2006     IR EXTREMITY ANGIOGRAM BILATERAL  7/3/2006     IR MISCELLANEOUS PROCEDURE  7/3/2006     IR MISCELLANEOUS PROCEDURE  8/9/2006       Family History:    Family History   Problem Relation Age  of Onset     Sudden Death Mother 55.00        no autopsy     Goiter Mother      Stomach Cancer Father 72.00     No Known Problems Brother      Other - See Comments Brother          in [place]     Ulcers Son         Gastric ulcer was perforated; had surgery. His wife passed suddenly at 49 at home in 2016.     Atrial fibrillation Son        Social History:  Marital Status:   [4]  Social History     Tobacco Use     Smoking status: Every Day     Packs/day: 1.00     Years: 60.00     Pack years: 60.00     Types: Cigarettes     Smokeless tobacco: Never     Tobacco comments:     down to 0.3 ppd since 2017   Substance Use Topics     Alcohol use: No     Alcohol/week: 1.0 standard drink     Drug use: No        Medications:    doxycycline hyclate (VIBRAMYCIN) 100 MG capsule  acebutolol (SECTRAL) 400 MG capsule  acetaminophen (TYLENOL) 500 MG tablet  albuterol (PROAIR HFA/PROVENTIL HFA/VENTOLIN HFA) 108 (90 Base) MCG/ACT inhaler  amLODIPine (NORVASC) 2.5 MG tablet  aspirin 81 MG EC tablet  brimonidine (ALPHAGAN-P) 0.15 % ophthalmic solution  diltiazem ER (DILT-XR) 120 MG 24 hr capsule  dorzolamide (TRUSOPT) 2 % ophthalmic solution  hydrochlorothiazide (HYDRODIURIL) 25 MG tablet  imiquimod (ALDARA) 5 % external cream  latanoprost (XALATAN) 0.005 % ophthalmic solution  levothyroxine (SYNTHROID/LEVOTHROID) 112 MCG tablet  losartan (COZAAR) 100 MG tablet  pantoprazole (PROTONIX) 40 MG EC tablet  rosuvastatin (CRESTOR) 5 MG tablet  triamcinolone (KENALOG) 0.025 % external ointment  triamcinolone (KENALOG) 0.1 % external cream          Review of Systems   Constitutional: Negative for chills, fatigue and fever.   HENT: Negative for congestion, ear discharge, ear pain, rhinorrhea, sinus pressure, sinus pain, sore throat and trouble swallowing.    Eyes: Negative for discharge and redness.   Respiratory: Negative for cough and shortness of breath.    Cardiovascular: Positive for chest pain. Negative for palpitations and leg  "swelling.   Gastrointestinal: Negative for abdominal pain, nausea and vomiting.   Genitourinary: Negative for dysuria.   Musculoskeletal: Negative for arthralgias, joint swelling and myalgias.   Skin: Positive for color change and wound. Negative for rash.   Neurological: Negative for dizziness, weakness, light-headedness, numbness and headaches.   All other systems reviewed and are negative.      Physical Exam   BP: (!) 199/79  Pulse: 73  Temp: 98.5  F (36.9  C)  Resp: 16  Height: 160 cm (5' 3\")  Weight: 54 kg (119 lb)  SpO2: 98 %      Physical Exam  Constitutional:       General: She is not in acute distress.     Appearance: She is well-developed. She is not diaphoretic.   Eyes:      Conjunctiva/sclera: Conjunctivae normal.      Pupils: Pupils are equal, round, and reactive to light.   Cardiovascular:      Rate and Rhythm: Normal rate and regular rhythm.  No extrasystoles are present.     Pulses: Normal pulses.      Heart sounds: Normal heart sounds.   Pulmonary:      Effort: Pulmonary effort is normal. No respiratory distress.      Breath sounds: Normal breath sounds and air entry. No decreased air movement. No decreased breath sounds, wheezing or rhonchi.   Abdominal:      General: There is no distension.      Palpations: Abdomen is soft.      Tenderness: There is no abdominal tenderness.   Musculoskeletal:         General: Normal range of motion.      Cervical back: Normal range of motion and neck supple.      Right lower leg: No edema.      Left lower leg: No edema.   Skin:     General: Skin is warm.      Capillary Refill: Capillary refill takes less than 2 seconds.      Comments: See images below of left ankle   Neurological:      General: No focal deficit present.      Mental Status: She is alert and oriented to person, place, and time.   Psychiatric:         Mood and Affect: Mood normal.               ED Course           Procedures              EKG Interpretation:      Interpreted by DIANA Elliott CNP " Dr. Brewer  Time reviewed: 3768  Symptoms at time of EKG: chest pressure   Rhythm: normal sinus   Rate: normal  Axis: normal  Ectopy: none  Conduction: normal  ST Segments/ T Waves: No ST-T wave changes  Q Waves: none  Comparison to prior: Unchanged from 8/14/19    Clinical Impression: normal EKG       Results for orders placed or performed during the hospital encounter of 10/26/22 (from the past 24 hour(s))   [place] Draw    Narrative    The following orders were created for panel order [place] Draw.  Procedure                               Abnormality         Status                     ---------                               -----------         ------                     Extra Blue Top Tube[[id number]]                              Final result               Extra Red Top Tube[[id number]]                               Final result               Extra Green Top (Lithium...[[id number]]                      Final result               Extra Purple Top Tube[[id number]]                            Final result                 Please view results for these tests on the individual orders.   Troponin T, High Sensitivity   Result Value Ref Range    Troponin T, High Sensitivity 11 <=14 ng/L   CBC with platelets, differential    Narrative    The following orders were created for panel order CBC with platelets, differential.  Procedure                               Abnormality         Status                     ---------                               -----------         ------                     CBC with platelets and d...[[id number]]  Abnormal            Final result                 Please view results for these tests on the individual orders.   Comprehensive metabolic panel   Result Value Ref Range    Sodium 125 (L) 136 - 145 mmol/L    Potassium 4.6 3.4 - 5.3 mmol/L    Chloride 91 (L) 98 - 107 mmol/L    Carbon Dioxide (CO2) 21 (L) 22 - 29 mmol/L    Anion Gap 13 7 - 15 mmol/L    Urea Nitrogen 14.1 8.0 - 23.0 mg/dL    Creatinine  0.96 (H) 0.51 - 0.95 mg/dL    Calcium 9.2 8.8 - 10.2 mg/dL    Glucose 147 (H) 70 - 99 mg/dL    Alkaline Phosphatase 79 35 - 104 U/L    AST 21 10 - 35 U/L    ALT 11 10 - 35 U/L    Protein Total 7.3 6.4 - 8.3 g/dL    Albumin 4.0 3.5 - 5.2 g/dL    Bilirubin Total 0.3 <=1.2 mg/dL    GFR Estimate 59 (L) >60 mL/min/1.73m2   Extra Blue Top Tube   Result Value Ref Range    Hold Specimen JIC    Extra Red Top Tube   Result Value Ref Range    Hold Specimen JIC    Extra Green Top (Lithium Heparin) Tube   Result Value Ref Range    Hold Specimen JIC    Extra Purple Top Tube   Result Value Ref Range    Hold Specimen JIC    CBC with platelets and differential   Result Value Ref Range    WBC Count 8.0 4.0 - 11.0 10e3/uL    RBC Count 4.29 3.80 - 5.20 10e6/uL    Hemoglobin 14.3 11.7 - 15.7 g/dL    Hematocrit 41.4 35.0 - 47.0 %    MCV 97 78 - 100 fL    MCH 33.3 (H) 26.5 - 33.0 pg    MCHC 34.5 31.5 - 36.5 g/dL    RDW 14.4 10.0 - 15.0 %    Platelet Count 241 150 - 450 10e3/uL    % Neutrophils 66 %    % Lymphocytes 17 %    % Monocytes 13 %    % Eosinophils 2 %    % Basophils 1 %    % Immature Granulocytes 1 %    NRBCs per 100 WBC 0 <1 /100    Absolute Neutrophils 5.3 1.6 - 8.3 10e3/uL    Absolute Lymphocytes 1.3 0.8 - 5.3 10e3/uL    Absolute Monocytes 1.1 0.0 - 1.3 10e3/uL    Absolute Eosinophils 0.2 0.0 - 0.7 10e3/uL    Absolute Basophils 0.1 0.0 - 0.2 10e3/uL    Absolute Immature Granulocytes 0.1 <=0.4 10e3/uL    Absolute NRBCs 0.0 10e3/uL   Chest XR,  PA & LAT    Narrative    XR CHEST 2 VIEWS   10/26/2022 1:39 PM     HISTORY: chest pain    COMPARISON: Radiograph 5/28/2018.      Impression    IMPRESSION: No acute cardiopulmonary disease.    KEISHA HERNANDES MD         SYSTEM ID:  [id number]       Medications   diltiazem (CARDIZEM) tablet 60 mg (60 mg Oral Given 10/26/22 1123)       Assessments & Plan (with Medical Decision Making)   Zee Mireles is a 82 year old female who presents emergency department for evaluation of chest  pressure since yesterday evening.  Reports it is a constant chest pressure that has not worsened with exertion.  Patient felt this was either due to her A. fib or hypertension. Initially hypertensive, oral diltiazem given and improved pressure. Normal vitals otherwise. Patient is well appearing and no worrisome findings on exam. Remains hyponatremic at 125, plan for fluid restriction and recheck as scheduled, discussed symptoms of hyponatremia that should prompt return to the department. Remaining CBC and CMP unchanged from baseline. Troponin normal, EKG unchanged, doubt ACS. Chest xray negative. Reassurance provided. Will broaden coverage for left ankle infection, unclear if Kenalog injection to this site or the sites on her shins. Follow up in 5 days for wound reevaluation, discussed possibility of wound care referral. Return precautions reviewed, all questions answered. Patient is agreeable to plan of care and discharged in no acute distress.       I have reviewed the nursing notes.    I have reviewed the findings, diagnosis, plan and need for follow up with the patient.  Discharge Medication List as of 10/26/2022  2:05 PM      START taking these medications    Details   doxycycline hyclate (VIBRAMYCIN) 100 MG capsule Take 1 capsule (100 mg) by mouth 2 times daily for 7 days, Disp-14 capsule, R-0, E-Prescribe           Final diagnoses:   Chest pain   Hyponatremia   Cellulitis of foot, left     10/26/2022   [facility] EMERGENCY DEPT     Sophia Arellano, APRN CNP  10/26/22 1516

## 2022-10-26 NOTE — ED TRIAGE NOTES
Pt presents to ED with complaints of CP.  Pt was concerned she went into afib.  Pt also states she has a sore on her left foot and is taking bactrim, concerned there is no improvement.      Triage Assessment     Row Name 10/26/22 0933       Triage Assessment (Adult)    Airway WDL WDL       Respiratory WDL    Respiratory WDL cough    Cough Frequency frequent    Cough Type congested;tight

## 2022-11-02 ENCOUNTER — TRANSCRIBE ORDERS (OUTPATIENT)
Dept: VASCULAR SURGERY | Facility: CLINIC | Age: 82
End: 2022-11-02

## 2022-11-02 ENCOUNTER — MEDICAL CORRESPONDENCE (OUTPATIENT)
Dept: HEALTH INFORMATION MANAGEMENT | Facility: CLINIC | Age: 82
End: 2022-11-02

## 2022-11-02 ENCOUNTER — LAB REQUISITION (OUTPATIENT)
Dept: LAB | Facility: CLINIC | Age: 82
End: 2022-11-02

## 2022-11-02 ENCOUNTER — TRANSFERRED RECORDS (OUTPATIENT)
Dept: HEALTH INFORMATION MANAGEMENT | Facility: CLINIC | Age: 82
End: 2022-11-02

## 2022-11-02 DIAGNOSIS — S81.002A OPEN WOUND OF KNEE, LEG, AND ANKLE, LEFT, INITIAL ENCOUNTER: Primary | ICD-10-CM

## 2022-11-02 DIAGNOSIS — E03.9 HYPOTHYROIDISM, UNSPECIFIED: ICD-10-CM

## 2022-11-02 DIAGNOSIS — E78.5 HYPERLIPIDEMIA, UNSPECIFIED: ICD-10-CM

## 2022-11-02 DIAGNOSIS — I10 ESSENTIAL (PRIMARY) HYPERTENSION: ICD-10-CM

## 2022-11-02 DIAGNOSIS — E87.1 HYPO-OSMOLALITY AND HYPONATREMIA: ICD-10-CM

## 2022-11-02 DIAGNOSIS — S81.802A OPEN WOUND OF KNEE, LEG, AND ANKLE, LEFT, INITIAL ENCOUNTER: Primary | ICD-10-CM

## 2022-11-02 DIAGNOSIS — S91.002A OPEN WOUND OF KNEE, LEG, AND ANKLE, LEFT, INITIAL ENCOUNTER: Primary | ICD-10-CM

## 2022-11-02 LAB
ALBUMIN SERPL BCG-MCNC: 4.1 G/DL (ref 3.5–5.2)
ALP SERPL-CCNC: 71 U/L (ref 35–104)
ALT SERPL W P-5'-P-CCNC: 12 U/L (ref 10–35)
ANION GAP SERPL CALCULATED.3IONS-SCNC: 13 MMOL/L (ref 7–15)
AST SERPL W P-5'-P-CCNC: 17 U/L (ref 10–35)
BILIRUB SERPL-MCNC: 0.3 MG/DL
BUN SERPL-MCNC: 16.4 MG/DL (ref 8–23)
CALCIUM SERPL-MCNC: 9.1 MG/DL (ref 8.8–10.2)
CHLORIDE SERPL-SCNC: 93 MMOL/L (ref 98–107)
CHOLEST SERPL-MCNC: 152 MG/DL
CREAT SERPL-MCNC: 0.73 MG/DL (ref 0.51–0.95)
CRP SERPL HS-MCNC: 1.18 MG/L
DEPRECATED HCO3 PLAS-SCNC: 22 MMOL/L (ref 22–29)
ERYTHROCYTE [DISTWIDTH] IN BLOOD BY AUTOMATED COUNT: 14.6 % (ref 10–15)
ERYTHROCYTE [SEDIMENTATION RATE] IN BLOOD BY WESTERGREN METHOD: 9 MM/HR (ref 0–30)
GFR SERPL CREATININE-BSD FRML MDRD: 82 ML/MIN/1.73M2
GLUCOSE SERPL-MCNC: 113 MG/DL (ref 70–99)
HCT VFR BLD AUTO: 40.9 % (ref 35–47)
HDLC SERPL-MCNC: 85 MG/DL
HGB BLD-MCNC: 14 G/DL (ref 11.7–15.7)
LDLC SERPL CALC-MCNC: 52 MG/DL
MCH RBC QN AUTO: 33.1 PG (ref 26.5–33)
MCHC RBC AUTO-ENTMCNC: 34.2 G/DL (ref 31.5–36.5)
MCV RBC AUTO: 97 FL (ref 78–100)
NONHDLC SERPL-MCNC: 67 MG/DL
PLATELET # BLD AUTO: 268 10E3/UL (ref 150–450)
POTASSIUM SERPL-SCNC: 4.4 MMOL/L (ref 3.4–5.3)
PROT SERPL-MCNC: 6.2 G/DL (ref 6.4–8.3)
RBC # BLD AUTO: 4.23 10E6/UL (ref 3.8–5.2)
SODIUM SERPL-SCNC: 128 MMOL/L (ref 136–145)
SODIUM UR-SCNC: 83 MMOL/L
TRIGL SERPL-MCNC: 75 MG/DL
TSH SERPL DL<=0.005 MIU/L-ACNC: 1.01 UIU/ML (ref 0.3–4.2)
WBC # BLD AUTO: 8.6 10E3/UL (ref 4–11)

## 2022-11-02 PROCEDURE — 86141 C-REACTIVE PROTEIN HS: CPT | Performed by: FAMILY MEDICINE

## 2022-11-02 PROCEDURE — 80053 COMPREHEN METABOLIC PANEL: CPT | Performed by: FAMILY MEDICINE

## 2022-11-02 PROCEDURE — 85014 HEMATOCRIT: CPT | Performed by: FAMILY MEDICINE

## 2022-11-02 PROCEDURE — 85652 RBC SED RATE AUTOMATED: CPT | Performed by: FAMILY MEDICINE

## 2022-11-02 PROCEDURE — 84300 ASSAY OF URINE SODIUM: CPT | Performed by: FAMILY MEDICINE

## 2022-11-02 PROCEDURE — 87070 CULTURE OTHR SPECIMN AEROBIC: CPT | Performed by: FAMILY MEDICINE

## 2022-11-02 PROCEDURE — 83935 ASSAY OF URINE OSMOLALITY: CPT | Performed by: FAMILY MEDICINE

## 2022-11-02 PROCEDURE — 84443 ASSAY THYROID STIM HORMONE: CPT | Performed by: FAMILY MEDICINE

## 2022-11-02 PROCEDURE — 80061 LIPID PANEL: CPT | Performed by: FAMILY MEDICINE

## 2022-11-03 LAB — OSMOLALITY UR: 502 MMOL/KG (ref 100–1200)

## 2022-11-05 LAB
BACTERIA WND CULT: ABNORMAL
BACTERIA WND CULT: ABNORMAL

## 2022-11-07 ENCOUNTER — OFFICE VISIT (OUTPATIENT)
Dept: VASCULAR SURGERY | Facility: CLINIC | Age: 82
End: 2022-11-07
Attending: FAMILY MEDICINE
Payer: COMMERCIAL

## 2022-11-07 VITALS — RESPIRATION RATE: 18 BRPM | HEART RATE: 72 BPM | DIASTOLIC BLOOD PRESSURE: 76 MMHG | SYSTOLIC BLOOD PRESSURE: 150 MMHG

## 2022-11-07 DIAGNOSIS — M25.372 ANKLE INSTABILITY, LEFT: ICD-10-CM

## 2022-11-07 DIAGNOSIS — S81.002A OPEN WOUND OF KNEE, LEG, AND ANKLE, LEFT, INITIAL ENCOUNTER: Primary | ICD-10-CM

## 2022-11-07 DIAGNOSIS — S81.802A OPEN WOUND OF KNEE, LEG, AND ANKLE, LEFT, INITIAL ENCOUNTER: Primary | ICD-10-CM

## 2022-11-07 DIAGNOSIS — S91.002A OPEN WOUND OF KNEE, LEG, AND ANKLE, LEFT, INITIAL ENCOUNTER: Primary | ICD-10-CM

## 2022-11-07 PROCEDURE — G0463 HOSPITAL OUTPT CLINIC VISIT: HCPCS | Mod: 25 | Performed by: REGISTERED NURSE

## 2022-11-07 PROCEDURE — 11042 DBRDMT SUBQ TIS 1ST 20SQCM/<: CPT | Performed by: REGISTERED NURSE

## 2022-11-07 PROCEDURE — 99213 OFFICE O/P EST LOW 20 MIN: CPT | Mod: 25 | Performed by: REGISTERED NURSE

## 2022-11-07 RX ORDER — LEVOTHYROXINE SODIUM 100 UG/1
TABLET ORAL
COMMUNITY
Start: 2022-09-21 | End: 2022-11-22

## 2022-11-07 ASSESSMENT — PAIN SCALES - GENERAL: PAINLEVEL: NO PAIN (0)

## 2022-11-07 NOTE — PROGRESS NOTES
[facility] Consult Note    Date of Service: November 7, 2022     Requesting Provider: Jessica Fitzgerald MD    Chief Complaint: L anterior ankle    History of Present Illness: Zee Mireles is being seen at [facility] today regarding L anterior ankle wound. They arrive to the clinic today alone. The patient reports that she had a large zit on her ankle that never came to a head. She was seen in dermatology and was given Bactrim on 10/21 for 2 weeks but after 6 days she was switched to Doxycycline while in the ER. Was currently/previously using neosporin and telfa pad. Reports pain of 2/10; currently using nothing for pain. Has used nothing as compression in the past, is currently using nothing for compression. Denies any fevers, chills, or generalized ill feeling. History of melanoma cancer. Sleeps in a bed with legs flat in bed. Pt still has their uterus and ovaries, they deny any abnormal vaginal bleeding. Denies history of DVT, Joint Replacement and Cellulitis. Positive history of Vein Procedures. I personally reviewed outside imaging, lab work, and progress noted through Care Everywhere and outside records.      Review of Systems:   Constitutional:  Negative   EENTM: negative for glasses;  positive [place]; deaf in right ear  GI:  negative for nausea/vomiting;   negative for constipation   negative diarrhea;   negative for fecal incontinence  negative weight loss  :   negative dysuria,  negative incontinence  MS: patient is ambulatory;  does use assistive devices  Cardiac: positive history of atrial fibrillation  Respiratory:  negative SOB  Endocrine:  negative  diabetes  Psych: negative  depression/anxiety    Past Medical History:   Past Medical History:   Diagnosis Date     Asthma      Chest pain 11/2018    burning sensation - indigestion     Colitis      COPD (chronic obstructive pulmonary disease) (H)      COPD (chronic obstructive pulmonary disease) (H)      Coronary  "artery disease due to calcified coronary lesion 11/01/2018    stent to LAD after NSTEMI     DNI (do not intubate)      Dyslipidemia, goal LDL below 70      Essential hypertension      GERD (gastroesophageal reflux disease)      Glaucoma      HTN (hypertension)      Hyperlipidemia      Hypothyroid      NSTEMI (non-ST elevation myocardial infarction) (H) 11/01/2018     PAD (peripheral artery disease) (H)      Paroxysmal atrial fibrillation (H) 11/2018     Paroxysmal atrial fibrillation (H) 11/01/2018     Partial retinal artery occlusion 06/09/2021     Skin cancer      Smoker     ongoing - \"6-8 cigarettes QD\"        Surgical History:   Past Surgical History:   Procedure Laterality Date     CATARACT EXTRACTION       cataract removal       COLONOSCOPY  12/2018    [facility]     COLONOSCOPY N/A 12/12/2018    COLONOSCOPY with polypectomy; Damien Denton MD;  [facility];  Service: Gastroenterology     CORONARY STENT PLACEMENT  11/2018    at [facility] BALLOON DILATION AND/OR STENT PLACEMENT OF VESSEL  11/2018    stent to LAD     ESOPHAGOSCOPY, GASTROSCOPY, DUODENOSCOPY (EGD), COMBINED  12/2018    [facility]     ESOPHAGOSCOPY, GASTROSCOPY, DUODENOSCOPY (EGD), COMBINED N/A 8/15/2019    Procedure: ESOPHAGOGASTRODUODENOSCOPY, WITH BIOPSY;  Surgeon: Demario Clayton DO;  Location: [facility]     ESOPHAGOSCOPY, GASTROSCOPY, DUODENOSCOPY (EGD), COMBINED N/A 12/10/2018    Damien Denton MD; [facility];  Service: Gastroenterology     IR ABDOMINAL AORTOGRAM  7/3/2006     IR EXTREMITY ANGIOGRAM BILATERAL  7/3/2006     IR MISCELLANEOUS PROCEDURE  7/3/2006     IR MISCELLANEOUS PROCEDURE  8/9/2006        Medications:   Current Outpatient Medications   Medication Sig     acebutolol (SECTRAL) 400 MG capsule Take 400 mg by mouth daily as needed (Palpitations) = extra dose in addition to scheduled daily dose     acetaminophen (TYLENOL) 500 MG tablet Take 1,000 mg by mouth every 6 hours as needed for mild " pain     albuterol (PROAIR HFA/PROVENTIL HFA/VENTOLIN HFA) 108 (90 Base) MCG/ACT inhaler Inhale 2 puffs into the lungs every 4 hours as needed for shortness of breath / dyspnea or wheezing OFFICE VISIT NEEDED FOR ANY FURTHER REFILLS     amLODIPine (NORVASC) 2.5 MG tablet Take 1 tablet (2.5 mg) by mouth daily (Patient taking differently: Take 5 mg by mouth daily)     aspirin 81 MG EC tablet Take 1 tablet (81 mg) by mouth     brimonidine (ALPHAGAN-P) 0.15 % ophthalmic solution Place 1 drop into both eyes 3 times daily     diltiazem ER (DILT-XR) 120 MG 24 hr capsule Take 1 capsule (120 mg) by mouth daily     dorzolamide (TRUSOPT) 2 % ophthalmic solution Place 1 drop into both eyes 3 times daily     hydrochlorothiazide (HYDRODIURIL) 25 MG tablet Take 12.5 mg by mouth daily     imiquimod (ALDARA) 5 % external cream APPLY TOPICALLY TO ENTIRE FOREARMS ONCE DAILY     latanoprost (XALATAN) 0.005 % ophthalmic solution Place 1 drop into both eyes every evening      levothyroxine (SYNTHROID/LEVOTHROID) 100 MCG tablet TAKE 1 TABLET BY MOUTH EVERY DAY IN THE MORNING ON AN EMPTY STOMACH FOR THYROID     levothyroxine (SYNTHROID/LEVOTHROID) 112 MCG tablet 1 tablet daily     losartan (COZAAR) 100 MG tablet Take 1 tablet (100 mg) by mouth daily     pantoprazole (PROTONIX) 40 MG EC tablet Take 1 tablet (40 mg) by mouth daily     rosuvastatin (CRESTOR) 5 MG tablet TK 1 T PO THREE TIMES A WEEK m/w/f     triamcinolone (KENALOG) 0.025 % external ointment APPLY TWICE DAILY TO ARMS TAPER TO EVERY DAY     triamcinolone (KENALOG) 0.1 % external cream Apply twice to three times daily as needed on arms and legs for next 4 weeks.     No current facility-administered medications for this visit.       Allergies:   Allergies   Allergen Reactions     Atorvastatin Muscle Pain (Myalgia)     Was fine while taking simvastatin 20 mg daily.     Zoledronic Acid      Other reaction(s): SEVERE REACTION  Other reaction(s): SEVERE REACTION         Family  history:   Family History   Problem Relation Age of Onset     Sudden Death Mother 55.00        no autopsy     Goiter Mother      Stomach Cancer Father 72.00     No Known Problems Brother      Other - See Comments Brother          in [place]     Ulcers Son         Gastric ulcer was perforated; had surgery. His wife passed suddenly at 49 at home in 2016.     Atrial fibrillation Son         Social History:   Social History     Socioeconomic History     Marital status:      Spouse name: Not on file     Number of children: 2     Years of education: Not on file     Highest education level: Not on file   Occupational History     Not on file   Tobacco Use     Smoking status: Every Day     Packs/day: 1.00     Years: 60.00     Pack years: 60.00     Types: Cigarettes     Smokeless tobacco: Never     Tobacco comments:     down to 0.3 ppd since 2017   Substance and Sexual Activity     Alcohol use: No     Alcohol/week: 1.0 standard drink     Drug use: No     Sexual activity: Not Currently     Partners: Male   Other Topics Concern     Not on file   Social History Narrative    Zee lives with her son, Braden, and his wife.     Social Determinants of Health     Financial Resource Strain: Not on file   Food Insecurity: Not on file   Transportation Needs: Not on file   Physical Activity: Not on file   Stress: Not on file   Social Connections: Not on file   Intimate Partner Violence: Not on file   Housing Stability: Not on file        Physical Exam  Vitals: BP (!) 150/76   Pulse 72   Resp 18   Weight is 0 lbs 0 oz          There is no height or weight on file to calculate BMI.  General: This is a 82 year old female who appears their reported age, not in acute distress  Head: normocephalic, Atraumatic; wearing glasses; non-icteric sclera; no exudate; hearing loss right ear  Respiratory: Clear throughout all lung fields; unlabored breathing; no cough  Cardiac: Regular, Rate and Rhythm, murmur heard  Skin: Uniformly warm  and dry  Psych: Alert and oriented x4; calm and cooperative throughout exam  Abdomen: Normal bowel sounds. Soft, symmetric, no guarding or rigidity, nontender with palpation.  No organomegaly or masses palpated.   Extremities: BLE strength testing revealed 3/4 to BLEs.    Peripheral Vascular: +1 dorsalis pedis, posterior tibial pulses to BLE , using a handheld doppler these were monophasic to the left and biphasic to the right in nature.  Good capillary refill. No unusual venous distention. Negative for spider veins, telangiectasias and hemosiderin deposition or hyperpigmentation  Positive for spider veins, telangiectasias and hemosiderin deposition or hyperpigmentation      Circumferential volume measures:        No flowsheet data found.    Ulceration(s)/Wound(s):     Wound #1 Location: L ankle  Size: 1.2L x 0.9W x 0.3depth.  No sinus tract present, Wound base: 100% slough  No undermining present. Wound is full thickness. There is moderate  drainage. Periwound: no denudement, erythema, induration, maceration or warmth.        VASC Wound left ankle (Active)   Pre Size Length 1.2 11/07/22 1000   Pre Size Width 0.9 11/07/22 1000   Pre Size Depth 0.3 11/07/22 1000   Pre Total Sq cm 1.08 11/07/22 1000   Post Size Length 1.2 11/07/22 1000   Post Size Width 0.9 11/07/22 1000   Post Size Depth 0.3 11/07/22 1000   Post Total Sq cm 1.08 11/07/22 1000       Laboratory studies:     I personally reviewed the following lab results today and those on care everywhere, if indicated     CRP   Date Value Ref Range Status   07/02/2019 0.3 0.0 - 0.8 mg/dL Final     C-Reactive Protein High Sensitivity   Date Value Ref Range Status   11/02/2022 1.18  Final     Comment:     Low risk < 1.0 mg/L   Average risk 1.0 to 3.0 mg/L   High risk > 3.0 mg/L      Erythrocyte Sedimentation Rate   Date Value Ref Range Status   11/02/2022 9 0 - 30 mm/hr Final      Last Renal Panel:  Sodium   Date Value Ref Range Status   11/02/2022 128 (L) 136 - 145  mmol/L Final   08/15/2019 142 133 - 144 mmol/L Final     Potassium   Date Value Ref Range Status   11/02/2022 4.4 3.4 - 5.3 mmol/L Final   01/20/2022 4.2 3.5 - 5.0 mmol/L Final   08/15/2019 3.9 3.4 - 5.3 mmol/L Final     Chloride   Date Value Ref Range Status   11/02/2022 93 (L) 98 - 107 mmol/L Final   01/20/2022 94 (L) 98 - 107 mmol/L Final   08/15/2019 111 (H) 94 - 109 mmol/L Final     Carbon Dioxide   Date Value Ref Range Status   08/15/2019 25 20 - 32 mmol/L Final     Carbon Dioxide (CO2)   Date Value Ref Range Status   11/02/2022 22 22 - 29 mmol/L Final   01/20/2022 22 22 - 31 mmol/L Final     Anion Gap   Date Value Ref Range Status   11/02/2022 13 7 - 15 mmol/L Final   01/20/2022 15 5 - 18 mmol/L Final   08/15/2019 6 3 - 14 mmol/L Final     Glucose   Date Value Ref Range Status   11/02/2022 113 (H) 70 - 99 mg/dL Final   01/20/2022 106 70 - 125 mg/dL Final   08/15/2019 90 70 - 99 mg/dL Final     Urea Nitrogen   Date Value Ref Range Status   11/02/2022 16.4 8.0 - 23.0 mg/dL Final   01/20/2022 17 8 - 28 mg/dL Final   08/15/2019 26 7 - 30 mg/dL Final     Creatinine   Date Value Ref Range Status   11/02/2022 0.73 0.51 - 0.95 mg/dL Final   08/15/2019 1.11 (H) 0.52 - 1.04 mg/dL Final     GFR Estimate   Date Value Ref Range Status   11/02/2022 82 >60 mL/min/1.73m2 Final     Comment:     Effective December 21, 2021 eGFRcr in adults is calculated using the 2021 CKD-EPI creatinine equation which includes age and gender (Babak seals al., NEJM, DOI: 10.1056/DDGHjc8476089)   07/07/2021 53 (L) >60 mL/min/1.73m2 Final   08/15/2019 47 (L) >60 mL/min/[1.73_m2] Final     Comment:     Non  GFR Calc  Starting 12/18/2018, serum creatinine based estimated GFR (eGFR) will be   calculated using the Chronic Kidney Disease Epidemiology Collaboration   (CKD-EPI) equation.       Calcium   Date Value Ref Range Status   11/02/2022 9.1 8.8 - 10.2 mg/dL Final   08/15/2019 7.7 (L) 8.5 - 10.1 mg/dL Final     Phosphorus   Date Value  Ref Range Status   12/10/2018 4.1 2.5 - 4.5 mg/dL Final     Albumin   Date Value Ref Range Status   11/02/2022 4.1 3.5 - 5.2 g/dL Final   07/07/2021 3.7 3.5 - 5.0 g/dL Final   08/14/2019 3.6 3.4 - 5.0 g/dL Final      Lab Results   Component Value Date    WBC 8.6 11/02/2022    WBC 7.3 08/15/2019     Lab Results   Component Value Date    RBC 4.23 11/02/2022    RBC 3.29 08/15/2019     Lab Results   Component Value Date    HGB 14.0 11/02/2022    HGB 9.6 10/04/2019     Lab Results   Component Value Date    HCT 40.9 11/02/2022    HCT 30.5 08/15/2019     No components found for: MCT  Lab Results   Component Value Date    MCV 97 11/02/2022    MCV 93 08/15/2019     Lab Results   Component Value Date    MCH 33.1 11/02/2022    MCH 29.5 08/15/2019     Lab Results   Component Value Date    MCHC 34.2 11/02/2022    MCHC 31.8 08/15/2019     Lab Results   Component Value Date    RDW 14.6 11/02/2022    RDW 16.3 08/15/2019     Lab Results   Component Value Date     11/02/2022     08/15/2019      No results found for: A1C   TSH   Date Value Ref Range Status   11/02/2022 1.01 0.30 - 4.20 uIU/mL Final   01/20/2022 0.54 0.30 - 5.00 uIU/mL Final   11/26/2018 8.11 (H) 0.40 - 4.00 mU/L Final      Lab Results   Component Value Date    VITDT 35 09/07/2022          Impression:    Encounter Diagnoses   Name Primary?     Open wound of knee, leg, and ankle, left, initial encounter Yes     Ankle instability, left        Assessment/Plan:  1. Debridement: After discussion of risk factors and verbal consent was obtained 2% Lidocaine HCL jelly was applied, under clean conditions, the L ankle ulceration(s) were debrided using currette. Devitalized and nonviable tissue, along with any fibrin and slough, was removed to improve granulation tissue formation, stimulate wound healing, decrease overall bacteria load, disrupt biofilm formation and decrease edge senescence.  Total excisional debridement was 1.08 sq cm from the epidermis/dermis area  with a depth of 0.3 cm.   Ulcers were improved afterwards and .  Measures were as noted on the flow sheet.    2. Treatment: wound treatment will include irrigation and dressings to promote autolytic debridement which will include: Cleanse with dilute hibiclens 30cc in 500cc NS. Apply medihoney to wound bed cover with foam border to be changed twice weekly. Wound with possible tendon present and enlarged tissue area around wound. No fluid is expressed when palpating on this ballotable area. Will obtain MRI to view if any other structures are involved or affected.    If for some reason the patient is not able to get your dressing(s) changed as outlined above (due to illness, lack of supplies, lack of help) please do the following: remove old, soiled dressings; wash the wounds with saline; pat dry; apply ABD pad or other absorbant pad and secure with rolled gauze; avoid tape directly on your skin; Patient instructed to call the clinic as soon as possible to let us know what the current issues are in receiving wound care.    3. Edema. Trace edema to ankle. Pt had ABIs 2019 showing left toe pressure of 101mmhg. Will start tubular compression for now. Discussed ABIs and venous competency studies but pt would rather hold off for now.    If a 2 layer or 4 layer compression wrap is being used; these are safe to have on for ONLY 7 days. If for some reason the patient is not able to get the wrap(s) changed (due to illness; lack of supplies, lack of help, lack of transportation) please do the following: unwrap the old 2 or 4 layer compression wrap; avoid using scissors as you could cut your skin and cause wounds; use tubular compression when available. Call to reschedule your home care or clinic visit appointment as soon as possible.    4. Offloading: Ordered AFO brace to keep ankle stabilized. Instructed pt limited non weight bearing.    5. Nutrition: Focus on protein    Patient to return to clinic in 3 week(s) for  re-evaluation. They were instructed to call the clinic sooner with any further questions or concerns. Answered all questions.      20 minutes spent on the date of the encounter doing chart review, history and exam, documentation and further activities per the note      DIANA Francisco CNP   [facility]  [phone]      This note was electronically signed by DIANA Francisco CNP

## 2022-11-07 NOTE — PATIENT INSTRUCTIONS
Wound care supplies were ordered today through [employer] and if you are not receiving your supplies or have a question on your bill please contact Angi Frias at [phone]. Please allow 2-5 business days for delivery of supplies. You may get a call from a [phone] if there are additional information Francisca needs. It is important to  or return their call. PLEASE NOTE: If you need to return your supplies, you MUST call customer service within 15 days of delivery date.     Please call Central Scheduling [phone] to schedule your test which should be completed prior to your next appointment.     [facility] (Please call to make an appointment)    [facility]  [address], [address]  [place], [place].  Phone: [phone]           MRI and AFO brace ordered today.    Wound Care Instructions    2 TIMES PER WEEK and as needed, Cleanse your left ankle wound(s) with Dilute hibiclens 30cc in 500cc NS.    Pat Dry with non-sterile gauze    Apply Lotion to the intact skin surrounding your wound and other dry skin locations. Some good lotions include: Remedy Skin Repair Cream, Sarna, Vanicream or Cetaphil    Primary Dressing: Apply medihoney into/onto the wounds    Secondary dressing: Cover with foam border    Compression: tubular compression    Offloading: AFO brace with limited weight bearing    It is not ok to get your wound wet in the bath or shower    It is recommended that you do not get your ulcer wet when showering.  Listed below are several ways of keeping it dry when you shower.     1. Wrap it with Press and Seal plastic wrap.  It can be found in the stores where the plastic wraps or tin foil is kept.               2.  Some people take a bath and hang their leg/foot out of the tub.                        3  Put your leg in a plastic bag and tape it on.           4. You can purchase a shower cover for casts at some pharmacies and through the  Internet.            5. Take a Bed Bath or wash up at the sink          If for some reason you are not able to get your dressing(s) changed as outlined above (due to illness, lack of supplies, lack of help) please do the following: remove old, soiled dressings; wash the wounds with saline; pat dry; apply ABD pad or other absorbant pad and secure with rolled gauze; avoid tape directly on your skin; Call the clinic as soon as possible to let us know what the current issues are in receiving wound care [phone].      SEEK MEDICAL CARE IF:  You have an increase in swelling, pain, or redness around the wound.  You have an increase in the amount of pus coming from the wound.  There is a bad smell coming from the wound.  The wound appears to be worsening/enlarging  You have a fever greater than 101.5 F      It is ok to continue current wound care treatment/products for the next 2-3 days until new wound care supplies are ordered and arrive. If longer than this please contact our office at [phone].    If you have a 2 layer or 4 layer compression wrap on these are safe to have on for ONLY 7 days. If for some reason you are not able to get the wrap(s) changed (due to illness; lack of supplies, lack of help, lack of transportation) please do the following: unwrap the old 2 or 4 layer compression wrap; avoid using scissors as you could cut your skin and cause wounds; use tubular compression when available. Call to reschedule your home care or clinic visit appointment as soon as possible.    Please NOTE: if you are 15 minutes late to your clinic appointment you will have to be rescheduled. Please call our clinic as soon as possible if you know you will not be able to get to your appointment at [phone].    If you fail to show up to 3 scheduled clinic appointments you will be dismissed from our clinic.              We want to hear from you!  In the next few weeks, you should receive a call or email to complete a survey  about your visit at [facility]. Please help us improve your appointment experience by letting us know how we did today. We strive to make your experience good and value any ways in which we could do better.      We value your input and suggestions.    Thank you for choosing the [facility]!

## 2022-11-07 NOTE — LETTER
[facility]  [address] [place] [address] [address]  [place], [place] [address]  [phone]      Fax [phone]    [facility]ly [facility]           Fax: [phone]            Customer Service: [phone]    Wound Dressing Rx and Order Form  Order Status: New  Verbal: Vane  Date: 2022     Zee Mireles  Gender: female  : 1940  [address] [facility] [address]  [phone] (home)     Medical Record: [id number]  Primary Care Provider: Jessica Fitzgerald      ICD-10-CM    1. Open wound of knee, leg, and ankle, left, initial encounter  S81.002A Vascular Medicine Referral    S81.802A Orthotics and Prosthetics DME Orthotic; AFO (Ankle Foot Orthosis)    S91.002A MR Foot Left w Contrast            Insurance Info:  INSURER: Payor: [facility] / Plan: "[employer] MEDICARE / Product Type: HMO /   Policy ID#:  [id number]  SECONDARY INSURANCE:    Secondary Policy ID#:  N/A        Physician Info:   Name:  JOSÉ SQUIRES     Dept Address/Phones:   [address] [place] [address], [address]  [facility] [address] [phone]  Fax: [phone]    Lymphedema circumferential measurements (in cm):  No flowsheet data found.      Wound info:  Rash 18 0400 Left elbow (Active)   Number of days: 1438       VASC Wound left ankle (Active)   Pre Size Length 1.2 22 1000   Pre Size Width 0.9 22 1000   Pre Size Depth 0.3 22 1000   Pre Total Sq cm 1.08 22 1000   Number of days: 0        Drainage: moderate  Thickness:  full  Duration of Need: 30 DAYS  Days Supply: 30 DAYS  Start Date: 2022  Starter Kit: yes   Qualifying wound/Debridement: Yes      Dressing Type Brand Size Number of pieces Frequency of change    Primary Manuka honey HD supra lite   2x2 2 2 TIMES PER WEEK and as needed   Secondary         mepilex bordered foam adhesive   3x3 10 2 TIMES PER WEEK and as needed    Square gauze   4x4 1 loaf  2 TIMES PER WEEK and as needed     No substitutions preferred. Call [phone].          OK to forward to covered supplier.    Electronically Signed Physician:  JOSÉ SQUIRES             Date: November 7, 2022

## 2022-11-08 ENCOUNTER — OFFICE VISIT (OUTPATIENT)
Dept: CARDIOLOGY | Facility: CLINIC | Age: 82
End: 2022-11-08
Payer: COMMERCIAL

## 2022-11-08 VITALS
SYSTOLIC BLOOD PRESSURE: 176 MMHG | BODY MASS INDEX: 19.84 KG/M2 | WEIGHT: 112 LBS | RESPIRATION RATE: 16 BRPM | HEART RATE: 74 BPM | HEIGHT: 63 IN | DIASTOLIC BLOOD PRESSURE: 64 MMHG

## 2022-11-08 DIAGNOSIS — I73.9 PAD (PERIPHERAL ARTERY DISEASE) (H): ICD-10-CM

## 2022-11-08 DIAGNOSIS — Z87.891 PERSONAL HISTORY OF TOBACCO USE, PRESENTING HAZARDS TO HEALTH: ICD-10-CM

## 2022-11-08 DIAGNOSIS — E78.5 HYPERLIPIDEMIA LDL GOAL <70: ICD-10-CM

## 2022-11-08 DIAGNOSIS — I25.119 CORONARY ARTERY DISEASE INVOLVING NATIVE CORONARY ARTERY OF NATIVE HEART WITH ANGINA PECTORIS (H): Primary | ICD-10-CM

## 2022-11-08 DIAGNOSIS — I10 ESSENTIAL HYPERTENSION: ICD-10-CM

## 2022-11-08 DIAGNOSIS — I48.0 PAROXYSMAL ATRIAL FIBRILLATION (H): ICD-10-CM

## 2022-11-08 PROCEDURE — 99215 OFFICE O/P EST HI 40 MIN: CPT | Performed by: GENERAL ACUTE CARE HOSPITAL

## 2022-11-08 RX ORDER — METOPROLOL SUCCINATE 25 MG/1
25 TABLET, EXTENDED RELEASE ORAL DAILY
Qty: 90 TABLET | Refills: 3 | Status: SHIPPED | OUTPATIENT
Start: 2022-11-08 | End: 2023-11-07

## 2022-11-08 NOTE — PROGRESS NOTES
HEART CARE ENCOUNTER NOTE        Assessment/Recommendations   Assessment:    1. Chest pain. This may be been brought on by uncontrolled hypertension. Resolved.  2. Coronary artery disease with history of non-ST elevation myocardial infarction status post drug-eluting stenting of the mid left anterior descending artery on 11/29/2018. She also had stenoses noted in the ostial 1st obtuse marginal artery and mid right coronary artery at the time of coronary angiography. She currently denies angina.  3. Peripheral arterial disease with stenting of the right external iliac/right common femoral artery on 7/3/2006. Her last ankle-brachial indices 1/21/2019 suggested moderate bilateral lower extremity peripheral artery disease possibly severe with exercise in the left lower extremity. She currently denies claudication.  4. Borderline seemingly asymptomatic moderate (50-69%) stenosis of the right internal carotid artery seen on carotid ultrasound 7/5/2019.  5. Paroxsymal atrial fibrillation noted 11/26/2018 with no evidence of recurrence. [id number] score is 5 (hypertension, age 75 or greater, coronary artery disease, female gender). HAS-BLED score is at least 3 (age greater than 65, prior gastrointestinal bleeding, need for antiplatelet therapy).  6. Essential hypertension. Elevated.  7. Hyperlipidemia. Last LDL 52 mg/dL.  8. Gastrointestinal bleeding possibly due to gastritis seen on upper endoscopy 8/15/2019.  9. Acute on chronic hyponatremia which may in part be due to thiazide-diuretic use.  10. Tobacco use.    Plan:  1. Discontinue diltiazem (due to interaction with amlodipine) and start oral metoprolol succinate 25 mg daily.  2. If she has recurrent chest discomfort, we can schedule a pharmacologic nuclear stress test.  3. Agree with stopping hydrochlorothiazide.  4. Continue amlodipine and losartan.   5. Hydralazine and/or isosorbide mononitrate could be added if she needs additional blood pressure  lowering.  6. Rosuvastatin 5 mg three times a week, as she perceived myalgias on more frequent dosing.  7. She follows in vascular medicine for her peripheral vascular disease.  8. Continue aspirin 81 mg daily.  9. She is not interested in anticoagulation or a left atrial appendage closure device.  10. She is not interested in quitting smoking.  11. Follow-up with me in 6 months.       A total time of 60 minutes was spent on the date of this encounter.    History of Present Illness   Ms. Zee Mireles is a 82 year old female with a significant past history of CAD with NSTEMI s/p AGATHA to the mid LAD 11/29/2018, PAD s/p stenting of the right external iliac/common femoral artery 7/3/2006, paroxysmal AF only seen at the time of her NSTEMI in 2018 and currently not on AC due to prior GI bleeding, HTN, HLD, and tobacco use presenting to [facility] care. She previously followed in cardiology clinic with my former colleague Dr. Kevin Luna, who has since retired.    On 10/26/2022 she felt tightness in her chest as well as palpitations. Her BP was also very high 170-190 systolic. She came to the ED. ECG showed no ischemic changes. High-sensitivity troponin was not elevated. CXR showed no acute disease. She reports being given diltiazem which helped lower her BP and improved her symptoms but she has not been taking this as an outpatient.    Her BP is consistently high in clinic. At home, her BP tends to run closer to 140s systolic. She was on hydrochlorothiazide but this was stopped presumably due to worsening hyponatremia. She has been on losartan for a while and recently had her amlodipine dose increased. She has a slow healing wound on her left lower extremity but denies any claudication, which she used to have in her right leg prior to stenting in 2006. She continues to smoke.     She is no longer having chest pain and denies shortness of breath at rest, light headedness/dizziness, pre-syncope, syncope, lower  extremity swelling, palpitations, paroxysmal nocturnal dyspnea (PND), or orthopnea.    She takes rosuvastatin 5 mg just three times a week, as taking this daily made her legs hurt and she felt better with the dose reduction.    She was seen by vascular surgery on 8/26/2019 for retinal spots seen on eye examination concerning for focal emboli. A carotid [facility] suggested borderline 50-69% stenosis of the right ICA and less than 50% stenosis in the left ICA. This was felt to not be significant or warrant intervention. She was referred for a Watchman procedure but says she is no longer interested in doing this because she feels she is too old.     Cardiac Problems and Cardiac Diagnostics     Most Recent Cardiac testing:  ECG dated 10/26/2022 (personaly reviewed and interpreted): SR, possible LA enlargement, otherwise normal    CXR 10/26/2022 (report reviewed):  No acute cardiopulmonary disease.    ECHO 7/11/2019 (report reviewed):   1. Normal resting left ventricular size, estimated EF . No resting regional wall motion abnormalities noted.  2. Mild concentric left ventricular hypertrophy.  3. Aortic valve sclerosis. No evidence of aortic stenosis. Mild aortic insufficiency.  4. Mitral annular calcification. Mild mitral regurgitation.  5. Mild tricuspid regurgitation. Estimated RA pressure 5-10 mm Hg based upon evaluation of the IVC. Calculated RV systolic pressure 27 mm Hg plus right atrial pressure (normal).  6. Structurally and functionally unremarkable pulmonic valve.  7. Patient incidentally noted to be in sinus rhythm during echocardiographic imaging.    Exercise ABIs 1/21/2019 (report reviewed):  1.  Resting ABIs on the right compatible with moderate arterial insufficiency that worsens with exercise.  2.  Resting ABIs on the left compatible with moderate arterial insufficiency that becomes borderline severe with exercise. Catheter angiogram with possible intervention could be  performed for further evaluation and treatment.    Carotid ultrasound 7/5/2019 (report reviewed):   1. Right carotid: There has been significant interval softening calcified plaque formation within the distal right common carotid artery, bifurcation and right proximal ICA relative to that of the prior exam from 2014. Currently velocity and ICA/CCA ratio suggests borderline 50-69% stenosis within the ICA. In a clinically symptomatic patient this may be hemodynamically significant.  2. Left carotid: There is moderate arteriosclerotic plaque formation identified within the bifurcation which is unchanged compared to prior exam. The estimated vascular stenosis of the left ICA is less than 50% which is not considered hemodynamically significant.  3. Vertebral arteries: Patent with normal cephalad flow direction.    Stress test 11/27/2018 (report reviewed):   1.  Myocardial perfusion imaging using single isotope technique  demonstrated mild to moderate mid to distal anteroseptal defect with  mild reversibility. (Normal wall motion in this area, however due to  mild reversibility cannot exclude mild ischemia in this territory)   2. Gated images demonstrated normal LV cavity size with hyperdynamic  LV systolic function and normal wall motion.  The left ventricular  systolic function is 81%.  3. Compared to the prior study from no prior study .    Cardiac cath 11/29/2018 (report reviewed):  LEFT MAIN CORONARY ARTERY: No significant disease  LEFT ANTERIOR DESCENDING ARTERY: Heavily calcified LAD. There is mild  diffuse disease until the mid segment at the diagonal branch where  there is a heavily calcified 89% stenosis. The first septal   also has an 80% ostial stenosis. The diagonal branch has a severe 95%  stenosis with ulceration and a 90 degree takeoff from the LAD. This  vessel is probably 1.52 mm in caliber.  CIRCUMFLEX ARTERY: Circumflex has mild diffuse disease. OM1 has an  ostial 70% stenosis of a moderate  size vessel.  RIGHT CORONARY ARTERY: Heavily calcified dominant right. There is a  mid vessel 60% stenosis.  HEMODYNAMICS: Aortic pressures is 160/70,  PERCUTANEOUS CORONARY INTERVENTION: After reviewing the coronary  angiography findings with the patient, we elected to treat the LAD. A  6 French JL 3.5 guiding catheter was used to engage the ostium of the  left main. This provided an adequate support for the procedure. A 14  wire was used to successfully the LAD lesion. The lesion was  pre-dilated with a 2.5 compliant balloon. After adequate angioplasty  result, a 0.5 x 28 drug-eluting stent was deployed inside the lesion  using 14 gretchen atmosphere pressure. A guideline her catheter was used  and was necessary to deliver the LAD stent. We attempted to cross the  lesion back and postdilated with a NC balloon both a Storenvy as well  as a [place] balloon and were unable to get the balloon to the site  even with use of a guideline a catheter.       Medications  Allergies   Current Outpatient Medications   Medication Sig Dispense Refill     acebutolol (SECTRAL) 400 MG capsule Take 400 mg by mouth daily as needed (Palpitations) = extra dose in addition to scheduled daily dose       acetaminophen (TYLENOL) 500 MG tablet Take 1,000 mg by mouth every 6 hours as needed for mild pain       albuterol (PROAIR HFA/PROVENTIL HFA/VENTOLIN HFA) 108 (90 Base) MCG/ACT inhaler Inhale 2 puffs into the lungs every 4 hours as needed for shortness of breath / dyspnea or wheezing OFFICE VISIT NEEDED FOR ANY FURTHER REFILLS 18 g 0     amLODIPine (NORVASC) 2.5 MG tablet Take 1 tablet (2.5 mg) by mouth daily (Patient taking differently: Take 5 mg by mouth daily) 30 tablet 11     aspirin 81 MG EC tablet Take 1 tablet (81 mg) by mouth       brimonidine (ALPHAGAN-P) 0.15 % ophthalmic solution Place 1 drop into both eyes 3 times daily       diltiazem ER (DILT-XR) 120 MG 24 hr capsule Take 1 capsule (120 mg) by mouth daily 30 capsule 0      "dorzolamide (TRUSOPT) 2 % ophthalmic solution Place 1 drop into both eyes 3 times daily       hydrochlorothiazide (HYDRODIURIL) 25 MG tablet Take 12.5 mg by mouth daily 30 tablet 0     imiquimod (ALDARA) 5 % external cream APPLY TOPICALLY TO ENTIRE FOREARMS ONCE DAILY       latanoprost (XALATAN) 0.005 % ophthalmic solution Place 1 drop into both eyes every evening        levothyroxine (SYNTHROID/LEVOTHROID) 100 MCG tablet TAKE 1 TABLET BY MOUTH EVERY DAY IN THE MORNING ON AN EMPTY STOMACH FOR THYROID       levothyroxine (SYNTHROID/LEVOTHROID) 112 MCG tablet 1 tablet daily       losartan (COZAAR) 100 MG tablet Take 1 tablet (100 mg) by mouth daily 30 tablet 0     pantoprazole (PROTONIX) 40 MG EC tablet Take 1 tablet (40 mg) by mouth daily 90 tablet 1     rosuvastatin (CRESTOR) 5 MG tablet TK 1 T PO THREE TIMES A WEEK m/w/f  0     triamcinolone (KENALOG) 0.025 % external ointment APPLY TWICE DAILY TO ARMS TAPER TO EVERY DAY       triamcinolone (KENALOG) 0.1 % external cream Apply twice to three times daily as needed on arms and legs for next 4 weeks. 453.6 g 5      Allergies   Allergen Reactions     Atorvastatin Muscle Pain (Myalgia)     Was fine while taking simvastatin 20 mg daily.     Zoledronic Acid      Other reaction(s): SEVERE REACTION  Other reaction(s): SEVERE REACTION          Physical Examination Review of Systems   BP (!) 176/64 (BP Location: Right arm, Patient Position: Sitting, Cuff Size: Adult Regular)   Pulse 74   Resp 16   Ht 1.6 m (5' 3\")   Wt 50.8 kg (112 lb)   BMI 19.84 kg/m    Body mass index is 19.84 kg/m .  Wt Readings from Last 3 Encounters:   11/08/22 50.8 kg (112 lb)   10/26/22 54 kg (119 lb)   11/22/21 58.5 kg (129 lb)       General Appearance:   Pleasant  female, appears  stated age. no acute distress, thin body habitus   ENT/Mouth: Facemask.    EYES:  no scleral icterus, normal conjunctivae   Neck: no carotid bruits. No anterior cervical lymphadenopaty   Respiratory:   lungs are clear " "to auscultation, no rales or wheezing, equal chest wall expansion    Cardiovascular:   Regular rhythm, normal rate. Normal first and second heart sounds with no murmurs, rubs, or gallops; the carotid, radial and posterior tibial pulses are intact, Jugular venous pressure normal, no edema bilaterally    Abdomen/GI:  no organomegaly, masses, bruits, or tenderness; bowel sounds are present   Extremities: no cyanosis or clubbing   Skin: no xanthelasma, warm.    Heme/lymph/ Immunology No apparent bleeding noted.   Neurologic: Alert and oriented. normal gait, no tremors     Psychiatric: Pleasant, calm, appropriate affect.    A complete 10 system review of systems was performed and is negative except as mentioned in the HPI/subjective.         Past History   Past Medical History:   Past Medical History:   Diagnosis Date     Asthma      Chest pain 11/2018    burning sensation - indigestion     Colitis      COPD (chronic obstructive pulmonary disease) (H)      COPD (chronic obstructive pulmonary disease) (H)      Coronary artery disease due to calcified coronary lesion 11/01/2018    stent to LAD after NSTEMI     DNI (do not intubate)      Dyslipidemia, goal LDL below 70      Essential hypertension      GERD (gastroesophageal reflux disease)      Glaucoma      HTN (hypertension)      Hyperlipidemia      Hypothyroid      NSTEMI (non-ST elevation myocardial infarction) (H) 11/01/2018     PAD (peripheral artery disease) (H)      Paroxysmal atrial fibrillation (H) 11/2018     Paroxysmal atrial fibrillation (H) 11/01/2018     Partial retinal artery occlusion 06/09/2021     Skin cancer      Smoker     ongoing - \"6-8 cigarettes QD\"       Past Surgical History:   Past Surgical History:   Procedure Laterality Date     CATARACT EXTRACTION       cataract removal       COLONOSCOPY  12/2018    [facility]     COLONOSCOPY N/A 12/12/2018    COLONOSCOPY with polypectomy; Damien Denton MD;  [facility];  Service: Gastroenterology     " CORONARY STENT PLACEMENT  2018    at [facility] in [place]     CV BALLOON DILATION AND/OR STENT PLACEMENT OF VESSEL  2018    stent to LAD     ESOPHAGOSCOPY, GASTROSCOPY, DUODENOSCOPY (EGD), COMBINED  2018    [facility]     ESOPHAGOSCOPY, GASTROSCOPY, DUODENOSCOPY (EGD), COMBINED N/A 8/15/2019    Procedure: ESOPHAGOGASTRODUODENOSCOPY, WITH BIOPSY;  Surgeon: Demario Clayton DO;  Location: [facility]     ESOPHAGOSCOPY, GASTROSCOPY, DUODENOSCOPY (EGD), COMBINED N/A 12/10/2018    Damien Denton MD; [facility];  Service: Gastroenterology     IR ABDOMINAL AORTOGRAM  7/3/2006     IR EXTREMITY ANGIOGRAM BILATERAL  7/3/2006     IR MISCELLANEOUS PROCEDURE  7/3/2006     IR MISCELLANEOUS PROCEDURE  2006       Family History:   Family History   Problem Relation Age of Onset     Sudden Death Mother 55.00        no autopsy     Goiter Mother      Stomach Cancer Father 72.00     No Known Problems Brother      Other - See Comments Brother          in [place] War     Ulcers Son         Gastric ulcer was perforated; had surgery. His wife passed suddenly at 49 at home in 2016.     Atrial fibrillation Son        Social History:   Social History     Socioeconomic History     Marital status:      Spouse name: Not on file     Number of children: 2     Years of education: Not on file     Highest education level: Not on file   Occupational History     Not on file   Tobacco Use     Smoking status: Every Day     Packs/day: 1.00     Years: 60.00     Pack years: 60.00     Types: Cigarettes     Smokeless tobacco: Never     Tobacco comments:     down to 0.3 ppd since 2017   Substance and Sexual Activity     Alcohol use: No     Alcohol/week: 1.0 standard drink     Drug use: No     Sexual activity: Not Currently     Partners: Male   Other Topics Concern     Not on file   Social History Narrative    Zee lives with her son, Braden, and his wife.     Social Determinants of Health     Financial Resource Strain: Not  on file   Food Insecurity: Not on file   Transportation Needs: Not on file   Physical Activity: Not on file   Stress: Not on file   Social Connections: Not on file   Intimate Partner Violence: Not on file   Housing Stability: Not on file              Lab Results    Chemistry/lipid CBC Cardiac Enzymes/BNP/TSH/INR   Lab Results   Component Value Date    CHOL 152 11/02/2022    HDL 85 11/02/2022    LDL 52 11/02/2022    TRIG 75 11/02/2022    CR 0.73 11/02/2022    BUN 16.4 11/02/2022    POTASSIUM 4.4 11/02/2022     (L) 11/02/2022    CO2 22 11/02/2022      Lab Results   Component Value Date    WBC 8.6 11/02/2022    HGB 14.0 11/02/2022    HCT 40.9 11/02/2022    MCV 97 11/02/2022     11/02/2022      Lab Results   Component Value Date    TROPONINI 0.02 05/28/2018     (H) 05/28/2018    TSH 1.01 11/02/2022    INR 1.06 08/14/2019          Kanu Stover MD [facility]  Non-Invasive Cardiologist  [facility]  Pager [phone]

## 2022-11-08 NOTE — LETTER
11/8/2022    Jessica Fitzgerald MD  [address]  [place] [address]    RE: Zee Mireles       Dear Colleague,     I had the pleasure of seeing Zee Mireles in the [facility].  HEART CARE ENCOUNTER NOTE        Assessment/Recommendations   Assessment:    1. Chest pain. This may be been brought on by uncontrolled hypertension. Resolved.  2. Coronary artery disease with history of non-ST elevation myocardial infarction status post drug-eluting stenting of the mid left anterior descending artery on 11/29/2018. She also had stenoses noted in the ostial 1st obtuse marginal artery and mid right coronary artery at the time of coronary angiography. She currently denies angina.  3. Peripheral arterial disease with stenting of the right external iliac/right common femoral artery on 7/3/2006. Her last ankle-brachial indices 1/21/2019 suggested moderate bilateral lower extremity peripheral artery disease possibly severe with exercise in the left lower extremity. She currently denies claudication.  4. Borderline seemingly asymptomatic moderate (50-69%) stenosis of the right internal carotid artery seen on carotid ultrasound 7/5/2019.  5. Paroxsymal atrial fibrillation noted 11/26/2018 with no evidence of recurrence. [id number] score is 5 (hypertension, age 75 or greater, coronary artery disease, female gender). HAS-BLED score is at least 3 (age greater than 65, prior gastrointestinal bleeding, need for antiplatelet therapy).  6. Essential hypertension. Elevated.  7. Hyperlipidemia. Last LDL 52 mg/dL.  8. Gastrointestinal bleeding possibly due to gastritis seen on upper endoscopy 8/15/2019.  9. Acute on chronic hyponatremia which may in part be due to thiazide-diuretic use.  10. Tobacco use.    Plan:  1. Discontinue diltiazem (due to interaction with amlodipine) and start oral metoprolol succinate 25 mg daily.  2. If she has recurrent chest discomfort, we can schedule a pharmacologic  nuclear stress test.  3. Agree with stopping hydrochlorothiazide.  4. Continue amlodipine and losartan.   5. Hydralazine and/or isosorbide mononitrate could be added if she needs additional blood pressure lowering.  6. Rosuvastatin 5 mg three times a week, as she perceived myalgias on more frequent dosing.  7. She follows in vascular medicine for her peripheral vascular disease.  8. Continue aspirin 81 mg daily.  9. She is not interested in anticoagulation or a left atrial appendage closure device.  10. She is not interested in quitting smoking.  11. Follow-up with me in 6 months.       A total time of 60 minutes was spent on the date of this encounter.    History of Present Illness   Ms. Zee Mireles is a 82 year old female with a significant past history of CAD with NSTEMI s/p AGATHA to the mid LAD 11/29/2018, PAD s/p stenting of the right external iliac/common femoral artery 7/3/2006, paroxysmal AF only seen at the time of her NSTEMI in 2018 and currently not on AC due to prior GI bleeding, HTN, HLD, and tobacco use presenting to establish care. She previously followed in cardiology clinic with my former colleague Dr. Kevin Luna, who has since retired.    On 10/26/2022 she felt tightness in her chest as well as palpitations. Her BP was also very high 170-190 systolic. She came to the ED. ECG showed no ischemic changes. High-sensitivity troponin was not elevated. CXR showed no acute disease. She reports being given diltiazem which helped lower her BP and improved her symptoms but she has not been taking this as an outpatient.    Her BP is consistently high in clinic. At home, her BP tends to run closer to 140s systolic. She was on hydrochlorothiazide but this was stopped presumably due to worsening hyponatremia. She has been on losartan for a while and recently had her amlodipine dose increased. She has a slow healing wound on her left lower extremity but denies any claudication, which she used to have in  her right leg prior to stenting in 2006. She continues to smoke.     She is no longer having chest pain and denies shortness of breath at rest, light headedness/dizziness, pre-syncope, syncope, lower extremity swelling, palpitations, paroxysmal nocturnal dyspnea (PND), or orthopnea.    She takes rosuvastatin 5 mg just three times a week, as taking this daily made her legs hurt and she felt better with the dose reduction.    She was seen by vascular surgery on 8/26/2019 for retinal spots seen on eye examination concerning for focal emboli. A carotid ultrasound done through [facility] suggested borderline 50-69% stenosis of the right ICA and less than 50% stenosis in the left ICA. This was felt to not be significant or warrant intervention. She was referred for a Watchman procedure but says she is no longer interested in doing this because she feels she is too old.     Cardiac Problems and Cardiac Diagnostics     Most Recent Cardiac testing:  ECG dated 10/26/2022 (personaly reviewed and interpreted): SR, possible LA enlargement, otherwise normal    CXR 10/26/2022 (report reviewed):  No acute cardiopulmonary disease.    ECHO 7/11/2019 (report reviewed):   1. Normal resting left ventricular size, estimated EF . No resting regional wall motion abnormalities noted.  2. Mild concentric left ventricular hypertrophy.  3. Aortic valve sclerosis. No evidence of aortic stenosis. Mild aortic insufficiency.  4. Mitral annular calcification. Mild mitral regurgitation.  5. Mild tricuspid regurgitation. Estimated RA pressure 5-10 mm Hg based upon evaluation of the IVC. Calculated RV systolic pressure 27 mm Hg plus right atrial pressure (normal).  6. Structurally and functionally unremarkable pulmonic valve.  7. Patient incidentally noted to be in sinus rhythm during echocardiographic imaging.    Exercise ABIs 1/21/2019 (report reviewed):  1.  Resting ABIs on the right compatible with moderate arterial insufficiency that worsens  with exercise.  2.  Resting ABIs on the left compatible with moderate arterial insufficiency that becomes borderline severe with exercise. Catheter angiogram with possible intervention could be performed for further evaluation and treatment.    Carotid ultrasound 7/5/2019 (report reviewed):   1. Right carotid: There has been significant interval softening calcified plaque formation within the distal right common carotid artery, bifurcation and right proximal ICA relative to that of the prior exam from 2014. Currently velocity and ICA/CCA ratio suggests borderline 50-69% stenosis within the ICA. In a clinically symptomatic patient this may be hemodynamically significant.  2. Left carotid: There is moderate arteriosclerotic plaque formation identified within the bifurcation which is unchanged compared to prior exam. The estimated vascular stenosis of the left ICA is less than 50% which is not considered hemodynamically significant.  3. Vertebral arteries: Patent with normal cephalad flow direction.    Stress test 11/27/2018 (report reviewed):   1.  Myocardial perfusion imaging using single isotope technique  demonstrated mild to moderate mid to distal anteroseptal defect with  mild reversibility. (Normal wall motion in this area, however due to  mild reversibility cannot exclude mild ischemia in this territory)   2. Gated images demonstrated normal LV cavity size with hyperdynamic  LV systolic function and normal wall motion.  The left ventricular  systolic function is 81%.  3. Compared to the prior study from no prior study .    Cardiac cath 11/29/2018 (report reviewed):  LEFT MAIN CORONARY ARTERY: No significant disease  LEFT ANTERIOR DESCENDING ARTERY: Heavily calcified LAD. There is mild  diffuse disease until the mid segment at the diagonal branch where  there is a heavily calcified 89% stenosis. The first septal   also has an 80% ostial stenosis. The diagonal branch has a severe 95%  stenosis with  ulceration and a 90 degree takeoff from the LAD. This  vessel is probably 1.52 mm in caliber.  CIRCUMFLEX ARTERY: Circumflex has mild diffuse disease. OM1 has an  ostial 70% stenosis of a moderate size vessel.  RIGHT CORONARY ARTERY: Heavily calcified dominant right. There is a  mid vessel 60% stenosis.  HEMODYNAMICS: Aortic pressures is 160/70,  PERCUTANEOUS CORONARY INTERVENTION: After reviewing the coronary  angiography findings with the patient, we elected to treat the LAD. A  6 French JL 3.5 guiding catheter was used to engage the ostium of the  left main. This provided an adequate support for the procedure. A 14  wire was used to successfully the LAD lesion. The lesion was  pre-dilated with a 2.5 compliant balloon. After adequate angioplasty  result, a 0.5 x 28 drug-eluting stent was deployed inside the lesion  using 14 gretchen atmosphere pressure. A guideline her catheter was used  and was necessary to deliver the LAD stent. We attempted to cross the  lesion back and postdilated with a NC balloon both a [place] as well  as a [place] balloon and were unable to get the balloon to the site  even with use of a guideline a catheter.       Medications  Allergies   Current Outpatient Medications   Medication Sig Dispense Refill     acebutolol (SECTRAL) 400 MG capsule Take 400 mg by mouth daily as needed (Palpitations) = extra dose in addition to scheduled daily dose       acetaminophen (TYLENOL) 500 MG tablet Take 1,000 mg by mouth every 6 hours as needed for mild pain       albuterol (PROAIR HFA/PROVENTIL HFA/VENTOLIN HFA) 108 (90 Base) MCG/ACT inhaler Inhale 2 puffs into the lungs every 4 hours as needed for shortness of breath / dyspnea or wheezing OFFICE VISIT NEEDED FOR ANY FURTHER REFILLS 18 g 0     amLODIPine (NORVASC) 2.5 MG tablet Take 1 tablet (2.5 mg) by mouth daily (Patient taking differently: Take 5 mg by mouth daily) 30 tablet 11     aspirin 81 MG EC tablet Take 1 tablet (81 mg) by mouth       brimonidine  "(ALPHAGAN-P) 0.15 % ophthalmic solution Place 1 drop into both eyes 3 times daily       diltiazem ER (DILT-XR) 120 MG 24 hr capsule Take 1 capsule (120 mg) by mouth daily 30 capsule 0     dorzolamide (TRUSOPT) 2 % ophthalmic solution Place 1 drop into both eyes 3 times daily       hydrochlorothiazide (HYDRODIURIL) 25 MG tablet Take 12.5 mg by mouth daily 30 tablet 0     imiquimod (ALDARA) 5 % external cream APPLY TOPICALLY TO ENTIRE FOREARMS ONCE DAILY       latanoprost (XALATAN) 0.005 % ophthalmic solution Place 1 drop into both eyes every evening        levothyroxine (SYNTHROID/LEVOTHROID) 100 MCG tablet TAKE 1 TABLET BY MOUTH EVERY DAY IN THE MORNING ON AN EMPTY STOMACH FOR THYROID       levothyroxine (SYNTHROID/LEVOTHROID) 112 MCG tablet 1 tablet daily       losartan (COZAAR) 100 MG tablet Take 1 tablet (100 mg) by mouth daily 30 tablet 0     pantoprazole (PROTONIX) 40 MG EC tablet Take 1 tablet (40 mg) by mouth daily 90 tablet 1     rosuvastatin (CRESTOR) 5 MG tablet TK 1 T PO THREE TIMES A WEEK m/w/f  0     triamcinolone (KENALOG) 0.025 % external ointment APPLY TWICE DAILY TO ARMS TAPER TO EVERY DAY       triamcinolone (KENALOG) 0.1 % external cream Apply twice to three times daily as needed on arms and legs for next 4 weeks. 453.6 g 5      Allergies   Allergen Reactions     Atorvastatin Muscle Pain (Myalgia)     Was fine while taking simvastatin 20 mg daily.     Zoledronic Acid      Other reaction(s): SEVERE REACTION  Other reaction(s): SEVERE REACTION          Physical Examination Review of Systems   BP (!) 176/64 (BP Location: Right arm, Patient Position: Sitting, Cuff Size: Adult Regular)   Pulse 74   Resp 16   Ht 1.6 m (5' 3\")   Wt 50.8 kg (112 lb)   BMI 19.84 kg/m    Body mass index is 19.84 kg/m .  Wt Readings from Last 3 Encounters:   11/08/22 50.8 kg (112 lb)   10/26/22 54 kg (119 lb)   11/22/21 58.5 kg (129 lb)       General Appearance:   Pleasant  female, appears  stated age. no acute " "distress, thin body habitus   ENT/Mouth: Facemask.    EYES:  no scleral icterus, normal conjunctivae   Neck: no carotid bruits. No anterior cervical lymphadenopaty   Respiratory:   lungs are clear to auscultation, no rales or wheezing, equal chest wall expansion    Cardiovascular:   Regular rhythm, normal rate. Normal first and second heart sounds with no murmurs, rubs, or gallops; the carotid, radial and posterior tibial pulses are intact, Jugular venous pressure normal, no edema bilaterally    Abdomen/GI:  no organomegaly, masses, bruits, or tenderness; bowel sounds are present   Extremities: no cyanosis or clubbing   Skin: no xanthelasma, warm.    Heme/lymph/ Immunology No apparent bleeding noted.   Neurologic: Alert and oriented. normal gait, no tremors     Psychiatric: Pleasant, calm, appropriate affect.    A complete 10 system review of systems was performed and is negative except as mentioned in the HPI/subjective.         Past History   Past Medical History:   Past Medical History:   Diagnosis Date     Asthma      Chest pain 11/2018    burning sensation - indigestion     Colitis      COPD (chronic obstructive pulmonary disease) (H)      COPD (chronic obstructive pulmonary disease) (H)      Coronary artery disease due to calcified coronary lesion 11/01/2018    stent to LAD after NSTEMI     DNI (do not intubate)      Dyslipidemia, goal LDL below 70      Essential hypertension      GERD (gastroesophageal reflux disease)      Glaucoma      HTN (hypertension)      Hyperlipidemia      Hypothyroid      NSTEMI (non-ST elevation myocardial infarction) (H) 11/01/2018     PAD (peripheral artery disease) (H)      Paroxysmal atrial fibrillation (H) 11/2018     Paroxysmal atrial fibrillation (H) 11/01/2018     Partial retinal artery occlusion 06/09/2021     Skin cancer      Smoker     ongoing - \"6-8 cigarettes QD\"       Past Surgical History:   Past Surgical History:   Procedure Laterality Date     CATARACT EXTRACTION   "     cataract removal       COLONOSCOPY  2018    [facility]     COLONOSCOPY N/A 2018    COLONOSCOPY with polypectomy; Damien Denton MD;  [facility];  Service: Gastroenterology     CORONARY STENT PLACEMENT  2018    at [facility] in [place]     CV BALLOON DILATION AND/OR STENT PLACEMENT OF VESSEL  2018    stent to LAD     ESOPHAGOSCOPY, GASTROSCOPY, DUODENOSCOPY (EGD), COMBINED  2018    [facility]     ESOPHAGOSCOPY, GASTROSCOPY, DUODENOSCOPY (EGD), COMBINED N/A 8/15/2019    Procedure: ESOPHAGOGASTRODUODENOSCOPY, WITH BIOPSY;  Surgeon: Demario Clayton DO;  Location: [facility]     ESOPHAGOSCOPY, GASTROSCOPY, DUODENOSCOPY (EGD), COMBINED N/A 12/10/2018    Damien Denton MD; [facility];  Service: Gastroenterology     IR ABDOMINAL AORTOGRAM  7/3/2006     IR EXTREMITY ANGIOGRAM BILATERAL  7/3/2006     IR MISCELLANEOUS PROCEDURE  7/3/2006     IR MISCELLANEOUS PROCEDURE  2006       Family History:   Family History   Problem Relation Age of Onset     Sudden Death Mother 55.00        no autopsy     Goiter Mother      Stomach Cancer Father 72.00     No Known Problems Brother      Other - See Comments Brother          in [place]     Ulcers Son         Gastric ulcer was perforated; had surgery. His wife passed suddenly at 49 at home in 2016.     Atrial fibrillation Son        Social History:   Social History     Socioeconomic History     Marital status:      Spouse name: Not on file     Number of children: 2     Years of education: Not on file     Highest education level: Not on file   Occupational History     Not on file   Tobacco Use     Smoking status: Every Day     Packs/day: 1.00     Years: 60.00     Pack years: 60.00     Types: Cigarettes     Smokeless tobacco: Never     Tobacco comments:     down to 0.3 ppd since 2017   Substance and Sexual Activity     Alcohol use: No     Alcohol/week: 1.0 standard drink     Drug use: No     Sexual activity: Not Currently      Partners: Male   Other Topics Concern     Not on file   Social History Narrative    Zee lives with her son, Braden, and his wife.     Social Determinants of Health     Financial Resource Strain: Not on file   Food Insecurity: Not on file   Transportation Needs: Not on file   Physical Activity: Not on file   Stress: Not on file   Social Connections: Not on file   Intimate Partner Violence: Not on file   Housing Stability: Not on file              Lab Results    Chemistry/lipid CBC Cardiac Enzymes/BNP/TSH/INR   Lab Results   Component Value Date    CHOL 152 11/02/2022    HDL 85 11/02/2022    LDL 52 11/02/2022    TRIG 75 11/02/2022    CR 0.73 11/02/2022    BUN 16.4 11/02/2022    POTASSIUM 4.4 11/02/2022     (L) 11/02/2022    CO2 22 11/02/2022      Lab Results   Component Value Date    WBC 8.6 11/02/2022    HGB 14.0 11/02/2022    HCT 40.9 11/02/2022    MCV 97 11/02/2022     11/02/2022      Lab Results   Component Value Date    TROPONINI 0.02 05/28/2018     (H) 05/28/2018    TSH 1.01 11/02/2022    INR 1.06 08/14/2019          Kanu Stover MD [facility]  Non-Invasive Cardiologist  [facility]  Pager [phone]        Thank you for allowing me to participate in the care of your patient.      Sincerely,     Kanu Stover MD     [facility]  cc:   Referred Self,

## 2022-11-08 NOTE — PATIENT INSTRUCTIONS
"We will add a new heart medicine called \"metoprolol\".  You do not need to take diltiazem.  Continue on your other medications.  If you have more chest pressure, we can consider a stress test.  Let us know if you are having a lot of blood pressure over 140.  See me back in 6 months.  "

## 2022-11-17 ENCOUNTER — TELEPHONE (OUTPATIENT)
Dept: VASCULAR SURGERY | Facility: CLINIC | Age: 82
End: 2022-11-17

## 2022-11-17 NOTE — TELEPHONE ENCOUNTER
Cristian from MRI calling to clarify the order placed for this patient.  He states he believes the wrong order was placed.  MRI LEFT FOOT WITH CONTRAST was ordered, he believes that it was supposed to be foot and ankle with/without contrast.  He would like a call back to clarify and to get a new order placed if needed.  Patient is scheduled for this Sunday 11/20/22.     [phone]

## 2022-11-18 ENCOUNTER — OFFICE VISIT (OUTPATIENT)
Dept: DERMATOLOGY | Facility: CLINIC | Age: 82
End: 2022-11-18
Payer: COMMERCIAL

## 2022-11-18 DIAGNOSIS — L28.1 PRURIGO NODULARIS: Primary | ICD-10-CM

## 2022-11-18 PROCEDURE — 11900 INJECT SKIN LESIONS </W 7: CPT | Performed by: PHYSICIAN ASSISTANT

## 2022-11-18 ASSESSMENT — PAIN SCALES - GENERAL: PAINLEVEL: NO PAIN (0)

## 2022-11-18 NOTE — LETTER
"    11/18/2022         RE: Zee Mireles  [address] [facility] [address]        Dear Colleague,    Thank you for referring your patient, Zee Mireles, to the [facility]. Please see a copy of my visit note below.    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 She is here today for more intralesional injections. She is seeing good improvement, does note one year area on left forearm.  Patient has no other skin complaints today.  Remainder of the HPI, Meds, PMH, Allergies, FH, and SH was reviewed in chart.    Past Medical History:   Diagnosis Date     Asthma      Chest pain 11/2018    burning sensation - indigestion     Colitis      COPD (chronic obstructive pulmonary disease) (H)      COPD (chronic obstructive pulmonary disease) (H)      Coronary artery disease due to calcified coronary lesion 11/01/2018    stent to LAD after NSTEMI     DNI (do not intubate)      Dyslipidemia, goal LDL below 70      Essential hypertension      GERD (gastroesophageal reflux disease)      Glaucoma      HTN (hypertension)      Hyperlipidemia      Hypothyroid      NSTEMI (non-ST elevation myocardial infarction) (H) 11/01/2018     PAD (peripheral artery disease) (H)      Paroxysmal atrial fibrillation (H) 11/2018     Paroxysmal atrial fibrillation (H) 11/01/2018     Partial retinal artery occlusion 06/09/2021     Skin cancer      Smoker     ongoing - \"6-8 cigarettes QD\"       Past Surgical History:   Procedure Laterality Date     CATARACT EXTRACTION       cataract removal       COLONOSCOPY  12/2018    [facility]     COLONOSCOPY N/A 12/12/2018    COLONOSCOPY with polypectomy; Damien Denton MD;  [facility];  Service: Gastroenterology     CORONARY STENT PLACEMENT  11/2018    at [facility] in [place]     CV BALLOON DILATION AND/OR STENT PLACEMENT OF VESSEL  11/2018    stent to LAD     ESOPHAGOSCOPY, " GASTROSCOPY, DUODENOSCOPY (EGD), COMBINED  2018    [facility]     ESOPHAGOSCOPY, GASTROSCOPY, DUODENOSCOPY (EGD), COMBINED N/A 8/15/2019    Procedure: ESOPHAGOGASTRODUODENOSCOPY, WITH BIOPSY;  Surgeon: Demario Clayton DO;  Location: [facility]     ESOPHAGOSCOPY, GASTROSCOPY, DUODENOSCOPY (EGD), COMBINED N/A 12/10/2018    Damien Denton MD; [facility];  Service: Gastroenterology     IR ABDOMINAL AORTOGRAM  7/3/2006     IR EXTREMITY ANGIOGRAM BILATERAL  7/3/2006     IR MISCELLANEOUS PROCEDURE  7/3/2006     IR MISCELLANEOUS PROCEDURE  2006        Family History   Problem Relation Age of Onset     Sudden Death Mother 55.00        no autopsy     Goiter Mother      Stomach Cancer Father 72.00     No Known Problems Brother      Other - See Comments Brother          in [place]     Ulcers Son         Gastric ulcer was perforated; had surgery. His wife passed suddenly at 49 at home in .     Atrial fibrillation Son        Social History     Socioeconomic History     Marital status:      Spouse name: Not on file     Number of children: 2     Years of education: Not on file     Highest education level: Not on file   Occupational History     Not on file   Tobacco Use     Smoking status: Every Day     Packs/day: 1.00     Years: 60.00     Pack years: 60.00     Types: Cigarettes     Smokeless tobacco: Never     Tobacco comments:     down to 0.3 ppd since 2017   Substance and Sexual Activity     Alcohol use: No     Alcohol/week: 1.0 standard drink     Drug use: No     Sexual activity: Not Currently     Partners: Male   Other Topics Concern     Not on file   Social History Narrative    Zee lives with her son, Braden, and his wife.     Social Determinants of Health     Financial Resource Strain: Not on file   Food Insecurity: Not on file   Transportation Needs: Not on file   Physical Activity: Not on file   Stress: Not on file   Social Connections: Not on file   Intimate Partner Violence: Not on file    Housing Stability: Not on file       Outpatient Encounter Medications as of 11/18/2022   Medication Sig Dispense Refill     acebutolol (SECTRAL) 400 MG capsule Take 400 mg by mouth daily as needed (Palpitations) = extra dose in addition to scheduled daily dose       acetaminophen (TYLENOL) 500 MG tablet Take 1,000 mg by mouth every 6 hours as needed for mild pain       albuterol (PROAIR HFA/PROVENTIL HFA/VENTOLIN HFA) 108 (90 Base) MCG/ACT inhaler Inhale 2 puffs into the lungs every 4 hours as needed for shortness of breath / dyspnea or wheezing OFFICE VISIT NEEDED FOR ANY FURTHER REFILLS 18 g 0     amLODIPine (NORVASC) 2.5 MG tablet Take 1 tablet (2.5 mg) by mouth daily 30 tablet 11     aspirin 81 MG EC tablet Take 1 tablet (81 mg) by mouth       brimonidine (ALPHAGAN-P) 0.15 % ophthalmic solution Place 1 drop into both eyes 3 times daily       dorzolamide (TRUSOPT) 2 % ophthalmic solution Place 1 drop into both eyes 3 times daily       imiquimod (ALDARA) 5 % external cream APPLY TOPICALLY TO ENTIRE FOREARMS ONCE DAILY       latanoprost (XALATAN) 0.005 % ophthalmic solution Place 1 drop into both eyes every evening        levothyroxine (SYNTHROID/LEVOTHROID) 100 MCG tablet TAKE 1 TABLET BY MOUTH EVERY DAY IN THE MORNING ON AN EMPTY STOMACH FOR THYROID       levothyroxine (SYNTHROID/LEVOTHROID) 112 MCG tablet 1 tablet daily       losartan (COZAAR) 100 MG tablet Take 1 tablet (100 mg) by mouth daily 30 tablet 0     metoprolol succinate ER (TOPROL XL) 25 MG 24 hr tablet Take 1 tablet (25 mg) by mouth daily 90 tablet 3     pantoprazole (PROTONIX) 40 MG EC tablet Take 1 tablet (40 mg) by mouth daily 90 tablet 1     rosuvastatin (CRESTOR) 5 MG tablet TK 1 T PO THREE TIMES A WEEK m/w/f  0     triamcinolone (KENALOG) 0.025 % external ointment APPLY TWICE DAILY TO ARMS TAPER TO EVERY DAY       triamcinolone (KENALOG) 0.1 % external cream Apply twice to three times daily as needed on arms and legs for next 4 weeks. 453.6 g  5     Facility-Administered Encounter Medications as of 11/18/2022   Medication Dose Route Frequency Provider Last Rate Last Admin     [COMPLETED] triamcinolone acetonide (KENALOG-10) injection 10 mg  10 mg Intra-Lesional Once Melody Odonnell PA-C   10 mg at 11/18/22 1406             O:   NAD, WDWN, Alert & Oriented, Mood & Affect wnl, Vitals stable   Here today alone   There were no vitals taken for this visit.   General appearance normal   Vitals stable   Alert, oriented and in no acute distress     Pink scaly papule on legs and left forearm       Eyes: Conjunctivae/lids:Normal     ENT: Lips: normal    MSK:Normal    Pulm: Breathing Normal    Neuro/Psych: Orientation:Alert and Orientedx3 ; Mood/Affect:normal  A/P:  1. 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  Risks including but not limited to injection site reaction, bruising, no resolution.  All questions answered and entertained to patient s satisfaction.  Informed consent obtained.  IL TAC in concentration of 10mg/ml was injected ID to prurigo.  Total injected was  1.2  ml.  Patient tolerated without complications and given wound care instructions, including not to move product around.  Return in 4 weeks for follow-up and possible additional IL TAC.    Discussed potential use dupixent to control skin, she would will think about it.   Recheck in 2 months.       Again, thank you for allowing me to participate in the care of your patient.        Sincerely,        Melody Odonnell PA-C

## 2022-11-18 NOTE — TELEPHONE ENCOUNTER
Spoke with Cristian and the MRI should be ordered of the ankle based off the photo in Epic. If needing the foot it covers only the ball area of the foot. Confirmed with Mabel Barnes CNP and okay to change to MRI of left ankle.    Additional Progress Note...

## 2022-11-19 ENCOUNTER — HOSPITAL ENCOUNTER (OUTPATIENT)
Dept: MRI IMAGING | Facility: CLINIC | Age: 82
Discharge: HOME | End: 2022-11-19
Attending: REGISTERED NURSE | Admitting: REGISTERED NURSE
Payer: COMMERCIAL

## 2022-11-19 DIAGNOSIS — S81.802A OPEN WOUND OF KNEE, LEG, AND ANKLE, LEFT, INITIAL ENCOUNTER: ICD-10-CM

## 2022-11-19 DIAGNOSIS — S91.002A OPEN WOUND OF KNEE, LEG, AND ANKLE, LEFT, INITIAL ENCOUNTER: ICD-10-CM

## 2022-11-19 DIAGNOSIS — S81.002A OPEN WOUND OF KNEE, LEG, AND ANKLE, LEFT, INITIAL ENCOUNTER: ICD-10-CM

## 2022-11-19 PROCEDURE — 255N000002 HC RX 255 OP 636: Performed by: REGISTERED NURSE

## 2022-11-19 PROCEDURE — A9585 GADOBUTROL INJECTION: HCPCS | Performed by: REGISTERED NURSE

## 2022-11-19 PROCEDURE — 73723 MRI JOINT LWR EXTR W/O&W/DYE: CPT | Mod: LT

## 2022-11-19 RX ORDER — GADOBUTROL 604.72 MG/ML
5 INJECTION INTRAVENOUS ONCE
Status: COMPLETED | OUTPATIENT
Start: 2022-11-19 | End: 2022-11-19

## 2022-11-19 RX ADMIN — GADOBUTROL 5 ML: 604.72 INJECTION INTRAVENOUS at 12:52

## 2022-11-19 NOTE — PROGRESS NOTES
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 She is here today for more intralesional injections. She is seeing good improvement, does note one year area on left forearm.  Patient has no other skin complaints today.  Remainder of the HPI, Meds, PMH, Allergies, FH, and SH was reviewed in chart.    Past Medical History:   Diagnosis Date     Asthma      Chest pain 11/2018    burning sensation - indigestion     Colitis      COPD (chronic obstructive pulmonary disease) (H)      COPD (chronic obstructive pulmonary disease) (H)      Coronary artery disease due to calcified coronary lesion 11/01/2018    stent to LAD after NSTEMI     DNI (do not intubate)      Dyslipidemia, goal LDL below 70      Essential hypertension      GERD (gastroesophageal reflux disease)      Glaucoma      HTN (hypertension)      Hyperlipidemia      Hypothyroid      NSTEMI (non-ST elevation myocardial infarction) (H) 11/01/2018     PAD (peripheral artery disease) (H)      Paroxysmal atrial fibrillation (H) 11/2018     Paroxysmal atrial fibrillation (H) 11/01/2018     Partial retinal artery occlusion 06/09/2021     Skin cancer      Smoker     ongoing - \"6-8 cigarettes QD\"       Past Surgical History:   Procedure Laterality Date     CATARACT EXTRACTION       cataract removal       COLONOSCOPY  12/2018    [facility]     COLONOSCOPY N/A 12/12/2018    COLONOSCOPY with polypectomy; Damien Denton MD;  [facility];  Service: Gastroenterology     CORONARY STENT PLACEMENT  11/2018    at [facility] BALLOON DILATION AND/OR STENT PLACEMENT OF VESSEL  11/2018    stent to LAD     ESOPHAGOSCOPY, GASTROSCOPY, DUODENOSCOPY (EGD), COMBINED  12/2018    [facility]     ESOPHAGOSCOPY, GASTROSCOPY, DUODENOSCOPY (EGD), COMBINED N/A 8/15/2019    Procedure: ESOPHAGOGASTRODUODENOSCOPY, WITH BIOPSY;  Surgeon: Demario Clayton DO;  Location: [facility]" ESOPHAGOSCOPY, GASTROSCOPY, DUODENOSCOPY (EGD), COMBINED N/A 12/10/2018    Damien Denton MD; [facility];  Service: Gastroenterology     IR ABDOMINAL AORTOGRAM  7/3/2006     IR EXTREMITY ANGIOGRAM BILATERAL  7/3/2006     IR MISCELLANEOUS PROCEDURE  7/3/2006     IR MISCELLANEOUS PROCEDURE  2006        Family History   Problem Relation Age of Onset     Sudden Death Mother 55.00        no autopsy     Goiter Mother      Stomach Cancer Father 72.00     No Known Problems Brother      Other - See Comments Brother          in [place]     Ulcers Son         Gastric ulcer was perforated; had surgery. His wife passed suddenly at 49 at home in 2016.     Atrial fibrillation Son        Social History     Socioeconomic History     Marital status:      Spouse name: Not on file     Number of children: 2     Years of education: Not on file     Highest education level: Not on file   Occupational History     Not on file   Tobacco Use     Smoking status: Every Day     Packs/day: 1.00     Years: 60.00     Pack years: 60.00     Types: Cigarettes     Smokeless tobacco: Never     Tobacco comments:     down to 0.3 ppd since 2017   Substance and Sexual Activity     Alcohol use: No     Alcohol/week: 1.0 standard drink     Drug use: No     Sexual activity: Not Currently     Partners: Male   Other Topics Concern     Not on file   Social History Narrative    Zee lives with her son, Braden, and his wife.     Social Determinants of Health     Financial Resource Strain: Not on file   Food Insecurity: Not on file   Transportation Needs: Not on file   Physical Activity: Not on file   Stress: Not on file   Social Connections: Not on file   Intimate Partner Violence: Not on file   Housing Stability: Not on file       Outpatient Encounter Medications as of 2022   Medication Sig Dispense Refill     acebutolol (SECTRAL) 400 MG capsule Take 400 mg by mouth daily as needed (Palpitations) = extra dose in addition to scheduled  daily dose       acetaminophen (TYLENOL) 500 MG tablet Take 1,000 mg by mouth every 6 hours as needed for mild pain       albuterol (PROAIR HFA/PROVENTIL HFA/VENTOLIN HFA) 108 (90 Base) MCG/ACT inhaler Inhale 2 puffs into the lungs every 4 hours as needed for shortness of breath / dyspnea or wheezing OFFICE VISIT NEEDED FOR ANY FURTHER REFILLS 18 g 0     amLODIPine (NORVASC) 2.5 MG tablet Take 1 tablet (2.5 mg) by mouth daily 30 tablet 11     aspirin 81 MG EC tablet Take 1 tablet (81 mg) by mouth       brimonidine (ALPHAGAN-P) 0.15 % ophthalmic solution Place 1 drop into both eyes 3 times daily       dorzolamide (TRUSOPT) 2 % ophthalmic solution Place 1 drop into both eyes 3 times daily       imiquimod (ALDARA) 5 % external cream APPLY TOPICALLY TO ENTIRE FOREARMS ONCE DAILY       latanoprost (XALATAN) 0.005 % ophthalmic solution Place 1 drop into both eyes every evening        levothyroxine (SYNTHROID/LEVOTHROID) 100 MCG tablet TAKE 1 TABLET BY MOUTH EVERY DAY IN THE MORNING ON AN EMPTY STOMACH FOR THYROID       levothyroxine (SYNTHROID/LEVOTHROID) 112 MCG tablet 1 tablet daily       losartan (COZAAR) 100 MG tablet Take 1 tablet (100 mg) by mouth daily 30 tablet 0     metoprolol succinate ER (TOPROL XL) 25 MG 24 hr tablet Take 1 tablet (25 mg) by mouth daily 90 tablet 3     pantoprazole (PROTONIX) 40 MG EC tablet Take 1 tablet (40 mg) by mouth daily 90 tablet 1     rosuvastatin (CRESTOR) 5 MG tablet TK 1 T PO THREE TIMES A WEEK m/w/f  0     triamcinolone (KENALOG) 0.025 % external ointment APPLY TWICE DAILY TO ARMS TAPER TO EVERY DAY       triamcinolone (KENALOG) 0.1 % external cream Apply twice to three times daily as needed on arms and legs for next 4 weeks. 453.6 g 5     Facility-Administered Encounter Medications as of 11/18/2022   Medication Dose Route Frequency Provider Last Rate Last Admin     [COMPLETED] triamcinolone acetonide (KENALOG-10) injection 10 mg  10 mg Intra-Lesional Once Melody Odonnell,  PA-C   10 mg at 11/18/22 1406             O:   NAD, WDWN, Alert & Oriented, Mood & Affect wnl, Vitals stable   Here today alone   There were no vitals taken for this visit.   General appearance normal   Vitals stable   Alert, oriented and in no acute distress     Pink scaly papule on legs and left forearm       Eyes: Conjunctivae/lids:Normal     ENT: Lips: normal    MSK:Normal    Pulm: Breathing Normal    Neuro/Psych: Orientation:Alert and Orientedx3 ; Mood/Affect:normal  A/P:  1. 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  Risks including but not limited to injection site reaction, bruising, no resolution.  All questions answered and entertained to patient s satisfaction.  Informed consent obtained.  IL TAC in concentration of 10mg/ml was injected ID to prurigo.  Total injected was  1.2  ml.  Patient tolerated without complications and given wound care instructions, including not to move product around.  Return in 4 weeks for follow-up and possible additional IL TAC.    Discussed potential use dupixent to control skin, she would will think about it.   Recheck in 2 months.

## 2022-11-22 ENCOUNTER — OFFICE VISIT (OUTPATIENT)
Dept: PULMONOLOGY | Facility: OTHER | Age: 82
End: 2022-11-22
Payer: COMMERCIAL

## 2022-11-22 VITALS
BODY MASS INDEX: 20.55 KG/M2 | OXYGEN SATURATION: 98 % | DIASTOLIC BLOOD PRESSURE: 86 MMHG | WEIGHT: 116 LBS | HEART RATE: 66 BPM | SYSTOLIC BLOOD PRESSURE: 134 MMHG

## 2022-11-22 DIAGNOSIS — J43.8 OTHER EMPHYSEMA (H): ICD-10-CM

## 2022-11-22 DIAGNOSIS — J44.9 CHRONIC OBSTRUCTIVE PULMONARY DISEASE, UNSPECIFIED COPD TYPE (H): Primary | ICD-10-CM

## 2022-11-22 PROCEDURE — G0008 ADMIN INFLUENZA VIRUS VAC: HCPCS | Performed by: NURSE PRACTITIONER

## 2022-11-22 PROCEDURE — 90662 IIV NO PRSV INCREASED AG IM: CPT | Performed by: NURSE PRACTITIONER

## 2022-11-22 PROCEDURE — 99213 OFFICE O/P EST LOW 20 MIN: CPT | Mod: 25 | Performed by: NURSE PRACTITIONER

## 2022-11-22 RX ORDER — TIOTROPIUM BROMIDE AND OLODATEROL 3.124; 2.736 UG/1; UG/1
2 SPRAY, METERED RESPIRATORY (INHALATION) DAILY
Qty: 4 G | Refills: 11 | Status: SHIPPED | OUTPATIENT
Start: 2022-11-22 | End: 2022-12-12

## 2022-11-22 RX ORDER — ALBUTEROL SULFATE 90 UG/1
2 AEROSOL, METERED RESPIRATORY (INHALATION) EVERY 4 HOURS PRN
Qty: 18 G | Refills: 0 | Status: SHIPPED | OUTPATIENT
Start: 2022-11-22

## 2022-11-22 NOTE — PATIENT INSTRUCTIONS
- you can always check on the prices of meds at [facility]. If they add Stiolto you may be able to get it much cheaper.

## 2022-11-22 NOTE — PROGRESS NOTES
Outpatient Pulmonary Clinic Visit  November 22, 2022      Assessment and Plan:Zee Mireles is a 82 year old female with a past medical history significant for COPD who presents to clinic today for follow up.  She is doing well on Stiolto therapy and occasional albuterol use.  She is still smoking, and I suspect her cough and mucous are mostly related to this and not likely to respond to aggressive increases in medication.  She is due for a COVID booster and flu shot which she assures me she will get tomorrow.    1) COPD - stable with no exacerbations.  Continue Stiolto and as needed albuterol.  2) Cough - likely from tobacco abuse, encourage her to try to quit, but she continues to show signs she is not ready to quit  3) RTC in 1 year.   4) Flu shot given in clinic today.    Janie Smith, JANETTE  Pulmonary Medicine  [facility]   [phone]    CCx:   Chief Complaint   Patient presents with     Follow Up     COPD        HPI:Ms. Mireles is a 82 year old female with a history of COPD who presents for a one year follow up. She was last seen in clinic by Dr. Cantor on 11/22/2021.  Since I saw her last time, she does not have any new complaints, nor any pulmonary setbacks. She takes her Stiolto everyday without issue, and uses her albuterol 1-2 times per week at night. Occasional mild cough throughout the day that seems worse in the morning. She feels like this is mostly from mild PND.  She does cough up some mucous from time to time.      Patient supplied answers from flow sheet for:  COPD Assessment Test (CAT)  2009 [employer]. All rights reserved.  COPD assessment test (CAT) 11/22/2021 11/22/2022   Cough 2 2   Phlegm 3 0   Chest tightness 0 0   Walk up hill 1 3   Limited activities 3 0   Leaving my home 3 0   Sleep 4 0   Energy 3 3   Total Score 19 8        ROS:  10 point ROS neg other than the symptoms noted above in the HPI.      Current Meds:  Current Outpatient Medications   Medication Sig Dispense  Refill     acebutolol (SECTRAL) 400 MG capsule Take 400 mg by mouth daily as needed (Palpitations) = extra dose in addition to scheduled daily dose       acetaminophen (TYLENOL) 500 MG tablet Take 1,000 mg by mouth every 6 hours as needed for mild pain       albuterol (PROAIR HFA/PROVENTIL HFA/VENTOLIN HFA) 108 (90 Base) MCG/ACT inhaler Inhale 2 puffs into the lungs every 4 hours as needed for shortness of breath / dyspnea or wheezing OFFICE VISIT NEEDED FOR ANY FURTHER REFILLS 18 g 0     amLODIPine (NORVASC) 2.5 MG tablet Take 1 tablet (2.5 mg) by mouth daily 30 tablet 11     aspirin 81 MG EC tablet Take 1 tablet (81 mg) by mouth       brimonidine (ALPHAGAN-P) 0.15 % ophthalmic solution Place 1 drop into both eyes 3 times daily       dorzolamide (TRUSOPT) 2 % ophthalmic solution Place 1 drop into both eyes 3 times daily       latanoprost (XALATAN) 0.005 % ophthalmic solution Place 1 drop into both eyes every evening        levothyroxine (SYNTHROID/LEVOTHROID) 112 MCG tablet 1 tablet daily       losartan (COZAAR) 100 MG tablet Take 1 tablet (100 mg) by mouth daily 30 tablet 0     metoprolol succinate ER (TOPROL XL) 25 MG 24 hr tablet Take 1 tablet (25 mg) by mouth daily 90 tablet 3     rosuvastatin (CRESTOR) 5 MG tablet TK 1 T PO THREE TIMES A WEEK m/w/f  0     tiotropium-olodaterol (STIOLTO RESPIMAT) 2.5-2.5 MCG/ACT AERS Inhale 2 puffs into the lungs daily 4 g 11       Physical Exam:  /86 (BP Location: Right arm, Patient Position: Chair, Cuff Size: Adult Regular)   Pulse 66   Wt 52.6 kg (116 lb)   SpO2 98%   BMI 20.55 kg/m      Physical Exam  Constitutional:       General: She is not in acute distress.     Appearance: She is not ill-appearing or diaphoretic.   HENT:      Nose: Nose normal.   Cardiovascular:      Rate and Rhythm: Normal rate and regular rhythm.      Pulses: Normal pulses.      Heart sounds: Normal heart sounds.   Pulmonary:      Effort: Pulmonary effort is normal. No respiratory distress.       Breath sounds: No wheezing or rhonchi.   Musculoskeletal:      Right lower leg: No edema.      Left lower leg: No edema.   Skin:     General: Skin is warm and dry.   Neurological:      Mental Status: She is alert.   Psychiatric:         Behavior: Behavior normal.       Labs:    Lab Results   Component Value Date    WBC 8.6 11/02/2022    ANEU 4.2 08/14/2019    HGB 14.0 11/02/2022    HCT 40.9 11/02/2022     11/02/2022     (L) 11/02/2022    POTASSIUM 4.4 11/02/2022    CHLORIDE 93 (L) 11/02/2022    CO2 22 11/02/2022     (H) 11/02/2022    BUN 16.4 11/02/2022    CR 0.73 11/02/2022    MAG 2.0 01/21/2019    INR 1.06 08/14/2019    BILITOTAL 0.3 11/02/2022    AST 17 11/02/2022    ALT 12 11/02/2022    ALKPHOS 71 11/02/2022    PROTTOTAL 6.2 (L) 11/02/2022    ALBUMIN 4.1 11/02/2022       I have personally reviewed all pertinent imaging studies and PFT results unless otherwise noted.    Imaging studies:    XR CHEST 2 VIEWS   10/26/2022 1:39 PM   HISTORY: chest pain  COMPARISON: Radiograph 5/28/2018.                                                                 IMPRESSION: No acute cardiopulmonary disease.    PFTs 7/24/2018  FEV1/FVC is 71 and is normal.  FEV1 is 65% predicted and is reduced.  FVC is 69% predicted and reduced.  There was no improvement in spirometry after a single inhaled dose of bronchodilator.  TLC is 103% predicted and is normal.  RV is 142% predicted and is increased.  DLCO is 52% predicted and is reduced when it   is corrected for hemoglobin.  Impression:  Full Pulmonary Function Test is abnormal.    PFTs are consistent with mild reduction in FEV1 and FVC.  Spirometry is not consistent with reversibility.  There is no hyperinflation.  There is air-trapping.  Diffusion capacity when corrected for hemoglobin is moderately reduced.

## 2022-11-28 ENCOUNTER — OFFICE VISIT (OUTPATIENT)
Dept: VASCULAR SURGERY | Facility: CLINIC | Age: 82
End: 2022-11-28
Attending: REGISTERED NURSE
Payer: COMMERCIAL

## 2022-11-28 VITALS
TEMPERATURE: 98.3 F | SYSTOLIC BLOOD PRESSURE: 158 MMHG | HEART RATE: 76 BPM | DIASTOLIC BLOOD PRESSURE: 68 MMHG | RESPIRATION RATE: 12 BRPM

## 2022-11-28 DIAGNOSIS — S91.002A OPEN WOUND OF KNEE, LEG, AND ANKLE, LEFT, INITIAL ENCOUNTER: Primary | ICD-10-CM

## 2022-11-28 DIAGNOSIS — M25.372 ANKLE INSTABILITY, LEFT: ICD-10-CM

## 2022-11-28 DIAGNOSIS — S81.802A OPEN WOUND OF KNEE, LEG, AND ANKLE, LEFT, INITIAL ENCOUNTER: Primary | ICD-10-CM

## 2022-11-28 DIAGNOSIS — S81.002A OPEN WOUND OF KNEE, LEG, AND ANKLE, LEFT, INITIAL ENCOUNTER: Primary | ICD-10-CM

## 2022-11-28 PROCEDURE — 97597 DBRDMT OPN WND 1ST 20 CM/<: CPT | Performed by: REGISTERED NURSE

## 2022-11-28 ASSESSMENT — PAIN SCALES - GENERAL: PAINLEVEL: NO PAIN (1)

## 2022-11-28 NOTE — PROGRESS NOTES
Follow up Vascular Visit       Date of Service:11/28/22      Chief Complaint: L anterior ankle      Pt returns to [facility] with regards to their L anterior ankle wound.  They arrive today alone. They are currently using medihoney, foam border to the wounds. This is being done by herself. They are using nothing for compression. They are feeling well today. Denies fevers, chills. No shortness of breath.     Allergies:   Allergies   Allergen Reactions     Atorvastatin Muscle Pain (Myalgia)     Was fine while taking simvastatin 20 mg daily.     Zoledronic Acid      Other reaction(s): SEVERE REACTION         Adhesive Tape Rash       Medications:   Current Outpatient Medications:      acebutolol (SECTRAL) 400 MG capsule, Take 400 mg by mouth daily as needed (Palpitations) = extra dose in addition to scheduled daily dose, Disp: , Rfl:      acetaminophen (TYLENOL) 500 MG tablet, Take 1,000 mg by mouth every 6 hours as needed for mild pain, Disp: , Rfl:      albuterol (PROAIR HFA/PROVENTIL HFA/VENTOLIN HFA) 108 (90 Base) MCG/ACT inhaler, Inhale 2 puffs into the lungs every 4 hours as needed for shortness of breath / dyspnea or wheezing OFFICE VISIT NEEDED FOR ANY FURTHER REFILLS, Disp: 18 g, Rfl: 0     amLODIPine (NORVASC) 2.5 MG tablet, Take 1 tablet (2.5 mg) by mouth daily, Disp: 30 tablet, Rfl: 11     aspirin 81 MG EC tablet, Take 1 tablet (81 mg) by mouth, Disp: , Rfl:      brimonidine (ALPHAGAN-P) 0.15 % ophthalmic solution, Place 1 drop into both eyes 3 times daily, Disp: , Rfl:      dorzolamide (TRUSOPT) 2 % ophthalmic solution, Place 1 drop into both eyes 3 times daily, Disp: , Rfl:      latanoprost (XALATAN) 0.005 % ophthalmic solution, Place 1 drop into both eyes every evening , Disp: , Rfl:      levothyroxine (SYNTHROID/LEVOTHROID) 112 MCG tablet, 1 tablet daily, Disp: , Rfl:      losartan (COZAAR) 100 MG tablet, Take 1 tablet (100 mg) by mouth daily, Disp: 30 tablet, Rfl: 0      "metoprolol succinate ER (TOPROL XL) 25 MG 24 hr tablet, Take 1 tablet (25 mg) by mouth daily, Disp: 90 tablet, Rfl: 3     rosuvastatin (CRESTOR) 5 MG tablet, TK 1 T PO THREE TIMES A WEEK m/w/f, Disp: , Rfl: 0     tiotropium-olodaterol (STIOLTO RESPIMAT) 2.5-2.5 MCG/ACT AERS, Inhale 2 puffs into the lungs daily, Disp: 4 g, Rfl: 11    History:   Past Medical History:   Diagnosis Date     Asthma      Chest pain 11/2018    burning sensation - indigestion     Colitis      COPD (chronic obstructive pulmonary disease) (H)      COPD (chronic obstructive pulmonary disease) (H)      Coronary artery disease due to calcified coronary lesion 11/01/2018    stent to LAD after NSTEMI     DNI (do not intubate)      Dyslipidemia, goal LDL below 70      Essential hypertension      GERD (gastroesophageal reflux disease)      Glaucoma      HTN (hypertension)      Hyperlipidemia      Hypothyroid      NSTEMI (non-ST elevation myocardial infarction) (H) 11/01/2018     PAD (peripheral artery disease) (H)      Paroxysmal atrial fibrillation (H) 11/2018     Paroxysmal atrial fibrillation (H) 11/01/2018     Partial retinal artery occlusion 06/09/2021     Skin cancer      Smoker     ongoing - \"6-8 cigarettes QD\"       Physical Exam:    BP (!) 158/68   Pulse 76   Temp 98.3  F (36.8  C)   Resp 12     General:  Patient presents to clinic in no apparent distress.  Head: normocephalic atraumatic  Psychiatric:  Alert and oriented x3.   Respiratory: unlabored breathing; no cough  Integumentary:  Skin is uniformly warm, dry and pink.    Wound #1 Location: L anterior ankle  Size: 1L x 0.8W x 0.3depth.  No sinus tract present, Wound base: slough/viable tissue mix  No undermining present. Wound is full thickness. There is moderate drainage. Periwound: no denudement, erythema, induration, maceration or warmth.      Rash 11/30/18 0400 Left elbow (Active)   Number of days: 1459       VASC Wound left ankle (Active)   Pre Size Length 1 11/28/22 1000   Pre " Size Width 0.8 11/28/22 1000   Pre Size Depth 0.3 11/28/22 1000   Pre Total Sq cm 0.8 11/28/22 1000   Post Size Length 1 11/28/22 1000   Post Size Width 0.8 11/28/22 1000   Post Size Depth 0.3 11/28/22 1000   Post Total Sq cm 0.8 11/28/22 1000   Product Used Medihoney 11/28/22 1000   Number of days: 21            Circumferential volume measures:      No flowsheet data found.    Labs:    I personally reviewed the following lab results today and those on care everywhere    CRP   Date Value Ref Range Status   07/02/2019 0.3 0.0 - 0.8 mg/dL Final     C-Reactive Protein High Sensitivity   Date Value Ref Range Status   11/02/2022 1.18  Final     Comment:     Low risk < 1.0 mg/L   Average risk 1.0 to 3.0 mg/L   High risk > 3.0 mg/L      Erythrocyte Sedimentation Rate   Date Value Ref Range Status   11/02/2022 9 0 - 30 mm/hr Final      Last Renal Panel:  Sodium   Date Value Ref Range Status   11/02/2022 128 (L) 136 - 145 mmol/L Final   08/15/2019 142 133 - 144 mmol/L Final     Potassium   Date Value Ref Range Status   11/02/2022 4.4 3.4 - 5.3 mmol/L Final   01/20/2022 4.2 3.5 - 5.0 mmol/L Final   08/15/2019 3.9 3.4 - 5.3 mmol/L Final     Chloride   Date Value Ref Range Status   11/02/2022 93 (L) 98 - 107 mmol/L Final   01/20/2022 94 (L) 98 - 107 mmol/L Final   08/15/2019 111 (H) 94 - 109 mmol/L Final     Carbon Dioxide   Date Value Ref Range Status   08/15/2019 25 20 - 32 mmol/L Final     Carbon Dioxide (CO2)   Date Value Ref Range Status   11/02/2022 22 22 - 29 mmol/L Final   01/20/2022 22 22 - 31 mmol/L Final     Anion Gap   Date Value Ref Range Status   11/02/2022 13 7 - 15 mmol/L Final   01/20/2022 15 5 - 18 mmol/L Final   08/15/2019 6 3 - 14 mmol/L Final     Glucose   Date Value Ref Range Status   11/02/2022 113 (H) 70 - 99 mg/dL Final   01/20/2022 106 70 - 125 mg/dL Final   08/15/2019 90 70 - 99 mg/dL Final     Urea Nitrogen   Date Value Ref Range Status   11/02/2022 16.4 8.0 - 23.0 mg/dL Final   01/20/2022 17 8 - 28  mg/dL Final   08/15/2019 26 7 - 30 mg/dL Final     Creatinine   Date Value Ref Range Status   11/02/2022 0.73 0.51 - 0.95 mg/dL Final   08/15/2019 1.11 (H) 0.52 - 1.04 mg/dL Final     GFR Estimate   Date Value Ref Range Status   11/02/2022 82 >60 mL/min/1.73m2 Final     Comment:     Effective December 21, 2021 eGFRcr in adults is calculated using the 2021 CKD-EPI creatinine equation which includes age and gender (Babak et al., NEJ, DOI: 10.1056/RSLCve8249227)   07/07/2021 53 (L) >60 mL/min/1.73m2 Final   08/15/2019 47 (L) >60 mL/min/[1.73_m2] Final     Comment:     Non  GFR Calc  Starting 12/18/2018, serum creatinine based estimated GFR (eGFR) will be   calculated using the Chronic Kidney Disease Epidemiology Collaboration   (CKD-EPI) equation.       Calcium   Date Value Ref Range Status   11/02/2022 9.1 8.8 - 10.2 mg/dL Final   08/15/2019 7.7 (L) 8.5 - 10.1 mg/dL Final     Phosphorus   Date Value Ref Range Status   12/10/2018 4.1 2.5 - 4.5 mg/dL Final     Albumin   Date Value Ref Range Status   11/02/2022 4.1 3.5 - 5.2 g/dL Final   07/07/2021 3.7 3.5 - 5.0 g/dL Final   08/14/2019 3.6 3.4 - 5.0 g/dL Final      Lab Results   Component Value Date    WBC 8.6 11/02/2022    WBC 7.3 08/15/2019     Lab Results   Component Value Date    RBC 4.23 11/02/2022    RBC 3.29 08/15/2019     Lab Results   Component Value Date    HGB 14.0 11/02/2022    HGB 9.6 10/04/2019     Lab Results   Component Value Date    HCT 40.9 11/02/2022    HCT 30.5 08/15/2019     No components found for: MCT  Lab Results   Component Value Date    MCV 97 11/02/2022    MCV 93 08/15/2019     Lab Results   Component Value Date    MCH 33.1 11/02/2022    MCH 29.5 08/15/2019     Lab Results   Component Value Date    MCHC 34.2 11/02/2022    MCHC 31.8 08/15/2019     Lab Results   Component Value Date    RDW 14.6 11/02/2022    RDW 16.3 08/15/2019     Lab Results   Component Value Date     11/02/2022     08/15/2019      No results  found for: A1C   TSH   Date Value Ref Range Status   11/02/2022 1.01 0.30 - 4.20 uIU/mL Final   01/20/2022 0.54 0.30 - 5.00 uIU/mL Final   11/26/2018 8.11 (H) 0.40 - 4.00 mU/L Final      Lab Results   Component Value Date    VITDT 35 09/07/2022                   Impression:  Encounter Diagnoses   Name Primary?     Open wound of knee, leg, and ankle, left, initial encounter Yes     Ankle instability, left         11/28/22 L ankle     11/28/22 L ankle post debridement            Are any of these wounds new today: No; Location: na    Assessment/Plan:          1. Debridement: After discussion of risk factors and verbal consent was obtained 2% Lidocaine HCL jelly was applied, under clean conditions, the L ankle ulceration(s) were debrided using currette. Devitalized and nonviable tissue, along with any fibrin and slough, was removed to improve granulation tissue formation, stimulate wound healing, decrease overall bacteria load, disrupt biofilm formation and decrease edge senescence.  Total excisional debridement was 0.8 sq cm from the epidermis/dermis area with a depth of 0.3 cm.   Ulcers were improved afterwards and .  Measures were as noted on the flow sheet.       2.  Wound treatment: wound treatment will include irrigation and dressings to promote autolytic debridement which will include: Cleanse with dilute hibiclens 30cc in 500cc NS. Apply medihoney to wound bed, skin prep on irritated skin, cover with gauze, gauze roll to be changed twice a week. If for some reason the patient is not able to get their dressing(s) changed as outlined above (due to illness, lack of supplies, lack of help) please do the following: remove old, soiled dressings; wash the wounds with saline; pat dry; apply ABD pad or other absorbant pad and secure with rolled gauze; avoid tape directly on your skin; patient instructed to call the clinic as soon as possible to let us know what the current issues are in receiving wound care.  Improved Pt with itchy inflamed skin where the adhesive was. Will stop foam border and place skin prep on the inflamed area and start gauze. MRI negative for osteomyelitis, tibialis tendon present, with surrounding cellulitis. Mild irritation present from the adhesive. No warmth or other active signs of infection present. Ballotable area around tissue has resolved.           3. Edema: Continue tubular compression. The compression wraps were applied today in clinic.     If a 2 layer or 4 layer compression wrap is being used; these are safe to have on for ONLY 7 days. If for some reason the patient is not able to get the wrap(s) changed (due to illness; lack of supplies, lack of help, lack of transportation) please do the following: unwrap the old 2 or 4 layer compression wrap; avoid using scissors as you could cut your skin and cause wounds; use tubular compression when available. Call to reschedule your home care or clinic visit appointment as soon as possible.  Improved            4. Nutrition: Focus on protein           5. Offloading: AFO brace not received. Instructed pt continue limited non weight bearing     Patient will follow up with me in 2 weeks for reevaluation. They were instructed to call the clinic sooner with any signs or symptoms of infection or any further questions/concerns. Answered all questions.      20 minutes spent on the date of the encounter doing chart review, history and exam, documentation and further activities per the note    Mabel Barnes MS, APRN, AGNP-C, CWCN  [facility]   [phone]        This note was electronically signed by DIANA Francisco CNP

## 2022-11-28 NOTE — PATIENT INSTRUCTIONS
[facility] (Please call to make an appointment) for brace    [facility]  [address], [address]  [place], [place].  Phone: [phone]        Wound Care Instructions    2 TIMES PER WEEK and as needed, Cleanse your left ankle wound(s) with Dilute hibiclens 30cc in 500cc NS.    Pat Dry with non-sterile gauze    Apply Lotion to the intact skin surrounding your wound and other dry skin locations. Some good lotions include: Remedy Skin Repair Cream, Sarna, Vanicream or Cetaphil  Skin prep periwound    Primary Dressing: Apply medihoney into/onto the wounds    Secondary dressing: Cover with gauze and roll gauze.    Compression: tubular compression size E    Offloading: AFO brace with limited weight bearing    It is not ok to get your wound wet in the bath or shower    It is recommended that you do not get your ulcer wet when showering.  Listed below are several ways of keeping it dry when you shower.     1. Wrap it with Press and Seal plastic wrap.  It can be found in the stores where the plastic wraps or tin foil is kept.               2.  Some people take a bath and hang their leg/foot out of the tub.                        3  Put your leg in a plastic bag and tape it on.           4. You can purchase a shower cover for casts at some pharmacies and through the Internet.            5. Take a Bed Bath or wash up at the sink          If for some reason you are not able to get your dressing(s) changed as outlined above (due to illness, lack of supplies, lack of help) please do the following: remove old, soiled dressings; wash the wounds with saline; pat dry; apply ABD pad or other absorbant pad and secure with rolled gauze; avoid tape directly on your skin; Call the clinic as soon as possible to let us know what the current issues are in receiving wound care [phone].      SEEK MEDICAL CARE IF:  You have an increase in swelling, pain, or redness  around the wound.  You have an increase in the amount of pus coming from the wound.  There is a bad smell coming from the wound.  The wound appears to be worsening/enlarging  You have a fever greater than 101.5 F      It is ok to continue current wound care treatment/products for the next 2-3 days until new wound care supplies are ordered and arrive. If longer than this please contact our office at [phone].    If you have a 2 layer or 4 layer compression wrap on these are safe to have on for ONLY 7 days. If for some reason you are not able to get the wrap(s) changed (due to illness; lack of supplies, lack of help, lack of transportation) please do the following: unwrap the old 2 or 4 layer compression wrap; avoid using scissors as you could cut your skin and cause wounds; use tubular compression when available. Call to reschedule your home care or clinic visit appointment as soon as possible.    Please NOTE: if you are 15 minutes late to your clinic appointment you will have to be rescheduled. Please call our clinic as soon as possible if you know you will not be able to get to your appointment at [phone].    If you fail to show up to 3 scheduled clinic appointments you will be dismissed from our clinic.              We want to hear from you!  In the next few weeks, you should receive a call or email to complete a survey about your visit at [facility]. Please help us improve your appointment experience by letting us know how we did today. We strive to make your experience good and value any ways in which we could do better.      We value your input and suggestions.    Thank you for choosing the [facility]!

## 2022-12-04 ENCOUNTER — APPOINTMENT (OUTPATIENT)
Dept: GENERAL RADIOLOGY | Facility: CLINIC | Age: 82
DRG: 194 | End: 2022-12-04
Attending: EMERGENCY MEDICINE
Payer: COMMERCIAL

## 2022-12-04 ENCOUNTER — HOSPITAL ENCOUNTER (INPATIENT)
Facility: CLINIC | Age: 82
LOS: 3 days | Discharge: HOME | DRG: 194 | End: 2022-12-07
Attending: EMERGENCY MEDICINE | Admitting: HOSPITALIST
Payer: COMMERCIAL

## 2022-12-04 DIAGNOSIS — Z11.52 ENCOUNTER FOR SCREENING LABORATORY TESTING FOR SEVERE ACUTE RESPIRATORY SYNDROME CORONAVIRUS 2 (SARS-COV-2): ICD-10-CM

## 2022-12-04 DIAGNOSIS — F17.210 CIGARETTE SMOKER: ICD-10-CM

## 2022-12-04 DIAGNOSIS — E87.1 HYPONATREMIA: ICD-10-CM

## 2022-12-04 DIAGNOSIS — J10.1 INFLUENZA A: ICD-10-CM

## 2022-12-04 DIAGNOSIS — J44.1 COPD EXACERBATION (H): ICD-10-CM

## 2022-12-04 LAB
ANION GAP SERPL CALCULATED.3IONS-SCNC: 12 MMOL/L (ref 7–15)
BASOPHILS # BLD AUTO: 0 10E3/UL (ref 0–0.2)
BASOPHILS NFR BLD AUTO: 0 %
BUN SERPL-MCNC: 10 MG/DL (ref 8–23)
CALCIUM SERPL-MCNC: 8.8 MG/DL (ref 8.8–10.2)
CHLORIDE SERPL-SCNC: 82 MMOL/L (ref 98–107)
CREAT SERPL-MCNC: 0.59 MG/DL (ref 0.51–0.95)
DEPRECATED HCO3 PLAS-SCNC: 24 MMOL/L (ref 22–29)
EOSINOPHIL # BLD AUTO: 0 10E3/UL (ref 0–0.7)
EOSINOPHIL NFR BLD AUTO: 0 %
ERYTHROCYTE [DISTWIDTH] IN BLOOD BY AUTOMATED COUNT: 13.7 % (ref 10–15)
FLUAV RNA SPEC QL NAA+PROBE: POSITIVE
FLUBV RNA RESP QL NAA+PROBE: NEGATIVE
GFR SERPL CREATININE-BSD FRML MDRD: 90 ML/MIN/1.73M2
GLUCOSE SERPL-MCNC: 104 MG/DL (ref 70–99)
HCT VFR BLD AUTO: 41.5 % (ref 35–47)
HGB BLD-MCNC: 14.7 G/DL (ref 11.7–15.7)
HOLD SPECIMEN: NORMAL
IMM GRANULOCYTES # BLD: 0 10E3/UL
IMM GRANULOCYTES NFR BLD: 1 %
LYMPHOCYTES # BLD AUTO: 1 10E3/UL (ref 0.8–5.3)
LYMPHOCYTES NFR BLD AUTO: 12 %
MCH RBC QN AUTO: 33.3 PG (ref 26.5–33)
MCHC RBC AUTO-ENTMCNC: 35.4 G/DL (ref 31.5–36.5)
MCV RBC AUTO: 94 FL (ref 78–100)
MONOCYTES # BLD AUTO: 1 10E3/UL (ref 0–1.3)
MONOCYTES NFR BLD AUTO: 13 %
NEUTROPHILS # BLD AUTO: 6.1 10E3/UL (ref 1.6–8.3)
NEUTROPHILS NFR BLD AUTO: 74 %
NRBC # BLD AUTO: 0 10E3/UL
NRBC BLD AUTO-RTO: 0 /100
PLATELET # BLD AUTO: 212 10E3/UL (ref 150–450)
POTASSIUM SERPL-SCNC: 4.1 MMOL/L (ref 3.4–5.3)
RBC # BLD AUTO: 4.42 10E6/UL (ref 3.8–5.2)
SARS-COV-2 RNA RESP QL NAA+PROBE: NEGATIVE
SODIUM SERPL-SCNC: 118 MMOL/L (ref 136–145)
SODIUM SERPL-SCNC: 118 MMOL/L (ref 136–145)
WBC # BLD AUTO: 8.2 10E3/UL (ref 4–11)

## 2022-12-04 PROCEDURE — 87636 SARSCOV2 & INF A&B AMP PRB: CPT | Performed by: EMERGENCY MEDICINE

## 2022-12-04 PROCEDURE — 80048 BASIC METABOLIC PNL TOTAL CA: CPT | Performed by: EMERGENCY MEDICINE

## 2022-12-04 PROCEDURE — 258N000003 HC RX IP 258 OP 636: Performed by: EMERGENCY MEDICINE

## 2022-12-04 PROCEDURE — 250N000013 HC RX MED GY IP 250 OP 250 PS 637: Performed by: EMERGENCY MEDICINE

## 2022-12-04 PROCEDURE — 36415 COLL VENOUS BLD VENIPUNCTURE: CPT | Performed by: EMERGENCY MEDICINE

## 2022-12-04 PROCEDURE — 94640 AIRWAY INHALATION TREATMENT: CPT

## 2022-12-04 PROCEDURE — 250N000012 HC RX MED GY IP 250 OP 636 PS 637: Performed by: EMERGENCY MEDICINE

## 2022-12-04 PROCEDURE — C9803 HOPD COVID-19 SPEC COLLECT: HCPCS | Performed by: EMERGENCY MEDICINE

## 2022-12-04 PROCEDURE — 96360 HYDRATION IV INFUSION INIT: CPT | Performed by: EMERGENCY MEDICINE

## 2022-12-04 PROCEDURE — 85025 COMPLETE CBC W/AUTO DIFF WBC: CPT | Performed by: EMERGENCY MEDICINE

## 2022-12-04 PROCEDURE — 99285 EMERGENCY DEPT VISIT HI MDM: CPT | Mod: 25 | Performed by: EMERGENCY MEDICINE

## 2022-12-04 PROCEDURE — 99285 EMERGENCY DEPT VISIT HI MDM: CPT | Mod: CS | Performed by: EMERGENCY MEDICINE

## 2022-12-04 PROCEDURE — 93005 ELECTROCARDIOGRAM TRACING: CPT | Performed by: EMERGENCY MEDICINE

## 2022-12-04 PROCEDURE — 99222 1ST HOSP IP/OBS MODERATE 55: CPT | Performed by: HOSPITALIST

## 2022-12-04 PROCEDURE — 71045 X-RAY EXAM CHEST 1 VIEW: CPT

## 2022-12-04 PROCEDURE — 96361 HYDRATE IV INFUSION ADD-ON: CPT | Performed by: EMERGENCY MEDICINE

## 2022-12-04 PROCEDURE — 84295 ASSAY OF SERUM SODIUM: CPT | Performed by: HOSPITALIST

## 2022-12-04 PROCEDURE — 120N000004 HC R&B MS OVERFLOW

## 2022-12-04 PROCEDURE — 250N000009 HC RX 250: Performed by: EMERGENCY MEDICINE

## 2022-12-04 PROCEDURE — 36415 COLL VENOUS BLD VENIPUNCTURE: CPT | Performed by: HOSPITALIST

## 2022-12-04 RX ORDER — ONDANSETRON 2 MG/ML
4 INJECTION INTRAMUSCULAR; INTRAVENOUS EVERY 6 HOURS PRN
Status: DISCONTINUED | OUTPATIENT
Start: 2022-12-04 | End: 2022-12-07 | Stop reason: HOSPADM

## 2022-12-04 RX ORDER — ACETAMINOPHEN 325 MG/1
650 TABLET ORAL EVERY 6 HOURS PRN
Status: DISCONTINUED | OUTPATIENT
Start: 2022-12-04 | End: 2022-12-07 | Stop reason: HOSPADM

## 2022-12-04 RX ORDER — ASPIRIN 81 MG/1
81 TABLET ORAL DAILY
Status: DISCONTINUED | OUTPATIENT
Start: 2022-12-05 | End: 2022-12-07 | Stop reason: HOSPADM

## 2022-12-04 RX ORDER — PROCHLORPERAZINE 25 MG
12.5 SUPPOSITORY, RECTAL RECTAL EVERY 12 HOURS PRN
Status: DISCONTINUED | OUTPATIENT
Start: 2022-12-04 | End: 2022-12-07 | Stop reason: HOSPADM

## 2022-12-04 RX ORDER — AMLODIPINE BESYLATE 5 MG/1
10 TABLET ORAL DAILY
COMMUNITY
Start: 2022-10-07 | End: 2024-02-23

## 2022-12-04 RX ORDER — LATANOPROST 50 UG/ML
1 SOLUTION/ DROPS OPHTHALMIC AT BEDTIME
Status: DISCONTINUED | OUTPATIENT
Start: 2022-12-04 | End: 2022-12-07 | Stop reason: HOSPADM

## 2022-12-04 RX ORDER — BRIMONIDINE TARTRATE 1.5 MG/ML
1 SOLUTION/ DROPS OPHTHALMIC 3 TIMES DAILY
Status: DISCONTINUED | OUTPATIENT
Start: 2022-12-04 | End: 2022-12-04 | Stop reason: RX

## 2022-12-04 RX ORDER — IPRATROPIUM BROMIDE AND ALBUTEROL SULFATE 2.5; .5 MG/3ML; MG/3ML
3 SOLUTION RESPIRATORY (INHALATION) EVERY 4 HOURS PRN
Status: DISCONTINUED | OUTPATIENT
Start: 2022-12-04 | End: 2022-12-07 | Stop reason: HOSPADM

## 2022-12-04 RX ORDER — ONDANSETRON 4 MG/1
4 TABLET, ORALLY DISINTEGRATING ORAL EVERY 6 HOURS PRN
Status: DISCONTINUED | OUTPATIENT
Start: 2022-12-04 | End: 2022-12-07 | Stop reason: HOSPADM

## 2022-12-04 RX ORDER — LIDOCAINE 40 MG/G
CREAM TOPICAL
Status: DISCONTINUED | OUTPATIENT
Start: 2022-12-04 | End: 2022-12-06

## 2022-12-04 RX ORDER — LOSARTAN POTASSIUM 50 MG/1
100 TABLET ORAL DAILY
Status: DISCONTINUED | OUTPATIENT
Start: 2022-12-05 | End: 2022-12-07 | Stop reason: HOSPADM

## 2022-12-04 RX ORDER — IPRATROPIUM BROMIDE AND ALBUTEROL SULFATE 2.5; .5 MG/3ML; MG/3ML
3 SOLUTION RESPIRATORY (INHALATION) ONCE
Status: COMPLETED | OUTPATIENT
Start: 2022-12-04 | End: 2022-12-04

## 2022-12-04 RX ORDER — DORZOLAMIDE HCL 20 MG/ML
1 SOLUTION/ DROPS OPHTHALMIC 3 TIMES DAILY
Status: DISCONTINUED | OUTPATIENT
Start: 2022-12-04 | End: 2022-12-07 | Stop reason: HOSPADM

## 2022-12-04 RX ORDER — ENOXAPARIN SODIUM 100 MG/ML
40 INJECTION SUBCUTANEOUS EVERY 24 HOURS
Status: DISCONTINUED | OUTPATIENT
Start: 2022-12-05 | End: 2022-12-07 | Stop reason: HOSPADM

## 2022-12-04 RX ORDER — BRIMONIDINE TARTRATE 2 MG/ML
1 SOLUTION/ DROPS OPHTHALMIC 3 TIMES DAILY
Status: DISCONTINUED | OUTPATIENT
Start: 2022-12-04 | End: 2022-12-07 | Stop reason: HOSPADM

## 2022-12-04 RX ORDER — LEVOTHYROXINE SODIUM 100 UG/1
100 TABLET ORAL DAILY
Status: DISCONTINUED | OUTPATIENT
Start: 2022-12-05 | End: 2022-12-07 | Stop reason: HOSPADM

## 2022-12-04 RX ORDER — PROCHLORPERAZINE MALEATE 5 MG
5 TABLET ORAL EVERY 6 HOURS PRN
Status: DISCONTINUED | OUTPATIENT
Start: 2022-12-04 | End: 2022-12-07 | Stop reason: HOSPADM

## 2022-12-04 RX ORDER — METOPROLOL SUCCINATE 25 MG/1
25 TABLET, EXTENDED RELEASE ORAL DAILY
Status: DISCONTINUED | OUTPATIENT
Start: 2022-12-05 | End: 2022-12-07 | Stop reason: HOSPADM

## 2022-12-04 RX ORDER — ALBUTEROL SULFATE 90 UG/1
2 AEROSOL, METERED RESPIRATORY (INHALATION) EVERY 4 HOURS PRN
Status: DISCONTINUED | OUTPATIENT
Start: 2022-12-04 | End: 2022-12-07 | Stop reason: HOSPADM

## 2022-12-04 RX ORDER — OSELTAMIVIR PHOSPHATE 30 MG/1
30 CAPSULE ORAL 2 TIMES DAILY
Status: DISCONTINUED | OUTPATIENT
Start: 2022-12-05 | End: 2022-12-07 | Stop reason: HOSPADM

## 2022-12-04 RX ORDER — PREDNISONE 20 MG/1
40 TABLET ORAL ONCE
Status: COMPLETED | OUTPATIENT
Start: 2022-12-04 | End: 2022-12-04

## 2022-12-04 RX ORDER — LEVOTHYROXINE SODIUM 100 UG/1
1 TABLET ORAL
COMMUNITY
Start: 2022-09-20

## 2022-12-04 RX ORDER — AMLODIPINE BESYLATE 5 MG/1
5 TABLET ORAL DAILY
Status: DISCONTINUED | OUTPATIENT
Start: 2022-12-05 | End: 2022-12-07 | Stop reason: HOSPADM

## 2022-12-04 RX ORDER — AMLODIPINE BESYLATE 2.5 MG/1
2.5 TABLET ORAL DAILY
Status: DISCONTINUED | OUTPATIENT
Start: 2022-12-05 | End: 2022-12-04

## 2022-12-04 RX ORDER — LEVOTHYROXINE SODIUM 112 UG/1
112 TABLET ORAL DAILY
Status: DISCONTINUED | OUTPATIENT
Start: 2022-12-05 | End: 2022-12-04

## 2022-12-04 RX ORDER — ACETAMINOPHEN 500 MG
1000 TABLET ORAL EVERY 6 HOURS PRN
Status: DISCONTINUED | OUTPATIENT
Start: 2022-12-04 | End: 2022-12-04

## 2022-12-04 RX ADMIN — IPRATROPIUM BROMIDE AND ALBUTEROL SULFATE 3 ML: 2.5; .5 SOLUTION RESPIRATORY (INHALATION) at 15:21

## 2022-12-04 RX ADMIN — BRIMONIDINE TARTRATE 1 DROP: 2 SOLUTION/ DROPS OPHTHALMIC at 22:14

## 2022-12-04 RX ADMIN — LATANOPROST 1 DROP: 50 SOLUTION OPHTHALMIC at 22:14

## 2022-12-04 RX ADMIN — DORZOLAMIDE HYDROCHLORIDE 1 DROP: 20 SOLUTION/ DROPS OPHTHALMIC at 22:14

## 2022-12-04 RX ADMIN — UMECLIDINIUM BROMIDE AND VILANTEROL TRIFENATATE 1 PUFF: 62.5; 25 POWDER RESPIRATORY (INHALATION) at 22:14

## 2022-12-04 RX ADMIN — SODIUM CHLORIDE 500 ML: 9 INJECTION, SOLUTION INTRAVENOUS at 14:47

## 2022-12-04 RX ADMIN — PREDNISONE 40 MG: 20 TABLET ORAL at 16:08

## 2022-12-04 ASSESSMENT — ACTIVITIES OF DAILY LIVING (ADL)
ADLS_ACUITY_SCORE: 35
ADLS_ACUITY_SCORE: 24
ADLS_ACUITY_SCORE: 22
ADLS_ACUITY_SCORE: 35

## 2022-12-04 NOTE — ED PROVIDER NOTES
History     Chief Complaint   Patient presents with     Shortness of Breath     HPI  Zee Mireles is a 82 year old female who presents from home where she lives with her daughter, developed cough and fever 5 days ago, tested positive for influenza A, she did get influenza vaccination and is up-to-date on COVID vaccines.  She has COPD not oxygen dependent, continues to smoke.  Denies nausea vomiting or diarrhea but describes anorexia.  Drinking fluids making urine per baseline.  History of coronary disease with coronary artery stent on aspirin 81 mg daily.  Denies recent fall or injury.    Allergies:  Allergies   Allergen Reactions     Atorvastatin Muscle Pain (Myalgia)     Was fine while taking simvastatin 20 mg daily.     Zoledronic Acid Other (See Comments)     SEVERE REACTION         Adhesive Tape Rash       Problem List:    Patient Active Problem List    Diagnosis Date Noted     Hyponatremia 12/04/2022     Priority: Medium     Influenza A 12/04/2022     Priority: Medium     COPD exacerbation (H) 12/04/2022     Priority: Medium     Coronary artery disease involving native coronary artery of native heart without angina pectoris 10/07/2019     Priority: Medium     PAD (peripheral artery disease) (H) 10/07/2019     Priority: Medium     COPD (chronic obstructive pulmonary disease) (H) 08/14/2019     Priority: Medium     Hyperlipidemia LDL goal <70 08/14/2019     Priority: Medium     Essential hypertension 08/14/2019     Priority: Medium     Upper GI bleed 08/14/2019     Priority: Medium     Paroxysmal atrial fibrillation (H) 11/01/2018     Priority: Medium     Overview:   CHADS2 VASC score equals 5 for age, sex, hypertension, and coronary artery disease       Coronary artery disease due to calcified coronary lesion 11/01/2018     Priority: Medium     Overview:   stent to LAD after NSTEMI          Past Medical History:    Past Medical History:   Diagnosis Date     Asthma      Chest pain 11/2018     Colitis       COPD (chronic obstructive pulmonary disease) (H)      COPD (chronic obstructive pulmonary disease) (H)      Coronary artery disease due to calcified coronary lesion 2018     DNI (do not intubate)      Dyslipidemia, goal LDL below 70      Essential hypertension      GERD (gastroesophageal reflux disease)      Glaucoma      HTN (hypertension)      Hyperlipidemia      Hypothyroid      NSTEMI (non-ST elevation myocardial infarction) (H) 2018     PAD (peripheral artery disease) (H)      Paroxysmal atrial fibrillation (H) 2018     Paroxysmal atrial fibrillation (H) 2018     Partial retinal artery occlusion 2021     Skin cancer      Smoker        Past Surgical History:    Past Surgical History:   Procedure Laterality Date     CATARACT EXTRACTION       cataract removal       COLONOSCOPY  2018    [facility]     COLONOSCOPY N/A 2018    COLONOSCOPY with polypectomy; Damien Denton MD;  [facility];  Service: Gastroenterology     CORONARY STENT PLACEMENT  2018    at [facility] BALLOON DILATION AND/OR STENT PLACEMENT OF VESSEL  2018    stent to LAD     ESOPHAGOSCOPY, GASTROSCOPY, DUODENOSCOPY (EGD), COMBINED  2018    [facility]     ESOPHAGOSCOPY, GASTROSCOPY, DUODENOSCOPY (EGD), COMBINED N/A 8/15/2019    Procedure: ESOPHAGOGASTRODUODENOSCOPY, WITH BIOPSY;  Surgeon: Demario Clayton DO;  Location: [facility]     ESOPHAGOSCOPY, GASTROSCOPY, DUODENOSCOPY (EGD), COMBINED N/A 12/10/2018    Damien Denton MD; [facility];  Service: Gastroenterology     IR ABDOMINAL AORTOGRAM  7/3/2006     IR EXTREMITY ANGIOGRAM BILATERAL  7/3/2006     IR MISCELLANEOUS PROCEDURE  7/3/2006     IR MISCELLANEOUS PROCEDURE  2006       Family History:    Family History   Problem Relation Age of Onset     Sudden Death Mother 55.00        no autopsy     Goiter Mother      Stomach Cancer Father 72.00     No Known Problems Brother      Other - See Comments Brother           "in [place]     Ulcers Son         Gastric ulcer was perforated; had surgery. His wife passed suddenly at 49 at home in 2016.     Atrial fibrillation Son        Social History:  Marital Status:   [4]  Social History     Tobacco Use     Smoking status: Every Day     Packs/day: 1.00     Years: 60.00     Pack years: 60.00     Types: Cigarettes     Smokeless tobacco: Never     Tobacco comments:     down to 0.3 ppd since 2017   Vaping Use     Vaping Use: Never used   Substance Use Topics     Alcohol use: No     Alcohol/week: 1.0 standard drink     Drug use: No        Medications:    No current outpatient medications on file.        Review of Systems  All other systems reviewed and are negative.    Physical Exam   BP: (!) 197/92  Pulse: 76  Temp: 98.2  F (36.8  C)  Resp: (!) 36  Height: 160 cm (5' 3\")  Weight: 50.8 kg (112 lb)  SpO2: 91 %      Physical Exam  Nontoxic appearing mild respiratory distress and form tachypnea, no audible wheeze  Head atraumatic normocephalic  Skin pale warm and dry  Neck supple full active painless range of motion  Lungs diminished breath sounds increased expiratory phase  Heart regular no murmur  Abdomen soft nontender bowel sounds positive   Strength and sensation grossly intact throughout the extremities, gait and station normal  Speech is fluent, good eye contact, thought processes are rational  Lower extremities without swelling, redness or tenderness  Pedal pulses symmetrical and strong    ED Course                 Procedures              Critical Care time:  none               Results for orders placed or performed during the hospital encounter of 12/04/22 (from the past 24 hour(s))   Symptomatic; Yes; 11/30/2022 Influenza A/B & SARS-CoV2 (COVID-19) Virus PCR Multiplex Nose    Specimen: Nose; Swab   Result Value Ref Range    Influenza A PCR Positive (A) Negative    Influenza B PCR Negative Negative    SARS CoV2 PCR Negative Negative    Narrative    Testing was performed using " the erin SARS-CoV-2 & Influenza A/B Assay on the erin [employer] System. This test should be ordered for the detection of SARS-CoV-2 and influenza viruses in individuals who meet clinical and/or epidemiological criteria. Test performance is unknown in asymptomatic patients. This test is for in vitro diagnostic use under the FDA EUA for laboratories certified under CLIA to perform moderate and/or high complexity testing. This test has not been FDA cleared or approved. A negative result does not rule out the presence of PCR inhibitors in the specimen or target RNA in concentration below the limit of detection for the assay. If only one viral target is positive but coinfection with multiple targets is suspected, the sample should be re-tested with another FDA cleared, approved or authorized test, if coinfection would change clinical management. [facility] are certified under the Clinical Laboratory Improvement Amendments of 1988 (CLIA-88) as qualified to perform moderate and/or high complexity laboratory testing.   [place] Draw    Narrative    The following orders were created for panel order [place] Draw.  Procedure                               Abnormality         Status                     ---------                               -----------         ------                     Extra Blue Top Tube[[id number]]                              Final result               Extra Red Top Tube[[id number]]                               Final result               Extra Green Top (Lithium...[[id number]]                      Final result               Extra Purple Top Tube[[id number]]                            Final result                 Please view results for these tests on the individual orders.   Extra Blue Top Tube   Result Value Ref Range    Hold Specimen JIC    Extra Red Top Tube   Result Value Ref Range    Hold Specimen JIC    Extra Green Top (Lithium Heparin) Tube   Result Value Ref Range    Hold Specimen JIC     Extra Purple Top Tube   Result Value Ref Range    Hold Specimen [facility]    CBC with platelets, differential    Narrative    The following orders were created for panel order CBC with platelets, differential.  Procedure                               Abnormality         Status                     ---------                               -----------         ------                     CBC with platelets and d...[[id number]]  Abnormal            Final result                 Please view results for these tests on the individual orders.   Basic metabolic panel   Result Value Ref Range    Sodium 118 (LL) 136 - 145 mmol/L    Potassium 4.1 3.4 - 5.3 mmol/L    Chloride 82 (L) 98 - 107 mmol/L    Carbon Dioxide (CO2) 24 22 - 29 mmol/L    Anion Gap 12 7 - 15 mmol/L    Urea Nitrogen 10.0 8.0 - 23.0 mg/dL    Creatinine 0.59 0.51 - 0.95 mg/dL    Calcium 8.8 8.8 - 10.2 mg/dL    Glucose 104 (H) 70 - 99 mg/dL    GFR Estimate 90 >60 mL/min/1.73m2   CBC with platelets and differential   Result Value Ref Range    WBC Count 8.2 4.0 - 11.0 10e3/uL    RBC Count 4.42 3.80 - 5.20 10e6/uL    Hemoglobin 14.7 11.7 - 15.7 g/dL    Hematocrit 41.5 35.0 - 47.0 %    MCV 94 78 - 100 fL    MCH 33.3 (H) 26.5 - 33.0 pg    MCHC 35.4 31.5 - 36.5 g/dL    RDW 13.7 10.0 - 15.0 %    Platelet Count 212 150 - 450 10e3/uL    % Neutrophils 74 %    % Lymphocytes 12 %    % Monocytes 13 %    % Eosinophils 0 %    % Basophils 0 %    % Immature Granulocytes 1 %    NRBCs per 100 WBC 0 <1 /100    Absolute Neutrophils 6.1 1.6 - 8.3 10e3/uL    Absolute Lymphocytes 1.0 0.8 - 5.3 10e3/uL    Absolute Monocytes 1.0 0.0 - 1.3 10e3/uL    Absolute Eosinophils 0.0 0.0 - 0.7 10e3/uL    Absolute Basophils 0.0 0.0 - 0.2 10e3/uL    Absolute Immature Granulocytes 0.0 <=0.4 10e3/uL    Absolute NRBCs 0.0 10e3/uL   XR Chest Port 1 View    Narrative    EXAM: XR CHEST PORT 1 VIEW  LOCATION: [facility]  DATE/TIME: 12/4/2022 3:01 PM    INDICATION: Dyspnea.  COMPARISON:  Chest radiograph 05/28/2018.      Impression    IMPRESSION: No airspace opacities, pleural effusions, or pneumothorax. Nonenlarged cardiomediastinal silhouette. Tortuous thoracic aorta with atherosclerotic aortic calcifications, unchanged.   Sodium   Result Value Ref Range    Sodium 118 (LL) 136 - 145 mmol/L       Medications   acetaminophen (TYLENOL) tablet 1,000 mg (has no administration in time range)   albuterol (PROVENTIL HFA/VENTOLIN HFA) inhaler (has no administration in time range)   aspirin EC tablet 81 mg (has no administration in time range)   dorzolamide (TRUSOPT) 2 % ophthalmic solution 1 drop (has no administration in time range)   latanoprost (XALATAN) 0.005 % ophthalmic solution 1 drop (has no administration in time range)   losartan (COZAAR) tablet 100 mg (has no administration in time range)   metoprolol succinate ER (TOPROL XL) 24 hr tablet 25 mg (has no administration in time range)   umeclidinium-vilanterol (ANORO ELLIPTA) 62.5-25 MCG/ACT oral inhaler 1 puff (has no administration in time range)   amLODIPine (NORVASC) tablet 5 mg (has no administration in time range)   levothyroxine (SYNTHROID/LEVOTHROID) tablet 100 mcg (has no administration in time range)   brimonidine (ALPHAGAN) 0.2 % ophthalmic solution 1 drop (has no administration in time range)   0.9% sodium chloride BOLUS (0 mLs Intravenous Stopped 12/4/22 2009)   ipratropium - albuterol 0.5 mg/2.5 mg/3 mL (DUONEB) neb solution 3 mL (3 mLs Nebulization Given 12/4/22 1521)   predniSONE (DELTASONE) tablet 40 mg (40 mg Oral Given 12/4/22 1608)       Assessments & Plan (with Medical Decision Making)  82-year-old female with influenza A and COPD, incidental finding of hyponatremia with a sodium of 118.  Treated with 500 cc normal saline bolus, and recheck sodium has not changed.  Chest x-ray by my review as well as radiology read is negative for acute finding.  She is treated with prednisone 40 mg orally.  Continue baseline home meds.   Elected to defer Tamiflu given duration of symptoms.  Admit to hospital service reviewed with Dr. Cardoza who accepts for same.     I have reviewed the nursing notes.    I have reviewed the findings, diagnosis, plan and need for follow up with the patient.       Current Discharge Medication List          Final diagnoses:   Influenza A   COPD exacerbation (H)   Hyponatremia       12/4/2022   [facility] EMERGENCY DEPT     Juan Manuel Franz MD  12/04/22 5672

## 2022-12-04 NOTE — ED TRIAGE NOTES
"Pt arrives via EMS with SOB after URI x5 days. Hx of COPD. Has inhalers at home. Received a duoneb from EMS \"helped some\".     Triage Assessment       Row Name 12/04/22 115       Triage Assessment (Adult)    Airway WDL WDL       Respiratory WDL    Respiratory WDL X;cough    Cough Frequency infrequent    Cough Type congested;tight       Skin Circulation/Temperature WDL    Skin Circulation/Temperature WDL WDL       Cardiac WDL    Cardiac WDL WDL       Peripheral/Neurovascular WDL    Peripheral Neurovascular WDL WDL       Cognitive/Neuro/Behavioral WDL    Cognitive/Neuro/Behavioral WDL WDL                  "

## 2022-12-05 ENCOUNTER — APPOINTMENT (OUTPATIENT)
Dept: GENERAL RADIOLOGY | Facility: CLINIC | Age: 82
DRG: 194 | End: 2022-12-05
Attending: HOSPITALIST
Payer: COMMERCIAL

## 2022-12-05 LAB
ANION GAP SERPL CALCULATED.3IONS-SCNC: 13 MMOL/L (ref 7–15)
BUN SERPL-MCNC: 13.5 MG/DL (ref 8–23)
CALCIUM SERPL-MCNC: 8.2 MG/DL (ref 8.8–10.2)
CHLORIDE SERPL-SCNC: 83 MMOL/L (ref 98–107)
CREAT SERPL-MCNC: 0.82 MG/DL (ref 0.51–0.95)
DEPRECATED HCO3 PLAS-SCNC: 25 MMOL/L (ref 22–29)
ERYTHROCYTE [DISTWIDTH] IN BLOOD BY AUTOMATED COUNT: 13.8 % (ref 10–15)
GFR SERPL CREATININE-BSD FRML MDRD: 71 ML/MIN/1.73M2
GLUCOSE SERPL-MCNC: 90 MG/DL (ref 70–99)
HCT VFR BLD AUTO: 37.5 % (ref 35–47)
HGB BLD-MCNC: 13.1 G/DL (ref 11.7–15.7)
MCH RBC QN AUTO: 33.1 PG (ref 26.5–33)
MCHC RBC AUTO-ENTMCNC: 34.9 G/DL (ref 31.5–36.5)
MCV RBC AUTO: 95 FL (ref 78–100)
OSMOLALITY UR: 271 MMOL/KG (ref 100–1200)
PLATELET # BLD AUTO: 182 10E3/UL (ref 150–450)
POTASSIUM SERPL-SCNC: 3.8 MMOL/L (ref 3.4–5.3)
RBC # BLD AUTO: 3.96 10E6/UL (ref 3.8–5.2)
SODIUM SERPL-SCNC: 119 MMOL/L (ref 136–145)
SODIUM SERPL-SCNC: 121 MMOL/L (ref 136–145)
SODIUM SERPL-SCNC: 121 MMOL/L (ref 136–145)
SODIUM SERPL-SCNC: 122 MMOL/L (ref 136–145)
SODIUM SERPL-SCNC: 125 MMOL/L (ref 136–145)
SODIUM SERPL-SCNC: 126 MMOL/L (ref 136–145)
SODIUM UR-SCNC: 20 MMOL/L
TSH SERPL DL<=0.005 MIU/L-ACNC: 0.59 UIU/ML (ref 0.3–4.2)
WBC # BLD AUTO: 7.8 10E3/UL (ref 4–11)

## 2022-12-05 PROCEDURE — 84295 ASSAY OF SERUM SODIUM: CPT | Performed by: HOSPITALIST

## 2022-12-05 PROCEDURE — 258N000003 HC RX IP 258 OP 636: Performed by: HOSPITALIST

## 2022-12-05 PROCEDURE — 120N000004 HC R&B MS OVERFLOW

## 2022-12-05 PROCEDURE — 250N000009 HC RX 250: Performed by: HOSPITALIST

## 2022-12-05 PROCEDURE — 999N000065 XR CHEST PORT 1 VIEW

## 2022-12-05 PROCEDURE — G0463 HOSPITAL OUTPT CLINIC VISIT: HCPCS

## 2022-12-05 PROCEDURE — 85027 COMPLETE CBC AUTOMATED: CPT | Performed by: HOSPITALIST

## 2022-12-05 PROCEDURE — 84300 ASSAY OF URINE SODIUM: CPT | Performed by: HOSPITALIST

## 2022-12-05 PROCEDURE — 250N000011 HC RX IP 250 OP 636: Performed by: HOSPITALIST

## 2022-12-05 PROCEDURE — 36415 COLL VENOUS BLD VENIPUNCTURE: CPT | Performed by: HOSPITALIST

## 2022-12-05 PROCEDURE — 250N000013 HC RX MED GY IP 250 OP 250 PS 637: Performed by: EMERGENCY MEDICINE

## 2022-12-05 PROCEDURE — 36569 INSJ PICC 5 YR+ W/O IMAGING: CPT

## 2022-12-05 PROCEDURE — 272N000579 HC TRAY POWER PICC SOLO 4FR SINGLE LUMEN

## 2022-12-05 PROCEDURE — 83935 ASSAY OF URINE OSMOLALITY: CPT | Performed by: HOSPITALIST

## 2022-12-05 PROCEDURE — 99233 SBSQ HOSP IP/OBS HIGH 50: CPT | Performed by: HOSPITALIST

## 2022-12-05 PROCEDURE — 84443 ASSAY THYROID STIM HORMONE: CPT | Performed by: HOSPITALIST

## 2022-12-05 PROCEDURE — 250N000013 HC RX MED GY IP 250 OP 250 PS 637: Performed by: HOSPITALIST

## 2022-12-05 RX ORDER — LIDOCAINE 40 MG/G
CREAM TOPICAL
Status: DISCONTINUED | OUTPATIENT
Start: 2022-12-05 | End: 2022-12-07 | Stop reason: HOSPADM

## 2022-12-05 RX ORDER — AMLODIPINE BESYLATE 5 MG/1
5 TABLET ORAL EVERY EVENING
Status: DISCONTINUED | OUTPATIENT
Start: 2022-12-05 | End: 2022-12-05

## 2022-12-05 RX ORDER — LEVOTHYROXINE SODIUM 100 UG/1
100 TABLET ORAL
Status: DISCONTINUED | OUTPATIENT
Start: 2022-12-06 | End: 2022-12-05

## 2022-12-05 RX ORDER — 3% SODIUM CHLORIDE 3 G/100ML
INJECTION, SOLUTION INTRAVENOUS CONTINUOUS
Status: DISPENSED | OUTPATIENT
Start: 2022-12-05 | End: 2022-12-05

## 2022-12-05 RX ADMIN — AMLODIPINE BESYLATE 5 MG: 5 TABLET ORAL at 08:38

## 2022-12-05 RX ADMIN — LEVOTHYROXINE SODIUM 100 MCG: 0.1 TABLET ORAL at 08:38

## 2022-12-05 RX ADMIN — DORZOLAMIDE HYDROCHLORIDE 1 DROP: 20 SOLUTION/ DROPS OPHTHALMIC at 08:42

## 2022-12-05 RX ADMIN — SODIUM CHLORIDE: 3 INJECTION, SOLUTION INTRAVENOUS at 14:41

## 2022-12-05 RX ADMIN — METOPROLOL SUCCINATE 25 MG: 25 TABLET, FILM COATED, EXTENDED RELEASE ORAL at 08:38

## 2022-12-05 RX ADMIN — ASPIRIN 81 MG: 81 TABLET, COATED ORAL at 08:38

## 2022-12-05 RX ADMIN — BRIMONIDINE TARTRATE 1 DROP: 2 SOLUTION/ DROPS OPHTHALMIC at 20:42

## 2022-12-05 RX ADMIN — DORZOLAMIDE HYDROCHLORIDE 1 DROP: 20 SOLUTION/ DROPS OPHTHALMIC at 13:53

## 2022-12-05 RX ADMIN — ENOXAPARIN SODIUM 40 MG: 100 INJECTION SUBCUTANEOUS at 08:39

## 2022-12-05 RX ADMIN — DORZOLAMIDE HYDROCHLORIDE 1 DROP: 20 SOLUTION/ DROPS OPHTHALMIC at 20:42

## 2022-12-05 RX ADMIN — LATANOPROST 1 DROP: 50 SOLUTION OPHTHALMIC at 20:43

## 2022-12-05 RX ADMIN — LIDOCAINE HYDROCHLORIDE ANHYDROUS 1 ML: 10 INJECTION, SOLUTION INFILTRATION at 13:00

## 2022-12-05 RX ADMIN — OSELTAMIVIR PHOSPHATE 30 MG: 30 CAPSULE ORAL at 00:23

## 2022-12-05 RX ADMIN — OSELTAMIVIR PHOSPHATE 30 MG: 30 CAPSULE ORAL at 08:39

## 2022-12-05 RX ADMIN — UMECLIDINIUM BROMIDE AND VILANTEROL TRIFENATATE 1 PUFF: 62.5; 25 POWDER RESPIRATORY (INHALATION) at 20:49

## 2022-12-05 RX ADMIN — BRIMONIDINE TARTRATE 1 DROP: 2 SOLUTION/ DROPS OPHTHALMIC at 13:53

## 2022-12-05 RX ADMIN — OSELTAMIVIR PHOSPHATE 30 MG: 30 CAPSULE ORAL at 20:42

## 2022-12-05 RX ADMIN — SODIUM CHLORIDE 1000 ML: 9 INJECTION, SOLUTION INTRAVENOUS at 11:27

## 2022-12-05 RX ADMIN — BRIMONIDINE TARTRATE 1 DROP: 2 SOLUTION/ DROPS OPHTHALMIC at 08:42

## 2022-12-05 ASSESSMENT — ACTIVITIES OF DAILY LIVING (ADL)
ADLS_ACUITY_SCORE: 24
ADLS_ACUITY_SCORE: 26
ADLS_ACUITY_SCORE: 24
ADLS_ACUITY_SCORE: 26
ADLS_ACUITY_SCORE: 24
ADLS_ACUITY_SCORE: 24
ADLS_ACUITY_SCORE: 26
ADLS_ACUITY_SCORE: 26
ADLS_ACUITY_SCORE: 24
ADLS_ACUITY_SCORE: 24

## 2022-12-05 NOTE — PROGRESS NOTES
[facility]  Procedure Note         PICC      Zee Mireles  MRN# [id number]   December 5, 2022, 1:43 PM Indication: Medication administration           Pause for the cause: Consent for catheter placement procedure signed  Time out completed  Patient ID's verified using two distinct indicators  All necessary equipment is present  Site marked if extremity to be used has been predetermined   Type of line to be used: PICC   Full barrier precautions used: Yes   Skin preparation: Chlorehexidine Gluconate 25% with isopropyl alcohol 70%   Date of insertion: December 5, 2022, 1:15 PM   Device type: Single lumen, valved, 4.0   Catheter brand: OrderAhead   Lot number: [id number]   Insertion location: Left basilic vein   Method of placement: Venipuncture  MST  Ultrasound   Number of attempts: With ultrasound: 1   Without ultrasound: 0   Difficulty threading: No   Midline IV device: Dressing dry and intact  Transparent semmipermeable dressing applied  Chlorhexidine patch  Catheter securement device   Arm circumference: Adults 10 cm above AC   Midline extremity circumference: 24.5 cm   Internal length: 44 cm   Midline visible catheter length: 1 cm   Total catheter length: 45 cm to hub   Tip termination: SVC/RA   Method of verification: Chest x-ray   Midline patency post placement: Positive blood return  Flushes without difficulty  Saline locked   Line flush: Line flush documented on the eMAR yes   Placement verified by: Radiologist   Catheter placed by: ANEESH Ledezma   Discontinuation form initiated: No   Patient tolerance: Tolerated well   PICC Insertion Education Complete: Yes      Summary:  This procedure was performed without difficulty and she tolerated the procedure well with no noted immediate complications.   Picc tip is In good position at the SVC/RA junction.  OK to use for meds, labs and power injection.      Recorded by ANEESH Ledezma    Attestation:  Amount of time performed on this  procedure: 15 minutes.

## 2022-12-05 NOTE — PROGRESS NOTES
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 Baseline sodium 125-128 between September and November of this year.  Previously was on hydrochlorothiazide but it sounds like this was discontinued at least a month ago if not earlier.  She has been told to manage with a fluid restriction and generally drinks about 32 ounces of water per day. She was given 500 NS bolus in ER. Repeat Na in ER at 2000 stable at 118.  Given her chronic hyponatremia I am inclined to treat this very conservatively.  My suspicion is that a large part of this is hypovolemia and low solute intake over the last week with her influenza as she endorses minimal intake asides from water & coffee.  - Patient was given 1 L of normal saline in 3 hours as bolus today.  - Is started on 3% saline due to lack of response to previous measures.  - Urine osmolality is normal  - TSH is normal  - Repeat sodium every 6 hours to ensure not over correcting    Influenza A  Asthma/COPD exacerbation  Symptoms since 11/30 with malaise and progressive shortness of breath on exertion developing over the last 2 days prior to admission.  Chest x-ray without clear infiltrates.  - Given hospital admission, will start on oseltamivir, dose decreased down to 30 twice daily, although a bit late in the course for this to be all that effective.  - Initiated on prednisone 40 mg in ER 12/4, continue once daily  - omar prn  - No need for oxygen supplementation currently at rest but may require it with exertion.  Monitor.  - Needs tobacco cessation    Hypertension  - Continue metoprolol succinate 25 mg  every morning  - Continue amlodipine 5 mg daily  - Restarted losartan 100 milligrams daily due to blood pressure rising    Dry eyes  - Continue drops    Hypothyroidism  - Continue levothyroxine 100    Paroxysmal atrial fibrillation  Patient has declined anticoagulation per cardiology notes.  - Continue metoprolol succinate    Coronary artery disease  Non-ST elevation MI 11/2018 status post AGATHA.    Left leg wound  Wound care nurse was consulted.    Peripheral arterial disease status post right external iliac/common femoral artery stenting 2006    Right carotid asymptomatic stenosis       Diet: Low Saturated Fat Na <2400 mg    DVT Prophylaxis: Enoxaparin (Lovenox) SQ  Fraire Catheter: Not present  Central Lines: PRESENT  PICC 12/05/22 Single Lumen Left Basilic Hypertonic saline-Site Assessment: WDL  Cardiac Monitoring: None  Code Status:   Patient agrees with full code but does not want to be on ventilator or life-sustaining measures more than 3 days.    Disposition Plan     Expected Discharge Date: 12/06/2022                The patient's care was discussed with the Bedside Nurse, Patient and Patient's Family.    Sly Edgar MD  Hospitalist Service  [facility]  Securely message with the Vocera Web Console (learn more here)  Text page via [facility] Paging/Directory         Clinically Significant Risk Factors         # Hyponatremia: Lowest Na = 118 mmol/L (Ref range: 136-145) in last 2 days, will monitor as appropriate                       ______________________________________________________________________    Interval History   Patient generally feels better.  Still has some wet cough.  Is on room air.  Is afebrile.  Blood pressure is elevated.  Her home medication losartan was restarted.  Serum sodium is a still low despite previous measures overnight.  Is a started on 3% saline for the next 12 hours.  Midline was placed.  WOC was consulted for distal leg wound.  Please see WOC note.  I  spoke with patient about her code.  She wants to trial of full code and does not want to be on ventilator or life-sustaining instruments for more than 3 days.    Data reviewed today: I reviewed all medications, new labs and imaging results over the last 24 hours. I personally reviewed no images or EKG's today.    Physical Exam   Vital Signs: Temp: 98.8  F (37.1  C) Temp src: Oral BP: (!) 157/68 Pulse: 75   Resp: 20 SpO2: 94 %      Weight: 110 lbs 10.73 oz  Constitutional: Alert, oriented, cooperative, no apparent distress, appears nontoxic  Eyes: Eyes are clear, pupils are reactive.  HENT: Oropharynx is clear and DRY . No evidence of cranial trauma.  Lymph/Hematologic: No epitrochlear, axillary, anterior or posterior cervical, or supraclavicular lymphadenopathy is appreciated.  Cardiovascular: Regular rate and rhythm, normal S1 and S2, and no murmur noted. JVP is normal. Good peripheral pulses in wrists bilaterally. No lower extremity edema.  Respiratory: Clear to auscultation bilaterally.   GI: Soft, non-tender, normal bowel sounds, no hepatomegaly.  Genitourinary: Deferred  Musculoskeletal: Normal muscle bulk and tone.  Skin: Warm and dry, no rashes.  Left leg with chronic wound distally without infection.  Please see WOC note.  Neurologic: Neck supple. Cranial nerves are grossly intact.  is symmetric.      Data   Recent Labs   Lab 12/05/22  1403 12/05/22  0956 12/05/22  0410 12/04/22 2009 12/04/22  1200   WBC  --   --  7.8  --  8.2   HGB  --   --  13.1  --  14.7   MCV  --   --  95  --  94   PLT  --   --  182  --  212   * 119* 121*  121*   < > 118*   POTASSIUM  --   --  3.8  --  4.1   CHLORIDE  --   --  83*  --  82*   CO2  --   --  25  --  24   BUN  --   --  13.5  --  10.0   CR  --   --  0.82  --  0.59   ANIONGAP  --   --  13  --  12   KARI  --   --  8.2*  --  8.8   GLC  --   --  90  --  104*    < > = values in this interval not displayed.     Medications     sodium chloride 3% 24 mL/hr at 12/05/22 1700        amLODIPine  5 mg Oral Daily     aspirin  81 mg Oral Daily     brimonidine  1 drop Both Eyes TID     dorzolamide  1 drop Both Eyes TID     enoxaparin ANTICOAGULANT  40 mg Subcutaneous Q24H     latanoprost  1 drop Both Eyes At Bedtime     levothyroxine  100 mcg Oral Daily     losartan  100 mg Oral Daily     metoprolol succinate ER  25 mg Oral Daily     oseltamivir  30 mg Oral BID     sodium chloride (PF)  10 mL Intracatheter Q8H     sodium chloride (PF)  10-40 mL Intracatheter Q7 Days     sodium chloride (PF)  3 mL Intracatheter Q8H     umeclidinium-vilanterol  1 puff Inhalation At Bedtime

## 2022-12-05 NOTE — H&P
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 Baseline sodium 125-128 between September and November of this year.  Previously was on hydrochlorothiazide but it sounds like this was discontinued at least a month ago if not earlier.  She has been told to manage with a fluid restriction and generally drinks about 32 ounces of water per day. She was given 500 NS bolus in ER. Repeat Na in ER at 2000 stable at 118.  Given her chronic hyponatremia I am inclined to treat this very conservatively.  My suspicion is that a large part of this is hypovolemia and low solute intake over the last week with her influenza as she endorses minimal intake asides from water & coffee.  - no further IVF  - check urine Na, urine osms  - Check TSH given known hypothyroidism  - Repeat sodium every 6 hours to ensure not over correcting  - If not rising on its own, could start hypertonic saline tomorrow    Influenza A  Asthma/COPD exacerbation  Symptoms since 11/30 with malaise and progressive shortness of breath on exertion developing over the last 2 days prior to admission.  Chest x-ray without clear infiltrates.  - Given hospital admission, will start on oseltamivir, dose decreased down to 30 twice daily, although a bit late in the course for this to be all that effective.  - Initiated on prednisone 40 mg in ER 12/4, continue once daily  - duonebs prn  - No need for oxygen supplementation currently at rest but may require it with exertion.  Monitor.  - Needs tobacco cessation    Hypertension  - Continue metoprolol succinate 25 mg every morning  - Continue amlodipine 5 mg daily  - Hold losartan 100 for now, may resume if blood  pressure is rising    Eyes  - Continue drops    Hypothyroidism  - Continue levothyroxine 100  - Check TSH    Paroxysmal atrial fibrillation  Patient has declined anticoagulation per cardiology notes.  - Continue metoprolol succinate    Coronary artery disease  Non-ST elevation MI 11/2018 status post AGATHA.    Peripheral arterial disease status post right external iliac/common femoral artery stenting 2006    Right carotid asymptomatic stenosis        Principal Problem:    Hyponatremia  Active Problems:    Influenza A    COPD exacerbation (H)    Clinically Significant Risk Factors Present on Admission         # Hyponatremia: Lowest Na = 118 mmol/L (Ref range: 136-145) in last 2 days, will monitor as appropriate          # Hypertension: home medication list includes antihypertensive(s)                  Diet: Low Saturated Fat Na <2400 mg    DVT Prophylaxis: Enoxaparin (Lovenox) SQ  Fraire Catheter: Not present  Code Status:       Lines: PIV    Disposition Plan      Expected Discharge Date: 12/06/2022             Entered: Lucas Cardoza MD 12/04/2022, 11:46 PM     Status: Patient is appropriate for med surg  Lucas Cardoza MD        The patient's care was discussed with the Patient.    Primary Care Physician   Jessica Fitzgerald [phone]    History is obtained from the patient,  and review of old records via the EMR.    History of Present Illness   Zee Mireles is a 82 year old female with past medical history of hypertension, paroxysmal atrial fibrillation, now presents on 12/4/2022 with shortness of breath.  She developed difficulty breathing about 2 days ago preceded by several days of malaise.  She has had some sick family members.  She lives with her son and her daughter-in-law.  They have been sick.  She tested positive for influenza in the ER.  She does note some cough.  Denies any chest pain.  She reports her sodium has always been low.  She has been told to be on a fluid restriction of about 32  "ounces per day which she tries to comply with.  She reports minimal solid food over the last several days secondary to malaise and sore throat.  She has still been drinking water though.    Review of Systems   The 10 point Review of Systems is negative other than noted in the HPI or here.     Past Medical History      Past Medical History:   Diagnosis Date     Asthma      Chest pain 11/2018    burning sensation - indigestion     Colitis      COPD (chronic obstructive pulmonary disease) (H)      COPD (chronic obstructive pulmonary disease) (H)      Coronary artery disease due to calcified coronary lesion 11/01/2018    stent to LAD after NSTEMI     DNI (do not intubate)      Dyslipidemia, goal LDL below 70      Essential hypertension      GERD (gastroesophageal reflux disease)      Glaucoma      HTN (hypertension)      Hyperlipidemia      Hypothyroid      NSTEMI (non-ST elevation myocardial infarction) (H) 11/01/2018     PAD (peripheral artery disease) (H)      Paroxysmal atrial fibrillation (H) 11/2018     Paroxysmal atrial fibrillation (H) 11/01/2018     Partial retinal artery occlusion 06/09/2021     Skin cancer      Smoker     ongoing - \"6-8 cigarettes QD\"     Patient Active Problem List    Diagnosis Date Noted     Hyponatremia 12/04/2022     Priority: Medium     Influenza A 12/04/2022     Priority: Medium     COPD exacerbation (H) 12/04/2022     Priority: Medium     Coronary artery disease involving native coronary artery of native heart without angina pectoris 10/07/2019     Priority: Medium     PAD (peripheral artery disease) (H) 10/07/2019     Priority: Medium     COPD (chronic obstructive pulmonary disease) (H) 08/14/2019     Priority: Medium     Hyperlipidemia LDL goal <70 08/14/2019     Priority: Medium     Essential hypertension 08/14/2019     Priority: Medium     Upper GI bleed 08/14/2019     Priority: Medium     Paroxysmal atrial fibrillation (H) 11/01/2018     Priority: Medium     Overview:   CHADS2 " VASC score equals 5 for age, sex, hypertension, and coronary artery disease       Coronary artery disease due to calcified coronary lesion 11/01/2018     Priority: Medium     Overview:   stent to LAD after NSTEMI          Past Surgical History     Past Surgical History:   Procedure Laterality Date     CATARACT EXTRACTION       cataract removal       COLONOSCOPY  12/2018    [facility]     COLONOSCOPY N/A 12/12/2018    COLONOSCOPY with polypectomy; Damien Denton MD;  [facility];  Service: Gastroenterology     CORONARY STENT PLACEMENT  11/2018    at [facility] in [place]     CV BALLOON DILATION AND/OR STENT PLACEMENT OF VESSEL  11/2018    stent to LAD     ESOPHAGOSCOPY, GASTROSCOPY, DUODENOSCOPY (EGD), COMBINED  12/2018    [facility]     ESOPHAGOSCOPY, GASTROSCOPY, DUODENOSCOPY (EGD), COMBINED N/A 8/15/2019    Procedure: ESOPHAGOGASTRODUODENOSCOPY, WITH BIOPSY;  Surgeon: Demario Clayton DO;  Location: [facility]     ESOPHAGOSCOPY, GASTROSCOPY, DUODENOSCOPY (EGD), COMBINED N/A 12/10/2018    Damien Denton MD; [facility];  Service: Gastroenterology     IR ABDOMINAL AORTOGRAM  7/3/2006     IR EXTREMITY ANGIOGRAM BILATERAL  7/3/2006     IR MISCELLANEOUS PROCEDURE  7/3/2006     IR MISCELLANEOUS PROCEDURE  8/9/2006        Prior to Admission Medications   Prior to Admission Medications   Prescriptions Last Dose Informant Patient Reported? Taking?   acebutolol (SECTRAL) 400 MG capsule 12/4/2022 at am Self Yes Yes   Sig: Take 400 mg by mouth daily as needed (Palpitations) = extra dose in addition to scheduled daily dose   acetaminophen (TYLENOL) 500 MG tablet 12/4/2022 at early am Self Yes Yes   Sig: Take 1,000 mg by mouth every 6 hours as needed for mild pain   albuterol (PROAIR HFA/PROVENTIL HFA/VENTOLIN HFA) 108 (90 Base) MCG/ACT inhaler 12/4/2022 at am Self No Yes   Sig: Inhale 2 puffs into the lungs every 4 hours as needed for shortness of breath / dyspnea or wheezing OFFICE VISIT NEEDED FOR ANY FURTHER  REFILLS   amLODIPine (NORVASC) 5 MG tablet 12/3/2022 at pm Self Yes Yes   Sig: Take 1 tablet by mouth every evening   aspirin 81 MG EC tablet 2022 at am Self Yes Yes   Sig: Take 81 mg by mouth daily   brimonidine (ALPHAGAN-P) 0.15 % ophthalmic solution 2022 at am Self Yes Yes   Sig: Place 1 drop into both eyes 3 times daily   dorzolamide (TRUSOPT) 2 % ophthalmic solution 2022 at am Self Yes Yes   Sig: Place 1 drop into both eyes 3 times daily   latanoprost (XALATAN) 0.005 % ophthalmic solution 12/3/2022 at pm Self Yes Yes   Sig: Place 1 drop into both eyes every evening    levothyroxine (SYNTHROID/LEVOTHROID) 100 MCG tablet 2022 at am Self Yes Yes   Sig: Take 1 tablet by mouth every morning (before breakfast)   losartan (COZAAR) 100 MG tablet 2022 at am Self No Yes   Sig: Take 1 tablet (100 mg) by mouth daily   metoprolol succinate ER (TOPROL XL) 25 MG 24 hr tablet 2022 at am Self No Yes   Sig: Take 1 tablet (25 mg) by mouth daily   rosuvastatin (CRESTOR) 5 MG tablet 2022 at am Self Yes Yes   Sig: Take 5 mg by mouth three times a week Monday, Wednesday, Friday   tiotropium-olodaterol (STIOLTO RESPIMAT) 2.5-2.5 MCG/ACT AERS 12/3/2022 at pm Self No Yes   Sig: Inhale 2 puffs into the lungs daily      Facility-Administered Medications: None     Allergies   Allergies   Allergen Reactions     Atorvastatin Muscle Pain (Myalgia)     Was fine while taking simvastatin 20 mg daily.     Zoledronic Acid Other (See Comments)     SEVERE REACTION         Adhesive Tape Rash       Family History    Family History   Problem Relation Age of Onset     Sudden Death Mother 55.00        no autopsy     Goiter Mother      Stomach Cancer Father 72.00     No Known Problems Brother      Other - See Comments Brother          in [place]     Ulcers Son         Gastric ulcer was perforated; had surgery. His wife passed suddenly at 49 at home in 2016.     Atrial fibrillation Son        Social History  "  Social History     Socioeconomic History     Marital status:      Spouse name: Not on file     Number of children: 2     Years of education: Not on file     Highest education level: Not on file   Occupational History     Not on file   Tobacco Use     Smoking status: Every Day     Packs/day: 1.00     Years: 60.00     Pack years: 60.00     Types: Cigarettes     Smokeless tobacco: Never     Tobacco comments:     down to 0.3 ppd since 2017   Vaping Use     Vaping Use: Never used   Substance and Sexual Activity     Alcohol use: No     Alcohol/week: 1.0 standard drink     Drug use: No     Sexual activity: Not Currently     Partners: Male   Other Topics Concern     Not on file   Social History Narrative    Zee lives with her son, Braden, and his wife.     Social Determinants of Health     Financial Resource Strain: Not on file   Food Insecurity: Not on file   Transportation Needs: Not on file   Physical Activity: Not on file   Stress: Not on file   Social Connections: Not on file   Intimate Partner Violence: Not on file   Housing Stability: Not on file       Physical Exam   BP (!) 176/86   Pulse 85   Temp 98.9  F (37.2  C) (Oral)   Resp 20   Ht 1.6 m (5' 3\")   Wt 50.2 kg (110 lb 10.7 oz)   SpO2 91%   BMI 19.60 kg/m       Weight: 110 lbs 10.73 oz Body mass index is 19.6 kg/m .     Constitutional: Alert, oriented, cooperative, no apparent distress, appears nontoxic  Eyes: Eyes are clear, pupils are reactive.  HENT: Oropharynx is clear and DRY . No evidence of cranial trauma.  Lymph/Hematologic: No epitrochlear, axillary, anterior or posterior cervical, or supraclavicular lymphadenopathy is appreciated.  Cardiovascular: Regular rate and rhythm, normal S1 and S2, and no murmur noted. JVP is normal. Good peripheral pulses in wrists bilaterally. No lower extremity edema.  Respiratory: Clear to auscultation bilaterally.   GI: Soft, non-tender, normal bowel sounds, no hepatomegaly.  Genitourinary: " Deferred  Musculoskeletal: Normal muscle bulk and tone.  Skin: Warm and dry, no rashes.   Neurologic: Neck supple. Cranial nerves are grossly intact.  is symmetric.     Data   Data reviewed today:   Recent Labs   Lab 12/04/22 2009 12/04/22  1200   WBC  --  8.2   HGB  --  14.7   MCV  --  94   PLT  --  212   * 118*   POTASSIUM  --  4.1   CHLORIDE  --  82*   CO2  --  24   BUN  --  10.0   CR  --  0.59   ANIONGAP  --  12   KARI  --  8.8   GLC  --  104*       Recent Results (from the past 24 hour(s))   XR Chest Port 1 View    Narrative    EXAM: XR CHEST PORT 1 VIEW  LOCATION: [facility]  DATE/TIME: 12/4/2022 3:01 PM    INDICATION: Dyspnea.  COMPARISON: Chest radiograph 05/28/2018.      Impression    IMPRESSION: No airspace opacities, pleural effusions, or pneumothorax. Nonenlarged cardiomediastinal silhouette. Tortuous thoracic aorta with atherosclerotic aortic calcifications, unchanged.       I personally reviewed no images or EKG's today

## 2022-12-05 NOTE — PLAN OF CARE
End Of Shift Note     Situation: Hyponatremia, influenza A     Plan: Trend sodium, tamiflu     Subjective/Objective:     Neuro: alert and oriented x4, SBA      Cardiac: HTN, Afebrile     Resp: Room air, productive cough, LS clear     GI/: Low NA/fat diet     MSK: generalized weakness     Skin: pre-existing wound on left foot, patient declined dressing removal d/t it not needing to be changed yet, WOC consult placed      LDAs: PIV x1

## 2022-12-05 NOTE — PROGRESS NOTES
Skin affirmation note    Admitting nurse completed full skin assessment, Anthony score and Anthony interventions. This writer agrees with the initial skin assessment findings.

## 2022-12-05 NOTE — PROGRESS NOTES
"  [facility] ADMISSION NOTE    Patient admitted to room 1009 at approximately 2045 via cart from emergency room. Patient was accompanied by transport tech.     Verbal SBAR report received from Glory MELENDREZ prior to patient arrival.     Patient ambulated to bed with stand-by assist. Patient alert and oriented X 3. The patient is not having any pain.  . Admission vital signs: Blood pressure (!) 176/86, pulse 85, temperature 98.9  F (37.2  C), temperature source Oral, resp. rate 20, height 1.6 m (5' 3\"), weight 50.2 kg (110 lb 10.7 oz), SpO2 91 %. Patient was oriented to plan of care, call light, bed controls, tv, telephone, bathroom and visiting hours.     Risk Assessment    The following safety risks were identified during admission: fall and isolation. Yellow risk band applied: YES.     Skin Initial Assessment    This writer admitted this patient and completed a full skin assessment and Anthony score in the Adult PCS flowsheet. Appropriate interventions initiated as needed.     Secondary skin check completed by Geneva MELENDREZ.    Anthony Risk Assessment  Sensory Perception: 4-->no impairment  Moisture: 4-->rarely moist  Activity: 4-->walks frequently  Mobility: 4-->no limitation  Nutrition: 2-->probably inadequate  Friction and Shear: 3-->no apparent problem  Anthony Score: 21  Nutrition Interventions: Encourage protein intake  Mattress: Low air loss (LEONIE pump, Isolibrium, Skin Guard, Pulsate, Isoair, etc.)  Bed Frame: Standard width and length    Education    Patient has a [place] to Observation order: No  Observation education completed and documented: N/A      Brionna Ho RN     "

## 2022-12-05 NOTE — PROGRESS NOTES
[facility] Nurse Inpatient Assessment     Consulted for: Existing leg wound     Patient History (according to provider note(s):    Zee Mireles is a 82 year old female with past medical history of hypertension, paroxysmal atrial fibrillation, now presents on 12/4/2022 with shortness of breath.  She developed difficulty breathing about 2 days ago preceded by several days of malaise.  She has had some sick family members.  She lives with her son and her daughter-in-law.  They have been sick.  She tested positive for influenza in the ER.  She does note some cough.  Denies any chest pain.  She reports her sodium has always been low.  She has been told to be on a fluid restriction of about 32 ounces per day which she tries to comply with.  She reports minimal solid food over the last several days secondary to malaise and sore throat.  She has still been drinking water though.    Pt has been following Vascular at [facility] in [place].  She has an existing plan of care in place per LYNN Francisco.  Pt is requesting a wound care RN to see her and change the dressing.       Areas Assessed:      Areas visualized during today's visit: Lower extremities         Open area measures 0.5cm L x 0.5cm W x 0.6cm deep.    Base is 50% granular and 50% slough  Drainage is minimal  Wound edges are open   Periwound skin is intact but dry and flaky  No s/s of infection      Treatment Plan:      wound: Monday/Thursday twice weekly      Orders: Written   Cleanse wound with spray wound cleanser.  Dry well  Apply Medihoney in the open area  Cover with 2x2 gauze  Secure with Kind Tape (use minimal amount)  Can use Aquaphor ointment to any dry flaky skin  then wrap with kerlex.   Apply Tubigrip for to help secure and to control edema  Change dressing 2 times per week.      RECOMMEND PRIMARY TEAM ORDER: None, at this time  Education provided: plan of care and wound progress  Discussed plan of  Socioeconomic History    Marital status:      Spouse name: Not on file    Number of children: Not on file    Years of education: Not on file    Highest education level: Not on file   Occupational History    Not on file   Social Needs    Financial resource strain: Not on file    Food insecurity     Worry: Not on file     Inability: Not on file    Transportation needs     Medical: Not on file     Non-medical: Not on file   Tobacco Use    Smoking status: Former Smoker     Packs/day: 0.25     Years: 2.00     Pack years: 0.50     Last attempt to quit: 1980     Years since quittin.9    Smokeless tobacco: Never Used   Substance and Sexual Activity    Alcohol use: No    Drug use: No    Sexual activity: Not on file   Lifestyle    Physical activity     Days per week: Not on file     Minutes per session: Not on file    Stress: Not on file   Relationships    Social connections     Talks on phone: Not on file     Gets together: Not on file     Attends [employer] service: Not on file     Active member of club or organization: Not on file     Attends meetings of clubs or organizations: Not on file     Relationship status: Not on file    Intimate partner violence     Fear of current or ex partner: Not on file     Emotionally abused: Not on file     Physically abused: Not on file     Forced sexual activity: Not on file   Other Topics Concern    Not on file   Social History Narrative    Not on file       Family History   Problem Relation Age of Onset    Cancer Mother     Heart Disease Father     Diabetes Sister     Cancer Brother     Stroke Brother     Cancer Brother         Review of Systems  Pertinent items are noted in HPI  Review of systems reviewed from Patient History Form dated on 10/22/2020 and available in the patient's chart under the Media tab. Vital Signs  There were no vitals filed for this visit.     General Exam:   Constitutional: Patient is adequately groomed with no care with: Nurse and Physician  WOC nurse follow-up plan: prn  Notify WOC if wound(s) deteriorate.  Nursing to notify the Provider(s) and re-consult the WOC Nurse if new skin concern.    DATA:     Current support surface: Standard  Standard gel/foam mattress (IsoFlex, Atmos air, etc)  Containment of urine/stool:   BMI: Body mass index is 19.6 kg/m .   Active diet order: Orders Placed This Encounter      Low Saturated Fat Na <2400 mg     Output: I/O last 3 completed shifts:  In: 850 [P.O.:350; IV Piggyback:500]  Out: -      Labs: Recent Labs   Lab 12/05/22  0410   HGB 13.1   WBC 7.8     Pressure injury risk assessment:   Sensory Perception: 4-->no impairment  Moisture: 4-->rarely moist  Activity: 4-->walks frequently  Mobility: 4-->no limitation  Nutrition: 2-->probably inadequate  Friction and Shear: 3-->no apparent problem  Anthony Score: 21    Jessica Leone RN     Dept. Office Number: [phone]   Referral Reason:   Specialty Services Required     Referred to Provider:   Jonnie Ford MD     Requested Specialty:   Orthopedic Surgery Sports Medicine     Number of Visits Requested:   1       Plan:   -We discussed that based on her x-ray findings she does have some evidence of some notching I think which could be related to her symptoms. I do when to get another opinion with one of our shoulder arthroplasty specialist as well to see if he thinks this could be related and if further work-up is needed. We will place a referral in to see Dr. Jonnie Ford for a shoulder reconstruction consultation for his assessment of her imaging and symptoms  -She may continue with ice, activity modification, medications  -She has permanent restrictions of 10 pound lifting restriction with the right upper extremity as well as avoid any provocative positioning    Nahun Torres MD  [id number] [employer] partner of [facility])    Voice Recognition Dictation disclaimer: Please note that portions of this chart were generated using Dragon dictation software. Although every effort was made to ensure the accuracy of this automated transcription, some errors in transcription may have occurred.

## 2022-12-05 NOTE — ED NOTES
"[facility]   Admission Handoff    The patient is Zee Mireles, 82 year old who arrived in the ED by AMBULANCE from emergency room with a complaint of Shortness of Breath  . The patient's current symptoms are new and during this time the symptoms have increased. In the ED, patient was diagnosed with   Final diagnoses:   Influenza A   COPD exacerbation (H)   Hyponatremia         Needed?: No    Allergies:    Allergies   Allergen Reactions    Atorvastatin Muscle Pain (Myalgia)     Was fine while taking simvastatin 20 mg daily.    Zoledronic Acid Other (See Comments)     SEVERE REACTION        Adhesive Tape Rash       Past Medical Hx:   Past Medical History:   Diagnosis Date    Asthma     Chest pain 11/2018    burning sensation - indigestion    Colitis     COPD (chronic obstructive pulmonary disease) (H)     COPD (chronic obstructive pulmonary disease) (H)     Coronary artery disease due to calcified coronary lesion 11/01/2018    stent to LAD after NSTEMI    DNI (do not intubate)     Dyslipidemia, goal LDL below 70     Essential hypertension     GERD (gastroesophageal reflux disease)     Glaucoma     HTN (hypertension)     Hyperlipidemia     Hypothyroid     NSTEMI (non-ST elevation myocardial infarction) (H) 11/01/2018    PAD (peripheral artery disease) (H)     Paroxysmal atrial fibrillation (H) 11/2018    Paroxysmal atrial fibrillation (H) 11/01/2018    Partial retinal artery occlusion 06/09/2021    Skin cancer     Smoker     ongoing - \"6-8 cigarettes QD\"       Initial vitals were: BP: (!) 197/92  Pulse: 76  Temp: 98.2  F (36.8  C)  Resp: (!) 36  Height: 160 cm (5' 3\")  Weight: 50.8 kg (112 lb)  SpO2: 91 %   Recent vital Signs: BP (!) 197/92   Pulse 76   Temp 98.2  F (36.8  C) (Oral)   Resp (!) 36   Ht 1.6 m (5' 3\")   Wt 50.8 kg (112 lb)   SpO2 94%   BMI 19.84 kg/m      Elimination Status: Continent: Yes     Activity Level: SBA    Fall Status: none      Baseline Mental " status: WDL  Current Mental Status changes: at basesline    Infection present or suspected this encounter: no  Sepsis suspected: No    Isolation type: droplet    Bariatric equipment needed?: No    In the ED these meds were given:   Medications   amLODIPine (NORVASC) tablet 5 mg (has no administration in time range)   levothyroxine (SYNTHROID/LEVOTHROID) tablet 100 mcg (has no administration in time range)   0.9% sodium chloride BOLUS (500 mLs Intravenous New Bag 12/4/22 1447)   ipratropium - albuterol 0.5 mg/2.5 mg/3 mL (DUONEB) neb solution 3 mL (3 mLs Nebulization Given 12/4/22 1521)   predniSONE (DELTASONE) tablet 40 mg (40 mg Oral Given 12/4/22 1608)       Drips running?  No    Home pump  No    Current LDAs: Peripheral IV: Site 18; Gauge     Results:   Labs/Imaging  Ordered and Resulted from Time of ED Arrival Up to the Time of Departure from the ED  Results for orders placed or performed during the hospital encounter of 12/04/22 (from the past 24 hour(s))   Symptomatic; Yes; 11/30/2022 Influenza A/B & SARS-CoV2 (COVID-19) Virus PCR Multiplex Nose    Specimen: Nose; Swab   Result Value Ref Range    Influenza A PCR Positive (A) Negative    Influenza B PCR Negative Negative    SARS CoV2 PCR Negative Negative    Narrative    Testing was performed using the erin SARS-CoV-2 & Influenza A/B Assay on the erin Shazia System. This test should be ordered for the detection of SARS-CoV-2 and influenza viruses in individuals who meet clinical and/or epidemiological criteria. Test performance is unknown in asymptomatic patients. This test is for in vitro diagnostic use under the FDA EUA for laboratories certified under CLIA to perform moderate and/or high complexity testing. This test has not been FDA cleared or approved. A negative result does not rule out the presence of PCR inhibitors in the specimen or target RNA in concentration below the limit of detection for the assay. If only one viral target is positive but  coinfection with multiple targets is suspected, the sample should be re-tested with another FDA cleared, approved or authorized test, if coinfection would change clinical management. [facility] are certified under the Clinical Laboratory Improvement Amendments of 1988 (CLIA-88) as qualified to perform moderate and/or high complexity laboratory testing.   [facility] Draw    Narrative    The following orders were created for panel order [place] Draw.  Procedure                               Abnormality         Status                     ---------                               -----------         ------                     Extra Blue Top Tube[[id number]]                              Final result               Extra Red Top Tube[[id number]]                               Final result               Extra Green Top (Lithium...[[id number]]                      Final result               Extra Purple Top Tube[[id number]]                            Final result                 Please view results for these tests on the individual orders.   Extra Blue Top Tube   Result Value Ref Range    Hold Specimen JIC    Extra Red Top Tube   Result Value Ref Range    Hold Specimen JIC    Extra Green Top (Lithium Heparin) Tube   Result Value Ref Range    Hold Specimen JIC    Extra Purple Top Tube   Result Value Ref Range    Hold Specimen JIC    CBC with platelets, differential    Narrative    The following orders were created for panel order CBC with platelets, differential.  Procedure                               Abnormality         Status                     ---------                               -----------         ------                     CBC with platelets and d...[[id number]]  Abnormal            Final result                 Please view results for these tests on the individual orders.   Basic metabolic panel   Result Value Ref Range    Sodium 118 (LL) 136 - 145 mmol/L    Potassium 4.1 3.4 - 5.3 mmol/L    Chloride 82  (L) 98 - 107 mmol/L    Carbon Dioxide (CO2) 24 22 - 29 mmol/L    Anion Gap 12 7 - 15 mmol/L    Urea Nitrogen 10.0 8.0 - 23.0 mg/dL    Creatinine 0.59 0.51 - 0.95 mg/dL    Calcium 8.8 8.8 - 10.2 mg/dL    Glucose 104 (H) 70 - 99 mg/dL    GFR Estimate 90 >60 mL/min/1.73m2   CBC with platelets and differential   Result Value Ref Range    WBC Count 8.2 4.0 - 11.0 10e3/uL    RBC Count 4.42 3.80 - 5.20 10e6/uL    Hemoglobin 14.7 11.7 - 15.7 g/dL    Hematocrit 41.5 35.0 - 47.0 %    MCV 94 78 - 100 fL    MCH 33.3 (H) 26.5 - 33.0 pg    MCHC 35.4 31.5 - 36.5 g/dL    RDW 13.7 10.0 - 15.0 %    Platelet Count 212 150 - 450 10e3/uL    % Neutrophils 74 %    % Lymphocytes 12 %    % Monocytes 13 %    % Eosinophils 0 %    % Basophils 0 %    % Immature Granulocytes 1 %    NRBCs per 100 WBC 0 <1 /100    Absolute Neutrophils 6.1 1.6 - 8.3 10e3/uL    Absolute Lymphocytes 1.0 0.8 - 5.3 10e3/uL    Absolute Monocytes 1.0 0.0 - 1.3 10e3/uL    Absolute Eosinophils 0.0 0.0 - 0.7 10e3/uL    Absolute Basophils 0.0 0.0 - 0.2 10e3/uL    Absolute Immature Granulocytes 0.0 <=0.4 10e3/uL    Absolute NRBCs 0.0 10e3/uL   XR Chest Port 1 View    Narrative    EXAM: XR CHEST PORT 1 VIEW  LOCATION: [facility]  DATE/TIME: 12/4/2022 3:01 PM    INDICATION: Dyspnea.  COMPARISON: Chest radiograph 05/28/2018.      Impression    IMPRESSION: No airspace opacities, pleural effusions, or pneumothorax. Nonenlarged cardiomediastinal silhouette. Tortuous thoracic aorta with atherosclerotic aortic calcifications, unchanged.       For the majority of the shift this patient's behavior was Green     Cardiac Rhythm: Normal Sinus  Pt needs tele? No  Skin/wound Issues: None    Code Status: unknown    Pain control: good    Nausea control: good    Abnormal labs/tests/findings requiring intervention:     Patient tested for COVID 19 prior to admission: YES     OBS brochure/video discussed/provided to patient/family: Yes     Family present during ED  course? No     Family Comments/Social Situation comments: none    Tasks needing completion: None    Glory Fleming, RN

## 2022-12-06 LAB
ANION GAP SERPL CALCULATED.3IONS-SCNC: 7 MMOL/L (ref 7–15)
BUN SERPL-MCNC: 12.6 MG/DL (ref 8–23)
CALCIUM SERPL-MCNC: 8.1 MG/DL (ref 8.8–10.2)
CHLORIDE SERPL-SCNC: 93 MMOL/L (ref 98–107)
CREAT SERPL-MCNC: 0.59 MG/DL (ref 0.51–0.95)
DEPRECATED HCO3 PLAS-SCNC: 28 MMOL/L (ref 22–29)
GFR SERPL CREATININE-BSD FRML MDRD: 90 ML/MIN/1.73M2
GLUCOSE SERPL-MCNC: 90 MG/DL (ref 70–99)
POTASSIUM SERPL-SCNC: 3.3 MMOL/L (ref 3.4–5.3)
POTASSIUM SERPL-SCNC: 3.8 MMOL/L (ref 3.4–5.3)
SODIUM SERPL-SCNC: 127 MMOL/L (ref 136–145)
SODIUM SERPL-SCNC: 127 MMOL/L (ref 136–145)
SODIUM SERPL-SCNC: 128 MMOL/L (ref 136–145)
SODIUM SERPL-SCNC: 129 MMOL/L (ref 136–145)

## 2022-12-06 PROCEDURE — 84295 ASSAY OF SERUM SODIUM: CPT | Performed by: HOSPITALIST

## 2022-12-06 PROCEDURE — 250N000013 HC RX MED GY IP 250 OP 250 PS 637: Performed by: EMERGENCY MEDICINE

## 2022-12-06 PROCEDURE — 120N000004 HC R&B MS OVERFLOW

## 2022-12-06 PROCEDURE — 84132 ASSAY OF SERUM POTASSIUM: CPT | Performed by: HOSPITALIST

## 2022-12-06 PROCEDURE — 250N000013 HC RX MED GY IP 250 OP 250 PS 637: Performed by: HOSPITALIST

## 2022-12-06 PROCEDURE — 99233 SBSQ HOSP IP/OBS HIGH 50: CPT | Performed by: HOSPITALIST

## 2022-12-06 RX ORDER — POTASSIUM CHLORIDE 1.5 G/1.58G
40 POWDER, FOR SOLUTION ORAL ONCE
Status: COMPLETED | OUTPATIENT
Start: 2022-12-06 | End: 2022-12-06

## 2022-12-06 RX ORDER — POTASSIUM CHLORIDE 1500 MG/1
40 TABLET, EXTENDED RELEASE ORAL ONCE
Status: DISCONTINUED | OUTPATIENT
Start: 2022-12-06 | End: 2022-12-06

## 2022-12-06 RX ADMIN — ASPIRIN 81 MG: 81 TABLET, COATED ORAL at 08:26

## 2022-12-06 RX ADMIN — BRIMONIDINE TARTRATE 1 DROP: 2 SOLUTION/ DROPS OPHTHALMIC at 08:27

## 2022-12-06 RX ADMIN — LEVOTHYROXINE SODIUM 100 MCG: 0.1 TABLET ORAL at 08:26

## 2022-12-06 RX ADMIN — METOPROLOL SUCCINATE 25 MG: 25 TABLET, FILM COATED, EXTENDED RELEASE ORAL at 08:26

## 2022-12-06 RX ADMIN — OSELTAMIVIR PHOSPHATE 30 MG: 30 CAPSULE ORAL at 08:55

## 2022-12-06 RX ADMIN — OSELTAMIVIR PHOSPHATE 30 MG: 30 CAPSULE ORAL at 20:15

## 2022-12-06 RX ADMIN — POTASSIUM CHLORIDE 40 MEQ: 1.5 FOR SOLUTION ORAL at 06:22

## 2022-12-06 RX ADMIN — DORZOLAMIDE HYDROCHLORIDE 1 DROP: 20 SOLUTION/ DROPS OPHTHALMIC at 20:13

## 2022-12-06 RX ADMIN — UMECLIDINIUM BROMIDE AND VILANTEROL TRIFENATATE 1 PUFF: 62.5; 25 POWDER RESPIRATORY (INHALATION) at 22:15

## 2022-12-06 RX ADMIN — DORZOLAMIDE HYDROCHLORIDE 1 DROP: 20 SOLUTION/ DROPS OPHTHALMIC at 13:55

## 2022-12-06 RX ADMIN — BRIMONIDINE TARTRATE 1 DROP: 2 SOLUTION/ DROPS OPHTHALMIC at 20:13

## 2022-12-06 RX ADMIN — AMLODIPINE BESYLATE 5 MG: 5 TABLET ORAL at 08:26

## 2022-12-06 RX ADMIN — DORZOLAMIDE HYDROCHLORIDE 1 DROP: 20 SOLUTION/ DROPS OPHTHALMIC at 08:27

## 2022-12-06 RX ADMIN — LATANOPROST 1 DROP: 50 SOLUTION OPHTHALMIC at 20:13

## 2022-12-06 RX ADMIN — LOSARTAN POTASSIUM 100 MG: 50 TABLET, FILM COATED ORAL at 08:26

## 2022-12-06 RX ADMIN — BRIMONIDINE TARTRATE 1 DROP: 2 SOLUTION/ DROPS OPHTHALMIC at 13:55

## 2022-12-06 ASSESSMENT — ACTIVITIES OF DAILY LIVING (ADL)
ADLS_ACUITY_SCORE: 24
ADLS_ACUITY_SCORE: 27
ADLS_ACUITY_SCORE: 24
ADLS_ACUITY_SCORE: 27
ADLS_ACUITY_SCORE: 24
ADLS_ACUITY_SCORE: 27
DEPENDENT_IADLS:: CLEANING
ADLS_ACUITY_SCORE: 24
ADLS_ACUITY_SCORE: 27

## 2022-12-06 NOTE — PLAN OF CARE
Goal Outcome Evaluation:      Plan of Care Reviewed With: patient    Overall Patient Progress: improvingOverall Patient Progress: improving     Time: Night shift      Reason for Admission: Hyponatremia, Influenza A      Activity: up ambulates with stand by assist.  Steady on feet.        Neuro: Alert and oriented.  Uses call light appropriately.  Makes needs known.        GI/: voids without difficulty.  Bowel sounds active, audible and normo.        Respiratory/Lungs: Clear, diminished.        Skin: warm and dry       Diet: Reg       IV Access: Picc Left arm , peripheral right wrist.       Vitals: WDL and stable      Pain: Denies      Safety: Bed alarm activated for patient safety

## 2022-12-06 NOTE — CONSULTS
Care Management Initial Consult    General Information  Assessment completed with: Patient, Zee  Type of CM/SW Visit: Initial Assessment    Primary Care Provider verified and updated as needed: Yes   Readmission within the last 30 days: no previous admission in last 30 days      Reason for Consult: discharge planning  Advance Care Planning: Advance Care Planning Reviewed: no concerns identified          Communication Assessment  Patient's communication style: spoken language (English or Bilingual)    Hearing Difficulty or Deaf: yes   Wear Glasses or Blind: yes    Cognitive  Cognitive/Neuro/Behavioral: WDL                      Living Environment:   People in home: child(dipak), adult  Dipti Haynes  Current living Arrangements: house      Able to return to prior arrangements: yes       Family/Social Support:  Care provided by: self  Provides care for: no one  Marital Status:   Children          Description of Support System: Supportive, Involved    Support Assessment: Adequate family and caregiver support    Current Resources:   Patient receiving home care services: No     Community Resources: None  Equipment currently used at home: none  Supplies currently used at home: None    Employment/Financial:  Employment Status: retired        Financial Concerns: No concerns identified   Referral to Financial Worker: No       Lifestyle & Psychosocial Needs:  Social Determinants of Health     Tobacco Use: High Risk     Smoking Tobacco Use: Every Day     Smokeless Tobacco Use: Never     Passive Exposure: Not on file   Alcohol Use: Not on file   Financial Resource Strain: Not on file   Food Insecurity: Not on file   Transportation Needs: Not on file   Physical Activity: Not on file   Stress: Not on file   Social Connections: Not on file   Intimate Partner Violence: Not on file   Depression: Not on file   Housing Stability: Not on file       Functional Status:  Prior to admission patient needed assistance:    Dependent ADLs:: Independent  Dependent IADLs:: Cleaning  Assesssment of Functional Status: Not at baseline with mobility    Mental Health Status:  Mental Health Status: No Current Concerns       Chemical Dependency Status:  Chemical Dependency Status: No Current Concerns             Values/Beliefs:  Spiritual, Cultural Beliefs, Islam Practices, Values that affect care: no               Additional Information:  CM consult for elevated readmission risk.  Met with patient at bedside, introduced self and role.  Patient currently lives with adult son, Dallas, adult son, Braden and Braden's wife, Brandy in a house in [place].  At baseline, she is independent with ADLs, ambulates without use of walker.  She continues to drive and remains active.  Her family assist with some cleaning, but she manges all self cares independently.     Of note, she is current with [facility] vascular clinic in [place] related to her leg wound.  She visits every two weeks to monthly, last visit was 11/28.  Patient self manges all wound care per outpatient team recommendations.      Discussed discharge planning.  Patient feels safe to return home.  She feels her strength is not yet to baseline, but is improving slowly.  She plans to increase activity slowly and regain strength.  Briefly discussed home care services, which she declines stating her family can assist with any needs.  She plans to see her PCP upon discharge and follow up with the [facility] vascular clinic.      Family will provide transportation upon discharge.      CM will remain engaged with IDT rounds daily.  No discharge needs identified at this time.      PLAN:  Home w/ family support, OP vascular clinic follow up    Theodora CHRISTINE, RN  Inpatient Care Coordinator  [facility] [phone]  [facility] [phone]

## 2022-12-06 NOTE — PLAN OF CARE
Goal Outcome Evaluation:      Plan of Care Reviewed With: patient    Overall Patient Progress: improvingOverall Patient Progress: improving       Upon entering patient room to draw labs, observed blood on patient's gown and bed cover.  Pressure dressing on Left upper forearm was oozing blood from underside of PICC line dressing. Cleaned around dressing and removed.   Blood underneath transparent dressing covering entire surface. Placed sterile gauze over oozing area. Called charge nurse for assist.  Charge Nurse Lanre MELENDREZ  assumed care and dressing change.  Was able to draw labs from line.

## 2022-12-06 NOTE — PROVIDER NOTIFICATION
2200 - Na 125 - per Dr. Arana hold 3% Saline infusion.  Will notify provider once 0200 Na result available.

## 2022-12-06 NOTE — PLAN OF CARE
Na returned at 127 for 1000 draw. Dr. Edgar updated via text page.  Mychal Vasquez RN on 12/6/2022 at 11:05 AM    1400 Na check returned at 127 again. Dr. Edgar notified.  Mychal Vasquez RN on 12/6/2022 at 2:44 PM    Pt is pleasant and cooperative - AAO x4. Denies pain. Mild HTN noted throughout the day, though is improved from this morning of 176/70. Occasional cough - LS coarse - continues to remain on RA. Denies pain. Up with SBA to BR with good output. Fair appetite at meals.  Mychal Vasquez RN on 12/6/2022 at 5:19 PM

## 2022-12-06 NOTE — PROGRESS NOTES
End Of Shift Note    Situation: Acute on chronic Hyponatremia, Influenza A    Plan: 3% Saline infusion with a goal of change from 6-8 points (stopped at 1900) , tamiflu, restarted BP meds    Subjective/Objective:    Neuro: A&O x4    Cardiac: HTN, caprefill less than 3 seconds    Resp: Room air, increase productive cough, LS coarse with wheezes    GI/: wears brief in bed, BM x3 today, urine sodium/osmolality sent.     MSK: SBA to indep just needs help with cords but does have generalized weakness    Skin: has pre-existing wound to foot. Seen by WCON and dressing changed. WCON gave blessing for unit dressing changes.     LDAs: New PIV and PICC placed today.

## 2022-12-07 VITALS
WEIGHT: 110.67 LBS | RESPIRATION RATE: 17 BRPM | SYSTOLIC BLOOD PRESSURE: 173 MMHG | BODY MASS INDEX: 19.61 KG/M2 | OXYGEN SATURATION: 97 % | HEIGHT: 63 IN | HEART RATE: 83 BPM | TEMPERATURE: 98.2 F | DIASTOLIC BLOOD PRESSURE: 85 MMHG

## 2022-12-07 LAB
ANION GAP SERPL CALCULATED.3IONS-SCNC: 10 MMOL/L (ref 7–15)
BUN SERPL-MCNC: 9.9 MG/DL (ref 8–23)
CALCIUM SERPL-MCNC: 8.4 MG/DL (ref 8.8–10.2)
CHLORIDE SERPL-SCNC: 95 MMOL/L (ref 98–107)
CREAT SERPL-MCNC: 0.67 MG/DL (ref 0.51–0.95)
DEPRECATED HCO3 PLAS-SCNC: 25 MMOL/L (ref 22–29)
GFR SERPL CREATININE-BSD FRML MDRD: 87 ML/MIN/1.73M2
GLUCOSE SERPL-MCNC: 91 MG/DL (ref 70–99)
PLATELET # BLD AUTO: 170 10E3/UL (ref 150–450)
POTASSIUM SERPL-SCNC: 3.8 MMOL/L (ref 3.4–5.3)
SODIUM SERPL-SCNC: 126 MMOL/L (ref 136–145)
SODIUM SERPL-SCNC: 130 MMOL/L (ref 136–145)
SODIUM SERPL-SCNC: 130 MMOL/L (ref 136–145)

## 2022-12-07 PROCEDURE — 99239 HOSP IP/OBS DSCHRG MGMT >30: CPT | Performed by: INTERNAL MEDICINE

## 2022-12-07 PROCEDURE — 84295 ASSAY OF SERUM SODIUM: CPT | Performed by: HOSPITALIST

## 2022-12-07 PROCEDURE — 250N000013 HC RX MED GY IP 250 OP 250 PS 637: Performed by: HOSPITALIST

## 2022-12-07 PROCEDURE — 250N000013 HC RX MED GY IP 250 OP 250 PS 637: Performed by: EMERGENCY MEDICINE

## 2022-12-07 PROCEDURE — 85049 AUTOMATED PLATELET COUNT: CPT | Performed by: HOSPITALIST

## 2022-12-07 RX ORDER — OSELTAMIVIR PHOSPHATE 30 MG/1
30 CAPSULE ORAL 2 TIMES DAILY
Qty: 4 CAPSULE | Refills: 0 | Status: SHIPPED | OUTPATIENT
Start: 2022-12-07 | End: 2022-12-09

## 2022-12-07 RX ADMIN — METOPROLOL SUCCINATE 25 MG: 25 TABLET, FILM COATED, EXTENDED RELEASE ORAL at 08:31

## 2022-12-07 RX ADMIN — OSELTAMIVIR PHOSPHATE 30 MG: 30 CAPSULE ORAL at 08:31

## 2022-12-07 RX ADMIN — LEVOTHYROXINE SODIUM 100 MCG: 0.1 TABLET ORAL at 08:31

## 2022-12-07 RX ADMIN — ASPIRIN 81 MG: 81 TABLET, COATED ORAL at 08:31

## 2022-12-07 RX ADMIN — AMLODIPINE BESYLATE 5 MG: 5 TABLET ORAL at 08:31

## 2022-12-07 RX ADMIN — LOSARTAN POTASSIUM 100 MG: 50 TABLET, FILM COATED ORAL at 08:31

## 2022-12-07 RX ADMIN — BRIMONIDINE TARTRATE 1 DROP: 2 SOLUTION/ DROPS OPHTHALMIC at 08:32

## 2022-12-07 RX ADMIN — DORZOLAMIDE HYDROCHLORIDE 1 DROP: 20 SOLUTION/ DROPS OPHTHALMIC at 08:32

## 2022-12-07 ASSESSMENT — ACTIVITIES OF DAILY LIVING (ADL)
ADLS_ACUITY_SCORE: 27
ADLS_ACUITY_SCORE: 24
ADLS_ACUITY_SCORE: 27
ADLS_ACUITY_SCORE: 27
ADLS_ACUITY_SCORE: 24
ADLS_ACUITY_SCORE: 27
ADLS_ACUITY_SCORE: 27

## 2022-12-07 NOTE — DISCHARGE SUMMARY
[facility]  Hospitalist Discharge Summary       Date of Admission:  12/4/2022  Date of Discharge:  12/7/2022  1:48 PM  Discharging Provider: Damien Condon MD      Discharge Diagnoses     Acute on chronic hyponatremia  Influenza A  COPD exacerbation  Hypertension  Dry eyes  Hypothyroidism  Paroxysmal atrial fibrillation  Left leg wound  Coronary artery disease  Non-ST elevation MI 11/2018 status post AGATHA.  Peripheral arterial disease  Right external iliac and common femoral artery stenting 2006  Right carotid asymptomatic stenosis    Follow-ups Needed After Discharge   Follow-up Appointments     Follow-up and recommended labs and tests      Follow up with primary care provider, Jessica Fitzgerald, within 7 days for   hospital follow- up.  The following labs/tests are recommended: BMP and   CBC.           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 Baseline sodium 125-128 between September and November of this year.  Previously was on hydrochlorothiazide but it sounds like this was discontinued at least a month ago if not earlier.  She has been told to manage with a fluid restriction and generally drinks about 32 ounces of water per day. She was given 500 NS bolus in ER. Repeat Na in ER at 2000 stable at 118.  Given her chronic hyponatremia I am inclined to treat this very conservatively.  My suspicion is that a large part of this is hypovolemia and low solute intake over the last week with her influenza as she endorses minimal intake asides from water & coffee.  - Patient was given 1 L of normal saline in 3 hours as bolus   - Started on 3% saline on 12/5 due to lack of response to previous measures. Na+ was 122  - Urine osmolality is 271  - TSH is  0.59  - Repeat Na every 6 hours. After 12 hours, 3% saline was stopped when Na is 129-127 mmol/L  - Na 130 in AM of 12/7     Influenza A  COPD exacerbation  Symptoms since 11/30 with malaise and progressive shortness of breath on exertion developing over the last 2 days prior to admission.  Chest x-ray without clear infiltrates.  - Given hospital admission, will start on oseltamivir, dose decreased down to 30 twice daily, although a bit late in the course for this to be all that effective.  - Prednisone 40 mg in ER 12/4/22, but not continued   - Duonebs TID  - No need for oxygen supplementation currently at rest but may require it with exertion.  Monitor.  - Needs tobacco cessation  - Discharge to home to complete course of oseltamivir     Hypertension  - Continue metoprolol succinate 25 mg every morning  - Continue amlodipine 5 mg daily  - Restarted losartan 100 milligrams daily due to blood pressure rising     Dry eyes  - Continue eye drops     Hypothyroidism  - Continue levothyroxine 100     Paroxysmal atrial fibrillation  Patient has declined anticoagulation per cardiology notes.  - Continue metoprolol succinate for rate control    Left leg wound  Wound care nurse was consulted.     Coronary artery disease  Non-ST elevation MI 11/2018 status post AGATHA.  Peripheral arterial disease  Right external iliac and common femoral artery stenting 2006  Right carotid asymptomatic stenosis  - Continue aspirin     Consultations This Hospital Stay   WOUND OSTOMY CONTINENCE NURSE  IP CONSULT  WOUND OSTOMY CONTINENCE NURSE  IP CONSULT  VASCULAR ACCESS ADULT IP CONSULT  VASCULAR ACCESS ADULT IP CONSULT  CARE MANAGEMENT / SOCIAL WORK IP CONSULT    Code Status   Special Code    Time Spent on this Encounter   I, Damien Condon MD, personally saw the patient today and spent greater than 30 minutes discharging this patient.       Damien Condon MD  [facility]  Center  ______________________________________________________________________    Physical Exam   Vital Signs: Temp: 98.2  F (36.8  C) Temp src: Oral BP: (!) 173/85 Pulse: 83   Resp: 17 SpO2: 97 % O2 Device: None (Room air)    Weight: 110 lbs 10.73 oz       Gen: Debilitated kyphotic elderly female, alert and oriented x 3, no acute distressed  HEENT: Atraumatic, normocephalic; sclera non-injected, anicterric; oral mucosa moist, no lesion, no exudate  Lungs: Prolonged expiration, no wheezes, no rhonchi, no rales  Heart: Regular rate, regular rhythm, no gallops, no rubs, no murmurs  GI: Bowel sound normal, no hepatosplenomegaly, no masses, non-tender, non-distended, no guarding, no rebound tenderness  Lymph: No lymphadenopathy, no edema  Skin: No rashes, no chronic venous stasis     Primary Care Physician   Jessica Fitzgerald    Discharge Orders      Follow-up and recommended labs and tests    Follow up with primary care provider, Jessica Fiztgerald, within 7 days for hospital follow- up.  The following labs/tests are recommended: BMP and CBC.     Activity    Your activity upon discharge: activity as tolerated     Reason for your hospital stay    This is an 82 year old female admitted with influenza A.     Diet    Follow this diet upon discharge: Orders Placed This Encounter      Low Saturated Fat Na <2400 mg       Significant Results and Procedures   Most Recent 3 CBC's:Recent Labs   Lab Test 12/07/22  0552 12/05/22  0410 12/04/22  1200 11/02/22  1212   WBC  --  7.8 8.2 8.6   HGB  --  13.1 14.7 14.0   MCV  --  95 94 97    182 212 268     Most Recent 3 BMP's:Recent Labs   Lab Test 12/07/22  0552 12/07/22  0015 12/06/22  1805 12/06/22  1357 12/06/22  1014 12/06/22  0634 12/06/22  0229 12/05/22  0956 12/05/22  0410   *  130* 126* 128*   < > 127*   < > 128*  128*   < > 121*  121*   POTASSIUM 3.8  --   --   --  3.8  --  3.3*  --  3.8   CHLORIDE 95*  --   --   --   --   --  93*  --  83*   CO2 25  --   --   --   --    --  28  --  25   BUN 9.9  --   --   --   --   --  12.6  --  13.5   CR 0.67  --   --   --   --   --  0.59  --  0.82   ANIONGAP 10  --   --   --   --   --  7  --  13   KARI 8.4*  --   --   --   --   --  8.1*  --  8.2*   GLC 91  --   --   --   --   --  90  --  90    < > = values in this interval not displayed.     7-Day Micro Results     Collected Updated Procedure Result Status      12/04/2022 1200 12/04/2022 1231 Symptomatic; Yes; 11/30/2022 Influenza A/B & SARS-CoV2 (COVID-19) Virus PCR Multiplex Nose [[id number]]    (Abnormal)   Swab from Nose    Final result Component Value   Influenza A PCR Positive   Influenza B PCR Negative   SARS CoV2 PCR Negative   NEGATIVE: SARS-CoV-2 (COVID-19) RNA not detected, presumed negative.              ,   Results for orders placed or performed during the hospital encounter of 12/04/22   XR Chest Port 1 View    Narrative    EXAM: XR CHEST PORT 1 VIEW  LOCATION: [facility]  DATE/TIME: 12/4/2022 3:01 PM    INDICATION: Dyspnea.  COMPARISON: Chest radiograph 05/28/2018.      Impression    IMPRESSION: No airspace opacities, pleural effusions, or pneumothorax. Nonenlarged cardiomediastinal silhouette. Tortuous thoracic aorta with atherosclerotic aortic calcifications, unchanged.   XR Chest Port 1 View    Narrative    XR CHEST PORT 1 VIEW 12/5/2022 1:39 PM    HISTORY: Verify PICC placement    COMPARISON: 12/4/2022      Impression    IMPRESSION: Left arm PICC has been placed with the tip at the  SVC/right atrial junction, in good position. Mild reticular opacities  in the left lung base could represent atelectasis or pneumonia. No  pleural effusion or pneumothorax. Normal heart size. .    MARCIANO GONZÁLES MD         SYSTEM ID:  [id number]       Discharge Medications   Current Discharge Medication List      START taking these medications    Details   oseltamivir (TAMIFLU) 30 MG capsule Take 1 capsule (30 mg) by mouth 2 times daily for 4 doses  Qty: 4 capsule,  Refills: 0    Associated Diagnoses: Influenza A         CONTINUE these medications which have NOT CHANGED    Details   acebutolol (SECTRAL) 400 MG capsule Take 400 mg by mouth daily as needed (Palpitations) = extra dose in addition to scheduled daily dose      acetaminophen (TYLENOL) 500 MG tablet Take 1,000 mg by mouth every 6 hours as needed for mild pain      albuterol (PROAIR HFA/PROVENTIL HFA/VENTOLIN HFA) 108 (90 Base) MCG/ACT inhaler Inhale 2 puffs into the lungs every 4 hours as needed for shortness of breath / dyspnea or wheezing OFFICE VISIT NEEDED FOR ANY FURTHER REFILLS  Qty: 18 g, Refills: 0    Comments: Pharmacy may dispense brand covered by insurance (Proair, or proventil or ventolin or generic albuterol inhaler)  Associated Diagnoses: Chronic obstructive pulmonary disease, unspecified COPD type (H)      amLODIPine (NORVASC) 5 MG tablet Take 1 tablet by mouth every evening      aspirin 81 MG EC tablet Take 81 mg by mouth daily      brimonidine (ALPHAGAN-P) 0.15 % ophthalmic solution Place 1 drop into both eyes 3 times daily      dorzolamide (TRUSOPT) 2 % ophthalmic solution Place 1 drop into both eyes 3 times daily      latanoprost (XALATAN) 0.005 % ophthalmic solution Place 1 drop into both eyes every evening       levothyroxine (SYNTHROID/LEVOTHROID) 100 MCG tablet Take 1 tablet by mouth every morning (before breakfast)      losartan (COZAAR) 100 MG tablet Take 1 tablet (100 mg) by mouth daily  Qty: 30 tablet, Refills: 0    Comments: Future refills by PCP Dr. Jessica Fitzgerald with phone number [phone].  Associated Diagnoses: NSTEMI (non-ST elevated myocardial infarction) (H)      metoprolol succinate ER (TOPROL XL) 25 MG 24 hr tablet Take 1 tablet (25 mg) by mouth daily  Qty: 90 tablet, Refills: 3    Associated Diagnoses: Coronary artery disease involving native coronary artery of native heart with angina pectoris (H); Essential hypertension      rosuvastatin (CRESTOR) 5 MG tablet Take 5 mg by  mouth three times a week Monday, Wednesday, Friday  Refills: 0      tiotropium-olodaterol (STIOLTO RESPIMAT) 2.5-2.5 MCG/ACT AERS Inhale 2 puffs into the lungs daily  Qty: 4 g, Refills: 11    Associated Diagnoses: Chronic obstructive pulmonary disease, unspecified COPD type (H)           Allergies   Allergies   Allergen Reactions     Atorvastatin Muscle Pain (Myalgia)     Was fine while taking simvastatin 20 mg daily.     Zoledronic Acid Other (See Comments)     SEVERE REACTION         Adhesive Tape Rash

## 2022-12-07 NOTE — PROGRESS NOTES
End Of Shift Note    Plan: Monitor Na levels    Subjective/Objective:    Neuro: A/Ox4    Cardiac: hypertensive, no edema    Resp: RA    GI/: uses bathroom as needed    MSK: SBA    Skin: nothing to note    LDAs: PIV, PICC

## 2022-12-07 NOTE — PROGRESS NOTES
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 Baseline sodium 125-128 between September and November of this year.  Previously was on hydrochlorothiazide but it sounds like this was discontinued at least a month ago if not earlier.  She has been told to manage with a fluid restriction and generally drinks about 32 ounces of water per day. She was given 500 NS bolus in ER. Repeat Na in ER at 2000 stable at 118.  Given her chronic hyponatremia I am inclined to treat this very conservatively.  My suspicion is that a large part of this is hypovolemia and low solute intake over the last week with her influenza as she endorses minimal intake asides from water & coffee.  - Patient was given 1 L of normal saline in 3 hours as bolus today.  - Is started on 3% saline yesterday due to lack of response to previous measures. Na+ was   - Urine osmolality is normal  - TSH is normal  - Repeat sodium every 6 hours. After 12 hour 3% saline was stopped. Na+ is 129-127 mmol/L today. Will try correction again tomorrow.     Influenza A  Asthma/COPD exacerbation  Symptoms since 11/30 with malaise and progressive shortness of breath on exertion developing over the last 2 days prior to admission.  Chest x-ray without clear infiltrates.  - Given hospital admission, will start on oseltamivir, dose decreased down to 30 twice daily, although a bit late in the course for this to be all that effective.  - Initiated on prednisone 40 mg in ER 12/4/22, continue once daily  - duonebs prn  - No need for oxygen supplementation currently at rest but may require it with  exertion.  Monitor.  - Needs tobacco cessation    Hypertension  - Continue metoprolol succinate 25 mg every morning  - Continue amlodipine 5 mg daily  - Restarted losartan 100 milligrams daily due to blood pressure rising    Dry eyes  - Continue drops    Hypothyroidism  - Continue levothyroxine 100    Paroxysmal atrial fibrillation  Patient has declined anticoagulation per cardiology notes.  - Continue metoprolol succinate    Coronary artery disease  Non-ST elevation MI 11/2018 status post AGATAH.    Left leg wound  Wound care nurse was consulted.    Peripheral arterial disease status post right external iliac/common femoral artery stenting 2006    Right carotid asymptomatic stenosis       Diet: Low Saturated Fat Na <2400 mg    DVT Prophylaxis: Enoxaparin (Lovenox) SQ  Fraire Catheter: Not present  Central Lines: PRESENT  PICC 12/05/22 Single Lumen Left Basilic Hypertonic saline-Site Assessment: WDL except;Bleeding;Leaking  Cardiac Monitoring: None  Code Status:   Patient agrees with full code but does not want to be on ventilator or life-sustaining measures more than 3 days.    Disposition Plan     Expected Discharge Date: 12/06/2022      Destination: home with family          The patient's care was discussed with the Bedside Nurse, Patient and Patient's Family.    Sly Edgar MD  Hospitalist Service  [facility]  Securely message with the Vocera Web Console (learn more here)  Text page via [employer] Paging/Directory         Clinically Significant Risk Factors        # Hypokalemia: Lowest K = 3.3 mmol/L (Ref range: 3.4-5.3) in last 2 days, will replace as needed  # Hyponatremia: Lowest Na = 118 mmol/L (Ref range: 136-145) in last 2 days, will monitor as appropriate                   _____________________________________________________________________    Interval History   Patient generally feels better.  Still has some wet cough.  Is on room air.  Is afebrile.  Blood pressure is elevated.   Her home medication losartan was restarted.  Serum sodium is a still low despite previous measures overnight.  Was on 3% saline for 12 hours. Last Na+ 127.  Will try correction again tomorrow.   WO was consulted for distal leg wound.  Please see WOC note.  I spoke with patient about her code yesterday.  She wants to trial of full code and she does not want to be on ventilator or life-sustaining instruments for more than 3 days.    Data reviewed today: I reviewed all medications, new labs and imaging results over the last 24 hours. I personally reviewed no images or EKG's today.    Physical Exam   Vital Signs: Temp: 98.5  F (36.9  C) Temp src: Oral BP: 138/67 Pulse: 72   Resp: 14 SpO2: 95 % O2 Device: None (Room air)    Weight: 110 lbs 10.73 oz  Constitutional: Alert, oriented, cooperative, no apparent distress, appears nontoxic  Eyes: Eyes are clear, pupils are reactive.  HENT: Oropharynx is clear and DRY . No evidence of cranial trauma.  Lymph/Hematologic: No epitrochlear, axillary, anterior or posterior cervical, or supraclavicular lymphadenopathy is appreciated.  Cardiovascular: Regular rate and rhythm, normal S1 and S2, and no murmur noted. JVP is normal. Good peripheral pulses in wrists bilaterally. No lower extremity edema.  Respiratory: Has wet cough and coarse breathing sounds. Decreased alveolar sounds bilaterally.   GI: Soft, non-tender, normal bowel sounds, no hepatomegaly.  Genitourinary: Deferred  Musculoskeletal: Normal muscle bulk and tone.  Skin: Warm and dry, no rashes.  Left leg with chronic wound distally without infection.  Please see WOC note.  Neurologic: Neck supple. Cranial nerves are grossly intact.  is symmetric.      Data   Recent Labs   Lab 12/06/22  1357 12/06/22  1014 12/06/22  0634 12/06/22  0229 12/05/22  0956 12/05/22  0410 12/04/22  2009 12/04/22  1200   WBC  --   --   --   --   --  7.8  --  8.2   HGB  --   --   --   --   --  13.1  --  14.7   MCV  --   --   --   --   --  95  --   94   PLT  --   --   --   --   --  182  --  212   * 127* 129* 128*  128*   < > 121*  121*   < > 118*   POTASSIUM  --  3.8  --  3.3*  --  3.8  --  4.1   CHLORIDE  --   --   --  93*  --  83*  --  82*   CO2  --   --   --  28  --  25  --  24   BUN  --   --   --  12.6  --  13.5  --  10.0   CR  --   --   --  0.59  --  0.82  --  0.59   ANIONGAP  --   --   --  7  --  13  --  12   KARI  --   --   --  8.1*  --  8.2*  --  8.8   GLC  --   --   --  90  --  90  --  104*    < > = values in this interval not displayed.     Medications       amLODIPine  5 mg Oral Daily     aspirin  81 mg Oral Daily     brimonidine  1 drop Both Eyes TID     dorzolamide  1 drop Both Eyes TID     enoxaparin ANTICOAGULANT  40 mg Subcutaneous Q24H     latanoprost  1 drop Both Eyes At Bedtime     levothyroxine  100 mcg Oral Daily     losartan  100 mg Oral Daily     metoprolol succinate ER  25 mg Oral Daily     oseltamivir  30 mg Oral BID     sodium chloride (PF)  10 mL Intracatheter Q8H     sodium chloride (PF)  10-40 mL Intracatheter Q7 Days     sodium chloride (PF)  3 mL Intracatheter Q8H     umeclidinium-vilanterol  1 puff Inhalation At Bedtime

## 2022-12-08 ENCOUNTER — PATIENT OUTREACH (OUTPATIENT)
Dept: CARE COORDINATION | Facility: CLINIC | Age: 82
End: 2022-12-08

## 2022-12-08 NOTE — PROGRESS NOTES
Clinic Care Coordination Contact  Care Team Conversations    Patient identified for care management outreach, however Delfina RN staff has already followed up with patient to ensure they are following up with PCP and have needs and resources met. Clinic RN will refer back to BRITT SHAH if needed/appropriate.    [place] Ghotra, [facility]  Social Work Care Coordinator - [facility] [place]  Care Coordination  [email or web address]  Cell Phone: [phone]  Gender pronouns: she/her  Employed by [employer]

## 2022-12-12 DIAGNOSIS — J44.9 CHRONIC OBSTRUCTIVE PULMONARY DISEASE, UNSPECIFIED COPD TYPE (H): ICD-10-CM

## 2022-12-12 RX ORDER — TIOTROPIUM BROMIDE AND OLODATEROL 3.124; 2.736 UG/1; UG/1
2 SPRAY, METERED RESPIRATORY (INHALATION) DAILY
Qty: 4 G | Refills: 11 | Status: SHIPPED | OUTPATIENT
Start: 2022-12-12 | End: 2023-01-27

## 2022-12-14 ENCOUNTER — LAB REQUISITION (OUTPATIENT)
Dept: LAB | Facility: CLINIC | Age: 82
End: 2022-12-14

## 2022-12-14 DIAGNOSIS — I10 ESSENTIAL (PRIMARY) HYPERTENSION: ICD-10-CM

## 2022-12-14 LAB
ANION GAP SERPL CALCULATED.3IONS-SCNC: 15 MMOL/L (ref 7–15)
BUN SERPL-MCNC: 9.1 MG/DL (ref 8–23)
CALCIUM SERPL-MCNC: 8.6 MG/DL (ref 8.8–10.2)
CHLORIDE SERPL-SCNC: 92 MMOL/L (ref 98–107)
CREAT SERPL-MCNC: 0.86 MG/DL (ref 0.51–0.95)
DEPRECATED HCO3 PLAS-SCNC: 22 MMOL/L (ref 22–29)
ERYTHROCYTE [DISTWIDTH] IN BLOOD BY AUTOMATED COUNT: 15 % (ref 10–15)
GFR SERPL CREATININE-BSD FRML MDRD: 67 ML/MIN/1.73M2
GLUCOSE SERPL-MCNC: 102 MG/DL (ref 70–99)
HCT VFR BLD AUTO: 36.5 % (ref 35–47)
HGB BLD-MCNC: 12.1 G/DL (ref 11.7–15.7)
MCH RBC QN AUTO: 32.5 PG (ref 26.5–33)
MCHC RBC AUTO-ENTMCNC: 33.2 G/DL (ref 31.5–36.5)
MCV RBC AUTO: 98 FL (ref 78–100)
PLATELET # BLD AUTO: 422 10E3/UL (ref 150–450)
POTASSIUM SERPL-SCNC: 4.8 MMOL/L (ref 3.4–5.3)
RBC # BLD AUTO: 3.72 10E6/UL (ref 3.8–5.2)
SODIUM SERPL-SCNC: 129 MMOL/L (ref 136–145)
WBC # BLD AUTO: 10.8 10E3/UL (ref 4–11)

## 2022-12-14 PROCEDURE — 80048 BASIC METABOLIC PNL TOTAL CA: CPT | Performed by: FAMILY MEDICINE

## 2022-12-14 PROCEDURE — 85027 COMPLETE CBC AUTOMATED: CPT | Performed by: FAMILY MEDICINE

## 2022-12-19 ENCOUNTER — HOSPITAL ENCOUNTER (EMERGENCY)
Facility: CLINIC | Age: 82
Discharge: HOME | End: 2022-12-19
Attending: EMERGENCY MEDICINE | Admitting: EMERGENCY MEDICINE
Payer: COMMERCIAL

## 2022-12-19 ENCOUNTER — APPOINTMENT (OUTPATIENT)
Dept: GENERAL RADIOLOGY | Facility: CLINIC | Age: 82
End: 2022-12-19
Attending: EMERGENCY MEDICINE
Payer: COMMERCIAL

## 2022-12-19 VITALS
BODY MASS INDEX: 19.49 KG/M2 | WEIGHT: 110 LBS | TEMPERATURE: 98.1 F | OXYGEN SATURATION: 95 % | SYSTOLIC BLOOD PRESSURE: 189 MMHG | HEIGHT: 63 IN | RESPIRATION RATE: 20 BRPM | DIASTOLIC BLOOD PRESSURE: 67 MMHG | HEART RATE: 75 BPM

## 2022-12-19 DIAGNOSIS — J44.1 CHRONIC OBSTRUCTIVE PULMONARY DISEASE WITH ACUTE EXACERBATION (H): ICD-10-CM

## 2022-12-19 LAB
ANION GAP SERPL CALCULATED.3IONS-SCNC: 15 MMOL/L (ref 7–15)
BASOPHILS # BLD AUTO: 0.1 10E3/UL (ref 0–0.2)
BASOPHILS NFR BLD AUTO: 1 %
BUN SERPL-MCNC: 9 MG/DL (ref 8–23)
CALCIUM SERPL-MCNC: 9.4 MG/DL (ref 8.8–10.2)
CHLORIDE SERPL-SCNC: 91 MMOL/L (ref 98–107)
CREAT SERPL-MCNC: 0.77 MG/DL (ref 0.51–0.95)
DEPRECATED HCO3 PLAS-SCNC: 26 MMOL/L (ref 22–29)
EOSINOPHIL # BLD AUTO: 0.2 10E3/UL (ref 0–0.7)
EOSINOPHIL NFR BLD AUTO: 2 %
ERYTHROCYTE [DISTWIDTH] IN BLOOD BY AUTOMATED COUNT: 15.2 % (ref 10–15)
GFR SERPL CREATININE-BSD FRML MDRD: 77 ML/MIN/1.73M2
GLUCOSE SERPL-MCNC: 105 MG/DL (ref 70–99)
HCT VFR BLD AUTO: 41 % (ref 35–47)
HGB BLD-MCNC: 13.6 G/DL (ref 11.7–15.7)
HOLD SPECIMEN: NORMAL
HOLD SPECIMEN: NORMAL
IMM GRANULOCYTES # BLD: 0.1 10E3/UL
IMM GRANULOCYTES NFR BLD: 1 %
LYMPHOCYTES # BLD AUTO: 1.8 10E3/UL (ref 0.8–5.3)
LYMPHOCYTES NFR BLD AUTO: 16 %
MCH RBC QN AUTO: 33 PG (ref 26.5–33)
MCHC RBC AUTO-ENTMCNC: 33.2 G/DL (ref 31.5–36.5)
MCV RBC AUTO: 100 FL (ref 78–100)
MONOCYTES # BLD AUTO: 1.6 10E3/UL (ref 0–1.3)
MONOCYTES NFR BLD AUTO: 14 %
NEUTROPHILS # BLD AUTO: 7.3 10E3/UL (ref 1.6–8.3)
NEUTROPHILS NFR BLD AUTO: 66 %
NRBC # BLD AUTO: 0 10E3/UL
NRBC BLD AUTO-RTO: 0 /100
NT-PROBNP SERPL-MCNC: 1666 PG/ML (ref 0–1800)
PLATELET # BLD AUTO: 386 10E3/UL (ref 150–450)
POTASSIUM SERPL-SCNC: 4.2 MMOL/L (ref 3.4–5.3)
RBC # BLD AUTO: 4.12 10E6/UL (ref 3.8–5.2)
SODIUM SERPL-SCNC: 132 MMOL/L (ref 136–145)
TROPONIN T SERPL HS-MCNC: 19 NG/L
TROPONIN T SERPL HS-MCNC: 20 NG/L
WBC # BLD AUTO: 11 10E3/UL (ref 4–11)

## 2022-12-19 PROCEDURE — 250N000012 HC RX MED GY IP 250 OP 636 PS 637: Performed by: EMERGENCY MEDICINE

## 2022-12-19 PROCEDURE — 71046 X-RAY EXAM CHEST 2 VIEWS: CPT

## 2022-12-19 PROCEDURE — 93005 ELECTROCARDIOGRAM TRACING: CPT | Performed by: EMERGENCY MEDICINE

## 2022-12-19 PROCEDURE — 85025 COMPLETE CBC W/AUTO DIFF WBC: CPT | Performed by: EMERGENCY MEDICINE

## 2022-12-19 PROCEDURE — 250N000009 HC RX 250: Performed by: EMERGENCY MEDICINE

## 2022-12-19 PROCEDURE — 36415 COLL VENOUS BLD VENIPUNCTURE: CPT | Performed by: EMERGENCY MEDICINE

## 2022-12-19 PROCEDURE — 84484 ASSAY OF TROPONIN QUANT: CPT | Performed by: EMERGENCY MEDICINE

## 2022-12-19 PROCEDURE — 99285 EMERGENCY DEPT VISIT HI MDM: CPT | Mod: 25 | Performed by: EMERGENCY MEDICINE

## 2022-12-19 PROCEDURE — 93010 ELECTROCARDIOGRAM REPORT: CPT | Performed by: EMERGENCY MEDICINE

## 2022-12-19 PROCEDURE — 83880 ASSAY OF NATRIURETIC PEPTIDE: CPT | Performed by: EMERGENCY MEDICINE

## 2022-12-19 PROCEDURE — 80048 BASIC METABOLIC PNL TOTAL CA: CPT | Performed by: EMERGENCY MEDICINE

## 2022-12-19 PROCEDURE — 84484 ASSAY OF TROPONIN QUANT: CPT | Mod: 91 | Performed by: EMERGENCY MEDICINE

## 2022-12-19 RX ORDER — PREDNISONE 20 MG/1
60 TABLET ORAL ONCE
Status: COMPLETED | OUTPATIENT
Start: 2022-12-19 | End: 2022-12-19

## 2022-12-19 RX ORDER — IPRATROPIUM BROMIDE AND ALBUTEROL SULFATE 2.5; .5 MG/3ML; MG/3ML
3 SOLUTION RESPIRATORY (INHALATION) ONCE
Status: COMPLETED | OUTPATIENT
Start: 2022-12-19 | End: 2022-12-19

## 2022-12-19 RX ORDER — PREDNISONE 20 MG/1
40 TABLET ORAL DAILY
Qty: 10 TABLET | Refills: 0 | Status: SHIPPED | OUTPATIENT
Start: 2022-12-19 | End: 2022-12-24

## 2022-12-19 RX ADMIN — IPRATROPIUM BROMIDE AND ALBUTEROL SULFATE 3 ML: 2.5; .5 SOLUTION RESPIRATORY (INHALATION) at 18:06

## 2022-12-19 RX ADMIN — PREDNISONE 60 MG: 20 TABLET ORAL at 18:44

## 2022-12-19 ASSESSMENT — ACTIVITIES OF DAILY LIVING (ADL): ADLS_ACUITY_SCORE: 35

## 2022-12-19 NOTE — ED NOTES
"    Pt arrived via triage in, accomp by daughter in law.   Pt with recent influenza and hospitalization.  Pt reports feeling better from that, then in the  last 2 -3 days cough was getting worse, and thick sputum and pt too weak to cough it up and out.   Pt using her inhaler without relief.  Pt report \"some\" relief from EMS Neb, and she would like another one now.  Pt able to speak in full sentences but does increase her RR, and brings on coughing.  Cough is deep and sounds wet.  Pt denies CP or pressure.    "

## 2022-12-19 NOTE — ED TRIAGE NOTES
"Pt recently admitted for Influenza A then discharged to home, pt states she was doing better until Friday and has gotten weaker and weaker.  Pt continues with cough, denies chills, fever or diarrhea.   Today pt was increased SOB and 'couldn't catch my breath\".        "

## 2022-12-19 NOTE — ED PROVIDER NOTES
History     Chief Complaint   Patient presents with     Shortness of Breath     SOB since Friday. O2 sats 100% on RA. Duo neb per EMS.      Generalized Weakness     HPI  Zee Mireles is a 82 year old female with history of prior COPD who presents for shortness of breath and weakness.  The patient had influenza and was admitted to the hospital about 2 weeks ago.  She improved and was discharged home and was doing better but over the last several days she has had increased shortness of breath with cough and feeling weak overall.  Cough is nonproductive.  No fevers.  She denies headache, chest pain, abdominal pain, nausea, vomiting, or diarrhea.  Dyspnea is worse on exertion.  She is able to sleep okay and is able to nearly lay flat for this.  No lower extremity edema.    Allergies:  Allergies   Allergen Reactions     Atorvastatin Muscle Pain (Myalgia)     Was fine while taking simvastatin 20 mg daily.     Zoledronic Acid Other (See Comments)     SEVERE REACTION         Adhesive Tape Rash       Problem List:    Patient Active Problem List    Diagnosis Date Noted     Hyponatremia 12/04/2022     Priority: Medium     Influenza A 12/04/2022     Priority: Medium     COPD exacerbation (H) 12/04/2022     Priority: Medium     Coronary artery disease involving native coronary artery of native heart without angina pectoris 10/07/2019     Priority: Medium     PAD (peripheral artery disease) (H) 10/07/2019     Priority: Medium     COPD (chronic obstructive pulmonary disease) (H) 08/14/2019     Priority: Medium     Hyperlipidemia LDL goal <70 08/14/2019     Priority: Medium     Essential hypertension 08/14/2019     Priority: Medium     Upper GI bleed 08/14/2019     Priority: Medium     Paroxysmal atrial fibrillation (H) 11/01/2018     Priority: Medium     Overview:   CHADS2 VASC score equals 5 for age, sex, hypertension, and coronary artery disease       Coronary artery disease due to calcified coronary lesion 11/01/2018      Priority: Medium     Overview:   stent to LAD after NSTEMI          Past Medical History:    Past Medical History:   Diagnosis Date     Asthma      Chest pain 11/2018     Colitis      COPD (chronic obstructive pulmonary disease) (H)      COPD (chronic obstructive pulmonary disease) (H)      Coronary artery disease due to calcified coronary lesion 11/01/2018     DNI (do not intubate)      Dyslipidemia, goal LDL below 70      Essential hypertension      GERD (gastroesophageal reflux disease)      Glaucoma      HTN (hypertension)      Hyperlipidemia      Hypothyroid      NSTEMI (non-ST elevation myocardial infarction) (H) 11/01/2018     PAD (peripheral artery disease) (H)      Paroxysmal atrial fibrillation (H) 11/2018     Paroxysmal atrial fibrillation (H) 11/01/2018     Partial retinal artery occlusion 06/09/2021     Skin cancer      Smoker        Past Surgical History:    Past Surgical History:   Procedure Laterality Date     CATARACT EXTRACTION       cataract removal       COLONOSCOPY  12/2018    [facility]     COLONOSCOPY N/A 12/12/2018    COLONOSCOPY with polypectomy; Damien Denton MD;  [facility];  Service: Gastroenterology     CORONARY STENT PLACEMENT  11/2018    at [facility] in [facility] BALLOON DILATION AND/OR STENT PLACEMENT OF VESSEL  11/2018    stent to LAD     ESOPHAGOSCOPY, GASTROSCOPY, DUODENOSCOPY (EGD), COMBINED  12/2018    [facility]     ESOPHAGOSCOPY, GASTROSCOPY, DUODENOSCOPY (EGD), COMBINED N/A 8/15/2019    Procedure: ESOPHAGOGASTRODUODENOSCOPY, WITH BIOPSY;  Surgeon: Demario Clayton DO;  Location: [facility]     ESOPHAGOSCOPY, GASTROSCOPY, DUODENOSCOPY (EGD), COMBINED N/A 12/10/2018    Damien Denton MD; [facility];  Service: Gastroenterology     IR ABDOMINAL AORTOGRAM  7/3/2006     IR EXTREMITY ANGIOGRAM BILATERAL  7/3/2006     IR MISCELLANEOUS PROCEDURE  7/3/2006     IR MISCELLANEOUS PROCEDURE  8/9/2006       Family History:    Family History   Problem Relation Age of  "Onset     Sudden Death Mother 55.00        no autopsy     Goiter Mother      Stomach Cancer Father 72.00     No Known Problems Brother      Other - See Comments Brother          in [place] War     Ulcers Son         Gastric ulcer was perforated; had surgery. His wife passed suddenly at 49 at home in 2016.     Atrial fibrillation Son        Social History:  Marital Status:   [4]  Social History     Tobacco Use     Smoking status: Every Day     Packs/day: 1.00     Years: 60.00     Pack years: 60.00     Types: Cigarettes     Smokeless tobacco: Never     Tobacco comments:     down to 0.3 ppd since 2017   Vaping Use     Vaping Use: Never used   Substance Use Topics     Alcohol use: No     Alcohol/week: 1.0 standard drink     Drug use: No        Medications:    predniSONE (DELTASONE) 20 MG tablet  acebutolol (SECTRAL) 400 MG capsule  acetaminophen (TYLENOL) 500 MG tablet  albuterol (PROAIR HFA/PROVENTIL HFA/VENTOLIN HFA) 108 (90 Base) MCG/ACT inhaler  amLODIPine (NORVASC) 5 MG tablet  aspirin 81 MG EC tablet  brimonidine (ALPHAGAN-P) 0.15 % ophthalmic solution  dorzolamide (TRUSOPT) 2 % ophthalmic solution  latanoprost (XALATAN) 0.005 % ophthalmic solution  levothyroxine (SYNTHROID/LEVOTHROID) 100 MCG tablet  losartan (COZAAR) 100 MG tablet  metoprolol succinate ER (TOPROL XL) 25 MG 24 hr tablet  rosuvastatin (CRESTOR) 5 MG tablet  tiotropium-olodaterol (STIOLTO RESPIMAT) 2.5-2.5 MCG/ACT AERS          Review of Systems  Pertinent positives and negatives listed in the HPI, all other systems reviewed and are negative.    Physical Exam   BP: (!) 192/81  Pulse: 76  Temp: 98.1  F (36.7  C)  Resp: 20  Height: 160 cm (5' 3\")  Weight: 49.9 kg (110 lb)  SpO2: 95 %      Physical Exam  Vitals and nursing note reviewed.   Constitutional:       General: She is in acute distress.      Appearance: She is well-developed and well-nourished. She is not diaphoretic.   HENT:      Head: Normocephalic and atraumatic.      Right Ear: " External ear normal.      Left Ear: External ear normal.      Nose: Nose normal.   Eyes:      General: No scleral icterus.     Conjunctiva/sclera: Conjunctivae normal.   Cardiovascular:      Rate and Rhythm: Normal rate and regular rhythm.   Pulmonary:      Effort: Pulmonary effort is normal. No respiratory distress.      Breath sounds: No stridor. Wheezing and rales present.   Abdominal:      General: There is no distension.      Palpations: Abdomen is soft.   Musculoskeletal:      Cervical back: Normal range of motion.      Right lower leg: No edema.      Left lower leg: No edema.   Skin:     General: Skin is warm and dry.   Neurological:      Mental Status: She is alert and oriented to person, place, and time.   Psychiatric:         Mood and Affect: Mood and affect normal.         Behavior: Behavior normal.         ED Course                 Procedures              EKG Interpretation:      Interpreted by Shane Issa MD  Time reviewed: 1558  Symptoms at time of EKG: Dyspnea   Rhythm: normal sinus   Rate: normal  Axis: normal  Ectopy: none  Conduction: normal  ST Segments/ T Waves: No ST-T wave changes  Q Waves: none  Comparison to prior: Unchanged    Clinical Impression: normal EKG    Critical Care time:  none               Results for orders placed or performed during the hospital encounter of 12/19/22 (from the past 24 hour(s))   Nt probnp inpatient   Result Value Ref Range    N terminal Pro BNP Inpatient 1,666 0 - 1,800 pg/mL   Troponin T, High Sensitivity   Result Value Ref Range    Troponin T, High Sensitivity 20 (H) <=14 ng/L   CBC with Platelets & Differential    Narrative    The following orders were created for panel order CBC with Platelets & Differential.  Procedure                               Abnormality         Status                     ---------                               -----------         ------                     CBC with platelets and d...[[id number]]  Abnormal            Final  result                 Please view results for these tests on the individual orders.   Basic metabolic panel   Result Value Ref Range    Sodium 132 (L) 136 - 145 mmol/L    Potassium 4.2 3.4 - 5.3 mmol/L    Chloride 91 (L) 98 - 107 mmol/L    Carbon Dioxide (CO2) 26 22 - 29 mmol/L    Anion Gap 15 7 - 15 mmol/L    Urea Nitrogen 9.0 8.0 - 23.0 mg/dL    Creatinine 0.77 0.51 - 0.95 mg/dL    Calcium 9.4 8.8 - 10.2 mg/dL    Glucose 105 (H) 70 - 99 mg/dL    GFR Estimate 77 >60 mL/min/1.73m2   CBC with platelets and differential   Result Value Ref Range    WBC Count 11.0 4.0 - 11.0 10e3/uL    RBC Count 4.12 3.80 - 5.20 10e6/uL    Hemoglobin 13.6 11.7 - 15.7 g/dL    Hematocrit 41.0 35.0 - 47.0 %     78 - 100 fL    MCH 33.0 26.5 - 33.0 pg    MCHC 33.2 31.5 - 36.5 g/dL    RDW 15.2 (H) 10.0 - 15.0 %    Platelet Count 386 150 - 450 10e3/uL    % Neutrophils 66 %    % Lymphocytes 16 %    % Monocytes 14 %    % Eosinophils 2 %    % Basophils 1 %    % Immature Granulocytes 1 %    NRBCs per 100 WBC 0 <1 /100    Absolute Neutrophils 7.3 1.6 - 8.3 10e3/uL    Absolute Lymphocytes 1.8 0.8 - 5.3 10e3/uL    Absolute Monocytes 1.6 (H) 0.0 - 1.3 10e3/uL    Absolute Eosinophils 0.2 0.0 - 0.7 10e3/uL    Absolute Basophils 0.1 0.0 - 0.2 10e3/uL    Absolute Immature Granulocytes 0.1 <=0.4 10e3/uL    Absolute NRBCs 0.0 10e3/uL   Extra Tube ([place] Draw)    Narrative    The following orders were created for panel order Extra Tube ([place] Draw).  Procedure                               Abnormality         Status                     ---------                               -----------         ------                     Extra Blue Top Tube[[id number]]                              Final result               Extra Red Top Tube[[id number]]                               Final result                 Please view results for these tests on the individual orders.   Extra Blue Top Tube   Result Value Ref Range    Hold Specimen JIC    Extra Red Top Tube    Result Value Ref Range    Hold Specimen JIC    XR Chest 2 Views    Narrative    EXAM: XR CHEST 2 VIEWS  LOCATION: [facility]  DATE/TIME: 12/19/2022 6:18 PM    INDICATION: shortness of breath, weakness  COMPARISON: 12/05/2022      Impression    IMPRESSION: Lungs are hyperinflated but otherwise clear. Previously seen PICC line has been removed. Heart size normal.   Troponin T, High Sensitivity   Result Value Ref Range    Troponin T, High Sensitivity 19 (H) <=14 ng/L       Medications   ipratropium - albuterol 0.5 mg/2.5 mg/3 mL (DUONEB) neb solution 3 mL (3 mLs Nebulization Given 12/19/22 1806)   predniSONE (DELTASONE) tablet 60 mg (60 mg Oral Given 12/19/22 1844)       Assessments & Plan (with Medical Decision Making)   82-year-old female with a history of COPD who presents for cough and shortness of breath.  Temperature is 36.7  C, heart is 76, SPO2 is 95% on room air.  She has a mild wheezing rales on auscultation.  Her blood cell count is 11 which is reassuring and her hemoglobin is 13.6, no signs of anemia.  EKG is sinus rhythm without signs of ischemia or dysrhythmia.  Troponin is 20.  NT proBNP is normal, no signs of heart failure.  Electrolytes are within normal limits.  White blood cell count is 11 which is reassuring and her hemoglobin is 13.6, no signs of anemia.  Chest x-ray obtained, images reviewed independently as well as radiology read reviewed, no signs of infiltrate to suggest pneumonia, no signs of pneumothorax.  She is given DuoNeb and prednisone here for presumed COPD.  Repeat troponin is unchanged at 19, unlikely coronary artery disease as a cause of her symptoms.  She is safe to discharge home with a prescription for prednisone and instructions to return if she has worsening of her symptoms or other concerns, otherwise follow-up in clinic.  The patient is in agreement with this plan.    I have reviewed the nursing notes.    I have reviewed the findings, diagnosis,  plan and need for follow up with the patient.       Discharge Medication List as of 12/19/2022  7:36 PM      START taking these medications    Details   predniSONE (DELTASONE) 20 MG tablet Take 2 tablets (40 mg) by mouth daily for 5 days, Disp-10 tablet, R-0, E-Prescribe             Final diagnoses:   Chronic obstructive pulmonary disease with acute exacerbation (H)       12/19/2022   [facility] EMERGENCY DEPT     Shane Issa MD  12/19/22 2024

## 2022-12-20 NOTE — DISCHARGE INSTRUCTIONS
Take the prednisone as prescribed.  Continue using your home inhalers and medications.  Return to the emergency department for fevers, difficulty breathing, worsening symptoms, or other concerns.  Follow-up in clinic if not feeling better over the next week.

## 2023-01-27 ENCOUNTER — LAB REQUISITION (OUTPATIENT)
Dept: LAB | Facility: CLINIC | Age: 83
End: 2023-01-27

## 2023-01-27 DIAGNOSIS — I10 ESSENTIAL (PRIMARY) HYPERTENSION: ICD-10-CM

## 2023-01-27 DIAGNOSIS — E87.1 HYPO-OSMOLALITY AND HYPONATREMIA: ICD-10-CM

## 2023-01-27 DIAGNOSIS — J44.9 CHRONIC OBSTRUCTIVE PULMONARY DISEASE, UNSPECIFIED COPD TYPE (H): ICD-10-CM

## 2023-01-27 LAB
ANION GAP SERPL CALCULATED.3IONS-SCNC: 17 MMOL/L (ref 7–15)
BUN SERPL-MCNC: 7.3 MG/DL (ref 8–23)
CALCIUM SERPL-MCNC: 9.5 MG/DL (ref 8.8–10.2)
CHLORIDE SERPL-SCNC: 92 MMOL/L (ref 98–107)
CREAT SERPL-MCNC: 0.77 MG/DL (ref 0.51–0.95)
DEPRECATED HCO3 PLAS-SCNC: 23 MMOL/L (ref 22–29)
ERYTHROCYTE [DISTWIDTH] IN BLOOD BY AUTOMATED COUNT: 15.2 % (ref 10–15)
GFR SERPL CREATININE-BSD FRML MDRD: 77 ML/MIN/1.73M2
GLUCOSE SERPL-MCNC: 99 MG/DL (ref 70–99)
HCT VFR BLD AUTO: 39.7 % (ref 35–47)
HGB BLD-MCNC: 12.8 G/DL (ref 11.7–15.7)
MCH RBC QN AUTO: 31.7 PG (ref 26.5–33)
MCHC RBC AUTO-ENTMCNC: 32.2 G/DL (ref 31.5–36.5)
MCV RBC AUTO: 98 FL (ref 78–100)
PLATELET # BLD AUTO: 456 10E3/UL (ref 150–450)
POTASSIUM SERPL-SCNC: 4.3 MMOL/L (ref 3.4–5.3)
RBC # BLD AUTO: 4.04 10E6/UL (ref 3.8–5.2)
SODIUM SERPL-SCNC: 132 MMOL/L (ref 136–145)
WBC # BLD AUTO: 8 10E3/UL (ref 4–11)

## 2023-01-27 PROCEDURE — 85014 HEMATOCRIT: CPT | Performed by: FAMILY MEDICINE

## 2023-01-27 PROCEDURE — 82310 ASSAY OF CALCIUM: CPT | Performed by: FAMILY MEDICINE

## 2023-01-27 RX ORDER — TIOTROPIUM BROMIDE AND OLODATEROL 3.124; 2.736 UG/1; UG/1
2 SPRAY, METERED RESPIRATORY (INHALATION) DAILY
Qty: 4 G | Refills: 11 | Status: SHIPPED | OUTPATIENT
Start: 2023-01-27 | End: 2023-11-27

## 2023-03-30 ENCOUNTER — LAB REQUISITION (OUTPATIENT)
Dept: LAB | Facility: CLINIC | Age: 83
End: 2023-03-30

## 2023-03-30 DIAGNOSIS — E03.9 HYPOTHYROIDISM, UNSPECIFIED: ICD-10-CM

## 2023-03-30 DIAGNOSIS — I10 ESSENTIAL (PRIMARY) HYPERTENSION: ICD-10-CM

## 2023-03-30 PROCEDURE — 85027 COMPLETE CBC AUTOMATED: CPT | Performed by: FAMILY MEDICINE

## 2023-03-30 PROCEDURE — 80053 COMPREHEN METABOLIC PANEL: CPT | Performed by: FAMILY MEDICINE

## 2023-03-30 PROCEDURE — 84443 ASSAY THYROID STIM HORMONE: CPT | Performed by: FAMILY MEDICINE

## 2023-03-31 LAB
ALBUMIN SERPL BCG-MCNC: 4 G/DL (ref 3.5–5.2)
ALP SERPL-CCNC: 74 U/L (ref 35–104)
ALT SERPL W P-5'-P-CCNC: 10 U/L (ref 10–35)
ANION GAP SERPL CALCULATED.3IONS-SCNC: 13 MMOL/L (ref 7–15)
AST SERPL W P-5'-P-CCNC: 18 U/L (ref 10–35)
BILIRUB SERPL-MCNC: 0.3 MG/DL
BUN SERPL-MCNC: 16.4 MG/DL (ref 8–23)
CALCIUM SERPL-MCNC: 9.7 MG/DL (ref 8.8–10.2)
CHLORIDE SERPL-SCNC: 98 MMOL/L (ref 98–107)
CREAT SERPL-MCNC: 0.88 MG/DL (ref 0.51–0.95)
DEPRECATED HCO3 PLAS-SCNC: 27 MMOL/L (ref 22–29)
ERYTHROCYTE [DISTWIDTH] IN BLOOD BY AUTOMATED COUNT: 16.3 % (ref 10–15)
GFR SERPL CREATININE-BSD FRML MDRD: 66 ML/MIN/1.73M2
GLUCOSE SERPL-MCNC: 83 MG/DL (ref 70–99)
HCT VFR BLD AUTO: 44.4 % (ref 35–47)
HGB BLD-MCNC: 13.7 G/DL (ref 11.7–15.7)
MCH RBC QN AUTO: 30.9 PG (ref 26.5–33)
MCHC RBC AUTO-ENTMCNC: 30.9 G/DL (ref 31.5–36.5)
MCV RBC AUTO: 100 FL (ref 78–100)
PLATELET # BLD AUTO: 306 10E3/UL (ref 150–450)
POTASSIUM SERPL-SCNC: 4.4 MMOL/L (ref 3.4–5.3)
PROT SERPL-MCNC: 6.7 G/DL (ref 6.4–8.3)
RBC # BLD AUTO: 4.44 10E6/UL (ref 3.8–5.2)
SODIUM SERPL-SCNC: 138 MMOL/L (ref 136–145)
TSH SERPL DL<=0.005 MIU/L-ACNC: 1.22 UIU/ML (ref 0.3–4.2)
WBC # BLD AUTO: 8.4 10E3/UL (ref 4–11)

## 2023-05-11 ENCOUNTER — HOSPITAL ENCOUNTER (EMERGENCY)
Facility: CLINIC | Age: 83
Discharge: HOME | End: 2023-05-11
Attending: EMERGENCY MEDICINE | Admitting: EMERGENCY MEDICINE
Payer: COMMERCIAL

## 2023-05-11 ENCOUNTER — APPOINTMENT (OUTPATIENT)
Dept: ULTRASOUND IMAGING | Facility: CLINIC | Age: 83
End: 2023-05-11
Attending: EMERGENCY MEDICINE
Payer: COMMERCIAL

## 2023-05-11 VITALS
BODY MASS INDEX: 18.96 KG/M2 | HEIGHT: 63 IN | HEART RATE: 86 BPM | DIASTOLIC BLOOD PRESSURE: 73 MMHG | RESPIRATION RATE: 18 BRPM | TEMPERATURE: 98.7 F | OXYGEN SATURATION: 98 % | SYSTOLIC BLOOD PRESSURE: 177 MMHG | WEIGHT: 107 LBS

## 2023-05-11 DIAGNOSIS — L28.1 PRURIGO NODULARIS: ICD-10-CM

## 2023-05-11 DIAGNOSIS — L03.116 CELLULITIS OF LEFT LOWER EXTREMITY: ICD-10-CM

## 2023-05-11 LAB
ALBUMIN SERPL BCG-MCNC: 3.4 G/DL (ref 3.5–5.2)
ALP SERPL-CCNC: 125 U/L (ref 35–104)
ALT SERPL W P-5'-P-CCNC: 28 U/L (ref 10–35)
ANION GAP SERPL CALCULATED.3IONS-SCNC: 11 MMOL/L (ref 7–15)
AST SERPL W P-5'-P-CCNC: 31 U/L (ref 10–35)
BASOPHILS # BLD AUTO: 0 10E3/UL (ref 0–0.2)
BASOPHILS NFR BLD AUTO: 0 %
BILIRUB SERPL-MCNC: 0.3 MG/DL
BUN SERPL-MCNC: 13.6 MG/DL (ref 8–23)
CALCIUM SERPL-MCNC: 9.2 MG/DL (ref 8.8–10.2)
CHLORIDE SERPL-SCNC: 97 MMOL/L (ref 98–107)
CREAT SERPL-MCNC: 0.8 MG/DL (ref 0.51–0.95)
CRP SERPL-MCNC: 194.13 MG/L
DEPRECATED HCO3 PLAS-SCNC: 25 MMOL/L (ref 22–29)
EOSINOPHIL # BLD AUTO: 0.1 10E3/UL (ref 0–0.7)
EOSINOPHIL NFR BLD AUTO: 1 %
ERYTHROCYTE [DISTWIDTH] IN BLOOD BY AUTOMATED COUNT: 16.3 % (ref 10–15)
GFR SERPL CREATININE-BSD FRML MDRD: 73 ML/MIN/1.73M2
GLUCOSE SERPL-MCNC: 102 MG/DL (ref 70–99)
HCT VFR BLD AUTO: 38.7 % (ref 35–47)
HGB BLD-MCNC: 12.5 G/DL (ref 11.7–15.7)
HOLD SPECIMEN: NORMAL
HOLD SPECIMEN: NORMAL
IMM GRANULOCYTES # BLD: 0.1 10E3/UL
IMM GRANULOCYTES NFR BLD: 1 %
LACTATE SERPL-SCNC: 1 MMOL/L (ref 0.7–2)
LYMPHOCYTES # BLD AUTO: 1.3 10E3/UL (ref 0.8–5.3)
LYMPHOCYTES NFR BLD AUTO: 12 %
MCH RBC QN AUTO: 31.3 PG (ref 26.5–33)
MCHC RBC AUTO-ENTMCNC: 32.3 G/DL (ref 31.5–36.5)
MCV RBC AUTO: 97 FL (ref 78–100)
MONOCYTES # BLD AUTO: 1 10E3/UL (ref 0–1.3)
MONOCYTES NFR BLD AUTO: 9 %
NEUTROPHILS # BLD AUTO: 8.6 10E3/UL (ref 1.6–8.3)
NEUTROPHILS NFR BLD AUTO: 77 %
NRBC # BLD AUTO: 0 10E3/UL
NRBC BLD AUTO-RTO: 0 /100
PLATELET # BLD AUTO: 205 10E3/UL (ref 150–450)
POTASSIUM SERPL-SCNC: 3.9 MMOL/L (ref 3.4–5.3)
PROT SERPL-MCNC: 6.7 G/DL (ref 6.4–8.3)
RBC # BLD AUTO: 3.99 10E6/UL (ref 3.8–5.2)
SODIUM SERPL-SCNC: 133 MMOL/L (ref 136–145)
WBC # BLD AUTO: 11.1 10E3/UL (ref 4–11)

## 2023-05-11 PROCEDURE — 99284 EMERGENCY DEPT VISIT MOD MDM: CPT | Mod: 25 | Performed by: EMERGENCY MEDICINE

## 2023-05-11 PROCEDURE — 86140 C-REACTIVE PROTEIN: CPT | Performed by: EMERGENCY MEDICINE

## 2023-05-11 PROCEDURE — 83605 ASSAY OF LACTIC ACID: CPT | Performed by: EMERGENCY MEDICINE

## 2023-05-11 PROCEDURE — 85025 COMPLETE CBC W/AUTO DIFF WBC: CPT | Performed by: EMERGENCY MEDICINE

## 2023-05-11 PROCEDURE — 80053 COMPREHEN METABOLIC PANEL: CPT | Performed by: EMERGENCY MEDICINE

## 2023-05-11 PROCEDURE — 36415 COLL VENOUS BLD VENIPUNCTURE: CPT | Performed by: EMERGENCY MEDICINE

## 2023-05-11 PROCEDURE — 99284 EMERGENCY DEPT VISIT MOD MDM: CPT | Performed by: EMERGENCY MEDICINE

## 2023-05-11 PROCEDURE — 250N000011 HC RX IP 250 OP 636: Performed by: EMERGENCY MEDICINE

## 2023-05-11 PROCEDURE — 93971 EXTREMITY STUDY: CPT | Mod: LT

## 2023-05-11 PROCEDURE — 96365 THER/PROPH/DIAG IV INF INIT: CPT | Performed by: EMERGENCY MEDICINE

## 2023-05-11 RX ORDER — CEPHALEXIN 500 MG/1
500 CAPSULE ORAL 4 TIMES DAILY
Qty: 40 CAPSULE | Refills: 0 | Status: SHIPPED | OUTPATIENT
Start: 2023-05-11 | End: 2023-05-21

## 2023-05-11 RX ORDER — CEFAZOLIN SODIUM 2 G/100ML
2 INJECTION, SOLUTION INTRAVENOUS ONCE
Status: COMPLETED | OUTPATIENT
Start: 2023-05-11 | End: 2023-05-11

## 2023-05-11 RX ADMIN — CEFAZOLIN SODIUM 2 G: 2 INJECTION, SOLUTION INTRAVENOUS at 14:33

## 2023-05-11 ASSESSMENT — ACTIVITIES OF DAILY LIVING (ADL)
ADLS_ACUITY_SCORE: 35
ADLS_ACUITY_SCORE: 35

## 2023-05-11 NOTE — ED PROVIDER NOTES
History     Chief Complaint   Patient presents with     Leg Swelling     HPI  Zee Mireles is a 83 year old female history of COPD, hypertension, peripheral artery disease, paroxysmal atrial fibrillation (with only anticoagulation on a baby aspirin daily due to GI bleeding) and prurigo nodularis dermatitis  who presents to the emergency department with left lower leg redness, swelling and moderate tenderness which was noted yesterday AM.  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  She is followed by Dermatology for this.  No proximal leg abnormality.  No shortness of breath, chest pain, cough or hemoptysis.  She had a fever to 101 three days ago and felt chilled, but has not had fever or chills  since then.  No other infectious signs or symptoms.  No history of PE/DVT.    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 No other acute systemic symptoms or pertinent history or acute complaints or concerns.    Allergies:  Allergies   Allergen Reactions     Atorvastatin Muscle Pain (Myalgia)     Was fine while taking simvastatin 20 mg daily.     Zoledronic Acid Other (See Comments)     SEVERE REACTION         Adhesive Tape Rash       Problem List:    Patient Active Problem List    Diagnosis Date Noted     Prurigo nodularis 05/11/2023     Priority: Medium     Hyponatremia 12/04/2022     Priority: Medium     Influenza A 12/04/2022     Priority: Medium     COPD exacerbation (H) 12/04/2022     Priority: Medium     Coronary artery disease involving native coronary artery of native heart without angina pectoris 10/07/2019     Priority: Medium     PAD (peripheral artery disease) (H) 10/07/2019     Priority:  Medium     COPD (chronic obstructive pulmonary disease) (H) 08/14/2019     Priority: Medium     Hyperlipidemia LDL goal <70 08/14/2019     Priority: Medium     Essential hypertension 08/14/2019     Priority: Medium     Upper GI bleed 08/14/2019     Priority: Medium     Paroxysmal atrial fibrillation (H) 11/01/2018     Priority: Medium     Overview:   CHADS2 VASC score equals 5 for age, sex, hypertension, and coronary artery disease       Coronary artery disease due to calcified coronary lesion 11/01/2018     Priority: Medium     Overview:   stent to LAD after NSTEMI          Past Medical History:    Past Medical History:   Diagnosis Date     Asthma      Chest pain 11/2018     Colitis      COPD (chronic obstructive pulmonary disease) (H)      COPD (chronic obstructive pulmonary disease) (H)      Coronary artery disease due to calcified coronary lesion 11/01/2018     DNI (do not intubate)      Dyslipidemia, goal LDL below 70      Essential hypertension      GERD (gastroesophageal reflux disease)      Glaucoma      HTN (hypertension)      Hyperlipidemia      Hypothyroid      NSTEMI (non-ST elevation myocardial infarction) (H) 11/01/2018     PAD (peripheral artery disease) (H)      Paroxysmal atrial fibrillation (H) 11/2018     Paroxysmal atrial fibrillation (H) 11/01/2018     Partial retinal artery occlusion 06/09/2021     Skin cancer      Smoker        Past Surgical History:    Past Surgical History:   Procedure Laterality Date     CATARACT EXTRACTION       cataract removal       COLONOSCOPY  12/2018    [facility]     COLONOSCOPY N/A 12/12/2018    COLONOSCOPY with polypectomy; Damien Denton MD;  [facility];  Service: Gastroenterology     CORONARY STENT PLACEMENT  11/2018    at [facility][1] BALLOON DILATION AND/OR STENT PLACEMENT OF VESSEL  11/2018    stent to LAD     ESOPHAGOSCOPY, GASTROSCOPY, DUODENOSCOPY (EGD), COMBINED  12/2018    [facility]     ESOPHAGOSCOPY, GASTROSCOPY, DUODENOSCOPY  (EGD), COMBINED N/A 8/15/2019    Procedure: ESOPHAGOGASTRODUODENOSCOPY, WITH BIOPSY;  Surgeon: Demario Clayton DO;  Location: [facility]     ESOPHAGOSCOPY, GASTROSCOPY, DUODENOSCOPY (EGD), COMBINED N/A 12/10/2018    Damien Denton MD; [facility];  Service: Gastroenterology     IR ABDOMINAL AORTOGRAM  7/3/2006     IR EXTREMITY ANGIOGRAM BILATERAL  7/3/2006     IR MISCELLANEOUS PROCEDURE  7/3/2006     IR MISCELLANEOUS PROCEDURE  2006       Family History:    Family History   Problem Relation Age of Onset     Sudden Death Mother 55.00        no autopsy     Goiter Mother      Stomach Cancer Father 72.00     No Known Problems Brother      Other - See Comments Brother          in [place] War     Ulcers Son         Gastric ulcer was perforated; had surgery. His wife passed suddenly at 49 at home in 2016.     Atrial fibrillation Son        Social History:  Marital Status:   [4]  Social History     Tobacco Use     Smoking status: Every Day     Packs/day: 1.00     Years: 60.00     Pack years: 60.00     Types: Cigarettes     Smokeless tobacco: Never     Tobacco comments:     down to 0.3 ppd since 2017   Vaping Use     Vaping status: Never Used   Substance Use Topics     Alcohol use: No     Alcohol/week: 1.0 standard drink of alcohol     Drug use: No        Medications:    cephALEXin (KEFLEX) 500 MG capsule  acebutolol (SECTRAL) 400 MG capsule  acetaminophen (TYLENOL) 500 MG tablet  albuterol (PROAIR HFA/PROVENTIL HFA/VENTOLIN HFA) 108 (90 Base) MCG/ACT inhaler  amLODIPine (NORVASC) 5 MG tablet  aspirin 81 MG EC tablet  brimonidine (ALPHAGAN-P) 0.15 % ophthalmic solution  dorzolamide (TRUSOPT) 2 % ophthalmic solution  latanoprost (XALATAN) 0.005 % ophthalmic solution  levothyroxine (SYNTHROID/LEVOTHROID) 100 MCG tablet  losartan (COZAAR) 100 MG tablet  metoprolol succinate ER (TOPROL XL) 25 MG 24 hr tablet  rosuvastatin (CRESTOR) 5 MG tablet  tiotropium-olodaterol (STIOLTO RESPIMAT) 2.5-2.5 MCG/ACT  "AERS        Review of Systems  As mentioned in the HPI, in addition focused review of systems was negative.    Physical Exam   BP: (!) 160/62  Pulse: 72  Temp: 98.7  F (37.1  C)  Resp: 18  Height: 160 cm (5' 3\")  Weight: 48.5 kg (107 lb)  SpO2: 99 %      Physical Exam  Vitals and nursing note reviewed.   Constitutional:       General: She is not in acute distress.     Appearance: Normal appearance. She is well-developed. She is not ill-appearing or diaphoretic.   HENT:      Mouth/Throat:      Mouth: Mucous membranes are moist.   Eyes:      General: No scleral icterus.     Extraocular Movements: Extraocular movements intact.      Conjunctiva/sclera: Conjunctivae normal.   Neck:      Trachea: No tracheal deviation.   Cardiovascular:      Rate and Rhythm: Normal rate and regular rhythm.      Pulses: Normal pulses.      Heart sounds: Normal heart sounds. No murmur heard.     No friction rub. No gallop.   Pulmonary:      Effort: Pulmonary effort is normal. No respiratory distress.      Breath sounds: Normal breath sounds. No wheezing, rhonchi or rales.   Musculoskeletal:         General: Normal range of motion.      Cervical back: Normal range of motion and neck supple.      Right lower leg: No edema.      Left lower leg: Edema (Mild nonpitting edema good distal left lower extremity    ) present.   Skin:     General: Skin is warm and dry.      Coloration: Skin is not pale.      Findings: Erythema (Left anterior lower extremity cellulitis as photographed, no cords, crepitance, fluctuance, proximal leg abnormality or signs or symptoms of compartment syndrome; distal lower extremity neurovascular function is intact.) and rash present.   Neurological:      General: No focal deficit present.      Mental Status: She is alert and oriented to person, place, and time.      Sensory: No sensory deficit.      Motor: No weakness.   Psychiatric:         Mood and Affect: Mood normal.         Behavior: Behavior normal.             ED " Course             Procedures                Results for orders placed or performed during the hospital encounter of 05/11/23 (from the past 24 hour(s))   CBC with platelets differential    Narrative    The following orders were created for panel order CBC with platelets differential.  Procedure                               Abnormality         Status                     ---------                               -----------         ------                     CBC with platelets and d...[[id number]]  Abnormal            Final result                 Please view results for these tests on the individual orders.   Comprehensive metabolic panel   Result Value Ref Range    Sodium 133 (L) 136 - 145 mmol/L    Potassium 3.9 3.4 - 5.3 mmol/L    Chloride 97 (L) 98 - 107 mmol/L    Carbon Dioxide (CO2) 25 22 - 29 mmol/L    Anion Gap 11 7 - 15 mmol/L    Urea Nitrogen 13.6 8.0 - 23.0 mg/dL    Creatinine 0.80 0.51 - 0.95 mg/dL    Calcium 9.2 8.8 - 10.2 mg/dL    Glucose 102 (H) 70 - 99 mg/dL    Alkaline Phosphatase 125 (H) 35 - 104 U/L    AST 31 10 - 35 U/L    ALT 28 10 - 35 U/L    Protein Total 6.7 6.4 - 8.3 g/dL    Albumin 3.4 (L) 3.5 - 5.2 g/dL    Bilirubin Total 0.3 <=1.2 mg/dL    GFR Estimate 73 >60 mL/min/1.73m2   CRP inflammation   Result Value Ref Range    CRP Inflammation 194.13 (H) <5.00 mg/L   CBC with platelets and differential   Result Value Ref Range    WBC Count 11.1 (H) 4.0 - 11.0 10e3/uL    RBC Count 3.99 3.80 - 5.20 10e6/uL    Hemoglobin 12.5 11.7 - 15.7 g/dL    Hematocrit 38.7 35.0 - 47.0 %    MCV 97 78 - 100 fL    MCH 31.3 26.5 - 33.0 pg    MCHC 32.3 31.5 - 36.5 g/dL    RDW 16.3 (H) 10.0 - 15.0 %    Platelet Count 205 150 - 450 10e3/uL    % Neutrophils 77 %    % Lymphocytes 12 %    % Monocytes 9 %    % Eosinophils 1 %    % Basophils 0 %    % Immature Granulocytes 1 %    NRBCs per 100 WBC 0 <1 /100    Absolute Neutrophils 8.6 (H) 1.6 - 8.3 10e3/uL    Absolute Lymphocytes 1.3 0.8 - 5.3 10e3/uL    Absolute Monocytes 1.0  0.0 - 1.3 10e3/uL    Absolute Eosinophils 0.1 0.0 - 0.7 10e3/uL    Absolute Basophils 0.0 0.0 - 0.2 10e3/uL    Absolute Immature Granulocytes 0.1 <=0.4 10e3/uL    Absolute NRBCs 0.0 10e3/uL   [place] Draw    Narrative    The following orders were created for panel order [place] Draw.  Procedure                               Abnormality         Status                     ---------                               -----------         ------                     Extra Blue Top Tube[[id number]]                              Final result               Extra Red Top Tube[[id number]]                               Final result                 Please view results for these tests on the individual orders.   Extra Blue Top Tube   Result Value Ref Range    Hold Specimen JIC    Extra Red Top Tube   Result Value Ref Range    Hold Specimen JIC    US Lower Extremity Venous Duplex Left    Narrative    VENOUS ULTRASOUND LEFT LOWER EXTREMITY  5/11/2023 3:40 PM     HISTORY: Atraumatic pain and swelling.    COMPARISON: None.    TECHNIQUE: Color Doppler and spectral waveform analysis performed  throughout the deep veins of the left lower extremity.    FINDINGS: The common femoral, proximal greater saphenous, femoral,  popliteal veins demonstrate normal blood flow, compression, and  augmentation. Posterior tibial and peroneal veins are compressible.  Subcutaneous edema noted in the soft tissue overlying the ankle.  Contralateral right common femoral vein is patent.      Impression    IMPRESSION: Negative for deep venous thrombosis throughout the left  lower extremity.    KEISHA PEREYRA MD         SYSTEM ID:  [id number]   Lactic acid whole blood   Result Value Ref Range    Lactic Acid 1.0 0.7 - 2.0 mmol/L       Medications   ceFAZolin (ANCEF) 2 g in 100 mL D5W intermittent infusion (0 g Intravenous Stopped 5/11/23 1505)     She has a significantly elevated CRP, but is no fever, no elevation of WBC or lactate.  I will observe her in the emergency  department for any worsening of her condition and to assess for need for admission for continued IV antibiotic therapy.  Left lower extremity ultrasound evaluation pending    Lower extremity ultrasound study was negative and her cellulitis remained stable during Emergency Department evaluation.  She does not wish to be admitted to the hospital and is comfortable discharge home and continued oral antibiotic therapy and careful monitoring of this with plan for her to recheck in her [place] primary care clinic as soon as possible in the near future and plan to return immediately if her symptoms worsen in any way or over the weekend (today is Thursday) if her condition fails to improve or worsens.    Assessments & Plan (with Medical Decision Making)   83 year old female history of COPD, hypertension, peripheral artery disease, paroxysmal atrial fibrillation (with only anticoagulation on a baby aspirin daily due to GI bleeding) and prurigo nodularis dermatitis who presents to the emergency department with left lower leg redness, swelling and moderate tenderness which was noted yesterday AM.  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  She is followed by Dermatology for this.  No proximal leg abnormality.  No shortness of breath, chest pain, cough or hemoptysis.  She had a fever to 101 three days ago and felt chilled, but has not had fever or chills  since then.  No other infectious signs or symptoms.  No history of PE/DVT.    Other than the significantly elevated CRP which I suspect is partially increased due to her chronic inflammatory dermatitis, prurigo nodularis dermatitis which is present on both lower extremities, she has no other significant clinical or laboratory findings and she appears stable and appropriate for discharge with  outpatient management.  She did not want to be admitted to the hospital and I will try outpatient oral antibiotic therapy after after initiation of antibiotic therapy with IV Ancef 2 g IV prior to discharge.  She understands that she must return immediately if her cellulitis is worsening in any way and I recommended recheck if cellulitis fails to improve over the next 48 hours. I recommended recheck in her PCC, a [facility], in 4 days Monday 5/15/2023, and she will contact her clinic tomorrow to arrange for this.  She will complete 10 days of Keflex 500 mg 4 times daily therapy.  She will follow-up with her Dermatologist and I will make a referral for this, she had an appointment this afternoon but missed her Dermatology clinic appointment because she was in the ED.  She was provided instructions for supportive care and will return as needed for worsened condition or worsening symptoms, or new problems or concerns.    I have reviewed the nursing notes.    I have reviewed the findings, diagnosis, plan and need for follow up with the patient.  Medical Decision Making  Hospitalization for IV antibiotic therapy and observation was considered and deferred. Currently appears stable and appropriate for outpatient management with close f/u.      Discharge Medication List as of 5/11/2023  6:52 PM      START taking these medications    Details   cephALEXin (KEFLEX) 500 MG capsule Take 1 capsule (500 mg) by mouth 4 times daily for 10 days, Disp-40 capsule, R-0, E-Prescribe             Final diagnoses:   Cellulitis of left lower extremity   Prurigo nodularis - chronic pruritic dermatitis       5/11/2023   [facility] EMERGENCY DEPT     Bear Marley MD  05/12/23 0000

## 2023-05-11 NOTE — ED TRIAGE NOTES
Pt here with left leg swelling and warmth that started yesterday.     Triage Assessment     Row Name 05/11/23 1133       Triage Assessment (Adult)    Airway WDL WDL       Respiratory WDL    Respiratory WDL WDL       Skin Circulation/Temperature WDL    Skin Circulation/Temperature WDL WDL       Cardiac WDL    Cardiac WDL WDL       Peripheral/Neurovascular WDL    Peripheral Neurovascular WDL WDL       Cognitive/Neuro/Behavioral WDL    Cognitive/Neuro/Behavioral WDL WDL

## 2023-05-15 ENCOUNTER — LAB REQUISITION (OUTPATIENT)
Dept: LAB | Facility: CLINIC | Age: 83
End: 2023-05-15

## 2023-05-15 DIAGNOSIS — L03.116 CELLULITIS OF LEFT LOWER LIMB: ICD-10-CM

## 2023-05-15 LAB
BASOPHILS # BLD AUTO: 0.1 10E3/UL (ref 0–0.2)
BASOPHILS NFR BLD AUTO: 1 %
EOSINOPHIL # BLD AUTO: 0.3 10E3/UL (ref 0–0.7)
EOSINOPHIL NFR BLD AUTO: 3 %
ERYTHROCYTE [DISTWIDTH] IN BLOOD BY AUTOMATED COUNT: 16.7 % (ref 10–15)
HCT VFR BLD AUTO: 38.1 % (ref 35–47)
HGB BLD-MCNC: 12.2 G/DL (ref 11.7–15.7)
IMM GRANULOCYTES # BLD: 0.4 10E3/UL
IMM GRANULOCYTES NFR BLD: 4 %
LYMPHOCYTES # BLD AUTO: 2.7 10E3/UL (ref 0.8–5.3)
LYMPHOCYTES NFR BLD AUTO: 23 %
MCH RBC QN AUTO: 30.6 PG (ref 26.5–33)
MCHC RBC AUTO-ENTMCNC: 32 G/DL (ref 31.5–36.5)
MCV RBC AUTO: 96 FL (ref 78–100)
MONOCYTES # BLD AUTO: 1.2 10E3/UL (ref 0–1.3)
MONOCYTES NFR BLD AUTO: 10 %
NEUTROPHILS # BLD AUTO: 7 10E3/UL (ref 1.6–8.3)
NEUTROPHILS NFR BLD AUTO: 59 %
NRBC # BLD AUTO: 0 10E3/UL
NRBC BLD AUTO-RTO: 0 /100
PLATELET # BLD AUTO: 336 10E3/UL (ref 150–450)
RBC # BLD AUTO: 3.99 10E6/UL (ref 3.8–5.2)
WBC # BLD AUTO: 11.6 10E3/UL (ref 4–11)

## 2023-05-15 PROCEDURE — 85025 COMPLETE CBC W/AUTO DIFF WBC: CPT | Performed by: FAMILY MEDICINE

## 2023-05-15 PROCEDURE — 86141 C-REACTIVE PROTEIN HS: CPT | Performed by: FAMILY MEDICINE

## 2023-05-16 LAB — CRP SERPL HS-MCNC: 121 MG/L

## 2023-05-19 ENCOUNTER — OFFICE VISIT (OUTPATIENT)
Dept: DERMATOLOGY | Facility: CLINIC | Age: 83
End: 2023-05-19
Payer: COMMERCIAL

## 2023-05-19 DIAGNOSIS — L28.1 PRURIGO NODULARIS: ICD-10-CM

## 2023-05-19 DIAGNOSIS — L03.116 CELLULITIS OF LEFT LOWER EXTREMITY: ICD-10-CM

## 2023-05-19 PROCEDURE — 99213 OFFICE O/P EST LOW 20 MIN: CPT | Performed by: PHYSICIAN ASSISTANT

## 2023-05-19 RX ORDER — DOXYCYCLINE HYCLATE 100 MG
TABLET ORAL
COMMUNITY
Start: 2023-05-15 | End: 2023-11-27

## 2023-05-19 RX ORDER — TRIAMCINOLONE ACETONIDE 5 MG/G
CREAM TOPICAL
Qty: 30 G | Refills: 4 | Status: SHIPPED | OUTPATIENT
Start: 2023-05-19 | End: 2024-03-15

## 2023-05-19 RX ORDER — AMOXICILLIN 875 MG
TABLET ORAL
COMMUNITY
Start: 2023-05-15 | End: 2023-11-27

## 2023-05-19 ASSESSMENT — PAIN SCALES - GENERAL: PAINLEVEL: MODERATE PAIN (4)

## 2023-05-19 NOTE — LETTER
"    5/19/2023         RE: Zee Mireles  [address] [facility] [address]        Dear Colleague,    Thank you for referring your patient, Zee Mireles, to the [facility]. Please see a copy of my visit note below.    Zee Mireles is an extremely pleasant 83 year old year old female patient here today for recheck prurigo nodularis and cellulitis. She notes she was diagnosed with cellulitis while in the ED on 5/11. She notes she has seen marked improvement on left leg since then. She reports improvement in redness and swelling. She notes she has 5 days left of antibiotic and will call her PCP if not fully resolved next weeks. She notes that her prurigo is minimal but still itchy. She is using acetone on her skin.  Patient has no other skin complaints today.  Remainder of the HPI, Meds, PMH, Allergies, FH, and SH was reviewed in chart.    Past Medical History:   Diagnosis Date     Asthma      Chest pain 11/2018    burning sensation - indigestion     Colitis      COPD (chronic obstructive pulmonary disease) (H)      COPD (chronic obstructive pulmonary disease) (H)      Coronary artery disease due to calcified coronary lesion 11/01/2018    stent to LAD after NSTEMI     DNI (do not intubate)      Dyslipidemia, goal LDL below 70      Essential hypertension      GERD (gastroesophageal reflux disease)      Glaucoma      HTN (hypertension)      Hyperlipidemia      Hypothyroid      NSTEMI (non-ST elevation myocardial infarction) (H) 11/01/2018     PAD (peripheral artery disease) (H)      Paroxysmal atrial fibrillation (H) 11/2018     Paroxysmal atrial fibrillation (H) 11/01/2018     Partial retinal artery occlusion 06/09/2021     Skin cancer      Smoker     ongoing - \"6-8 cigarettes QD\"       Past Surgical History:   Procedure Laterality Date     CATARACT EXTRACTION       cataract removal       COL[facility]COPY  12/2018    [facility]     COLONOSCOPY N/A 12/12/2018    COLONOSCOPY with " polypectomy; Damien Denton MD;  [facility];  Service: Gastroenterology     CORONARY STENT PLACEMENT  2018    at [facility] BALLOON DILATION AND/OR STENT PLACEMENT OF VESSEL  2018    stent to LAD     ESOPHAGOSCOPY, GASTROSCOPY, DUODENOSCOPY (EGD), COMBINED  2018    [facility]     ESOPHAGOSCOPY, GASTROSCOPY, DUODENOSCOPY (EGD), COMBINED N/A 8/15/2019    Procedure: ESOPHAGOGASTRODUODENOSCOPY, WITH BIOPSY;  Surgeon: Demario Clayton DO;  Location: [facility]     ESOPHAGOSCOPY, GASTROSCOPY, DUODENOSCOPY (EGD), COMBINED N/A 12/10/2018    Damien Denton MD; [facility];  Service: Gastroenterology     IR ABDOMINAL AORTOGRAM  7/3/2006     IR EXTREMITY ANGIOGRAM BILATERAL  7/3/2006     IR MISCELLANEOUS PROCEDURE  7/3/2006     IR MISCELLANEOUS PROCEDURE  2006        Family History   Problem Relation Age of Onset     Sudden Death Mother 55.00        no autopsy     Goiter Mother      Stomach Cancer Father 72.00     No Known Problems Brother      Other - See Comments Brother          in [place]     Ulcers Son         Gastric ulcer was perforated; had surgery. His wife passed suddenly at 49 at home in 2016.     Atrial fibrillation Son        Social History     Socioeconomic History     Marital status:      Spouse name: Not on file     Number of children: 2     Years of education: Not on file     Highest education level: Not on file   Occupational History     Not on file   Tobacco Use     Smoking status: Every Day     Packs/day: 1.00     Years: 60.00     Pack years: 60.00     Types: Cigarettes     Smokeless tobacco: Never     Tobacco comments:     down to 0.3 ppd since 2017   Vaping Use     Vaping status: Never Used   Substance and Sexual Activity     Alcohol use: No     Alcohol/week: 1.0 standard drink of alcohol     Drug use: No     Sexual activity: Not Currently     Partners: Male   Other Topics Concern     Not on file   Social History Narrative    Zee lives with  her son, Braden, and his wife.     Social Determinants of Health     Financial Resource Strain: Not on file   Food Insecurity: Not on file   Transportation Needs: Not on file   Physical Activity: Not on file   Stress: Not on file   Social Connections: Not on file   Intimate Partner Violence: Not on file   Housing Stability: Not on file       Outpatient Encounter Medications as of 5/19/2023   Medication Sig Dispense Refill     triamcinolone (ARISTOCORT HP) 0.5 % external cream Apply sparingly twice daily as needed to spot treat itchy areas on arms, legs. 30 g 4     acebutolol (SECTRAL) 400 MG capsule Take 400 mg by mouth daily as needed (Palpitations) = extra dose in addition to scheduled daily dose       acetaminophen (TYLENOL) 500 MG tablet Take 1,000 mg by mouth every 6 hours as needed for mild pain       albuterol (PROAIR HFA/PROVENTIL HFA/VENTOLIN HFA) 108 (90 Base) MCG/ACT inhaler Inhale 2 puffs into the lungs every 4 hours as needed for shortness of breath / dyspnea or wheezing OFFICE VISIT NEEDED FOR ANY FURTHER REFILLS 18 g 0     amLODIPine (NORVASC) 5 MG tablet Take 1 tablet by mouth every evening       amoxicillin (AMOXIL) 875 MG tablet TAKE ONE TABLET BY MOUTH TWICE DAILY FOR 10 DAYS*       aspirin 81 MG EC tablet Take 81 mg by mouth daily       brimonidine (ALPHAGAN-P) 0.15 % ophthalmic solution Place 1 drop into both eyes 3 times daily       cephALEXin (KEFLEX) 500 MG capsule Take 1 capsule (500 mg) by mouth 4 times daily for 10 days 40 capsule 0     dorzolamide (TRUSOPT) 2 % ophthalmic solution Place 1 drop into both eyes 3 times daily       doxycycline hyclate (VIBRA-TABS) 100 MG tablet take 1 tablet by mouth Twice a day for 10 days*       latanoprost (XALATAN) 0.005 % ophthalmic solution Place 1 drop into both eyes every evening        levothyroxine (SYNTHROID/LEVOTHROID) 100 MCG tablet Take 1 tablet by mouth every morning (before breakfast)       losartan (COZAAR) 100 MG tablet Take 1 tablet (100 mg)  by mouth daily 30 tablet 0     metoprolol succinate ER (TOPROL XL) 25 MG 24 hr tablet Take 1 tablet (25 mg) by mouth daily 90 tablet 3     rosuvastatin (CRESTOR) 5 MG tablet Take 5 mg by mouth three times a week Monday, Wednesday, Friday  0     tiotropium-olodaterol (STIOLTO RESPIMAT) 2.5-2.5 MCG/ACT AERS Inhale 2 puffs into the lungs daily 4 g 11     No facility-administered encounter medications on file as of 5/19/2023.             O:   NAD, WDWN, Alert & Oriented, Mood & Affect wnl, Vitals stable   Here today alone   There were no vitals taken for this visit.   General appearance normal   Vitals stable   Alert, oriented and in no acute distress     Thin prurigo and xerosis on left leg  Mild erythema on right lower calf     Eyes: Conjunctivae/lids:Normal     ENT: Lips: normal    MSK:Normal    Pulm: Breathing Normal    Neuro/Psych: Orientation:Alert and Orientedx3 ; Mood/Affect:normal   A/P:  1. Venous stasis with prurigo and cellulitis   Finish antibiotic.   Use moisturizer twice daily, sample given.   Avoid acetone on skin.   Apply triamcinolone to spot treat prurigo.   Return as needed.   It was a pleasure speaking to Zee Mireles today.      Again, thank you for allowing me to participate in the care of your patient.        Sincerely,        Melody Pan PA-C

## 2023-05-19 NOTE — NURSING NOTE
Chief Complaint   Patient presents with     Derm Problem     Injection follows, follow up cellulitis on left leg        There were no vitals filed for this visit.  Wt Readings from Last 1 Encounters:   05/11/23 48.5 kg (107 lb)       Yue Farrell LPN .................5/19/2023

## 2023-05-19 NOTE — PROGRESS NOTES
"Zee Mireles is an extremely pleasant 83 year old year old female patient here today for recheck prurigo nodularis and cellulitis. She notes she was diagnosed with cellulitis while in the ED on 5/11. She notes she has seen marked improvement on left leg since then. She reports improvement in redness and swelling. She notes she has 5 days left of antibiotic and will call her PCP if not fully resolved next weeks. She notes that her prurigo is minimal but still itchy. She is using acetone on her skin.  Patient has no other skin complaints today.  Remainder of the HPI, Meds, PMH, Allergies, FH, and SH was reviewed in chart.    Past Medical History:   Diagnosis Date     Asthma      Chest pain 11/2018    burning sensation - indigestion     Colitis      COPD (chronic obstructive pulmonary disease) (H)      COPD (chronic obstructive pulmonary disease) (H)      Coronary artery disease due to calcified coronary lesion 11/01/2018    stent to LAD after NSTEMI     DNI (do not intubate)      Dyslipidemia, goal LDL below 70      Essential hypertension      GERD (gastroesophageal reflux disease)      Glaucoma      HTN (hypertension)      Hyperlipidemia      Hypothyroid      NSTEMI (non-ST elevation myocardial infarction) (H) 11/01/2018     PAD (peripheral artery disease) (H)      Paroxysmal atrial fibrillation (H) 11/2018     Paroxysmal atrial fibrillation (H) 11/01/2018     Partial retinal artery occlusion 06/09/2021     Skin cancer      Smoker     ongoing - \"6-8 cigarettes QD\"       Past Surgical History:   Procedure Laterality Date     CATARACT EXTRACTION       cataract removal       COLONOSCOPY  12/2018    [facility]     COLONOSCOPY N/A 12/12/2018    COLONOSCOPY with polypectomy; Damien Denton MD;  [facility];  Service: Gastroenterology     CORONARY STENT PLACEMENT  11/2018    at [facility] in [place]     CV BALLOON DILATION AND/OR STENT PLACEMENT OF VESSEL  11/2018    stent to LAD     ESOPHAGOSCOPY, " GASTROSCOPY, DUODENOSCOPY (EGD), COMBINED  2018    [facility]     ESOPHAGOSCOPY, GASTROSCOPY, DUODENOSCOPY (EGD), COMBINED N/A 8/15/2019    Procedure: ESOPHAGOGASTRODUODENOSCOPY, WITH BIOPSY;  Surgeon: Demario Clayton DO;  Location: [facility]     ESOPHAGOSCOPY, GASTROSCOPY, DUODENOSCOPY (EGD), COMBINED N/A 12/10/2018    Damien Denton MD; [facility];  Service: Gastroenterology     IR ABDOMINAL AORTOGRAM  7/3/2006     IR EXTREMITY ANGIOGRAM BILATERAL  7/3/2006     IR MISCELLANEOUS PROCEDURE  7/3/2006     IR MISCELLANEOUS PROCEDURE  2006        Family History   Problem Relation Age of Onset     Sudden Death Mother 55.00        no autopsy     Goiter Mother      Stomach Cancer Father 72.00     No Known Problems Brother      Other - See Comments Brother          in [place]     Ulcers Son         Gastric ulcer was perforated; had surgery. His wife passed suddenly at 49 at home in 2016.     Atrial fibrillation Son        Social History     Socioeconomic History     Marital status:      Spouse name: Not on file     Number of children: 2     Years of education: Not on file     Highest education level: Not on file   Occupational History     Not on file   Tobacco Use     Smoking status: Every Day     Packs/day: 1.00     Years: 60.00     Pack years: 60.00     Types: Cigarettes     Smokeless tobacco: Never     Tobacco comments:     down to 0.3 ppd since 2017   Vaping Use     Vaping status: Never Used   Substance and Sexual Activity     Alcohol use: No     Alcohol/week: 1.0 standard drink of alcohol     Drug use: No     Sexual activity: Not Currently     Partners: Male   Other Topics Concern     Not on file   Social History Narrative    Zee lives with her son, Braden, and his wife.     Social Determinants of Health     Financial Resource Strain: Not on file   Food Insecurity: Not on file   Transportation Needs: Not on file   Physical Activity: Not on file   Stress: Not on file   Social  Connections: Not on file   Intimate Partner Violence: Not on file   Housing Stability: Not on file       Outpatient Encounter Medications as of 5/19/2023   Medication Sig Dispense Refill     triamcinolone (ARISTOCORT HP) 0.5 % external cream Apply sparingly twice daily as needed to spot treat itchy areas on arms, legs. 30 g 4     acebutolol (SECTRAL) 400 MG capsule Take 400 mg by mouth daily as needed (Palpitations) = extra dose in addition to scheduled daily dose       acetaminophen (TYLENOL) 500 MG tablet Take 1,000 mg by mouth every 6 hours as needed for mild pain       albuterol (PROAIR HFA/PROVENTIL HFA/VENTOLIN HFA) 108 (90 Base) MCG/ACT inhaler Inhale 2 puffs into the lungs every 4 hours as needed for shortness of breath / dyspnea or wheezing OFFICE VISIT NEEDED FOR ANY FURTHER REFILLS 18 g 0     amLODIPine (NORVASC) 5 MG tablet Take 1 tablet by mouth every evening       amoxicillin (AMOXIL) 875 MG tablet TAKE ONE TABLET BY MOUTH TWICE DAILY FOR 10 DAYS*       aspirin 81 MG EC tablet Take 81 mg by mouth daily       brimonidine (ALPHAGAN-P) 0.15 % ophthalmic solution Place 1 drop into both eyes 3 times daily       cephALEXin (KEFLEX) 500 MG capsule Take 1 capsule (500 mg) by mouth 4 times daily for 10 days 40 capsule 0     dorzolamide (TRUSOPT) 2 % ophthalmic solution Place 1 drop into both eyes 3 times daily       doxycycline hyclate (VIBRA-TABS) 100 MG tablet take 1 tablet by mouth Twice a day for 10 days*       latanoprost (XALATAN) 0.005 % ophthalmic solution Place 1 drop into both eyes every evening        levothyroxine (SYNTHROID/LEVOTHROID) 100 MCG tablet Take 1 tablet by mouth every morning (before breakfast)       losartan (COZAAR) 100 MG tablet Take 1 tablet (100 mg) by mouth daily 30 tablet 0     metoprolol succinate ER (TOPROL XL) 25 MG 24 hr tablet Take 1 tablet (25 mg) by mouth daily 90 tablet 3     rosuvastatin (CRESTOR) 5 MG tablet Take 5 mg by mouth three times a week Monday, Wednesday, Friday   0     tiotropium-olodaterol (STIOLTO RESPIMAT) 2.5-2.5 MCG/ACT AERS Inhale 2 puffs into the lungs daily 4 g 11     No facility-administered encounter medications on file as of 5/19/2023.             O:   NAD, WDWN, Alert & Oriented, Mood & Affect wnl, Vitals stable   Here today alone   There were no vitals taken for this visit.   General appearance normal   Vitals stable   Alert, oriented and in no acute distress     Thin prurigo and xerosis on left leg  Mild erythema on right lower calf     Eyes: Conjunctivae/lids:Normal     ENT: Lips: normal    MSK:Normal    Pulm: Breathing Normal    Neuro/Psych: Orientation:Alert and Orientedx3 ; Mood/Affect:normal   A/P:  1. Venous stasis with prurigo and cellulitis   Finish antibiotic.   Use moisturizer twice daily, sample given.   Avoid acetone on skin.   Apply triamcinolone to spot treat prurigo.   Return as needed.   It was a pleasure speaking to Zee Mireles today.

## 2023-08-20 ENCOUNTER — HOSPITAL ENCOUNTER (EMERGENCY)
Facility: CLINIC | Age: 83
Discharge: LEFT BEFORE DISCHARGE | End: 2023-08-20
Admitting: EMERGENCY MEDICINE
Payer: COMMERCIAL

## 2023-08-20 VITALS
SYSTOLIC BLOOD PRESSURE: 176 MMHG | TEMPERATURE: 98.8 F | BODY MASS INDEX: 19.49 KG/M2 | HEIGHT: 63 IN | RESPIRATION RATE: 18 BRPM | WEIGHT: 110 LBS | DIASTOLIC BLOOD PRESSURE: 69 MMHG | OXYGEN SATURATION: 97 % | HEART RATE: 79 BPM

## 2023-08-20 PROCEDURE — 99281 EMR DPT VST MAYX REQ PHY/QHP: CPT | Performed by: EMERGENCY MEDICINE

## 2023-08-20 NOTE — ED TRIAGE NOTES
Patient reports cough since thursdy. Patient states history of bronchitis and pneumonia.     Triage Assessment       Row Name 08/20/23 3488       Triage Assessment (Adult)    Airway WDL WDL       Respiratory WDL    Respiratory WDL X;cough;rhythm/pattern    Rhythm/Pattern, Respiratory dyspnea on exertion    Cough Frequency frequent    Cough Type productive;dry       Skin Circulation/Temperature WDL    Skin Circulation/Temperature WDL WDL       Cardiac WDL    Cardiac WDL WDL       Peripheral/Neurovascular WDL    Peripheral Neurovascular WDL WDL       Cognitive/Neuro/Behavioral WDL    Cognitive/Neuro/Behavioral WDL WDL

## 2023-08-21 ENCOUNTER — TRANSFERRED RECORDS (OUTPATIENT)
Dept: HEALTH INFORMATION MANAGEMENT | Facility: CLINIC | Age: 83
End: 2023-08-21

## 2023-11-07 DIAGNOSIS — I25.119 CORONARY ARTERY DISEASE INVOLVING NATIVE CORONARY ARTERY OF NATIVE HEART WITH ANGINA PECTORIS (H): ICD-10-CM

## 2023-11-07 DIAGNOSIS — I10 ESSENTIAL HYPERTENSION: ICD-10-CM

## 2023-11-07 RX ORDER — METOPROLOL SUCCINATE 25 MG/1
25 TABLET, EXTENDED RELEASE ORAL DAILY
Qty: 30 TABLET | Refills: 0 | Status: SHIPPED | OUTPATIENT
Start: 2023-11-07 | End: 2023-11-08

## 2023-11-08 RX ORDER — METOPROLOL SUCCINATE 25 MG/1
25 TABLET, EXTENDED RELEASE ORAL DAILY
Qty: 30 TABLET | Refills: 0 | Status: SHIPPED | OUTPATIENT
Start: 2023-11-08 | End: 2023-11-14

## 2023-11-10 DIAGNOSIS — I10 ESSENTIAL HYPERTENSION: ICD-10-CM

## 2023-11-10 DIAGNOSIS — I25.119 CORONARY ARTERY DISEASE INVOLVING NATIVE CORONARY ARTERY OF NATIVE HEART WITH ANGINA PECTORIS (H): ICD-10-CM

## 2023-11-14 RX ORDER — METOPROLOL SUCCINATE 25 MG/1
25 TABLET, EXTENDED RELEASE ORAL DAILY
Qty: 30 TABLET | Refills: 0 | Status: SHIPPED | OUTPATIENT
Start: 2023-11-14 | End: 2024-02-06

## 2023-11-27 ENCOUNTER — OFFICE VISIT (OUTPATIENT)
Dept: PULMONOLOGY | Facility: CLINIC | Age: 83
End: 2023-11-27
Payer: COMMERCIAL

## 2023-11-27 VITALS
DIASTOLIC BLOOD PRESSURE: 77 MMHG | OXYGEN SATURATION: 96 % | HEART RATE: 90 BPM | WEIGHT: 106 LBS | BODY MASS INDEX: 18.78 KG/M2 | SYSTOLIC BLOOD PRESSURE: 187 MMHG

## 2023-11-27 DIAGNOSIS — J44.9 CHRONIC OBSTRUCTIVE PULMONARY DISEASE, UNSPECIFIED COPD TYPE (H): Primary | ICD-10-CM

## 2023-11-27 DIAGNOSIS — Z72.0 TOBACCO ABUSE: ICD-10-CM

## 2023-11-27 PROCEDURE — 99214 OFFICE O/P EST MOD 30 MIN: CPT | Mod: 25 | Performed by: NURSE PRACTITIONER

## 2023-11-27 PROCEDURE — 90662 IIV NO PRSV INCREASED AG IM: CPT | Performed by: NURSE PRACTITIONER

## 2023-11-27 PROCEDURE — G0008 ADMIN INFLUENZA VIRUS VAC: HCPCS | Performed by: NURSE PRACTITIONER

## 2023-11-27 RX ORDER — TIOTROPIUM BROMIDE AND OLODATEROL 3.124; 2.736 UG/1; UG/1
2 SPRAY, METERED RESPIRATORY (INHALATION) DAILY
Qty: 4 G | Refills: 11 | Status: SHIPPED | OUTPATIENT
Start: 2023-11-27

## 2023-11-27 NOTE — PROGRESS NOTES
Outpatient Pulmonary Clinic Visit  November 27, 2023      Assessment and Plan:  Zee Mireles is a 83 year old female with a past medical history significant for COPD who presents to clinic today for follow up.  She is doing well on Stiolto therapy and occasional albuterol use.  She is still smoking, and I suspect her cough and mucous are mostly related to this and not likely to respond to aggressive increases in medication.  She is due for a COVID flu shot which we will give today.     1) COPD - stable with 1 exacerbation in the last year.  Continue Stiolto and as needed albuterol.  2) Cough - likely from tobacco abuse, encourage her to try to quit, but she continues to show signs she is not ready to quit  3) RTC in 1 year.   4) Flu shot given in clinic today.    Janie Smith, [facility]  Pulmonary Medicine  [facility]   [phone]    CCx:   Chief Complaint   Patient presents with    Follow Up     ANNUAL FOLLOW UP:  Chronic obstructive pulmonary disease, unspecified COPD type (H)  Other emphysema (H)          HPI:  Zee was last seen in clinic in 11/2022. Since that time she has done well and has no complaints today. She was late for her appointment today so we had an abbreviated visit.   She was seen twice in the ED in 12/2022 for COPD exacerbation following influenza A. No other exacerbations.   Occasional cough that is mostly dry. Occasionally has some sputum but this is not daily, she feels this is from PND. Is using a can to get around when outside her the house.   She takes her Stiolto everyday without issue, and uses her albuterol 1-2 times per week at night.      Patient supplied answers from flow sheet for:  COPD Assessment Test (CAT)  2009 [employer]. All rights reserved.      11/22/2021    11:00 AM 11/22/2022    11:00 AM 11/27/2023    11:00 AM   COPD assessment test (CAT)   Cough 2 2 1   Phlegm 3 0 1   Chest tightness 0 0 0   Walk up hill 1 3 1   Limited activities 3 0 0   Leaving my home 3 0 0    Sleep 4 0 0   Energy 3 3 2   Total Score 19 8 5      ROS:  10 point ROS neg other than the symptoms noted above in the HPI.    Current Meds:  Current Outpatient Medications   Medication Sig Dispense Refill    tiotropium-olodaterol (STIOLTO RESPIMAT) 2.5-2.5 MCG/ACT AERS Inhale 2 puffs into the lungs daily 4 g 11    acebutolol (SECTRAL) 400 MG capsule Take 400 mg by mouth daily as needed (Palpitations) = extra dose in addition to scheduled daily dose      acetaminophen (TYLENOL) 500 MG tablet Take 1,000 mg by mouth every 6 hours as needed for mild pain      albuterol (PROAIR HFA/PROVENTIL HFA/VENTOLIN HFA) 108 (90 Base) MCG/ACT inhaler Inhale 2 puffs into the lungs every 4 hours as needed for shortness of breath / dyspnea or wheezing OFFICE VISIT NEEDED FOR ANY FURTHER REFILLS 18 g 0    amLODIPine (NORVASC) 5 MG tablet Take 1 tablet by mouth every evening      aspirin 81 MG EC tablet Take 81 mg by mouth daily      brimonidine (ALPHAGAN-P) 0.15 % ophthalmic solution Place 1 drop into both eyes 3 times daily      dorzolamide (TRUSOPT) 2 % ophthalmic solution Place 1 drop into both eyes 3 times daily      latanoprost (XALATAN) 0.005 % ophthalmic solution Place 1 drop into both eyes every evening       levothyroxine (SYNTHROID/LEVOTHROID) 100 MCG tablet Take 1 tablet by mouth every morning (before breakfast)      losartan (COZAAR) 100 MG tablet Take 1 tablet (100 mg) by mouth daily 30 tablet 0    metoprolol succinate ER (TOPROL XL) 25 MG 24 hr tablet TAKE 1 TABLET(25 MG) BY MOUTH DAILY 30 tablet 0    rosuvastatin (CRESTOR) 5 MG tablet Take 5 mg by mouth three times a week Monday, Wednesday, Friday  0    triamcinolone (ARISTOCORT HP) 0.5 % external cream Apply sparingly twice daily as needed to spot treat itchy areas on arms, legs. 30 g 4       Physical Exam:  BP (!) 187/77 (BP Location: Left arm, Patient Position: Sitting, Cuff Size: Adult Regular)   Pulse 90   Wt 48.1 kg (106 lb)   SpO2 96%   BMI 18.78 kg/m       Physical Exam  Constitutional:       General: She is not in acute distress.     Appearance: She is not ill-appearing or diaphoretic.   HENT:      Nose: Nose normal.   Cardiovascular:      Rate and Rhythm: Normal rate and regular rhythm.      Pulses: Normal pulses.      Heart sounds: Normal heart sounds.   Pulmonary:      Effort: Pulmonary effort is normal. No respiratory distress.      Breath sounds: No wheezing or rhonchi.   Musculoskeletal:      Right lower leg: No edema.      Left lower leg: No edema.   Skin:     General: Skin is warm and dry.   Neurological:      Mental Status: She is alert.   Psychiatric:         Behavior: Behavior normal.       Labs:    Lab Results   Component Value Date    WBC 11.6 (H) 05/15/2023    ANEU 4.2 08/14/2019    HGB 12.2 05/15/2023    HCT 38.1 05/15/2023     05/15/2023     (L) 05/11/2023    POTASSIUM 3.9 05/11/2023    CHLORIDE 97 (L) 05/11/2023    CO2 25 05/11/2023     (H) 05/11/2023    BUN 13.6 05/11/2023    CR 0.80 05/11/2023    MAG 2.0 01/21/2019    INR 1.06 08/14/2019    BILITOTAL 0.3 05/11/2023    AST 31 05/11/2023    ALT 28 05/11/2023    ALKPHOS 125 (H) 05/11/2023    PROTTOTAL 6.7 05/11/2023    ALBUMIN 3.4 (L) 05/11/2023       I have personally reviewed all pertinent imaging studies and PFT results unless otherwise noted.    Imaging studies:    XR CHEST 2 VIEWS   10/26/2022 1:39 PM   HISTORY: chest pain  COMPARISON: Radiograph 5/28/2018.                                                                 IMPRESSION: No acute cardiopulmonary disease.    PFTs 7/24/2018  FEV1/FVC is 71 and is normal.  FEV1 is 65% predicted and is reduced.  FVC is 69% predicted and reduced.  There was no improvement in spirometry after a single inhaled dose of bronchodilator.  TLC is 103% predicted and is normal.  RV is 142% predicted and is increased.  DLCO is 52% predicted and is reduced when it   is corrected for hemoglobin.  Impression:  Full Pulmonary Function Test is  abnormal.    PFTs are consistent with mild reduction in FEV1 and FVC.  Spirometry is not consistent with reversibility.  There is no hyperinflation.  There is air-trapping.  Diffusion capacity when corrected for hemoglobin is moderately reduced.

## 2023-12-04 ENCOUNTER — HOSPITAL ENCOUNTER (EMERGENCY)
Facility: CLINIC | Age: 83
Discharge: HOME | End: 2023-12-04
Attending: FAMILY MEDICINE | Admitting: FAMILY MEDICINE
Payer: COMMERCIAL

## 2023-12-04 ENCOUNTER — APPOINTMENT (OUTPATIENT)
Dept: CT IMAGING | Facility: CLINIC | Age: 83
End: 2023-12-04
Attending: FAMILY MEDICINE
Payer: COMMERCIAL

## 2023-12-04 VITALS
DIASTOLIC BLOOD PRESSURE: 59 MMHG | OXYGEN SATURATION: 96 % | HEIGHT: 63 IN | RESPIRATION RATE: 18 BRPM | SYSTOLIC BLOOD PRESSURE: 134 MMHG | BODY MASS INDEX: 18.78 KG/M2 | WEIGHT: 106 LBS | HEART RATE: 67 BPM | TEMPERATURE: 99.1 F

## 2023-12-04 DIAGNOSIS — I10 HYPERTENSION, UNSPECIFIED TYPE: ICD-10-CM

## 2023-12-04 LAB
ALBUMIN SERPL BCG-MCNC: 4.1 G/DL (ref 3.5–5.2)
ALP SERPL-CCNC: 69 U/L (ref 40–150)
ALT SERPL W P-5'-P-CCNC: 7 U/L (ref 0–50)
ANION GAP SERPL CALCULATED.3IONS-SCNC: 11 MMOL/L (ref 7–15)
AST SERPL W P-5'-P-CCNC: 17 U/L (ref 0–45)
BASOPHILS # BLD AUTO: 0 10E3/UL (ref 0–0.2)
BASOPHILS NFR BLD AUTO: 0 %
BILIRUB SERPL-MCNC: 0.4 MG/DL
BUN SERPL-MCNC: 14.2 MG/DL (ref 8–23)
CALCIUM SERPL-MCNC: 9.2 MG/DL (ref 8.8–10.2)
CHLORIDE SERPL-SCNC: 98 MMOL/L (ref 98–107)
CREAT SERPL-MCNC: 0.87 MG/DL (ref 0.51–0.95)
DEPRECATED HCO3 PLAS-SCNC: 26 MMOL/L (ref 22–29)
EGFRCR SERPLBLD CKD-EPI 2021: 66 ML/MIN/1.73M2
EOSINOPHIL # BLD AUTO: 0.2 10E3/UL (ref 0–0.7)
EOSINOPHIL NFR BLD AUTO: 3 %
ERYTHROCYTE [DISTWIDTH] IN BLOOD BY AUTOMATED COUNT: 15.2 % (ref 10–15)
GLUCOSE SERPL-MCNC: 103 MG/DL (ref 70–99)
HCT VFR BLD AUTO: 46.4 % (ref 35–47)
HGB BLD-MCNC: 14.7 G/DL (ref 11.7–15.7)
IMM GRANULOCYTES # BLD: 0 10E3/UL
IMM GRANULOCYTES NFR BLD: 0 %
LYMPHOCYTES # BLD AUTO: 2.6 10E3/UL (ref 0.8–5.3)
LYMPHOCYTES NFR BLD AUTO: 31 %
MCH RBC QN AUTO: 31.9 PG (ref 26.5–33)
MCHC RBC AUTO-ENTMCNC: 31.7 G/DL (ref 31.5–36.5)
MCV RBC AUTO: 101 FL (ref 78–100)
MONOCYTES # BLD AUTO: 0.7 10E3/UL (ref 0–1.3)
MONOCYTES NFR BLD AUTO: 9 %
NEUTROPHILS # BLD AUTO: 4.7 10E3/UL (ref 1.6–8.3)
NEUTROPHILS NFR BLD AUTO: 57 %
PLATELET # BLD AUTO: 281 10E3/UL (ref 150–450)
POTASSIUM SERPL-SCNC: 4.3 MMOL/L (ref 3.4–5.3)
PROT SERPL-MCNC: 7.3 G/DL (ref 6.4–8.3)
RBC # BLD AUTO: 4.61 10E6/UL (ref 3.8–5.2)
SODIUM SERPL-SCNC: 135 MMOL/L (ref 135–145)
TROPONIN T SERPL HS-MCNC: 12 NG/L
WBC # BLD AUTO: 8.3 10E3/UL (ref 4–11)

## 2023-12-04 PROCEDURE — 250N000013 HC RX MED GY IP 250 OP 250 PS 637: Performed by: FAMILY MEDICINE

## 2023-12-04 PROCEDURE — 84484 ASSAY OF TROPONIN QUANT: CPT | Performed by: FAMILY MEDICINE

## 2023-12-04 PROCEDURE — 36415 COLL VENOUS BLD VENIPUNCTURE: CPT | Performed by: FAMILY MEDICINE

## 2023-12-04 PROCEDURE — 93010 ELECTROCARDIOGRAM REPORT: CPT | Performed by: FAMILY MEDICINE

## 2023-12-04 PROCEDURE — 99285 EMERGENCY DEPT VISIT HI MDM: CPT | Mod: 25

## 2023-12-04 PROCEDURE — 99284 EMERGENCY DEPT VISIT MOD MDM: CPT | Mod: 25 | Performed by: FAMILY MEDICINE

## 2023-12-04 PROCEDURE — 80053 COMPREHEN METABOLIC PANEL: CPT | Performed by: FAMILY MEDICINE

## 2023-12-04 PROCEDURE — 85025 COMPLETE CBC W/AUTO DIFF WBC: CPT | Performed by: FAMILY MEDICINE

## 2023-12-04 PROCEDURE — 70450 CT HEAD/BRAIN W/O DYE: CPT

## 2023-12-04 RX ORDER — CLONIDINE HYDROCHLORIDE 0.1 MG/1
0.1 TABLET ORAL ONCE
Status: COMPLETED | OUTPATIENT
Start: 2023-12-04 | End: 2023-12-04

## 2023-12-04 RX ADMIN — CLONIDINE HYDROCHLORIDE 0.1 MG: 0.1 TABLET ORAL at 18:58

## 2023-12-04 ASSESSMENT — ACTIVITIES OF DAILY LIVING (ADL): ADLS_ACUITY_SCORE: 35

## 2023-12-04 NOTE — ED NOTES
"Pt states that she felt a pounding in her chest this morning, so she took her blood pressure. SBP reading in the 180s. During this time, pt states that she did not slight blurred vision and headache. \"It wasn't bad enough to take anything\". She states that she took her BP multiple times throughout the day, with no reading lower than 160/70. Currently, pt reports continued mild headache and pounding in chest but no blurred vision.   "

## 2023-12-04 NOTE — ED TRIAGE NOTES
"Pt presents to ED for concerns of hypertension and headache. Noted BP to be 190-200s systolic this morning, took AM medications and BP recheck systolic 160s. Pt also reports \"pounding in chest\".      Triage Assessment (Adult)       Row Name 12/04/23 1406          Triage Assessment    Airway WDL WDL        Respiratory WDL    Respiratory WDL WDL        Skin Circulation/Temperature WDL    Skin Circulation/Temperature WDL WDL        Peripheral/Neurovascular WDL    Peripheral Neurovascular WDL WDL        Cognitive/Neuro/Behavioral WDL    Cognitive/Neuro/Behavioral WDL WDL                     "

## 2023-12-04 NOTE — ED PROVIDER NOTES
History     Chief Complaint   Patient presents with    Hypertension     HPI  Zee Mireles is a 83 year old female, past medical history is significant for asthma, colitis, COPD, DNI, dyslipidemia, hypertension, GERD, glaucoma, hypertension, hyperlipidemia, hypothyroidism, NSTEMI, PAD, presents to the emergency department concerns of hypertension.  History is obtained from the patient who presents with her son with concerns for blood pressure being up slowly over this past week but especially this morning.  She notes that her blood pressure today started off in the 180s systolic and has never gone below 160 which is unusual for her.  She did have a slight headache that did not merit any type of oral medication and it went away although her vision did seem a bit blurry in both eyes with it.  There was no nausea or vomiting.  No fever chills or sweats no asymmetric weakness or paresthesias.  She did identify some heavy beats/pounding in her chest this morning which was why she checked her blood pressure to see if her heart rate was really fast it was about her blood pressure was up.  She has had high blood pressure over the last 15 years or so, no recent adjustments to her medication and reports that her usual systolic is around 130.  She did not try being seen in her primary provider's clinic.    Allergies:  Allergies   Allergen Reactions    Atorvastatin Muscle Pain (Myalgia)     Was fine while taking simvastatin 20 mg daily.    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     Priority: Medium    Hyponatremia 12/04/2022     Priority: Medium    Influenza A 12/04/2022     Priority: Medium    COPD exacerbation (H) 12/04/2022     Priority: Medium    Coronary artery disease involving native coronary artery of native heart without angina pectoris 10/07/2019     Priority:  Medium    PAD (peripheral artery disease) (H24) 10/07/2019     Priority: Medium    COPD (chronic obstructive pulmonary disease) (H) 08/14/2019     Priority: Medium    Hyperlipidemia LDL goal <70 08/14/2019     Priority: Medium    Essential hypertension 08/14/2019     Priority: Medium    Upper GI bleed 08/14/2019     Priority: Medium    Paroxysmal atrial fibrillation (H) 11/01/2018     Priority: Medium     Overview:   CHADS2 VASC score equals 5 for age, sex, hypertension, and coronary artery disease      Coronary artery disease due to calcified coronary lesion 11/01/2018     Priority: Medium     Overview:   stent to LAD after NSTEMI          Past Medical History:    Past Medical History:   Diagnosis Date    Asthma     Chest pain 11/2018    Colitis     COPD (chronic obstructive pulmonary disease) (H)     COPD (chronic obstructive pulmonary disease) (H)     Coronary artery disease due to calcified coronary lesion 11/01/2018    DNI (do not intubate)     Dyslipidemia, goal LDL below 70     Essential hypertension     GERD (gastroesophageal reflux disease)     Glaucoma     HTN (hypertension)     Hyperlipidemia     Hypothyroid     NSTEMI (non-ST elevation myocardial infarction) (H) 11/01/2018    PAD (peripheral artery disease) (H24)     Paroxysmal atrial fibrillation (H) 11/2018    Paroxysmal atrial fibrillation (H) 11/01/2018    Partial retinal artery occlusion 06/09/2021    Skin cancer     Smoker        Past Surgical History:    Past Surgical History:   Procedure Laterality Date    CATARACT EXTRACTION      cataract removal      COLONOSCOPY  12/2018    [facility]    COLONOSCOPY N/A 12/12/2018    COLONOSCOPY with polypectomy; Damien Denton MD;  [facility];  Service: Gastroenterology    CORONARY STENT PLACEMENT  11/2018    at [facility] in [facility] BALLOON DILATION AND/OR STENT PLACEMENT OF VESSEL  11/2018    stent to LAD    ESOPHAGOSCOPY, GASTROSCOPY, DUODENOSCOPY (EGD), COMBINED  12/2018    [facility]     ESOPHAGOSCOPY, GASTROSCOPY, DUODENOSCOPY (EGD), COMBINED N/A 8/15/2019    Procedure: ESOPHAGOGASTRODUODENOSCOPY, WITH BIOPSY;  Surgeon: Demario Clayton DO;  Location: [facility]    ESOPHAGOSCOPY, GASTROSCOPY, DUODENOSCOPY (EGD), COMBINED N/A 12/10/2018    Damien Denton MD; [facility];  Service: Gastroenterology    IR ABDOMINAL AORTOGRAM  7/3/2006    IR EXTREMITY ANGIOGRAM BILATERAL  7/3/2006    IR MISCELLANEOUS PROCEDURE  7/3/2006    IR MISCELLANEOUS PROCEDURE  2006       Family History:    Family History   Problem Relation Age of Onset    Sudden Death Mother 55.00        no autopsy    Goiter Mother     Stomach Cancer Father 72.00    No Known Problems Brother     Other - See Comments Brother          in [place]    Ulcers Son         Gastric ulcer was perforated; had surgery. His wife passed suddenly at 49 at home in 2016.    Atrial fibrillation Son        Social History:  Marital Status:   [4]  Social History     Tobacco Use    Smoking status: Every Day     Packs/day: 1.00     Years: 60.00     Additional pack years: 0.00     Total pack years: 60.00     Types: Cigarettes    Smokeless tobacco: Never    Tobacco comments:     down to 0.3 ppd since 2017; is down to 6-8 cigarettes a day   Vaping Use    Vaping Use: Never used   Substance Use Topics    Alcohol use: No     Alcohol/week: 1.0 standard drink of alcohol    Drug use: No        Medications:    acebutolol (SECTRAL) 400 MG capsule  acetaminophen (TYLENOL) 500 MG tablet  albuterol (PROAIR HFA/PROVENTIL HFA/VENTOLIN HFA) 108 (90 Base) MCG/ACT inhaler  amLODIPine (NORVASC) 5 MG tablet  aspirin 81 MG EC tablet  brimonidine (ALPHAGAN-P) 0.15 % ophthalmic solution  dorzolamide (TRUSOPT) 2 % ophthalmic solution  latanoprost (XALATAN) 0.005 % ophthalmic solution  levothyroxine (SYNTHROID/LEVOTHROID) 100 MCG tablet  losartan (COZAAR) 100 MG tablet  metoprolol succinate ER (TOPROL XL) 25 MG 24 hr tablet  rosuvastatin (CRESTOR) 5 MG  "tablet  tiotropium-olodaterol (STIOLTO RESPIMAT) 2.5-2.5 MCG/ACT AERS  triamcinolone (ARISTOCORT HP) 0.5 % external cream          Review of Systems   All other systems reviewed and are negative.      Physical Exam   BP: (!) 189/71  Pulse: 65  Temp: 99.1  F (37.3  C)  Resp: 20  Height: 160 cm (5' 3\")  Weight: 48.1 kg (106 lb)  SpO2: 97 %      Physical Exam  Vitals and nursing note reviewed.   Constitutional:       General: She is not in acute distress.     Appearance: Normal appearance. She is normal weight. She is not ill-appearing.   HENT:      Head: Normocephalic and atraumatic.      Right Ear: Tympanic membrane normal.      Left Ear: Tympanic membrane normal.      Nose: Nose normal.      Mouth/Throat:      Mouth: Mucous membranes are moist.      Pharynx: Oropharynx is clear.   Eyes:      Extraocular Movements: Extraocular movements intact.      Conjunctiva/sclera: Conjunctivae normal.      Pupils: Pupils are equal, round, and reactive to light.   Cardiovascular:      Rate and Rhythm: Normal rate and regular rhythm.      Pulses: Normal pulses.      Heart sounds: Normal heart sounds.   Pulmonary:      Effort: Pulmonary effort is normal.      Breath sounds: Normal breath sounds.   Abdominal:      General: Bowel sounds are normal.      Palpations: Abdomen is soft.   Musculoskeletal:         General: Normal range of motion.      Cervical back: Normal range of motion and neck supple.   Skin:     General: Skin is warm and dry.      Capillary Refill: Capillary refill takes less than 2 seconds.   Neurological:      General: No focal deficit present.      Mental Status: She is alert and oriented to person, place, and time.   Psychiatric:         Mood and Affect: Mood normal.         Behavior: Behavior normal.         ED Course             EKG Interpretation:      Interpreted by Chester Levin MD  Time reviewed: Time obtained 1430 time interpreted 1745.  Sinus rhythm 66 bpm flipped T waves in V4 and V5 which are " different i.e. changed from 12/19/2022.         Procedures              Results for orders placed or performed during the hospital encounter of 12/04/23 (from the past 24 hour(s))   CBC with platelets, differential    Narrative    The following orders were created for panel order CBC with platelets, differential.  Procedure                               Abnormality         Status                     ---------                               -----------         ------                     CBC with platelets and d...[[id number]]  Abnormal            Final result                 Please view results for these tests on the individual orders.   Comprehensive metabolic panel   Result Value Ref Range    Sodium 135 135 - 145 mmol/L    Potassium 4.3 3.4 - 5.3 mmol/L    Carbon Dioxide (CO2) 26 22 - 29 mmol/L    Anion Gap 11 7 - 15 mmol/L    Urea Nitrogen 14.2 8.0 - 23.0 mg/dL    Creatinine 0.87 0.51 - 0.95 mg/dL    GFR Estimate 66 >60 mL/min/1.73m2    Calcium 9.2 8.8 - 10.2 mg/dL    Chloride 98 98 - 107 mmol/L    Glucose 103 (H) 70 - 99 mg/dL    Alkaline Phosphatase 69 40 - 150 U/L    AST 17 0 - 45 U/L    ALT 7 0 - 50 U/L    Protein Total 7.3 6.4 - 8.3 g/dL    Albumin 4.1 3.5 - 5.2 g/dL    Bilirubin Total 0.4 <=1.2 mg/dL   Troponin T, High Sensitivity   Result Value Ref Range    Troponin T, High Sensitivity 12 <=14 ng/L   CBC with platelets and differential   Result Value Ref Range    WBC Count 8.3 4.0 - 11.0 10e3/uL    RBC Count 4.61 3.80 - 5.20 10e6/uL    Hemoglobin 14.7 11.7 - 15.7 g/dL    Hematocrit 46.4 35.0 - 47.0 %     (H) 78 - 100 fL    MCH 31.9 26.5 - 33.0 pg    MCHC 31.7 31.5 - 36.5 g/dL    RDW 15.2 (H) 10.0 - 15.0 %    Platelet Count 281 150 - 450 10e3/uL    % Neutrophils 57 %    % Lymphocytes 31 %    % Monocytes 9 %    % Eosinophils 3 %    % Basophils 0 %    % Immature Granulocytes 0 %    Absolute Neutrophils 4.7 1.6 - 8.3 10e3/uL    Absolute Lymphocytes 2.6 0.8 - 5.3 10e3/uL    Absolute Monocytes 0.7 0.0 - 1.3  10e3/uL    Absolute Eosinophils 0.2 0.0 - 0.7 10e3/uL    Absolute Basophils 0.0 0.0 - 0.2 10e3/uL    Absolute Immature Granulocytes 0.0 <=0.4 10e3/uL   CT Head w/o Contrast    Narrative    EXAM: CT HEAD W/O CONTRAST  LOCATION: [facility]  DATE: 12/4/2023    INDICATION: Elevated blood pressure, headache  COMPARISON: None available.  TECHNIQUE: Routine CT Head without IV contrast. Multiplanar reformats. Dose reduction techniques were used.    FINDINGS:  INTRACRANIAL CONTENTS: No acute intracranial hemorrhage, extra-axial collection, or CT evidence of acute infarct. There is a heavily mineralized extra-axial lesion overlying the posterior right cerebellar hemisphere that measures approximately 1.6 cm AP   by 1.8 cm TV 1.4 cm CC. This produces local mass effect upon the underlying cerebellar parenchyma, where there is mild local vasogenic edema marginating this lesion. No midline shift. Mild to moderate presumed chronic small vessel ischemic changes. Mild   to moderate generalized volume loss. No hydrocephalus.     VISUALIZED ORBITS/SINUSES/MASTOIDS: Prior bilateral cataract surgery. Visualized portions of the orbits are otherwise unremarkable. No paranasal sinus mucosal disease. No middle ear or mastoid effusion.    BONES/SOFT TISSUES: No acute abnormality.        Impression    IMPRESSION:  1.  Heavily mineralized extra axial lesion overlying the posterior right cerebellar hemisphere measuring 1.6 x 1.8 x 1.4 cm likely represents a calcified meningioma. This produces local mass effect upon and mild vasogenic edema within the underlying   cerebellar parenchyma. No midline shift. Recommend further evaluation with MRI brain without and with IV contrast on a follow-up outpatient basis.  2.  No superimposed acute intracranial abnormality.  3.  Mild to moderate chronic age-related changes as detailed above.       Medications   cloNIDine (CATAPRES) tablet 0.1 mg (0.1 mg Oral $Given 12/4/23 3391)      8:39 PM  Significant improvement of the patient's blood pressure with clonidine.  She is asymptomatic currently.  I reviewed her medications in the room with her as well as the diagnostics performed here today.  She understands that she should follow-up with her primary care provider with respect to the radiologist impression of the head CT with respect to the calcified meningioma that she has been told previously that she has given the changes that have occurred to it.  No acute superimposed intracranial abnormality per radiology.  No bleed.  Her blood pressures come down nicely with 0.1 mg of clonidine orally.  Lab diagnostics are otherwise reassuring with a normal cardiac troponin.  The patient's EKG did display some T wave inversions but she has no symptoms at the time and she has no cardiac troponin change.  I plan to increase her amlodipine from 5 mg p.o. daily to 10 mg p.o. daily which she can start this evening.  She already has an appointment with her primary towards the end of this month which I encouraged her to keep and she will have a recheck at that time.  Return criteria for the emergency department are reviewed.    Assessments & Plan (with Medical Decision Making)   Assessments and plan with medical decision making at the time stamp above.    Disclaimer: This note consists of symbols derived from keyboarding, dictation and/or voice recognition software. As a result, there may be errors in the script that have gone undetected. Please consider this when interpreting information found in this chart.      I have reviewed the nursing notes.    I have reviewed the findings, diagnosis, plan and need for follow up with the patient.        New Prescriptions    No medications on file       Final diagnoses:   Hypertension, unspecified type       12/4/2023   [facility] EMERGENCY DEPT       Chester Levin MD  12/04/23 2042

## 2023-12-05 NOTE — DISCHARGE INSTRUCTIONS
Increase Norvasc from 5 mg to 10 mg once daily.  Continue all other current antihypertensive medications.  Please make an appointment with your primary care provider in the next 7 to 10 days for follow-up.  Return to the emergency department if concerns in the interim.

## 2023-12-18 ENCOUNTER — VIRTUAL VISIT (OUTPATIENT)
Dept: PHARMACY | Facility: PHYSICIAN GROUP | Age: 83
End: 2023-12-18
Payer: COMMERCIAL

## 2023-12-18 DIAGNOSIS — I10 ESSENTIAL HYPERTENSION: Primary | ICD-10-CM

## 2023-12-18 DIAGNOSIS — R52 PAIN: ICD-10-CM

## 2023-12-18 DIAGNOSIS — J44.9 CHRONIC OBSTRUCTIVE PULMONARY DISEASE, UNSPECIFIED COPD TYPE (H): ICD-10-CM

## 2023-12-18 DIAGNOSIS — L28.1 PRURIGO NODULARIS: ICD-10-CM

## 2023-12-18 DIAGNOSIS — E78.5 HYPERLIPIDEMIA LDL GOAL <70: ICD-10-CM

## 2023-12-18 DIAGNOSIS — I25.10 CORONARY ARTERY DISEASE DUE TO CALCIFIED CORONARY LESION: ICD-10-CM

## 2023-12-18 DIAGNOSIS — I25.84 CORONARY ARTERY DISEASE DUE TO CALCIFIED CORONARY LESION: ICD-10-CM

## 2023-12-18 DIAGNOSIS — E03.9 HYPOTHYROIDISM, UNSPECIFIED TYPE: ICD-10-CM

## 2023-12-18 DIAGNOSIS — H40.003 GLAUCOMA SUSPECT, BILATERAL: ICD-10-CM

## 2023-12-18 PROCEDURE — 99607 MTMS BY PHARM ADDL 15 MIN: CPT | Performed by: PHARMACIST

## 2023-12-18 PROCEDURE — 99605 MTMS BY PHARM NP 15 MIN: CPT | Performed by: PHARMACIST

## 2023-12-18 NOTE — PATIENT INSTRUCTIONS
Recommendations from today's MTM visit:                                                       Change time you take metoprolol to evening.   Continue amlodpine 10 mg every morning.   Continue losartan 100 mg every evening  Check your blood pressure about 2 hours after taking your medicines.     Follow-up: with Dr. Fitzgerald 12/27/2023 at 9:45 AM, MTM in 1 year or sooner as needed     It was great speaking with you today.  I value your experience and would be very thankful for your time in providing feedback in our clinic survey. In the next few days, you may receive an email or text message from [employer] with a link to a survey related to your clinical pharmacist.    To schedule another MTM appointment, please call [phone].    My Clinical Pharmacist's contact information:                                                      Please feel free to contact me with any questions or concerns you have.      Yesenia Ervin, PharmD, [facility] Center[facility]  Medication Therapy Management Pharmacist  [facility]  Voicemail: [phone]

## 2023-12-18 NOTE — PROGRESS NOTES
Medication Therapy Management (MTM) Encounter    ASSESSMENT:                            Medication Adherence/Access:   No issues identified    Hypertension , Coronary Artery Disease  Blood pressure at home after taking medication is at goal of <130/80 mmHg. Also pre-medication readings are also improved with occasional elevations. She may benefit from changing timing of metoprolol succinate to evening to separate from acebutolol dose to see if that helps with orthostatic dizziness. If blood pressure worsens could consider adding hydralazine but given drop in blood pressure after taking medications this may increase dizziness. Renal function reviewed and within normal limits.     Hyperlipidemia   Stable. LDL is at goal of <70 mg/dL.     Hypothyroidism:   Stable.. Last TSH is within normal limits.     COPD:   Stable. Continue to encourage smoking cessation.     Glaucoma:   Stable.     Skin Rash:    Stable.     Pain:    Stable.     PLAN:                            Change time you take metoprolol succinate to evening.   Continue amlodpine 10 mg every morning. Recommend ordering 10 mg tablets before you need another refill.   Continue losartan 100 mg every evening  Check your blood pressure about 2 hours after taking your medicines.     Follow-up: with Dr. Fitzgerald 12/27/2023 at 9:45 AM, MTM in 1 year or sooner as needed     Yesenia Ervin, PharmD, BCACP  Medication Therapy Management Pharmacist  [facility]  Pager: [phone]    SUBJECTIVE/OBJECTIVE:                          Zee Mireles is a 83 year old female called for an initial visit. She was referred to me from Dr. Fitzgerald.      Reason for visit: Medication review and blood pressure management    Allergies/ADRs: Reviewed in chart  Past Medical History: Reviewed in chart  Tobacco: She reports that she has been smoking cigarettes. She has a 60.00 pack-year smoking history. She has never used smokeless tobacco.Nicotine/Tobacco Cessation Plan:   Information  Urology Consult    Patient: Aly Campos MRN: [id number]  SSN: [id number]    YOB: 1931  Age: 81 y.o. Sex: male          Date of Consultation:  July 31, 2023  Requesting Physician: Milagro Chatterjee MD  Reason for Consultation: figueroa            Assessment/Plan:  Recent prostate abscess, drained with figueroa after recent admission earlier this month COVID infection   Discharge to SNF today  Recent UTI  Metastatic prostate cancer hx  DNR    -okay to continue current figueroa to SNF, pt will require OP follow up with Dr. 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 Tish Acosta     History of Present Illness:  Patient is a 81 y.o. male admitted 7/28/2023 to the hospital for COVID-19 virus infection [U07.1]. He was recently discharged from [facility] earlier this month for urosepsis with prostate abscess, resolved after figueroa placement and was sent to [facility] with figueroa. Came back to [facility] with yue/dehydration admission 7/28 with acute covid infection. Urology was consulted today for plan with his chronic figueroa. He is pending discharge today back to [facility]. hx of metastatic prostate cancer with extensive pelvic radiation. Evaluated recently by [facility]S however had great improvement in clinical condition and they didn't rec any surgical intervention unless he worsened and would only require diverting colostomy. Chart review only with acute covid infection. Urine cx this admission 7/28 NG  Creat WNL  HD stable  No abdominal imaging this admission         Past Medical History: Allergies   Allergen Reactions    Bee Venom Other (See Comments)     anaphylaxis    Penicillins Hives     Reaction was in 1950's following a PCN shot      Prior to Admission medications    Medication Sig Start Date End Date Taking? Authorizing Provider   predniSONE (DELTASONE) 20 MG tablet Take 2 tablets every day by oral route with meals. Historical Provider, MD   levoFLOXacin (LEVAQUIN) 500 MG tablet     Historical Provider, MD   doxycycline hyclate (VIBRAMYCIN) 100 MG capsule Take 1 capsule twice a day by oral route with meals. Historical Provider, MD   metroNIDAZOLE (FLAGYL) 500 MG tablet     Historical Provider, MD   oxyCODONE-acetaminophen (PERCOCET) 5-325 MG per tablet     Historical Provider, MD   sennosides-docusate sodium (SENOKOT-S) 8.6-50 MG tablet Take 2 tablets by mouth 2 times daily as needed for Constipation 7/19/23   Reji Maldonado MD   atorvastatin (LIPITOR) 10 MG tablet TAKE 1 TABLET BY MOUTH DAILY 5/24/23   Arlet Redd MD   aspirin 81 MG EC tablet Take 1 tablet by mouth daily    Ar Automatic Reconciliation   Enzalutamide (XTANDI) 40 MG TABS Take 160 mg by mouth daily 3/6/23   Ar Automatic Reconciliation   glimepiride (AMARYL) 2 MG tablet Take 1 tablet by mouth every morning 2/23/23   Ar Automatic Reconciliation   leuprolide (LUPRON) 45 MG injection Inject 45 mg into the muscle once    Ar Automatic Reconciliation   metFORMIN (GLUCOPHAGE-XR) 500 MG extended release tablet Take 2 tablets by mouth 2 times daily (with meals) 2/23/23   Ar Automatic Reconciliation   polyethylene glycol (GLYCOLAX) 17 GM/SCOOP powder Take 17 g by mouth daily    Ar Automatic Reconciliation      PMHx:  has a past medical history of Cancer ([address]), Chronic kidney disease, and Diabetes ([address]). PSurgHx:  has no past surgical history on file. PSocHx:  reports that he has never smoked. He has never used smokeless tobacco. He reports that he does not drink alcohol and does not use drugs. ROS:  Admission ROS by Abdelrahman Morales MD from 7/28/2023 were reviewed with the patient and changes (other than per HPI) include: none.           Lab Results   Component Value Date/Time    WBC 5.1 07/31/2023 05:06 AM    HCT 27.2 07/31/2023 05:06 AM     offered: Patient not interested at this time  Alcohol: none    Medication Adherence/Access:   Patient takes medications directly from bottles.  Patient takes medications 2 time(s) per day.   She has a good system and does not forget to take her medications.       Hypertension , Coronary Artery Disease    Amlodipine 10 mg (2x 5 mg tabs) once daily AM  Losartan 100 mg once daily PM  Metoprolol succinate ER 25 mg once daily AM  Acebutolol 400 mg daily in the morning for palpitations  Aspirin 81 mg once daily  After recent visit with Dr. Fitzgerald she changed when she takes losartan to evening and amlodipine to morning. No increased edema in her legs since increasing amlodipine dose. She has occasional dizziness upon standing but resolves quickly. She uses acebutolol every day otherwise she notices palpitations. Appears she was switched to metoprolol from diltiazem by cardiology last November. She had issues with hyponatremia with hydrochlorothiazide.   She does have history of GI bleeding. No current signs or symptoms of significant bruising or bleeding.   Patient self monitors blood pressure.    Home BP monitorin/11 8 am (before meds) 172/84, 4 pm 158/77   - 10:30 am (after meds) 116/63.    - AM (before meds) 140/80, PM- 141/74  - AM (before meds) 167/84, 10:45 (after meds)- 117/64, 6pm- 159/69  12/15- 8:30 (before meds)- 156/83, 1pm- 139/63, 8pm- 116/58  12/16- AM (before meds) 159/64, 10:30 am (after meds)- 124/64, 8 pm-146/60  1217- AM (before meds) 157/77, 1pm- 146/68   18- 9am (before meds)- 154/83     BP Readings from Last 3 Encounters:   23 134/59   23 (!) 187/77   23 (!) 176/69     Pulse Readings from Last 3 Encounters:   23 67   23 90   23 79     Creatinine   Date Value Ref Range Status   2023 0.87 0.51 - 0.95 mg/dL Final     GFR Estimate   Date Value Ref Range Status   2023 66 >60 mL/min/1.73m2 Final       Hyperlipidemia     Rosuvastatin 5 mg  three days a week  Patient reports no significant myalgias or other side effects. She did have side effects of muscle aches when taking it more frequently.      Recent Labs   Lab Test 11/02/22  1212 07/07/21  1130   CHOL 152 146   HDL 85 70   LDL 52 62   TRIG 75 72       Hypothyroidism      Levothyroxine 100 mcg daily.   Patient is having the following symptoms: none.      TSH   Date Value Ref Range Status   03/30/2023 1.22 0.30 - 4.20 uIU/mL Final   01/20/2022 0.54 0.30 - 5.00 uIU/mL Final   11/26/2018 8.11 (H) 0.40 - 4.00 mU/L Final     COPD:   LABA/LAMA - Stiolto Respimat 2.5/2.5 mcg - two puffs once daily in evening  Albuterol (ProAir/Ventolin/Proventil)- 2-3 times a week  She follows with pulmonology.   Patient reports no current medication side effects.    Patient reports the following symptoms: none. She had a cough a few weeks ago that she used more albuterol for but resolved and no other issues.   Continues to use tobacco.     Glaucoma:   Brimonidine 0.15% 1 drop both eyes three times a day   Dorzolamide 2% 1 drop both eyes three times a day  Latanoprost 0.005% 1 drop each eye once daily   Last eye doctor appointment pressures had improved and her provider was happy. No issues with administering drops or side effects.     Skin Rash:    Triamcinolone 0.1% cream three times a day as needed   Prescribed by dermatology for prurigo. She does think it is helping some. She was told to follow-up in 6 months if not helping. No issues reported.    Pain:    Acetaminophen 500 mg once daily in morning  Takes one tablet in the morning to help with aching. She does think it helps after she takes it. No other pain medicines. No issues reported.           Today's Vitals: There were no vitals taken for this visit.  ----------------      I spent 23 minutes with this patient today. All changes were made via collaborative practice agreement with Jessica Fitzgerald MD. A copy of the visit note was provided to the patient's  provider(s).    A summary of these recommendations was mailed to the patient.    Yesenia Ervin, PharmD, BCACP  Medication Therapy Management Pharmacist  [facility]  Pager: [phone]      Telemedicine Visit Details  Type of service:  Telephone visit  Start Time:  10:00 AM  End Time:  10:23 AM     Medication Therapy Recommendations  Coronary artery disease due to calcified coronary lesion    Current Medication: metoprolol succinate ER (TOPROL XL) 25 MG 24 hr tablet   Rationale: Undesirable effect - Adverse medication event - Safety   Recommendation: Change Administration Time   Status: Accepted - no CPA Needed

## 2023-12-18 NOTE — LETTER
"Recommended To-Do List      Prepared on: 12/18/2023       You can get the best results from your medications by completing the items on this \"To-Do List.\"      Bring your To-Do List when you go to your doctor. And, share it with your family or caregivers.    My To-Do List:  What we talked about: What I should do:   An issue with your medication    Change when you are taking metoprolol succinate ER (TOPROL XL) to evening.           What we talked about: What I should do:    What my medicines are for, how to know if my medicines are working, made sure my medicines are safe for me and reviewed how to take my medicines.      Take my medicines every day                "

## 2023-12-18 NOTE — LETTER
_  Medication List        Prepared on: 12/18/2023     Bring your Medication List when you go to the doctor, hospital, or   emergency room. And, share it with your family or caregivers.     Note any changes to how you take your medications.  Cross out medications when you no longer use them.    Medication How I take it Why I use it Prescriber   acebutolol (SECTRAL) 400 MG capsule Take 400 mg by mouth daily Palpitations Jessica Fitzgerald MD   acetaminophen (TYLENOL) 500 MG tablet Take 500-1,000 mg by mouth every 6 hours as needed for mild pain Pain Patient Reported   albuterol (PROAIR HFA/PROVENTIL HFA/VENTOLIN HFA) 108 (90 Base) MCG/ACT inhaler Inhale 2 puffs into the lungs every 4 hours as needed for shortness of breath / dyspnea or wheezing  Chronic obstructive pulmonary disease DIANA Thomas CNP   amLODIPine (NORVASC) 5 MG tablet Take 10 mg by mouth daily High Blood Pressure  Jessica Fitzgerald MD   aspirin 81 MG EC tablet Take 81 mg by mouth daily Disease of the Peripheral Arteries; Heart Disease Buck Jha MD   brimonidine (ALPHAGAN-P) 0.15 % ophthalmic solution Place 1 drop into both eyes 3 times daily Glaucoma Gayle Grissom MD   dorzolamide (TRUSOPT) 2 % ophthalmic solution Place 1 drop into both eyes 3 times daily Glaucoma Gayle Grissom MD   latanoprost (XALATAN) 0.005 % ophthalmic solution Place 1 drop into both eyes every evening Glaucoma Gayle Grissom MD   levothyroxine (SYNTHROID/LEVOTHROID) 100 MCG tablet Take 1 tablet by mouth every morning (before breakfast) Underactive Thyroid Jessica Fitzgerald MD   losartan (COZAAR) 100 MG tablet Take 1 tablet (100 mg) by mouth daily High Blood Pressure Jessica Fitzgerald MD   metoprolol succinate ER (TOPROL XL) 25 MG 24 hr tablet TAKE 1 TABLET(25 MG) BY MOUTH DAILY Coronary artery disease ; Essential Hypertension Kanu Stover MD   rosuvastatin (CRESTOR) 5 MG tablet Take 5 mg by mouth three times a week Monday, Wednesday, Friday Disease of the  Peripheral Arteries; Heart Disease Jessica Fitzgerald MD   tiotropium-olodaterol (STIOLTO RESPIMAT) 2.5-2.5 MCG/ACT AERS Inhale 2 puffs into the lungs daily Chronic obstructive pulmonary disease DIANA Thomas CNP   triamcinolone (ARISTOCORT HP) 0.5 % external cream Apply sparingly twice daily as needed to spot treat itchy areas on arms, legs. Prurigo Nodularis Melody Odonnell PA-C         Add new medications, over-the-counter drugs, herbals, vitamins, or  minerals in the blank rows below.    Medication How I take it Why I use it Prescriber                                      Allergies:      atorvastatin; zoledronic acid; adhesive tape        Side effects I have had:               Other Information:              My notes and questions:

## 2023-12-18 NOTE — LETTER
December 18, 2023  Zee J Aline  [address] [facility] [address]    Dear MsMannie Aline, [facility]     Thank you for talking with me on Dec 18, 2023 about your health and medications. As a follow-up to our conversation, I have included two documents:      Your Recommended To-Do List has steps you should take to get the best results from your medications.  Your Medication List will help you keep track of your medications and how to take them.    If you want to talk about these documents, please call Yesenia Ervin PharmD at phone: [phone], Monday-Friday 8-4:30pm.    I look forward to working with you and your doctors to make sure your medications work well for you.    Sincerely,  Yesenia Ervin PharmD  [facility] Pharmacist, [facility]

## 2023-12-27 ENCOUNTER — TRANSFERRED RECORDS (OUTPATIENT)
Dept: HEALTH INFORMATION MANAGEMENT | Facility: CLINIC | Age: 83
End: 2023-12-27

## 2024-02-06 DIAGNOSIS — E78.5 HYPERLIPIDEMIA LDL GOAL <70: ICD-10-CM

## 2024-02-06 DIAGNOSIS — Z87.891 PERSONAL HISTORY OF TOBACCO USE, PRESENTING HAZARDS TO HEALTH: ICD-10-CM

## 2024-02-06 DIAGNOSIS — I10 ESSENTIAL HYPERTENSION: ICD-10-CM

## 2024-02-06 DIAGNOSIS — I25.119 CORONARY ARTERY DISEASE INVOLVING NATIVE CORONARY ARTERY OF NATIVE HEART WITH ANGINA PECTORIS (H): ICD-10-CM

## 2024-02-06 DIAGNOSIS — I25.119 CORONARY ARTERY DISEASE INVOLVING NATIVE CORONARY ARTERY OF NATIVE HEART WITH ANGINA PECTORIS (H): Primary | ICD-10-CM

## 2024-02-06 DIAGNOSIS — I48.0 PAROXYSMAL ATRIAL FIBRILLATION (H): ICD-10-CM

## 2024-02-06 DIAGNOSIS — I73.9 PAD (PERIPHERAL ARTERY DISEASE) (H): ICD-10-CM

## 2024-02-06 RX ORDER — METOPROLOL SUCCINATE 25 MG/1
25 TABLET, EXTENDED RELEASE ORAL DAILY
Qty: 30 TABLET | Refills: 0 | Status: SHIPPED | OUTPATIENT
Start: 2024-02-06 | End: 2024-02-07

## 2024-02-07 RX ORDER — METOPROLOL SUCCINATE 25 MG/1
TABLET, EXTENDED RELEASE ORAL
Qty: 90 TABLET | Refills: 0 | Status: SHIPPED | OUTPATIENT
Start: 2024-02-07

## 2024-02-22 NOTE — PROGRESS NOTES
HEART CARE ENCOUNTER NOTE      [facility]  [phone]      Assessment/Recommendations   Assessment:   Coronary artery disease: Status post NSTEMI with AGATHA to mid LAD 11/29/2018.  Coronary angiogram at that time also showed stenoses in ostial first obtuse marginal artery and mid RCA.  Patient denies any anginal symptoms  Paroxysmal atrial fibrillation: First noted in 2018 when patient was in the hospital for NSTEMI.  No known recurrence.  Essential hypertension: Controlled on metoprolol 25 mg daily, amlodipine 10 mg daily, losartan 100 mg daily, acebutolol 400 mg daily.  CMP 12/4/2023 was stable.  Hyperlipidemia: On rosuvastatin 5 mg 3 times a week.  Last lipids from 2022 showed LDL of 52.  Peripheral arterial disease: Status post stenting of right external iliac/right common femoral artery 7/3/2006.    Moderate stenosis of right internal carotid artery: 50 to 69% stenosis noted on carotid ultrasound 7/5/2019    Plan:   Cholesterol check to be done in April through PCP  Continue monitoring blood pressure and will let us know if it increases again consistently above 140s systolic      Follow up in 1 year with Dr. Stover, sooner if needed.     History of Present Illness/Subjective    HPI: Zee Mireles is a 83 year old female with PMHx of CAD status post NSTEMI with AGATHA to mid LAD 11/29/2018, PAD status post stenting of the right external iliac/common femoral artery 7/3/2006, paroxysmal A-fib only seen at the time of NSTEMI in 2018 and not on anticoagulation due to prior GI bleed, hypertension, hyperlipidemia presents for follow-up.  Patient last saw Dr. Stover 11/8/2022 after an ED visit on patient was feeling tightness in her chest and palpitations.  Patient was given diltiazem which lowered her BP and improved symptoms.  EKG and troponin negative.    Patient presented to the ED 12/4/2023 with high blood pressure.  That day noted it was in the 180s and she had a slight headache.  Her  amlodipine was increased from 5 to 10 mg daily at that time.  Patient saw her primary care provider shortly after where she was noting some occasional orthostatic dizziness.  Was recommended to change her metoprolol to evening dosing. At that time did feel heart racing but pulse was only 80.     Felt heart beating faster this morning for a couple hours but pulse only went up to 78. BP was high at that time to 170 but unsure if cuff wasn't working. After sitting was 117 systolic. Notes normally BP is in the 130s.     Patient walks around stores and house without exertional symptoms. If going up hills feels a little more SOB but is able to do and go up stairs without needing a break- more weakness in the legs that limits her. Walks outside in the summer. Uses cane. Patient gets some swelling in left leg only and notes it has been present for a while after lots of cellulitis. Denies any swelling in her right leg. She denies fatigue, lightheadedness, shortness of breath, orthopnea, PND, chest pain, and abdominal fullness/bloating.        CXR 10/26/2022   No acute cardiopulmonary disease.     ECHO 7/11/2019  1. Normal resting left ventricular size, estimated EF . No resting regional wall motion abnormalities noted.  2. Mild concentric left ventricular hypertrophy.  3. Aortic valve sclerosis. No evidence of aortic stenosis. Mild aortic insufficiency.  4. Mitral annular calcification. Mild mitral regurgitation.  5. Mild tricuspid regurgitation. Estimated RA pressure 5-10 mm Hg based upon evaluation of the IVC. Calculated RV systolic pressure 27 mm Hg plus right atrial pressure (normal).  6. Structurally and functionally unremarkable pulmonic valve.  7. Patient incidentally noted to be in sinus rhythm during echocardiographic imaging.     Exercise ABIs 1/21/2019  1.  Resting ABIs on the right compatible with moderate arterial insufficiency that worsens with exercise.  2.  Resting ABIs on the left compatible with  moderate arterial insufficiency that becomes borderline severe with exercise. Catheter angiogram with possible intervention could be performed for further evaluation and treatment.     Carotid ultrasound 7/5/2019   1. Right carotid: There has been significant interval softening calcified plaque formation within the distal right common carotid artery, bifurcation and right proximal ICA relative to that of the prior exam from 2014. Currently velocity and ICA/CCA ratio suggests borderline 50-69% stenosis within the ICA. In a clinically symptomatic patient this may be hemodynamically significant.  2. Left carotid: There is moderate arteriosclerotic plaque formation identified within the bifurcation which is unchanged compared to prior exam. The estimated vascular stenosis of the left ICA is less than 50% which is not considered hemodynamically significant.  3. Vertebral arteries: Patent with normal cephalad flow direction.     Stress test 11/27/2018   1.  Myocardial perfusion imaging using single isotope technique  demonstrated mild to moderate mid to distal anteroseptal defect with  mild reversibility. (Normal wall motion in this area, however due to  mild reversibility cannot exclude mild ischemia in this territory)   2. Gated images demonstrated normal LV cavity size with hyperdynamic  LV systolic function and normal wall motion.  The left ventricular  systolic function is 81%.  3. Compared to the prior study from no prior study .     Cardiac cath 11/29/2018  LEFT MAIN CORONARY ARTERY: No significant disease  LEFT ANTERIOR DESCENDING ARTERY: Heavily calcified LAD. There is mild  diffuse disease until the mid segment at the diagonal branch where  there is a heavily calcified 89% stenosis. The first septal   also has an 80% ostial stenosis. The diagonal branch has a severe 95%  stenosis with ulceration and a 90 degree takeoff from the LAD. This  vessel is probably 1.52 mm in caliber.  CIRCUMFLEX ARTERY:  Circumflex has mild diffuse disease. OM1 has an  ostial 70% stenosis of a moderate size vessel.  RIGHT CORONARY ARTERY: Heavily calcified dominant right. There is a  mid vessel 60% stenosis.  HEMODYNAMICS: Aortic pressures is 160/70,  PERCUTANEOUS CORONARY INTERVENTION: After reviewing the coronary  angiography findings with the patient, we elected to treat the LAD. A  6 [place] JL 3.5 guiding catheter was used to engage the ostium of the  left main. This provided an adequate support for the procedure. A 14  wire was used to successfully the LAD lesion. The lesion was  pre-dilated with a 2.5 compliant balloon. After adequate angioplasty  result, a 0.5 x 28 drug-eluting stent was deployed inside the lesion  using 14 gretchen atmosphere pressure. A guideline her catheter was used  and was necessary to deliver the LAD stent. We attempted to cross the  lesion back and postdilated with a NC balloon both a [employer] as well  as a [place] balloon and were unable to get the balloon to the site  even with use of a guideline a catheter.       Physical Examination  Review of Systems   Vitals: /68 (BP Location: Left arm, Patient Position: Sitting, Cuff Size: Adult Small)   Pulse 65   Resp 14   Wt 51.3 kg (113 lb)   BMI 20.02 kg/m    BMI= Body mass index is 20.02 kg/m .  Wt Readings from Last 3 Encounters:   02/23/24 51.3 kg (113 lb)   12/04/23 48.1 kg (106 lb)   11/27/23 48.1 kg (106 lb)           ENT/Mouth: membranes moist, no oral lesions or bleeding gums.      EYES:  no scleral icterus, normal conjunctivae                    Neck: No carotid bruit   Chest/Lungs:   lungs are clear to auscultation, no rales or wheezing,  equal chest wall expansion    Cardiovascular:   Regular. Normal first and second heart sounds with no murmurs, rubs, or gallops; the carotid, radial and posterior tibial pulses are intact, trace edema in left ankle, no edema noted in right lower extremity       Extremities: no cyanosis or clubbing   Skin:  "no xanthelasma, warm.    Neurologic: no tremors     Psychiatric: alert and oriented x3, calm        Please refer above for cardiac ROS details.        Medical History  Surgical History Family History Social History   Past Medical History:   Diagnosis Date    Asthma     Chest pain 11/2018    burning sensation - indigestion    Colitis     COPD (chronic obstructive pulmonary disease) (H)     COPD (chronic obstructive pulmonary disease) (H)     Coronary artery disease due to calcified coronary lesion 11/01/2018    stent to LAD after NSTEMI    DNI (do not intubate)     Dyslipidemia, goal LDL below 70     Essential hypertension     GERD (gastroesophageal reflux disease)     Glaucoma     HTN (hypertension)     Hyperlipidemia     Hypothyroid     NSTEMI (non-ST elevation myocardial infarction) (H) 11/01/2018    PAD (peripheral artery disease) (H24)     Paroxysmal atrial fibrillation (H) 11/2018    Paroxysmal atrial fibrillation (H) 11/01/2018    Partial retinal artery occlusion 06/09/2021    Skin cancer     Smoker     ongoing - \"6-8 cigarettes QD\"     Past Surgical History:   Procedure Laterality Date    CATARACT EXTRACTION      cataract removal      COLONOSCOPY  12/2018    [facility]    COLONOSCOPY N/A 12/12/2018    COLONOSCOPY with polypectomy; Damien Denton MD;  [facility];  Service: Gastroenterology    CORONARY STENT PLACEMENT  11/2018    at [facility] in [facility] BALLOON DILATION AND/OR STENT PLACEMENT OF VESSEL  11/2018    stent to LAD    ESOPHAGOSCOPY, GASTROSCOPY, DUODENOSCOPY (EGD), COMBINED  12/2018    [facility]    ESOPHAGOSCOPY, GASTROSCOPY, DUODENOSCOPY (EGD), COMBINED N/A 8/15/2019    Procedure: ESOPHAGOGASTRODUODENOSCOPY, WITH BIOPSY;  Surgeon: Demario Clayton DO;  Location: [facility]    ESOPHAGOSCOPY, GASTROSCOPY, DUODENOSCOPY (EGD), COMBINED N/A 12/10/2018    Damien Denton MD; [facility];  Service: Gastroenterology    IR ABDOMINAL AORTOGRAM  7/3/2006    IR EXTREMITY ANGIOGRAM " BILATERAL  7/3/2006    IR MISCELLANEOUS PROCEDURE  7/3/2006    IR MISCELLANEOUS PROCEDURE  2006     Family History   Problem Relation Age of Onset    Sudden Death Mother 55.00        no autopsy    Goiter Mother     Stomach Cancer Father 72.00    No Known Problems Brother     Other - See Comments Brother          in [place]    Ulcers Son         Gastric ulcer was perforated; had surgery. His wife passed suddenly at 49 at home in 2016.    Atrial fibrillation Son         Social History     Socioeconomic History    Marital status:      Spouse name: Not on file    Number of children: 2    Years of education: Not on file    Highest education level: Not on file   Occupational History    Not on file   Tobacco Use    Smoking status: Every Day     Packs/day: 1.00     Years: 60.00     Additional pack years: 0.00     Total pack years: 60.00     Types: Cigarettes    Smokeless tobacco: Never    Tobacco comments:     down to 0.3 ppd since 2017; is down to 6-8 cigarettes a day   Vaping Use    Vaping Use: Never used   Substance and Sexual Activity    Alcohol use: No     Alcohol/week: 1.0 standard drink of alcohol    Drug use: No    Sexual activity: Not Currently     Partners: Male   Other Topics Concern    Not on file   Social History Narrative    Zee lives with her son, Braden, and his wife.     Social Determinants of Health     Financial Resource Strain: Not on file   Food Insecurity: Not on file   Transportation Needs: Not on file   Physical Activity: Not on file   Stress: Not on file   Social Connections: Not on file   Interpersonal Safety: Not on file   Housing Stability: Not on file           Medications  Allergies   Current Outpatient Medications   Medication Sig Dispense Refill    acebutolol (SECTRAL) 400 MG capsule Take 400 mg by mouth daily      acetaminophen (TYLENOL) 500 MG tablet Take 500-1,000 mg by mouth every 6 hours as needed for mild pain      albuterol (PROAIR HFA/PROVENTIL HFA/VENTOLIN HFA)  108 (90 Base) MCG/ACT inhaler Inhale 2 puffs into the lungs every 4 hours as needed for shortness of breath / dyspnea or wheezing OFFICE VISIT NEEDED FOR ANY FURTHER REFILLS 18 g 0    amLODIPine (NORVASC) 5 MG tablet Take 10 mg by mouth daily      aspirin 81 MG EC tablet Take 81 mg by mouth daily      brimonidine (ALPHAGAN-P) 0.15 % ophthalmic solution Place 1 drop into both eyes 3 times daily      dorzolamide (TRUSOPT) 2 % ophthalmic solution Place 1 drop into both eyes 3 times daily      latanoprost (XALATAN) 0.005 % ophthalmic solution Place 1 drop into both eyes every evening      levothyroxine (SYNTHROID/LEVOTHROID) 100 MCG tablet Take 1 tablet by mouth every morning (before breakfast)      losartan (COZAAR) 100 MG tablet Take 1 tablet (100 mg) by mouth daily 30 tablet 0    metoprolol succinate ER (TOPROL XL) 25 MG 24 hr tablet TAKE 1 TABLET(25 MG) BY MOUTH DAILY. NEED FOLLOW UP 90 tablet 0    rosuvastatin (CRESTOR) 5 MG tablet Take 5 mg by mouth three times a week Monday, Wednesday, Friday  0    tiotropium-olodaterol (STIOLTO RESPIMAT) 2.5-2.5 MCG/ACT AERS Inhale 2 puffs into the lungs daily 4 g 11    triamcinolone (ARISTOCORT HP) 0.5 % external cream Apply sparingly twice daily as needed to spot treat itchy areas on arms, legs. 30 g 4       Allergies   Allergen Reactions    Atorvastatin Muscle Pain (Myalgia)     Was fine while taking simvastatin 20 mg daily.    Zoledronic Acid Other (See Comments)     SEVERE REACTION        Adhesive Tape Rash          Lab Results    Chemistry/lipid CBC Cardiac Enzymes/BNP/TSH/INR   Recent Labs   Lab Test 11/02/22  1212   CHOL 152   HDL 85   LDL 52   TRIG 75     Recent Labs   Lab Test 11/02/22  1212 07/07/21  1130 05/11/20  1201   LDL 52 62 59     Recent Labs   Lab Test 12/04/23  1739      POTASSIUM 4.3   CHLORIDE 98   CO2 26   *   BUN 14.2   CR 0.87   GFRESTIMATED 66   KARI 9.2     Recent Labs   Lab Test 12/04/23  1739 05/11/23  1431 03/30/23  1112   CR 0.87 0.80  "0.88     No results for input(s): \"A1C\" in the last 63389 hours.       Recent Labs   Lab Test 12/04/23  1739   WBC 8.3   HGB 14.7   HCT 46.4   *        Recent Labs   Lab Test 12/04/23  1739 05/15/23  1723 05/11/23  1431   HGB 14.7 12.2 12.5    Recent Labs   Lab Test 05/28/18  2049   TROPONINI 0.02     Recent Labs   Lab Test 12/19/22  1749 05/28/18  2049   BNP  --  374*   NTBNPI 1,666  --      Recent Labs   Lab Test 03/30/23  1112   TSH 1.22     Recent Labs   Lab Test 08/14/19  1852 12/12/18  0535 12/10/18  1427   INR 1.06 1.19* 3.13*          Yazmin Flower PA-C                                       "

## 2024-02-23 ENCOUNTER — OFFICE VISIT (OUTPATIENT)
Dept: CARDIOLOGY | Facility: CLINIC | Age: 84
End: 2024-02-23
Attending: GENERAL ACUTE CARE HOSPITAL
Payer: COMMERCIAL

## 2024-02-23 VITALS
WEIGHT: 113 LBS | BODY MASS INDEX: 20.02 KG/M2 | DIASTOLIC BLOOD PRESSURE: 68 MMHG | RESPIRATION RATE: 14 BRPM | SYSTOLIC BLOOD PRESSURE: 138 MMHG | HEART RATE: 65 BPM

## 2024-02-23 DIAGNOSIS — I10 ESSENTIAL HYPERTENSION: ICD-10-CM

## 2024-02-23 DIAGNOSIS — I73.9 PAD (PERIPHERAL ARTERY DISEASE) (H): ICD-10-CM

## 2024-02-23 DIAGNOSIS — E78.5 HYPERLIPIDEMIA LDL GOAL <70: ICD-10-CM

## 2024-02-23 DIAGNOSIS — I25.119 CORONARY ARTERY DISEASE INVOLVING NATIVE CORONARY ARTERY OF NATIVE HEART WITH ANGINA PECTORIS (H): ICD-10-CM

## 2024-02-23 DIAGNOSIS — I48.0 PAROXYSMAL ATRIAL FIBRILLATION (H): ICD-10-CM

## 2024-02-23 PROCEDURE — 99214 OFFICE O/P EST MOD 30 MIN: CPT | Performed by: STUDENT IN AN ORGANIZED HEALTH CARE EDUCATION/TRAINING PROGRAM

## 2024-02-23 RX ORDER — AMLODIPINE BESYLATE 10 MG/1
10 TABLET ORAL DAILY
COMMUNITY
Start: 2023-12-27

## 2024-02-23 NOTE — PATIENT INSTRUCTIONS
Zee Mireles,    It was a pleasure to see you today at the [facility].     My recommendations after this visit include:    - Continue current medications.   - Cholesterol check to be done in April with PCP  - Follow up with Dr. Stover in 1 year, sooner if needed    - Please call [phone], if you have any questions or concerns      Yazmin Flower PA-C

## 2024-02-23 NOTE — LETTER
2/23/2024    Jessica Fitzgerald MD  [address]  [place] [address]    RE: Zee Mireles       Dear Colleague,     I had the pleasure of seeing Zee Mireles in the [facility].    HEART CARE ENCOUNTER NOTE      [facility]  [phone]      Assessment/Recommendations   Assessment:   Coronary artery disease: Status post NSTEMI with AGATHA to mid LAD 11/29/2018.  Coronary angiogram at that time also showed stenoses in ostial first obtuse marginal artery and mid RCA.  Patient denies any anginal symptoms  Paroxysmal atrial fibrillation: First noted in 2018 when patient was in the hospital for NSTEMI.  No known recurrence.  Essential hypertension: Controlled on metoprolol 25 mg daily, amlodipine 10 mg daily, losartan 100 mg daily, acebutolol 400 mg daily.  CMP 12/4/2023 was stable.  Hyperlipidemia: On rosuvastatin 5 mg 3 times a week.  Last lipids from 2022 showed LDL of 52.  Peripheral arterial disease: Status post stenting of right external iliac/right common femoral artery 7/3/2006.    Moderate stenosis of right internal carotid artery: 50 to 69% stenosis noted on carotid ultrasound 7/5/2019    Plan:   Cholesterol check to be done in April through PCP  Continue monitoring blood pressure and will let us know if it increases again consistently above 140s systolic      Follow up in 1 year with Dr. Stover, sooner if needed.     History of Present Illness/Subjective    HPI: Zee Mireles is a 83 year old female with PMHx of CAD status post NSTEMI with AGATHA to mid LAD 11/29/2018, PAD status post stenting of the right external iliac/common femoral artery 7/3/2006, paroxysmal A-fib only seen at the time of NSTEMI in 2018 and not on anticoagulation due to prior GI bleed, hypertension, hyperlipidemia presents for follow-up.  Patient last saw Dr. Stover 11/8/2022 after an ED visit on patient was feeling tightness in her chest and palpitations.  Patient was given diltiazem  which lowered her BP and improved symptoms.  EKG and troponin negative.    Patient presented to the ED 12/4/2023 with high blood pressure.  That day noted it was in the 180s and she had a slight headache.  Her amlodipine was increased from 5 to 10 mg daily at that time.  Patient saw her primary care provider shortly after where she was noting some occasional orthostatic dizziness.  Was recommended to change her metoprolol to evening dosing. At that time did feel heart racing but pulse was only 80.     Felt heart beating faster this morning for a couple hours but pulse only went up to 78. BP was high at that time to 170 but unsure if cuff wasn't working. After sitting was 117 systolic. Notes normally BP is in the 130s.     Patient walks around stores and house without exertional symptoms. If going up hills feels a little more SOB but is able to do and go up stairs without needing a break- more weakness in the legs that limits her. Walks outside in the summer. Uses cane. Patient gets some swelling in left leg only and notes it has been present for a while after lots of cellulitis. Denies any swelling in her right leg. She denies fatigue, lightheadedness, shortness of breath, orthopnea, PND, chest pain, and abdominal fullness/bloating.        CXR 10/26/2022   No acute cardiopulmonary disease.     ECHO 7/11/2019  1. Normal resting left ventricular size, estimated EF . No resting regional wall motion abnormalities noted.  2. Mild concentric left ventricular hypertrophy.  3. Aortic valve sclerosis. No evidence of aortic stenosis. Mild aortic insufficiency.  4. Mitral annular calcification. Mild mitral regurgitation.  5. Mild tricuspid regurgitation. Estimated RA pressure 5-10 mm Hg based upon evaluation of the IVC. Calculated RV systolic pressure 27 mm Hg plus right atrial pressure (normal).  6. Structurally and functionally unremarkable pulmonic valve.  7. Patient incidentally noted to be in sinus rhythm during  echocardiographic imaging.     Exercise ABIs 1/21/2019  1.  Resting ABIs on the right compatible with moderate arterial insufficiency that worsens with exercise.  2.  Resting ABIs on the left compatible with moderate arterial insufficiency that becomes borderline severe with exercise. Catheter angiogram with possible intervention could be performed for further evaluation and treatment.     Carotid ultrasound 7/5/2019   1. Right carotid: There has been significant interval softening calcified plaque formation within the distal right common carotid artery, bifurcation and right proximal ICA relative to that of the prior exam from 2014. Currently velocity and ICA/CCA ratio suggests borderline 50-69% stenosis within the ICA. In a clinically symptomatic patient this may be hemodynamically significant.  2. Left carotid: There is moderate arteriosclerotic plaque formation identified within the bifurcation which is unchanged compared to prior exam. The estimated vascular stenosis of the left ICA is less than 50% which is not considered hemodynamically significant.  3. Vertebral arteries: Patent with normal cephalad flow direction.     Stress test 11/27/2018   1.  Myocardial perfusion imaging using single isotope technique  demonstrated mild to moderate mid to distal anteroseptal defect with  mild reversibility. (Normal wall motion in this area, however due to  mild reversibility cannot exclude mild ischemia in this territory)   2. Gated images demonstrated normal LV cavity size with hyperdynamic  LV systolic function and normal wall motion.  The left ventricular  systolic function is 81%.  3. Compared to the prior study from no prior study .     Cardiac cath 11/29/2018  LEFT MAIN CORONARY ARTERY: No significant disease  LEFT ANTERIOR DESCENDING ARTERY: Heavily calcified LAD. There is mild  diffuse disease until the mid segment at the diagonal branch where  there is a heavily calcified 89% stenosis. The first septal    also has an 80% ostial stenosis. The diagonal branch has a severe 95%  stenosis with ulceration and a 90 degree takeoff from the LAD. This  vessel is probably 1.52 mm in caliber.  CIRCUMFLEX ARTERY: Circumflex has mild diffuse disease. OM1 has an  ostial 70% stenosis of a moderate size vessel.  RIGHT CORONARY ARTERY: Heavily calcified dominant right. There is a  mid vessel 60% stenosis.  HEMODYNAMICS: Aortic pressures is 160/70,  PERCUTANEOUS CORONARY INTERVENTION: After reviewing the coronary  angiography findings with the patient, we elected to treat the LAD. A  6 Romanian JL 3.5 guiding catheter was used to engage the ostium of the  left main. This provided an adequate support for the procedure. A 14  wire was used to successfully the LAD lesion. The lesion was  pre-dilated with a 2.5 compliant balloon. After adequate angioplasty  result, a 0.5 x 28 drug-eluting stent was deployed inside the lesion  using 14 gretchen atmosphere pressure. A guideline her catheter was used  and was necessary to deliver the LAD stent. We attempted to cross the  lesion back and postdilated with a NC balloon both a [employer] as well  as a [place] balloon and were unable to get the balloon to the site  even with use of a guideline a catheter.       Physical Examination  Review of Systems   Vitals: /68 (BP Location: Left arm, Patient Position: Sitting, Cuff Size: Adult Small)   Pulse 65   Resp 14   Wt 51.3 kg (113 lb)   BMI 20.02 kg/m    BMI= Body mass index is 20.02 kg/m .  Wt Readings from Last 3 Encounters:   02/23/24 51.3 kg (113 lb)   12/04/23 48.1 kg (106 lb)   11/27/23 48.1 kg (106 lb)           ENT/Mouth: membranes moist, no oral lesions or bleeding gums.      EYES:  no scleral icterus, normal conjunctivae                    Neck: No carotid bruit   Chest/Lungs:   lungs are clear to auscultation, no rales or wheezing,  equal chest wall expansion    Cardiovascular:   Regular. Normal first and second heart sounds  "with no murmurs, rubs, or gallops; the carotid, radial and posterior tibial pulses are intact, trace edema in left ankle, no edema noted in right lower extremity       Extremities: no cyanosis or clubbing   Skin: no xanthelasma, warm.    Neurologic: no tremors     Psychiatric: alert and oriented x3, calm        Please refer above for cardiac ROS details.        Medical History  Surgical History Family History Social History   Past Medical History:   Diagnosis Date    Asthma     Chest pain 11/2018    burning sensation - indigestion    Colitis     COPD (chronic obstructive pulmonary disease) (H)     COPD (chronic obstructive pulmonary disease) (H)     Coronary artery disease due to calcified coronary lesion 11/01/2018    stent to LAD after NSTEMI    DNI (do not intubate)     Dyslipidemia, goal LDL below 70     Essential hypertension     GERD (gastroesophageal reflux disease)     Glaucoma     HTN (hypertension)     Hyperlipidemia     Hypothyroid     NSTEMI (non-ST elevation myocardial infarction) (H) 11/01/2018    PAD (peripheral artery disease) (H24)     Paroxysmal atrial fibrillation (H) 11/2018    Paroxysmal atrial fibrillation (H) 11/01/2018    Partial retinal artery occlusion 06/09/2021    Skin cancer     Smoker     ongoing - \"6-8 cigarettes QD\"     Past Surgical History:   Procedure Laterality Date    CATARACT EXTRACTION      cataract removal      COLONOSCOPY  12/2018    [facility]    COLONOSCOPY N/A 12/12/2018    COLONOSCOPY with polypectomy; Damien Denton MD;  [facility];  Service: Gastroenterology    CORONARY STENT PLACEMENT  11/2018    at [facility] in [facility] BALLOON DILATION AND/OR STENT PLACEMENT OF VESSEL  11/2018    stent to LAD    ESOPHAGOSCOPY, GASTROSCOPY, DUODENOSCOPY (EGD), COMBINED  12/2018    [facility]    ESOPHAGOSCOPY, GASTROSCOPY, DUODENOSCOPY (EGD), COMBINED N/A 8/15/2019    Procedure: ESOPHAGOGASTRODUODENOSCOPY, WITH BIOPSY;  Surgeon: Demario Clayton DO;  Location: " [facility]    ESOPHAGOSCOPY, GASTROSCOPY, DUODENOSCOPY (EGD), COMBINED N/A 12/10/2018    Damien Denton MD; [facility];  Service: Gastroenterology    IR ABDOMINAL AORTOGRAM  7/3/2006    IR EXTREMITY ANGIOGRAM BILATERAL  7/3/2006    IR MISCELLANEOUS PROCEDURE  7/3/2006    IR MISCELLANEOUS PROCEDURE  2006     Family History   Problem Relation Age of Onset    Sudden Death Mother 55.00        no autopsy    Goiter Mother     Stomach Cancer Father 72.00    No Known Problems Brother     Other - See Comments Brother          in [place]    Ulcers Son         Gastric ulcer was perforated; had surgery. His wife passed suddenly at 49 at home in 2016.    Atrial fibrillation Son         Social History     Socioeconomic History    Marital status:      Spouse name: Not on file    Number of children: 2    Years of education: Not on file    Highest education level: Not on file   Occupational History    Not on file   Tobacco Use    Smoking status: Every Day     Packs/day: 1.00     Years: 60.00     Additional pack years: 0.00     Total pack years: 60.00     Types: Cigarettes    Smokeless tobacco: Never    Tobacco comments:     down to 0.3 ppd since 2017; is down to 6-8 cigarettes a day   Vaping Use    Vaping Use: Never used   Substance and Sexual Activity    Alcohol use: No     Alcohol/week: 1.0 standard drink of alcohol    Drug use: No    Sexual activity: Not Currently     Partners: Male   Other Topics Concern    Not on file   Social History Narrative    Zee lives with her son, Braden, and his wife.     Social Determinants of Health     Financial Resource Strain: Not on file   Food Insecurity: Not on file   Transportation Needs: Not on file   Physical Activity: Not on file   Stress: Not on file   Social Connections: Not on file   Interpersonal Safety: Not on file   Housing Stability: Not on file           Medications  Allergies   Current Outpatient Medications   Medication Sig Dispense Refill    acebutolol  (SECTRAL) 400 MG capsule Take 400 mg by mouth daily      acetaminophen (TYLENOL) 500 MG tablet Take 500-1,000 mg by mouth every 6 hours as needed for mild pain      albuterol (PROAIR HFA/PROVENTIL HFA/VENTOLIN HFA) 108 (90 Base) MCG/ACT inhaler Inhale 2 puffs into the lungs every 4 hours as needed for shortness of breath / dyspnea or wheezing OFFICE VISIT NEEDED FOR ANY FURTHER REFILLS 18 g 0    amLODIPine (NORVASC) 5 MG tablet Take 10 mg by mouth daily      aspirin 81 MG EC tablet Take 81 mg by mouth daily      brimonidine (ALPHAGAN-P) 0.15 % ophthalmic solution Place 1 drop into both eyes 3 times daily      dorzolamide (TRUSOPT) 2 % ophthalmic solution Place 1 drop into both eyes 3 times daily      latanoprost (XALATAN) 0.005 % ophthalmic solution Place 1 drop into both eyes every evening      levothyroxine (SYNTHROID/LEVOTHROID) 100 MCG tablet Take 1 tablet by mouth every morning (before breakfast)      losartan (COZAAR) 100 MG tablet Take 1 tablet (100 mg) by mouth daily 30 tablet 0    metoprolol succinate ER (TOPROL XL) 25 MG 24 hr tablet TAKE 1 TABLET(25 MG) BY MOUTH DAILY. NEED FOLLOW UP 90 tablet 0    rosuvastatin (CRESTOR) 5 MG tablet Take 5 mg by mouth three times a week Monday, Wednesday, Friday  0    tiotropium-olodaterol (STIOLTO RESPIMAT) 2.5-2.5 MCG/ACT AERS Inhale 2 puffs into the lungs daily 4 g 11    triamcinolone (ARISTOCORT HP) 0.5 % external cream Apply sparingly twice daily as needed to spot treat itchy areas on arms, legs. 30 g 4       Allergies   Allergen Reactions    Atorvastatin Muscle Pain (Myalgia)     Was fine while taking simvastatin 20 mg daily.    Zoledronic Acid Other (See Comments)     SEVERE REACTION        Adhesive Tape Rash          Lab Results    Chemistry/lipid CBC Cardiac Enzymes/BNP/TSH/INR   Recent Labs   Lab Test 11/02/22  1212   CHOL 152   HDL 85   LDL 52   TRIG 75     Recent Labs   Lab Test 11/02/22  1212 07/07/21  1130 05/11/20  1201   LDL 52 62 59     Recent Labs   Lab  "Test 12/04/23  1739      POTASSIUM 4.3   CHLORIDE 98   CO2 26   *   BUN 14.2   CR 0.87   GFRESTIMATED 66   KARI 9.2     Recent Labs   Lab Test 12/04/23  1739 05/11/23  1431 03/30/23  1112   CR 0.87 0.80 0.88     No results for input(s): \"A1C\" in the last 01077 hours.       Recent Labs   Lab Test 12/04/23  1739   WBC 8.3   HGB 14.7   HCT 46.4   *        Recent Labs   Lab Test 12/04/23  1739 05/15/23  1723 05/11/23  1431   HGB 14.7 12.2 12.5    Recent Labs   Lab Test 05/28/18  2049   TROPONINI 0.02     Recent Labs   Lab Test 12/19/22  1749 05/28/18  2049   BNP  --  374*   NTBNPI 1,666  --      Recent Labs   Lab Test 03/30/23  1112   TSH 1.22     Recent Labs   Lab Test 08/14/19  1852 12/12/18  0535 12/10/18  1427   INR 1.06 1.19* 3.13*          Yazmin Folwer PA-C    Thank you for allowing me to participate in the care of your patient.      Sincerely,     Yazmin Flower PA-C     [facility]  cc:   Kanu Stover MD  [address]  [place] [address]      "

## 2024-03-15 DIAGNOSIS — L28.1 PRURIGO NODULARIS: ICD-10-CM

## 2024-03-15 RX ORDER — TRIAMCINOLONE ACETONIDE 5 MG/G
CREAM TOPICAL
Qty: 30 G | Refills: 4 | Status: SHIPPED | OUTPATIENT
Start: 2024-03-15

## 2024-03-15 NOTE — TELEPHONE ENCOUNTER
Requested Prescriptions   Pending Prescriptions Disp Refills    triamcinolone (ARISTOCORT HP) 0.5 % external cream 30 g 4     Sig: Apply sparingly twice daily as needed to spot treat itchy areas on arms, legs.       There is no refill protocol information for this order        Last Written Prescription Date:  5/19/23   Last Fill Quantity: 30g,  # refills: 4   Last office visit: 5/19/2023 ; last virtual visit: Visit date not found with prescribing provider:    Future Office Visit:  None      Shilpa Cartagena MA

## 2024-04-15 ENCOUNTER — LAB REQUISITION (OUTPATIENT)
Dept: LAB | Facility: CLINIC | Age: 84
End: 2024-04-15

## 2024-04-15 DIAGNOSIS — E03.9 HYPOTHYROIDISM, UNSPECIFIED: ICD-10-CM

## 2024-04-15 DIAGNOSIS — E78.5 HYPERLIPIDEMIA, UNSPECIFIED: ICD-10-CM

## 2024-04-15 PROCEDURE — 84443 ASSAY THYROID STIM HORMONE: CPT | Performed by: FAMILY MEDICINE

## 2024-04-15 PROCEDURE — 80061 LIPID PANEL: CPT | Performed by: FAMILY MEDICINE

## 2024-04-15 PROCEDURE — 84439 ASSAY OF FREE THYROXINE: CPT | Performed by: FAMILY MEDICINE

## 2024-04-15 PROCEDURE — 80053 COMPREHEN METABOLIC PANEL: CPT | Performed by: FAMILY MEDICINE

## 2024-04-16 LAB
ALBUMIN SERPL BCG-MCNC: 4.1 G/DL (ref 3.5–5.2)
ALP SERPL-CCNC: 67 U/L (ref 40–150)
ALT SERPL W P-5'-P-CCNC: 9 U/L (ref 0–50)
ANION GAP SERPL CALCULATED.3IONS-SCNC: 11 MMOL/L (ref 7–15)
AST SERPL W P-5'-P-CCNC: 18 U/L (ref 0–45)
BILIRUB SERPL-MCNC: 0.4 MG/DL
BUN SERPL-MCNC: 18.9 MG/DL (ref 8–23)
CALCIUM SERPL-MCNC: 9 MG/DL (ref 8.8–10.2)
CHLORIDE SERPL-SCNC: 100 MMOL/L (ref 98–107)
CHOLEST SERPL-MCNC: 172 MG/DL
CREAT SERPL-MCNC: 0.83 MG/DL (ref 0.51–0.95)
DEPRECATED HCO3 PLAS-SCNC: 26 MMOL/L (ref 22–29)
EGFRCR SERPLBLD CKD-EPI 2021: 70 ML/MIN/1.73M2
FASTING STATUS PATIENT QL REPORTED: NORMAL
GLUCOSE SERPL-MCNC: 102 MG/DL (ref 70–99)
HDLC SERPL-MCNC: 87 MG/DL
LDLC SERPL CALC-MCNC: 69 MG/DL
NONHDLC SERPL-MCNC: 85 MG/DL
POTASSIUM SERPL-SCNC: 4.6 MMOL/L (ref 3.4–5.3)
PROT SERPL-MCNC: 6.9 G/DL (ref 6.4–8.3)
SODIUM SERPL-SCNC: 137 MMOL/L (ref 135–145)
TRIGL SERPL-MCNC: 79 MG/DL
TSH SERPL DL<=0.005 MIU/L-ACNC: 5.4 UIU/ML (ref 0.3–4.2)

## 2024-04-17 LAB — T4 FREE SERPL-MCNC: 1.68 NG/DL (ref 0.9–1.7)

## 2024-08-17 ENCOUNTER — ANESTHESIA EVENT (OUTPATIENT)
Dept: EMERGENCY MEDICINE | Facility: CLINIC | Age: 84
End: 2024-08-17
Payer: COMMERCIAL

## 2024-08-17 ENCOUNTER — ANESTHESIA (OUTPATIENT)
Dept: EMERGENCY MEDICINE | Facility: CLINIC | Age: 84
End: 2024-08-17
Payer: COMMERCIAL

## 2024-08-17 ENCOUNTER — HOSPITAL ENCOUNTER (EMERGENCY)
Facility: CLINIC | Age: 84
Discharge: HOME | End: 2024-08-17
Attending: EMERGENCY MEDICINE | Admitting: EMERGENCY MEDICINE
Payer: COMMERCIAL

## 2024-08-17 ENCOUNTER — APPOINTMENT (OUTPATIENT)
Dept: GENERAL RADIOLOGY | Facility: CLINIC | Age: 84
End: 2024-08-17
Attending: EMERGENCY MEDICINE
Payer: COMMERCIAL

## 2024-08-17 VITALS
TEMPERATURE: 98.1 F | BODY MASS INDEX: 20.38 KG/M2 | HEIGHT: 63 IN | OXYGEN SATURATION: 97 % | DIASTOLIC BLOOD PRESSURE: 64 MMHG | SYSTOLIC BLOOD PRESSURE: 146 MMHG | RESPIRATION RATE: 17 BRPM | HEART RATE: 69 BPM | WEIGHT: 115 LBS

## 2024-08-17 DIAGNOSIS — R53.1 WEAKNESS: ICD-10-CM

## 2024-08-17 DIAGNOSIS — I48.91 ATRIAL FIBRILLATION WITH RAPID VENTRICULAR RESPONSE (H): ICD-10-CM

## 2024-08-17 DIAGNOSIS — Z01.89 ENCOUNTER FOR CARDIOVERSION PROCEDURE: ICD-10-CM

## 2024-08-17 LAB
ANION GAP SERPL CALCULATED.3IONS-SCNC: 10 MMOL/L (ref 7–15)
BASOPHILS # BLD AUTO: 0.1 10E3/UL (ref 0–0.2)
BASOPHILS NFR BLD AUTO: 1 %
BUN SERPL-MCNC: 13.7 MG/DL (ref 8–23)
CALCIUM SERPL-MCNC: 8.8 MG/DL (ref 8.8–10.4)
CHLORIDE SERPL-SCNC: 101 MMOL/L (ref 98–107)
CREAT SERPL-MCNC: 0.76 MG/DL (ref 0.51–0.95)
EGFRCR SERPLBLD CKD-EPI 2021: 77 ML/MIN/1.73M2
EOSINOPHIL # BLD AUTO: 0.3 10E3/UL (ref 0–0.7)
EOSINOPHIL NFR BLD AUTO: 3 %
ERYTHROCYTE [DISTWIDTH] IN BLOOD BY AUTOMATED COUNT: 14 % (ref 10–15)
GLUCOSE SERPL-MCNC: 161 MG/DL (ref 70–99)
HCO3 SERPL-SCNC: 22 MMOL/L (ref 22–29)
HCT VFR BLD AUTO: 42.4 % (ref 35–47)
HGB BLD-MCNC: 14.4 G/DL (ref 11.7–15.7)
HOLD SPECIMEN: NORMAL
IMM GRANULOCYTES # BLD: 0.1 10E3/UL
IMM GRANULOCYTES NFR BLD: 1 %
LYMPHOCYTES # BLD AUTO: 3.5 10E3/UL (ref 0.8–5.3)
LYMPHOCYTES NFR BLD AUTO: 28 %
MAGNESIUM SERPL-MCNC: 1.9 MG/DL (ref 1.7–2.3)
MCH RBC QN AUTO: 34.2 PG (ref 26.5–33)
MCHC RBC AUTO-ENTMCNC: 34 G/DL (ref 31.5–36.5)
MCV RBC AUTO: 101 FL (ref 78–100)
MONOCYTES # BLD AUTO: 1.3 10E3/UL (ref 0–1.3)
MONOCYTES NFR BLD AUTO: 11 %
NEUTROPHILS # BLD AUTO: 7.1 10E3/UL (ref 1.6–8.3)
NEUTROPHILS NFR BLD AUTO: 57 %
NRBC # BLD AUTO: 0 10E3/UL
NRBC BLD AUTO-RTO: 0 /100
NT-PROBNP SERPL-MCNC: 1693 PG/ML (ref 0–1800)
PLATELET # BLD AUTO: 323 10E3/UL (ref 150–450)
POTASSIUM SERPL-SCNC: 4.2 MMOL/L (ref 3.4–5.3)
RBC # BLD AUTO: 4.21 10E6/UL (ref 3.8–5.2)
SODIUM SERPL-SCNC: 133 MMOL/L (ref 135–145)
TROPONIN T SERPL HS-MCNC: 23 NG/L
TSH SERPL DL<=0.005 MIU/L-ACNC: 2.27 UIU/ML (ref 0.3–4.2)
WBC # BLD AUTO: 12.3 10E3/UL (ref 4–11)

## 2024-08-17 PROCEDURE — 99285 EMERGENCY DEPT VISIT HI MDM: CPT | Mod: 25 | Performed by: EMERGENCY MEDICINE

## 2024-08-17 PROCEDURE — 93005 ELECTROCARDIOGRAM TRACING: CPT | Mod: XU | Performed by: EMERGENCY MEDICINE

## 2024-08-17 PROCEDURE — 83735 ASSAY OF MAGNESIUM: CPT | Performed by: EMERGENCY MEDICINE

## 2024-08-17 PROCEDURE — 36415 COLL VENOUS BLD VENIPUNCTURE: CPT | Performed by: EMERGENCY MEDICINE

## 2024-08-17 PROCEDURE — 84443 ASSAY THYROID STIM HORMONE: CPT | Performed by: EMERGENCY MEDICINE

## 2024-08-17 PROCEDURE — 85025 COMPLETE CBC W/AUTO DIFF WBC: CPT | Performed by: EMERGENCY MEDICINE

## 2024-08-17 PROCEDURE — 71045 X-RAY EXAM CHEST 1 VIEW: CPT

## 2024-08-17 PROCEDURE — 96361 HYDRATE IV INFUSION ADD-ON: CPT | Mod: 59 | Performed by: EMERGENCY MEDICINE

## 2024-08-17 PROCEDURE — 250N000013 HC RX MED GY IP 250 OP 250 PS 637: Performed by: EMERGENCY MEDICINE

## 2024-08-17 PROCEDURE — 84484 ASSAY OF TROPONIN QUANT: CPT | Performed by: EMERGENCY MEDICINE

## 2024-08-17 PROCEDURE — 80048 BASIC METABOLIC PNL TOTAL CA: CPT | Performed by: EMERGENCY MEDICINE

## 2024-08-17 PROCEDURE — 250N000011 HC RX IP 250 OP 636: Performed by: NURSE ANESTHETIST, CERTIFIED REGISTERED

## 2024-08-17 PROCEDURE — 96360 HYDRATION IV INFUSION INIT: CPT | Mod: 59 | Performed by: EMERGENCY MEDICINE

## 2024-08-17 PROCEDURE — 258N000003 HC RX IP 258 OP 636: Performed by: EMERGENCY MEDICINE

## 2024-08-17 PROCEDURE — 93010 ELECTROCARDIOGRAM REPORT: CPT | Mod: 59 | Performed by: EMERGENCY MEDICINE

## 2024-08-17 PROCEDURE — 370N000003 HC ANESTHESIA WARD SERVICE: Performed by: NURSE ANESTHETIST, CERTIFIED REGISTERED

## 2024-08-17 PROCEDURE — 92960 CARDIOVERSION ELECTRIC EXT: CPT | Performed by: EMERGENCY MEDICINE

## 2024-08-17 PROCEDURE — 83880 ASSAY OF NATRIURETIC PEPTIDE: CPT | Performed by: EMERGENCY MEDICINE

## 2024-08-17 RX ORDER — METOPROLOL TARTRATE 25 MG/1
25 TABLET, FILM COATED ORAL ONCE
Status: COMPLETED | OUTPATIENT
Start: 2024-08-17 | End: 2024-08-17

## 2024-08-17 RX ORDER — METOPROLOL TARTRATE 1 MG/ML
5 INJECTION, SOLUTION INTRAVENOUS EVERY 5 MIN PRN
Status: DISCONTINUED | OUTPATIENT
Start: 2024-08-17 | End: 2024-08-17 | Stop reason: HOSPADM

## 2024-08-17 RX ORDER — METOPROLOL TARTRATE 1 MG/ML
5 INJECTION, SOLUTION INTRAVENOUS ONCE
Status: DISCONTINUED | OUTPATIENT
Start: 2024-08-17 | End: 2024-08-17 | Stop reason: HOSPADM

## 2024-08-17 RX ORDER — PROPOFOL 10 MG/ML
INJECTION, EMULSION INTRAVENOUS PRN
Status: DISCONTINUED | OUTPATIENT
Start: 2024-08-17 | End: 2024-08-17

## 2024-08-17 RX ORDER — SODIUM CHLORIDE 9 MG/ML
INJECTION, SOLUTION INTRAVENOUS CONTINUOUS
Status: DISCONTINUED | OUTPATIENT
Start: 2024-08-17 | End: 2024-08-17 | Stop reason: HOSPADM

## 2024-08-17 RX ADMIN — METOPROLOL TARTRATE 25 MG: 25 TABLET, FILM COATED ORAL at 07:31

## 2024-08-17 RX ADMIN — PROPOFOL 20 MG: 10 INJECTION, EMULSION INTRAVENOUS at 08:51

## 2024-08-17 RX ADMIN — PROPOFOL 40 MG: 10 INJECTION, EMULSION INTRAVENOUS at 08:50

## 2024-08-17 RX ADMIN — SODIUM CHLORIDE 500 ML: 900 INJECTION, SOLUTION INTRAVENOUS at 07:16

## 2024-08-17 RX ADMIN — SODIUM CHLORIDE: 9 INJECTION, SOLUTION INTRAVENOUS at 08:00

## 2024-08-17 ASSESSMENT — ACTIVITIES OF DAILY LIVING (ADL)
ADLS_ACUITY_SCORE: 36

## 2024-08-17 ASSESSMENT — COPD QUESTIONNAIRES
CAT_SEVERITY: MILD
COPD: 1

## 2024-08-17 ASSESSMENT — LIFESTYLE VARIABLES: TOBACCO_USE: 1

## 2024-08-17 ASSESSMENT — ENCOUNTER SYMPTOMS: DYSRHYTHMIAS: 1

## 2024-08-17 ASSESSMENT — COLUMBIA-SUICIDE SEVERITY RATING SCALE - C-SSRS
6. HAVE YOU EVER DONE ANYTHING, STARTED TO DO ANYTHING, OR PREPARED TO DO ANYTHING TO END YOUR LIFE?: NO
2. HAVE YOU ACTUALLY HAD ANY THOUGHTS OF KILLING YOURSELF IN THE PAST MONTH?: NO
1. IN THE PAST MONTH, HAVE YOU WISHED YOU WERE DEAD OR WISHED YOU COULD GO TO SLEEP AND NOT WAKE UP?: NO

## 2024-08-17 NOTE — ED TRIAGE NOTES
HX of Afib. On Asprin.Went to bed fine last night~21:00 on 8/16. Woke up in the 03:00 hour to void and was fine. This AM woke up at 05:00 sweaty. Denair palpations/fast heart beats at 05:30. Feels like past afib. .    Currently no pain. Lung sounds clear bilateral. Heart tones tachycardiac.

## 2024-08-17 NOTE — ANESTHESIA CARE TRANSFER NOTE
Patient: Zee Mireles    Procedure: * No procedures listed *       Diagnosis: * No pre-op diagnosis entered *  Diagnosis Additional Information: No value filed.    Anesthesia Type:   General     Note:    Oropharynx: oropharynx clear of all foreign objects  Level of Consciousness: awake  Oxygen Supplementation: room air    Independent Airway: airway patency satisfactory and stable  Dentition: dentition unchanged  Vital Signs Stable: post-procedure vital signs reviewed and stable  Report to RN Given: handoff report given  Patient transferred to: Emergency Department    Handoff Report: Identifed the Patient, Identified the Reponsible Provider, Reviewed the pertinent medical history, Discussed the surgical course, Reviewed Intra-OP anesthesia mangement and issues during anesthesia, Set expectations for post-procedure period and Allowed opportunity for questions and acknowledgement of understanding      Vitals:  Vitals Value Taken Time   /54 08/17/24 0901   Temp     Pulse 79 08/17/24 0903   Resp 27 08/17/24 0903   SpO2 99 % 08/17/24 0903   Vitals shown include unfiled device data.    Electronically Signed By: DIANA Bacon CRNA  August 17, 2024  9:04 AM

## 2024-08-17 NOTE — ANESTHESIA POSTPROCEDURE EVALUATION
Patient: Zee Mireles    Procedure: * No procedures listed *       Anesthesia Type:  General    Note:  Disposition: Outpatient   Postop Pain Control: Uneventful            Sign Out: Well controlled pain   PONV: No   Neuro/Psych: Uneventful            Sign Out: Acceptable/Baseline neuro status   Airway/Respiratory: Uneventful            Sign Out: Acceptable/Baseline resp. status   CV/Hemodynamics: Uneventful            Sign Out: Acceptable CV status; No obvious hypovolemia; No obvious fluid overload   Other NRE: NONE   DID A NON-ROUTINE EVENT OCCUR? No    Event details/Postop Comments:  Monitoring patient in Emergency department.                  Electronically Signed By: DIANA Bacon CRNA  August 17, 2024  9:05 AM

## 2024-08-17 NOTE — ED PROVIDER NOTES
ED Provider Note  [facility]      History     Chief Complaint   Patient presents with    Palpitations     HPI  Zee Mireles is a 84 year old female who presents to the emergency department with concerns regarding generalized weakness.  Patient also with his feelings of funny sensation of her heart.  Patient with known history of coronary artery disease status post drug-eluting stent in 2018.  Patient with paroxysmal atrial fibrillation first noted in 2018 when patient was hospitalized for NSTEMI, with no known recurrence since then.  Also does have history of peripheral arterial disease, hypertension, hyperlipidemia.    Patient presents today because of increased amounts of weakness that began during the overnight hours.  Does have recent COVID diagnosis from October 2.  States that she has felt generally rundown since then.  No chills.  No fever.  Denies any chest pain.  No severe shortness of breath.            Independent Historian:        Review of External Notes:  Reviewed cardiology office visit from February 23, 2024.  It was noted that patient has history of paroxysmal atrial fibrillation, however no known recurrence.      Allergies:  Allergies   Allergen Reactions    Atorvastatin Muscle Pain (Myalgia)     Was fine while taking simvastatin 20 mg daily.    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     Priority: Medium    Hyponatremia 12/04/2022     Priority: Medium    Influenza A 12/04/2022     Priority: Medium    COPD exacerbation (H) 12/04/2022     Priority: Medium    Coronary artery disease involving native coronary artery of native heart without angina pectoris 10/07/2019     Priority: Medium    PAD (peripheral artery disease) (H24) 10/07/2019     Priority: Medium    COPD (chronic obstructive pulmonary disease) (H) 08/14/2019      Priority: Medium    Hyperlipidemia LDL goal <70 08/14/2019     Priority: Medium    Essential hypertension 08/14/2019     Priority: Medium    Upper GI bleed 08/14/2019     Priority: Medium    Paroxysmal atrial fibrillation (H) 11/01/2018     Priority: Medium     Overview:   CHADS2 VASC score equals 5 for age, sex, hypertension, and coronary artery disease      Coronary artery disease due to calcified coronary lesion 11/01/2018     Priority: Medium     Overview:   stent to LAD after NSTEMI          Past Medical History:    Past Medical History:   Diagnosis Date    Asthma     Chest pain 11/2018    Colitis     COPD (chronic obstructive pulmonary disease) (H)     COPD (chronic obstructive pulmonary disease) (H)     Coronary artery disease due to calcified coronary lesion 11/01/2018    DNI (do not intubate)     Dyslipidemia, goal LDL below 70     Essential hypertension     GERD (gastroesophageal reflux disease)     Glaucoma     HTN (hypertension)     Hyperlipidemia     Hypothyroid     NSTEMI (non-ST elevation myocardial infarction) (H) 11/01/2018    PAD (peripheral artery disease) (H24)     Paroxysmal atrial fibrillation (H) 11/2018    Paroxysmal atrial fibrillation (H) 11/01/2018    Partial retinal artery occlusion 06/09/2021    Skin cancer     Smoker        Past Surgical History:    Past Surgical History:   Procedure Laterality Date    CATARACT EXTRACTION      cataract removal      COLONOSCOPY  12/2018    [facility]    COLONOSCOPY N/A 12/12/2018    COLONOSCOPY with polypectomy; Damien Denton MD;  [facility];  Service: Gastroenterology    CORONARY STENT PLACEMENT  11/2018    at [facility] BALLOON DILATION AND/OR STENT PLACEMENT OF VESSEL  11/2018    stent to LAD    ESOPHAGOSCOPY, GASTROSCOPY, DUODENOSCOPY (EGD), COMBINED  12/2018    [facility]    ESOPHAGOSCOPY, GASTROSCOPY, DUODENOSCOPY (EGD), COMBINED N/A 8/15/2019    Procedure: ESOPHAGOGASTRODUODENOSCOPY, WITH BIOPSY;  Surgeon: Matilde  Demario Mckeon, DO;  Location: [facility]    ESOPHAGOSCOPY, GASTROSCOPY, DUODENOSCOPY (EGD), COMBINED N/A 12/10/2018    Damien Denton MD; [facility];  Service: Gastroenterology    IR ABDOMINAL AORTOGRAM  7/3/2006    IR EXTREMITY ANGIOGRAM BILATERAL  7/3/2006    IR MISCELLANEOUS PROCEDURE  7/3/2006    IR MISCELLANEOUS PROCEDURE  2006       Family History:    Family History   Problem Relation Age of Onset    Sudden Death Mother 55.00        no autopsy    Goiter Mother     Stomach Cancer Father 72.00    No Known Problems Brother     Other - See Comments Brother          in [place] War    Ulcers Son         Gastric ulcer was perforated; had surgery. His wife passed suddenly at 49 at home in 2016.    Atrial fibrillation Son        Social History:  Marital Status:   [4]  Social History     Tobacco Use    Smoking status: Every Day     Current packs/day: 1.00     Average packs/day: 1 pack/day for 60.0 years (60.0 ttl pk-yrs)     Types: Cigarettes    Smokeless tobacco: Never    Tobacco comments:     down to 0.3 ppd since 2017; is down to 6-8 cigarettes a day   Vaping Use    Vaping status: Never Used   Substance Use Topics    Alcohol use: No     Alcohol/week: 1.0 standard drink of alcohol    Drug use: No        Medications:    acebutolol (SECTRAL) 400 MG capsule  acetaminophen (TYLENOL) 500 MG tablet  albuterol (PROAIR HFA/PROVENTIL HFA/VENTOLIN HFA) 108 (90 Base) MCG/ACT inhaler  amLODIPine (NORVASC) 10 MG tablet  aspirin 81 MG EC tablet  brimonidine (ALPHAGAN-P) 0.15 % ophthalmic solution  dorzolamide (TRUSOPT) 2 % ophthalmic solution  latanoprost (XALATAN) 0.005 % ophthalmic solution  levothyroxine (SYNTHROID/LEVOTHROID) 100 MCG tablet  losartan (COZAAR) 100 MG tablet  metoprolol succinate ER (TOPROL XL) 25 MG 24 hr tablet  rosuvastatin (CRESTOR) 5 MG tablet  tiotropium-olodaterol (STIOLTO RESPIMAT) 2.5-2.5 MCG/ACT AERS  triamcinolone (ARISTOCORT HP) 0.5 % external cream          Review of Systems  A  "medically appropriate review of systems was performed with pertinent positives and negatives noted in the HPI, and all other systems negative.    Physical Exam   Patient Vitals for the past 24 hrs:   BP Temp Temp src Pulse Resp SpO2 Height Weight   08/17/24 0945 (!) 146/64 -- -- 69 17 97 % -- --   08/17/24 0930 138/61 -- -- 70 21 96 % -- --   08/17/24 0915 135/55 -- -- 74 24 96 % -- --   08/17/24 0901 100/54 -- -- 77 18 99 % -- --   08/17/24 0900 109/55 -- -- 79 26 98 % -- --   08/17/24 0857 (!) 148/53 -- -- (!) 148 18 98 % -- --   08/17/24 0856 110/56 -- -- 85 (!) 31 97 % -- --   08/17/24 0855 113/52 -- -- 85 28 93 % -- --   08/17/24 0854 106/51 -- -- 85 30 96 % -- --   08/17/24 0853 115/54 -- -- 84 21 (!) 87 % -- --   08/17/24 0852 126/52 -- -- 63 (!) 36 97 % -- --   08/17/24 0851 118/56 -- -- (!) 170 18 98 % -- --   08/17/24 0850 (!) 154/127 -- -- (!) 165 -- -- -- --   08/17/24 0745 (!) 87/78 -- -- (!) 142 20 96 % -- --   08/17/24 0731 (!) 124/101 -- -- (!) 147 -- -- -- --   08/17/24 0730 (!) 124/101 -- -- (!) 147 (!) 9 97 % -- --   08/17/24 0715 (!) 141/77 -- -- (!) 149 20 96 % -- --   08/17/24 0710 (!) 88/72 -- -- (!) 151 20 96 % -- --   08/17/24 0647 -- -- -- -- -- -- 1.6 m (5' 3\") 52.2 kg (115 lb)   08/17/24 0645 (!) 107/91 -- -- (!) 147 23 97 % -- --   08/17/24 0630 (!) 141/83 98.1  F (36.7  C) Oral (!) 161 20 98 % -- --          Physical Exam  General: alert and in no acute distress on arrival  Head: atraumatic, normocephalic  Lungs:  nonlabored  CV:  extremities warm and perfused.  Irregularly irregular  Abd: nondistended  Skin: no rashes, no diaphoresis and skin color normal  Neuro: Patient awake, alert, speech is fluent,   Psychiatric: affect/mood normal,        ED Course                 Procedures         EKG, reviewed by myself shows atrial fibrillation.  Rate 14 beats per minute.  Nonspecific [id number] changes with ST depression laterally.  No acute ischemic appearing changes.3            " "Cardioversion/Defibrillation:      Performed by Mike Nguyễn MD     Pre Procedure Rhythm:  Atrial Fibrillation  Consent: Consent given by: Patient who states understanding of the procedure being performed after discussing the  risks, benefits and alternatives.  Time out: Immediately prior to procedure a \"time out\" was called to verify the correct patient, procedure, equipment, support staff and site/side marked as required.    Sedation:  Patient sedated with Propofol  Vital signs, airway and pulse oximetry were monitored and remained stable throughout the procedure and sedation was maintained until the procedure was complete.  The patient was monitored with staff at the bedside until she fully regained consciousness.  Procedure: The patient was placed in the supine position and the chest area was exposed. The cardioversion pads were applied in the standard manner and configuration.     The Biphasic defibrillator was set on the Synchronized mode and the patient was shocked at 120 Joules, resulting in Sinus Rhythm.     The Patient tolerated the procedure well with no immediate complications.             ECG Post Procedure:      EKG Number: 2.  Done at 0900 hours.  Interpreted by Mike Nguyễn MD  Symptoms at time of EKG: None   Rhythm: Normal sinus   Rate: 75  Axis: Normal  Ectopy: None  Conduction: Normal  ST Segments/ T Waves: Non-specific ST-T wave changes    Comparison to prior: sinus    Clinical Impression: Normal sinus rhythm              Results for orders placed or performed during the hospital encounter of 08/17/24 (from the past 24 hour(s))   CBC with platelets differential    Narrative    The following orders were created for panel order CBC with platelets differential.  Procedure                               Abnormality         Status                     ---------                               -----------         ------                     CBC with platelets and d...[[id number]]  Abnormal      "       Final result                 Please view results for these tests on the individual orders.   Basic metabolic panel   Result Value Ref Range    Sodium 133 (L) 135 - 145 mmol/L    Potassium 4.2 3.4 - 5.3 mmol/L    Chloride 101 98 - 107 mmol/L    Carbon Dioxide (CO2) 22 22 - 29 mmol/L    Anion Gap 10 7 - 15 mmol/L    Urea Nitrogen 13.7 8.0 - 23.0 mg/dL    Creatinine 0.76 0.51 - 0.95 mg/dL    GFR Estimate 77 >60 mL/min/1.73m2    Calcium 8.8 8.8 - 10.4 mg/dL    Glucose 161 (H) 70 - 99 mg/dL   Troponin T, High Sensitivity   Result Value Ref Range    Troponin T, High Sensitivity 23 (H) <=14 ng/L   CBC with platelets and differential   Result Value Ref Range    WBC Count 12.3 (H) 4.0 - 11.0 10e3/uL    RBC Count 4.21 3.80 - 5.20 10e6/uL    Hemoglobin 14.4 11.7 - 15.7 g/dL    Hematocrit 42.4 35.0 - 47.0 %     (H) 78 - 100 fL    MCH 34.2 (H) 26.5 - 33.0 pg    MCHC 34.0 31.5 - 36.5 g/dL    RDW 14.0 10.0 - 15.0 %    Platelet Count 323 150 - 450 10e3/uL    % Neutrophils 57 %    % Lymphocytes 28 %    % Monocytes 11 %    % Eosinophils 3 %    % Basophils 1 %    % Immature Granulocytes 1 %    NRBCs per 100 WBC 0 <1 /100    Absolute Neutrophils 7.1 1.6 - 8.3 10e3/uL    Absolute Lymphocytes 3.5 0.8 - 5.3 10e3/uL    Absolute Monocytes 1.3 0.0 - 1.3 10e3/uL    Absolute Eosinophils 0.3 0.0 - 0.7 10e3/uL    Absolute Basophils 0.1 0.0 - 0.2 10e3/uL    Absolute Immature Granulocytes 0.1 <=0.4 10e3/uL    Absolute NRBCs 0.0 10e3/uL   TSH with free T4 reflex   Result Value Ref Range    TSH 2.27 0.30 - 4.20 uIU/mL   Magnesium   Result Value Ref Range    Magnesium 1.9 1.7 - 2.3 mg/dL   NT pro BNP   Result Value Ref Range    N terminal Pro BNP Inpatient 1,693 0 - 1,800 pg/mL   [place] Draw    Narrative    The following orders were created for panel order [place] Draw.  Procedure                               Abnormality         Status                     ---------                               -----------         ------                      Extra Blue Top Tube[[id number]]                              Final result                 Please view results for these tests on the individual orders.   Extra Blue Top Tube   Result Value Ref Range    Hold Specimen JIC    XR Chest Port 1 View    Narrative    EXAM: XR CHEST PORTABLE 1 VIEW  LOCATION: [facility]  DATE: 08/17/2024    INDICATION: Palpitations.  COMPARISON: 12/19/2022.      Impression    IMPRESSION: Mild interstitial prominence at both lung bases may be related to fibrosis, although an infectious process cannot be excluded. Lungs are otherwise clear. Tortuous calcified thoracic aorta. Heart size is stable. Pulmonary vascularity is within   normal limits. Degenerative changes right glenohumeral joint.         MEDICATIONS GIVEN IN THE EMERGENCY DEPARTMENT:  Medications   metoprolol (LOPRESSOR) injection 5 mg (has no administration in time range)   metoprolol (LOPRESSOR) injection 5 mg (0 mg Intravenous Hold 8/17/24 0945)   sodium chloride 0.9 % infusion (0 mLs Intravenous Stopped 8/17/24 0952)   sodium chloride 0.9% BOLUS 500 mL (0 mLs Intravenous Stopped 8/17/24 0946)   metoprolol tartrate (LOPRESSOR) tablet 25 mg (25 mg Oral $Given 8/17/24 0731)           Independent Interpretation (X-rays, CTs, rhythm strip):  Chest x-ray images personally reviewed.  No infiltrate or other acute findings    Consultations/Discussion of Management or Tests:         Social Determinants of Health affecting care:         Assessments & Plan (with Medical Decision Making)  84 year old female who presents to the Emergency Department for evaluation of generalized weakness, fatigue.  Patient with heart rates in the 140s upon arrival, with borderline blood pressures.    Patient was placed on the monitor, and did have borderline blood pressures that occasionally would be improved, however had multiple episodes where patient's blood pressure was closer to 80 systolic.  Given the patient's generalized  weakness, fatigue, and some feelings of lightheadedness, decision made for electrical cardioversion.  Patient states that she knows the onset of symptoms was within the past 1 day, notably this morning.  She does check her blood pressure and heart rate multiple times daily on a normal basis.  Has only had 1 prior episode of atrial fibrillation and that was in the timeframe of her NSTEMI in 2018.  She has only been on aspirin secondary to history of upper GI bleed.    After further discussion with the patient, decision made for electrical cardioversion.  This was performed as above.  Patient did have some bradycardic rates at the immediate timeframe of the cardioversion, with some PVCs which were present, and ultimately had improved heart rates within the 1 to 2 minutes after cardioversion, with normal heart rates, and normal blood pressures.  Sedation performed by CRNA as I did perform the cardioversion procedure as above.    I did have discussion with patient with regards to anticoagulation.  She is hesitant to do any anticoagulation secondary to the history of upper GI bleed.  Given the solitary episode today, I did discuss risk versus benefits, and patient prefers no anticoagulation beyond aspirin.  Reassurance provided.  Recommend follow-up with primary care provider, in addition to cardiologist.  Return precautions discussed.       I have reviewed the nursing notes.    I have reviewed the findings, diagnosis, plan and need for follow up with the patient.       Critical Care time:  none      NEW PRESCRIPTIONS STARTED AT TODAY'S ER VISIT  Discharge Medication List as of 8/17/2024  9:57 AM          Final diagnoses:   Atrial fibrillation with rapid ventricular response (H)   Encounter for cardioversion procedure   Weakness       8/17/2024   [facility] EMERGENCY DEPT       Mike Nguyễn MD  08/17/24 0524

## 2024-08-17 NOTE — ANESTHESIA PREPROCEDURE EVALUATION
"Anesthesia Pre-Procedure Evaluation    Patient: Zee Mireles   MRN: [id number] : 1940        Procedure : * No procedures listed *          Past Medical History:   Diagnosis Date    Asthma     Chest pain 2018    burning sensation - indigestion    Colitis     COPD (chronic obstructive pulmonary disease) (H)     COPD (chronic obstructive pulmonary disease) (H)     Coronary artery disease due to calcified coronary lesion 2018    stent to LAD after NSTEMI    DNI (do not intubate)     Dyslipidemia, goal LDL below 70     Essential hypertension     GERD (gastroesophageal reflux disease)     Glaucoma     HTN (hypertension)     Hyperlipidemia     Hypothyroid     NSTEMI (non-ST elevation myocardial infarction) (H) 2018    PAD (peripheral artery disease) (H24)     Paroxysmal atrial fibrillation (H) 2018    Paroxysmal atrial fibrillation (H) 2018    Partial retinal artery occlusion 2021    Skin cancer     Smoker     ongoing - \"6-8 cigarettes QD\"      Past Surgical History:   Procedure Laterality Date    CATARACT EXTRACTION      cataract removal      COLONOSCOPY  2018    [facility]    COLONOSCOPY N/A 2018    COLONOSCOPY with polypectomy; Damien Denton MD;  [facility];  Service: Gastroenterology    CORONARY STENT PLACEMENT  2018    at [facility] BALLOON DILATION AND/OR STENT PLACEMENT OF VESSEL  2018    stent to LAD    ESOPHAGOSCOPY, GASTROSCOPY, DUODENOSCOPY (EGD), COMBINED  2018    [facility]    ESOPHAGOSCOPY, GASTROSCOPY, DUODENOSCOPY (EGD), COMBINED N/A 8/15/2019    Procedure: ESOPHAGOGASTRODUODENOSCOPY, WITH BIOPSY;  Surgeon: Demario Clayton DO;  Location: [facility]    ESOPHAGOSCOPY, GASTROSCOPY, DUODENOSCOPY (EGD), COMBINED N/A 12/10/2018    Damien Denton MD; [facility];  Service: Gastroenterology    IR ABDOMINAL AORTOGRAM  7/3/2006    IR EXTREMITY ANGIOGRAM BILATERAL  7/3/2006    IR MISCELLANEOUS PROCEDURE  7/3/2006    IR " MISCELLANEOUS PROCEDURE  8/9/2006      Allergies   Allergen Reactions    Atorvastatin Muscle Pain (Myalgia)     Was fine while taking simvastatin 20 mg daily.    Zoledronic Acid Other (See Comments)     SEVERE REACTION        Adhesive Tape Rash      Social History     Tobacco Use    Smoking status: Every Day     Current packs/day: 1.00     Average packs/day: 1 pack/day for 60.0 years (60.0 ttl pk-yrs)     Types: Cigarettes    Smokeless tobacco: Never    Tobacco comments:     down to 0.3 ppd since 2017; is down to 6-8 cigarettes a day   Substance Use Topics    Alcohol use: No     Alcohol/week: 1.0 standard drink of alcohol      Wt Readings from Last 1 Encounters:   08/17/24 52.2 kg (115 lb)        Anesthesia Evaluation            ROS/MED HX  ENT/Pulmonary:     (+)                tobacco use, Current use,    Intermittent, asthma  Treatment: Inhaler prn,  mild,  COPD,              Neurologic:  - neg neurologic ROS     Cardiovascular:     (+) Dyslipidemia hypertension- Peripheral Vascular Disease-  CAD -  - stent-2018.   Taking blood thinners                     dysrhythmias, a-fib,             METS/Exercise Tolerance:     Hematologic:  - neg hematologic  ROS     Musculoskeletal:   (+)  arthritis,             GI/Hepatic:     (+) GERD, Asymptomatic on medication,                  Renal/Genitourinary: Comment: hyponatremia      Endo:     (+)          thyroid problem, hypothyroidism,           Psychiatric/Substance Use:  - neg psychiatric ROS     Infectious Disease:  - neg infectious disease ROS     Malignancy:  - neg malignancy ROS     Other:  - neg other ROS          Physical Exam    Airway  airway exam normal      Mallampati: II   TM distance: > 3 FB   Neck ROM: full   Mouth opening: > 3 cm    Respiratory Devices and Support         Dental       (+) Modest Abnormalities - crowns, retainers, 1 or 2 missing teeth      Cardiovascular          Rhythm and rate: irregular and tachycardia     Pulmonary   pulmonary exam normal   "      breath sounds clear to auscultation           OUTSIDE LABS:  CBC:   Lab Results   Component Value Date    WBC 12.3 (H) 08/17/2024    WBC 8.3 12/04/2023    HGB 14.4 08/17/2024    HGB 14.7 12/04/2023    HCT 42.4 08/17/2024    HCT 46.4 12/04/2023     08/17/2024     12/04/2023     BMP:   Lab Results   Component Value Date     (L) 08/17/2024     04/15/2024    POTASSIUM 4.2 08/17/2024    POTASSIUM 4.6 04/15/2024    CHLORIDE 101 08/17/2024    CHLORIDE 100 04/15/2024    CO2 22 08/17/2024    CO2 26 04/15/2024    BUN 13.7 08/17/2024    BUN 18.9 04/15/2024    CR 0.76 08/17/2024    CR 0.83 04/15/2024     (H) 08/17/2024     (H) 04/15/2024     COAGS:   Lab Results   Component Value Date    INR 1.06 08/14/2019    FIBR 401 07/02/2019     POC: No results found for: \"BGM\", \"HCG\", \"HCGS\"  HEPATIC:   Lab Results   Component Value Date    ALBUMIN 4.1 04/15/2024    PROTTOTAL 6.9 04/15/2024    ALT 9 04/15/2024    AST 18 04/15/2024    ALKPHOS 67 04/15/2024    BILITOTAL 0.4 04/15/2024     OTHER:   Lab Results   Component Value Date    LACT 1.0 05/11/2023    KARI 8.8 08/17/2024    PHOS 4.1 12/10/2018    MAG 1.9 08/17/2024    LIPASE 190 08/14/2019    TSH 2.27 08/17/2024    T4 1.68 04/15/2024    CRP 0.3 07/02/2019    SED 9 11/02/2022       Anesthesia Plan    ASA Status:  4, emergent    NPO Status:  ELEVATED Aspiration Risk/Unknown    Anesthesia Type: General.     - Airway: Native airway   Induction: Propofol, Intravenous.           Consents    Anesthesia Plan(s) and associated risks, benefits, and realistic alternatives discussed. Questions answered and patient/representative(s) expressed understanding.     - Discussed: Risks, Benefits and Alternatives for BOTH SEDATION and the PROCEDURE were discussed     - Discussed with:  Patient      - Extended Intubation/Ventilatory Support Discussed: No.      - Patient is DNR/DNI Status: Yes             Suspend during perioperative period? Yes.             " Agree to: chemical resuscitation (intubation).   Use of blood products discussed: No .     Postoperative Care    Pain management: IV analgesics.   PONV prophylaxis: Background Propofol Infusion     Comments:               DIANA Bacon CRNA    I have reviewed the pertinent notes and labs in the chart from the past 30 days and (re)examined the patient.  Any updates or changes from those notes are reflected in this note.     # Hyponatremia: Lowest Na = 133 mmol/L in last 30 days, will monitor as appropriate         # Drug Induced Platelet Defect: home medication list includes an antiplatelet medication

## 2024-08-17 NOTE — DISCHARGE INSTRUCTIONS
Continue home medications.   Follow up with your primary care provider, in addition to cardiologist.    Return or be seen if new or worsening symptoms develop.

## 2024-10-14 ENCOUNTER — TRANSFERRED RECORDS (OUTPATIENT)
Dept: HEALTH INFORMATION MANAGEMENT | Facility: CLINIC | Age: 84
End: 2024-10-14

## 2024-11-26 ENCOUNTER — OFFICE VISIT (OUTPATIENT)
Dept: PULMONOLOGY | Facility: CLINIC | Age: 84
End: 2024-11-26
Attending: NURSE PRACTITIONER
Payer: COMMERCIAL

## 2024-11-26 VITALS
OXYGEN SATURATION: 99 % | DIASTOLIC BLOOD PRESSURE: 60 MMHG | HEART RATE: 60 BPM | BODY MASS INDEX: 21.26 KG/M2 | SYSTOLIC BLOOD PRESSURE: 134 MMHG | WEIGHT: 120 LBS

## 2024-11-26 DIAGNOSIS — J44.9 CHRONIC OBSTRUCTIVE PULMONARY DISEASE, UNSPECIFIED COPD TYPE (H): Primary | ICD-10-CM

## 2024-11-26 DIAGNOSIS — Z72.0 TOBACCO ABUSE: ICD-10-CM

## 2024-11-26 PROCEDURE — 99214 OFFICE O/P EST MOD 30 MIN: CPT | Performed by: NURSE PRACTITIONER

## 2024-11-26 RX ORDER — ALBUTEROL SULFATE 90 UG/1
2 INHALANT RESPIRATORY (INHALATION) EVERY 4 HOURS PRN
Qty: 18 G | Refills: 4 | Status: SHIPPED | OUTPATIENT
Start: 2024-11-26

## 2024-11-26 RX ORDER — TIOTROPIUM BROMIDE AND OLODATEROL 3.124; 2.736 UG/1; UG/1
2 SPRAY, METERED RESPIRATORY (INHALATION) DAILY
Qty: 4 G | Refills: 11 | Status: SHIPPED | OUTPATIENT
Start: 2024-11-26

## 2024-11-26 NOTE — PATIENT INSTRUCTIONS
It was a pleasure seeing you in clinic today. This is what we discussed:    Continue your inhalers as you have been.   If you continue to have more cough or breathing symptoms we will repeat your lung function test next year.   Use your albuterol every 4 hours as needed for cough, shortness of breath, wheeze, or chest tightness.   Follow up 1 year, sooner if needed   If you have worsening symptoms, questions, or need to speak with the nurse please call [phone].

## 2024-12-11 ENCOUNTER — TELEPHONE (OUTPATIENT)
Dept: PULMONOLOGY | Facility: CLINIC | Age: 84
End: 2024-12-11
Payer: COMMERCIAL

## 2024-12-12 ENCOUNTER — TELEPHONE (OUTPATIENT)
Dept: PULMONOLOGY | Facility: CLINIC | Age: 84
End: 2024-12-12
Payer: COMMERCIAL

## 2025-02-24 ENCOUNTER — HOSPITAL ENCOUNTER (EMERGENCY)
Facility: CLINIC | Age: 85
Discharge: HOME | End: 2025-02-25
Attending: EMERGENCY MEDICINE | Admitting: EMERGENCY MEDICINE
Payer: COMMERCIAL

## 2025-02-24 ENCOUNTER — APPOINTMENT (OUTPATIENT)
Dept: CT IMAGING | Facility: CLINIC | Age: 85
End: 2025-02-24
Attending: EMERGENCY MEDICINE
Payer: COMMERCIAL

## 2025-02-24 VITALS
RESPIRATION RATE: 19 BRPM | HEIGHT: 63 IN | HEART RATE: 80 BPM | TEMPERATURE: 98.1 F | SYSTOLIC BLOOD PRESSURE: 134 MMHG | OXYGEN SATURATION: 96 % | DIASTOLIC BLOOD PRESSURE: 52 MMHG | WEIGHT: 118 LBS | BODY MASS INDEX: 20.91 KG/M2

## 2025-02-24 DIAGNOSIS — R00.2 PALPITATIONS: ICD-10-CM

## 2025-02-24 DIAGNOSIS — E27.9 ADRENAL NODULE: ICD-10-CM

## 2025-02-24 DIAGNOSIS — K59.00 CONSTIPATION, UNSPECIFIED CONSTIPATION TYPE: ICD-10-CM

## 2025-02-24 LAB
ALBUMIN SERPL BCG-MCNC: 4 G/DL (ref 3.5–5.2)
ALP SERPL-CCNC: 69 U/L (ref 40–150)
ALT SERPL W P-5'-P-CCNC: 8 U/L (ref 0–50)
ANION GAP SERPL CALCULATED.3IONS-SCNC: 14 MMOL/L (ref 7–15)
AST SERPL W P-5'-P-CCNC: 19 U/L (ref 0–45)
BASOPHILS # BLD AUTO: 0.1 10E3/UL (ref 0–0.2)
BASOPHILS NFR BLD AUTO: 1 %
BILIRUB SERPL-MCNC: 0.5 MG/DL
BUN SERPL-MCNC: 12.9 MG/DL (ref 8–23)
CALCIUM SERPL-MCNC: 9.4 MG/DL (ref 8.8–10.4)
CHLORIDE SERPL-SCNC: 97 MMOL/L (ref 98–107)
CREAT SERPL-MCNC: 0.9 MG/DL (ref 0.51–0.95)
EGFRCR SERPLBLD CKD-EPI 2021: 63 ML/MIN/1.73M2
EOSINOPHIL # BLD AUTO: 0.2 10E3/UL (ref 0–0.7)
EOSINOPHIL NFR BLD AUTO: 2 %
ERYTHROCYTE [DISTWIDTH] IN BLOOD BY AUTOMATED COUNT: 13.7 % (ref 10–15)
GLUCOSE SERPL-MCNC: 82 MG/DL (ref 70–99)
HCO3 SERPL-SCNC: 23 MMOL/L (ref 22–29)
HCT VFR BLD AUTO: 46.9 % (ref 35–47)
HGB BLD-MCNC: 15.4 G/DL (ref 11.7–15.7)
IMM GRANULOCYTES # BLD: 0 10E3/UL
IMM GRANULOCYTES NFR BLD: 0 %
LYMPHOCYTES # BLD AUTO: 2.5 10E3/UL (ref 0.8–5.3)
LYMPHOCYTES NFR BLD AUTO: 26 %
MCH RBC QN AUTO: 33.3 PG (ref 26.5–33)
MCHC RBC AUTO-ENTMCNC: 32.8 G/DL (ref 31.5–36.5)
MCV RBC AUTO: 102 FL (ref 78–100)
MONOCYTES # BLD AUTO: 0.9 10E3/UL (ref 0–1.3)
MONOCYTES NFR BLD AUTO: 9 %
NEUTROPHILS # BLD AUTO: 6.2 10E3/UL (ref 1.6–8.3)
NEUTROPHILS NFR BLD AUTO: 63 %
NRBC # BLD AUTO: 0 10E3/UL
NRBC BLD AUTO-RTO: 0 /100
PLATELET # BLD AUTO: 263 10E3/UL (ref 150–450)
POTASSIUM SERPL-SCNC: 4.4 MMOL/L (ref 3.4–5.3)
PROT SERPL-MCNC: 7.1 G/DL (ref 6.4–8.3)
RBC # BLD AUTO: 4.62 10E6/UL (ref 3.8–5.2)
SODIUM SERPL-SCNC: 134 MMOL/L (ref 135–145)
TROPONIN T SERPL HS-MCNC: 16 NG/L
TROPONIN T SERPL HS-MCNC: 17 NG/L
WBC # BLD AUTO: 9.9 10E3/UL (ref 4–11)

## 2025-02-24 PROCEDURE — 250N000009 HC RX 250: Performed by: EMERGENCY MEDICINE

## 2025-02-24 PROCEDURE — 250N000011 HC RX IP 250 OP 636: Performed by: EMERGENCY MEDICINE

## 2025-02-24 PROCEDURE — 99284 EMERGENCY DEPT VISIT MOD MDM: CPT | Performed by: EMERGENCY MEDICINE

## 2025-02-24 PROCEDURE — 82040 ASSAY OF SERUM ALBUMIN: CPT | Performed by: EMERGENCY MEDICINE

## 2025-02-24 PROCEDURE — 93010 ELECTROCARDIOGRAM REPORT: CPT | Performed by: EMERGENCY MEDICINE

## 2025-02-24 PROCEDURE — 36415 COLL VENOUS BLD VENIPUNCTURE: CPT | Performed by: EMERGENCY MEDICINE

## 2025-02-24 PROCEDURE — 93005 ELECTROCARDIOGRAM TRACING: CPT | Performed by: EMERGENCY MEDICINE

## 2025-02-24 PROCEDURE — 84484 ASSAY OF TROPONIN QUANT: CPT | Performed by: EMERGENCY MEDICINE

## 2025-02-24 PROCEDURE — 99285 EMERGENCY DEPT VISIT HI MDM: CPT | Mod: 25 | Performed by: EMERGENCY MEDICINE

## 2025-02-24 PROCEDURE — 85004 AUTOMATED DIFF WBC COUNT: CPT | Performed by: EMERGENCY MEDICINE

## 2025-02-24 PROCEDURE — 74177 CT ABD & PELVIS W/CONTRAST: CPT

## 2025-02-24 RX ORDER — IOPAMIDOL 755 MG/ML
57 INJECTION, SOLUTION INTRAVASCULAR ONCE
Status: COMPLETED | OUTPATIENT
Start: 2025-02-24 | End: 2025-02-24

## 2025-02-24 RX ADMIN — SODIUM CHLORIDE 54 ML: 9 INJECTION, SOLUTION INTRAVENOUS at 20:29

## 2025-02-24 RX ADMIN — IOPAMIDOL 57 ML: 755 INJECTION, SOLUTION INTRAVENOUS at 20:29

## 2025-02-24 ASSESSMENT — ACTIVITIES OF DAILY LIVING (ADL)
ADLS_ACUITY_SCORE: 49

## 2025-02-24 NOTE — ED TRIAGE NOTES
"Irregular heart beat x 1 month, constipation x 10 days, left lower abdominal pain that \"comes in waves\"      Triage Assessment (Adult)       Row Name 02/24/25 1541          Triage Assessment    Airway WDL WDL        Respiratory WDL    Respiratory WDL WDL                     "

## 2025-02-25 PROBLEM — E27.9 ADRENAL NODULE: Status: ACTIVE | Noted: 2025-02-25

## 2025-02-25 LAB
ATRIAL RATE - MUSE: 67 BPM
DIASTOLIC BLOOD PRESSURE - MUSE: NORMAL MMHG
INTERPRETATION ECG - MUSE: NORMAL
P AXIS - MUSE: 69 DEGREES
PR INTERVAL - MUSE: 144 MS
QRS DURATION - MUSE: 74 MS
QT - MUSE: 356 MS
QTC - MUSE: 376 MS
R AXIS - MUSE: 22 DEGREES
SYSTOLIC BLOOD PRESSURE - MUSE: NORMAL MMHG
T AXIS - MUSE: 92 DEGREES
VENTRICULAR RATE- MUSE: 67 BPM

## 2025-02-25 NOTE — ED PROVIDER NOTES
History     Chief Complaint   Patient presents with    Constipation    Irregular Heart Beat     HPI  Zee Mireles is a 84 year old female with history of paroxysmal atrial fibrillation, coronary artery disease, COPD, hyperlipidemia, hypertension, upper GI bleed, who presents with issues of constipation and irregular heartbeat.  Reports heartbeat being irregular for the past month and constipation of 10 days with colicky left lower quadrant abdominal pain.  Her complaint to me is that she has not had a bowel movement since 2/13/2025.  Prior to this she had a large volume of loose stool.  She denies any prior abdominal surgeries.  Reports taking stool softener on 3 different occasions and this time and did a Fleet enema yesterday with no result.  She is passing gas.  No nausea or vomiting.  Spoke with her clinic this morning and suggested that she is evaluated.  Not complaining of any chest pain or shortness of breath.  No fevers or chills.  No palpitations.  No urinary symptoms.  No back pain.    The patient's PMHx, Surgical Hx, Allergies, and Medications were all reviewed with the patient.    Allergies:  Allergies   Allergen Reactions    Atorvastatin Muscle Pain (Myalgia)     Was fine while taking simvastatin 20 mg daily.    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     Priority: Medium    Hyponatremia 12/04/2022     Priority: Medium    Influenza A 12/04/2022     Priority: Medium    COPD exacerbation (H) 12/04/2022     Priority: Medium    Coronary artery disease involving native coronary artery of native heart without angina pectoris 10/07/2019     Priority: Medium    PAD (peripheral artery disease) 10/07/2019     Priority: Medium    COPD (chronic obstructive pulmonary disease) (H) 08/14/2019     Priority: Medium    Hyperlipidemia LDL goal <70 08/14/2019      Priority: Medium    Essential hypertension 08/14/2019     Priority: Medium    Upper GI bleed 08/14/2019     Priority: Medium    Paroxysmal atrial fibrillation (H) 11/01/2018     Priority: Medium     Overview:   CHADS2 VASC score equals 5 for age, sex, hypertension, and coronary artery disease      Coronary artery disease due to calcified coronary lesion 11/01/2018     Priority: Medium     Overview:   stent to LAD after NSTEMI          Past Medical History:    Past Medical History:   Diagnosis Date    Asthma     Chest pain 11/2018    Colitis     COPD (chronic obstructive pulmonary disease) (H)     COPD (chronic obstructive pulmonary disease) (H)     Coronary artery disease due to calcified coronary lesion 11/01/2018    DNI (do not intubate)     Dyslipidemia, goal LDL below 70     Essential hypertension     GERD (gastroesophageal reflux disease)     Glaucoma     HTN (hypertension)     Hyperlipidemia     Hypothyroid     NSTEMI (non-ST elevation myocardial infarction) (H) 11/01/2018    PAD (peripheral artery disease)     Paroxysmal atrial fibrillation (H) 11/2018    Paroxysmal atrial fibrillation (H) 11/01/2018    Partial retinal artery occlusion 06/09/2021    Skin cancer     Smoker        Past Surgical History:    Past Surgical History:   Procedure Laterality Date    CATARACT EXTRACTION      cataract removal      COLONOSCOPY  12/2018    [facility]    COLONOSCOPY N/A 12/12/2018    COLONOSCOPY with polypectomy; Damien Denton MD;  [facility];  Service: Gastroenterology    CORONARY STENT PLACEMENT  11/2018    at [facility] in [facility] BALLOON DILATION AND/OR STENT PLACEMENT OF VESSEL  11/2018    stent to LAD    ESOPHAGOSCOPY, GASTROSCOPY, DUODENOSCOPY (EGD), COMBINED  12/2018    [facility]    ESOPHAGOSCOPY, GASTROSCOPY, DUODENOSCOPY (EGD), COMBINED N/A 8/15/2019    Procedure: ESOPHAGOGASTRODUODENOSCOPY, WITH BIOPSY;  Surgeon: Demario Clayton DO;  Location: [facility]    ESOPHAGOSCOPY, GASTROSCOPY,  DUODENOSCOPY (EGD), COMBINED N/A 12/10/2018    Damien Denton MD; [facility];  Service: Gastroenterology    IR ABDOMINAL AORTOGRAM  7/3/2006    IR EXTREMITY ANGIOGRAM BILATERAL  7/3/2006    IR MISCELLANEOUS PROCEDURE  7/3/2006    IR MISCELLANEOUS PROCEDURE  2006       Family History:    Family History   Problem Relation Age of Onset    Sudden Death Mother 55.00        no autopsy    Goiter Mother     Stomach Cancer Father 72.00    No Known Problems Brother     Other - See Comments Brother          in [place]    Ulcers Son         Gastric ulcer was perforated; had surgery. His wife passed suddenly at 49 at home in 2016.    Atrial fibrillation Son        Social History:  Marital Status:   [4]  Social History     Tobacco Use    Smoking status: Every Day     Current packs/day: 1.00     Average packs/day: 1 pack/day for 60.0 years (60.0 ttl pk-yrs)     Types: Cigarettes    Smokeless tobacco: Never    Tobacco comments:     down to 0.3 ppd since 2017; is down to 6-8 cigarettes a day   Vaping Use    Vaping status: Never Used   Substance Use Topics    Alcohol use: No     Alcohol/week: 1.0 standard drink of alcohol    Drug use: No        Medications:    acebutolol (SECTRAL) 400 MG capsule  acetaminophen (TYLENOL) 500 MG tablet  albuterol (PROAIR HFA/PROVENTIL HFA/VENTOLIN HFA) 108 (90 Base) MCG/ACT inhaler  amLODIPine (NORVASC) 10 MG tablet  aspirin 81 MG EC tablet  brimonidine (ALPHAGAN-P) 0.15 % ophthalmic solution  dorzolamide (TRUSOPT) 2 % ophthalmic solution  latanoprost (XALATAN) 0.005 % ophthalmic solution  levothyroxine (SYNTHROID/LEVOTHROID) 100 MCG tablet  losartan (COZAAR) 100 MG tablet  metoprolol succinate ER (TOPROL XL) 25 MG 24 hr tablet  rosuvastatin (CRESTOR) 5 MG tablet  tiotropium-olodaterol (STIOLTO RESPIMAT) 2.5-2.5 MCG/ACT AERS  triamcinolone (ARISTOCORT HP) 0.5 % external cream          Review of Systems  Pertinent positives and negatives mentioned in HPI    Physical Exam   BP: (!)  "149/83  Pulse: 67  Temp: 98.1  F (36.7  C)  Resp: 18  Height: 160 cm (5' 3\")  Weight: 53.5 kg (118 lb)  SpO2: 97 %    GEN: Awake, alert, and cooperative. No acute distress.  Resting comfortably in semireclined position  CV : Regular rate and rhythm.  PULM: Normal effort. No wheezes, rales, or rhonchi bilaterally.  ABD: Abdomen is soft and nondistended.  Mild tenderness in upper abdomen.  No rebound or guarding.   NEURO: Normal speech. Following commands. Answering questions and interacting appropriately.   EXT: no pretibial edema.   INT: Warm. No diaphoresis. Normal color.        ED Course        Procedures         EKG: Interpreted by Bam Nielsen MD sinus rhythm with rate of 67 bpm.  Normal axis.  Normal R wave progression.  Subtle ST depressions high lateral 1 aVL and V6.  When comparing to ECG from 8/17/2024 these depressions were also present.  Impression sinus rhythm with nonspecific and unchanged ST segment changes.       Critical Care time:  none     None         Results for orders placed or performed during the hospital encounter of 02/24/25 (from the past 24 hours)   EKG 12 lead   Result Value Ref Range    Systolic Blood Pressure  mmHg    Diastolic Blood Pressure  mmHg    Ventricular Rate 67 BPM    Atrial Rate 67 BPM    OK Interval 144 ms    QRS Duration 74 ms     ms    QTc 376 ms    P Axis 69 degrees    R AXIS 22 degrees    T Axis 92 degrees    Interpretation ECG       Sinus rhythm  Possible Left atrial enlargement  ST & T wave abnormality, consider lateral ischemia  Abnormal ECG  When compared with ECG of 10-Dec-2018 14:21,  ST no longer elevated in Anterior leads  Nonspecific T wave abnormality now evident in Anterolateral leads         CT Abdomen Pelvis w Contrast   Final Result   IMPRESSION:    1.  No acute process demonstrated in the abdomen and pelvis.   2.  Indeterminate left adrenal nodule measuring 2.6 cm.           Medications   iopamidol (ISOVUE-370) solution 57 mL (57 mLs Intravenous " $Given 2/24/25 2029)   sodium chloride 0.9 % bag 500 mL for CT scan flush use (54 mLs Intravenous $Given 2/24/25 2029)     Labs Ordered and Resulted from Time of ED Arrival to Time of ED Departure   COMPREHENSIVE METABOLIC PANEL - Abnormal       Result Value    Sodium 134 (*)     Potassium 4.4      Carbon Dioxide (CO2) 23      Anion Gap 14      Urea Nitrogen 12.9      Creatinine 0.90      GFR Estimate 63      Calcium 9.4      Chloride 97 (*)     Glucose 82      Alkaline Phosphatase 69      AST 19      ALT 8      Protein Total 7.1      Albumin 4.0      Bilirubin Total 0.5     TROPONIN T, HIGH SENSITIVITY - Abnormal    Troponin T, High Sensitivity 16 (*)    CBC WITH PLATELETS AND DIFFERENTIAL - Abnormal    WBC Count 9.9      RBC Count 4.62      Hemoglobin 15.4      Hematocrit 46.9       (*)     MCH 33.3 (*)     MCHC 32.8      RDW 13.7      Platelet Count 263      % Neutrophils 63      % Lymphocytes 26      % Monocytes 9      % Eosinophils 2      % Basophils 1      % Immature Granulocytes 0      NRBCs per 100 WBC 0      Absolute Neutrophils 6.2      Absolute Lymphocytes 2.5      Absolute Monocytes 0.9      Absolute Eosinophils 0.2      Absolute Basophils 0.1      Absolute Immature Granulocytes 0.0      Absolute NRBCs 0.0     TROPONIN T, HIGH SENSITIVITY - Abnormal    Troponin T, High Sensitivity 17 (*)          Assessments & Plan (with Medical Decision Making)   84 year old female with past medical history of coronary artery disease, paroxysmal atrial fibrillation, COPD, hypertension, lipidemia, upper GI hemorrhage, not anticoagulated, with main concern of no bowel movement since 2/13/2025. ECG w/ sinus rhythm and no signs of acute ischemia. Troponin is 16 and 17 at 2 hours. Has had similar elevations in the past. No c/o CP or dyspnea. Does have palpitations and occasional PVC on monitor. CBC w/out leukocytosis or anemia. CMP grossly normal CT abdomen/pelvis w/out acute process to explain her symptoms. No signs  of obstruction or obstipation. Non surgical abdomen. Incidental left adrenal nodule, can follow up on this as outpatient. This was added to her problem list.  Advised to start using miralax twice daily, and decrease to once daily when stools are soft. ED return precautions discussed.          I have reviewed the nursing notes.         New Prescriptions    No medications on file       Final diagnoses:   Palpitations   Constipation, unspecified constipation type   Adrenal nodule - left     Bam Nielsen MD        2/24/2025   [facility] EMERGENCY DEPT    Disclaimer: This note consists of words and symbols derived from keyboarding and dictation using voice recognition software.  As a result, there may be errors that have gone undetected.  Please consider this when interpreting information found in this note.               Bam Nielsen MD  02/25/25 [id number]

## 2025-02-25 NOTE — DISCHARGE INSTRUCTIONS
CT scan of your abdomen pelvis did not show any cause for constipation.  Okay to take MiraLAX as we discussed.  Can use twice daily until you have bowel movements then once daily until they are soft.    There was an incidental nodule on your left adrenal gland.  You can follow-up on this in the future with your primary care provider.    ECG was reassuring but did show occasional PVCs which were likely causing your symptoms of palpitations.    Follow-up with your primary care team as soon as able.  Return to emergency department for any new or worsening symptoms.

## 2025-02-28 ENCOUNTER — TRANSFERRED RECORDS (OUTPATIENT)
Dept: HEALTH INFORMATION MANAGEMENT | Facility: CLINIC | Age: 85
End: 2025-02-28
Payer: COMMERCIAL

## 2025-02-28 ENCOUNTER — LAB REQUISITION (OUTPATIENT)
Dept: LAB | Facility: CLINIC | Age: 85
End: 2025-02-28

## 2025-02-28 DIAGNOSIS — L03.116 CELLULITIS OF LEFT LOWER LIMB: ICD-10-CM

## 2025-02-28 PROCEDURE — 86140 C-REACTIVE PROTEIN: CPT | Performed by: PHYSICIAN ASSISTANT

## 2025-03-01 LAB — CRP SERPL-MCNC: 56.3 MG/L

## 2025-03-17 NOTE — PROGRESS NOTES
HEART CARE ENCOUNTER NOTE      [facility]  [phone]      Assessment/Recommendations   Assessment:   Coronary artery disease: Status post NSTEMI with AGATHA to mid LAD 11/29/2018.  Coronary angiogram at that time also showed stenoses in ostial first obtuse marginal artery and mid RCA.  Patient denies any anginal symptoms  Paroxysmal atrial fibrillation: First noted in 2018 when patient was in the hospital for NSTEMI.  Patient had recurrence 8/2024 and presented to the ED with palpitations seen to be in A-fib with RVR with rates in the 140s.  Patient was cardioverted given symptoms less than 24 hours.  Patient is not on anticoagulation given history of GI bleed originally started on Eliquis.  Discussed restarting Eliquis 2.5 mg twice daily adjusted for age and weight but patient is very hesitant.  Discussed watchman as well which she will discuss with family.  Will obtain Zio patch to see if patient is going in and out of atrial fibrillation as she may only be symptomatic when having high rates.  PVCs: Occasional PVCs seen on monitor in the hospital.  Patient notes she occasionally is symptomatic with these.  Obtaining Zio patch for problem above will try further quantify these.  Essential hypertension: Controlled on metoprolol 25 mg daily, amlodipine 10 mg daily, losartan 100 mg daily.  CMP 2/24/25 stable  Hyperlipidemia: On rosuvastatin 5 mg 3 times a week.  Last lipids 4/15/24 showed LDL 69  Peripheral arterial disease: Status post stenting of right external iliac/right common femoral artery 7/3/2006.    Moderate stenosis of right internal carotid artery: 50 to 69% stenosis noted on carotid ultrasound 7/5/2019  COPD: Patient is still smoking 6-8 cigarettes a day.  Not interested in quitting at this time.    Plan:   Continue current medications  Schedule echocardiogram  Zio patch to be mailed out worn for 1 week to look for any recurrence of atrial fibrillation as patient may only be  symptomatic with RVR  Strongly recommended restarting Eliquis 2.5 mg daily.  Given history of GI bleed feel patient would be a good candidate for watchman.  Patient will discuss with family and let us know if  Encourage smoking cessation  Lipid check through PCP at upcoming annual      Follow up in 6 months with Dr. Stover, sooner with any pending results if needed     History of Present Illness/Subjective    HPI: Zee Mireles is a 83 year old female with PMHx of CAD status post NSTEMI with AGATHA to mid LAD 11/29/2018, PAD status post stenting of the right external iliac/common femoral artery 7/3/2006, paroxysmal A-fib only seen at the time of NSTEMI in 2018 and not on anticoagulation due to prior GI bleed, hypertension, hyperlipidemia presents for follow-up.  Patient had dealt with elevated blood pressure in the past leading to ER visits due to chest tightness that improved after lowering blood pressure with diltiazem.  I last saw patient 2/23/2024 where she had been doing well.  Had noted shortness of breath with going up and down stairs that was not a new symptom.  Patient presented to the ED in August for palpitations.  EKG showed atrial fibrillation with rates in the 140s and patient was cardioverted.  Patient again presented to the ED 2/24/2025 with irregular heartbeat as well as constipation.  EKG showed sinus rhythm with rate of 67 bpm with unchanged ST segment changes.  Occasional PVCs seen on the monitor.    Patient notes other than the atrial fibrillation episode in August she has been doing well.  Patient notes overnight she started feeling weak and that something was different in her body.  Maybe felt her heart racing.  Patient presented to the ED that morning and notes feeling better after the cardioversion.  Patient notes feeling winded when going up stairs but that this is not new and that she can keep going and push through the shortness of breath.  Patient notes that it is more her leg weakness  that limits her.  She tries to walk but her back starts hurting after walking 100 to 200 feet.  Patient uses the grocery cart at the store which is easier for her.  Does note feeling off balance and uses her cane.  Patient notes blood pressures ranging 130-140/60-70s at home.  Patient denies lightheadedness.  Notes some lower extremity edema in her left leg that she has been dealing with some cellulitis.          EKG 8/17/2024: Atrial fibrillation with heart rate 143 bpm    CXR 10/26/2022   No acute cardiopulmonary disease.     ECHO 7/11/2019  1. Normal resting left ventricular size, estimated EF . No resting regional wall motion abnormalities noted.  2. Mild concentric left ventricular hypertrophy.  3. Aortic valve sclerosis. No evidence of aortic stenosis. Mild aortic insufficiency.  4. Mitral annular calcification. Mild mitral regurgitation.  5. Mild tricuspid regurgitation. Estimated RA pressure 5-10 mm Hg based upon evaluation of the IVC. Calculated RV systolic pressure 27 mm Hg plus right atrial pressure (normal).  6. Structurally and functionally unremarkable pulmonic valve.  7. Patient incidentally noted to be in sinus rhythm during echocardiographic imaging.     Exercise ABIs 1/21/2019  1.  Resting ABIs on the right compatible with moderate arterial insufficiency that worsens with exercise.  2.  Resting ABIs on the left compatible with moderate arterial insufficiency that becomes borderline severe with exercise. Catheter angiogram with possible intervention could be performed for further evaluation and treatment.     Carotid ultrasound 7/5/2019   1. Right carotid: There has been significant interval softening calcified plaque formation within the distal right common carotid artery, bifurcation and right proximal ICA relative to that of the prior exam from 2014. Currently velocity and ICA/CCA ratio suggests borderline 50-69% stenosis within the ICA. In a clinically symptomatic patient this may be  hemodynamically significant.  2. Left carotid: There is moderate arteriosclerotic plaque formation identified within the bifurcation which is unchanged compared to prior exam. The estimated vascular stenosis of the left ICA is less than 50% which is not considered hemodynamically significant.  3. Vertebral arteries: Patent with normal cephalad flow direction.     Stress test 11/27/2018   1.  Myocardial perfusion imaging using single isotope technique  demonstrated mild to moderate mid to distal anteroseptal defect with  mild reversibility. (Normal wall motion in this area, however due to  mild reversibility cannot exclude mild ischemia in this territory)   2. Gated images demonstrated normal LV cavity size with hyperdynamic  LV systolic function and normal wall motion.  The left ventricular  systolic function is 81%.  3. Compared to the prior study from no prior study .     Cardiac cath 11/29/2018  LEFT MAIN CORONARY ARTERY: No significant disease  LEFT ANTERIOR DESCENDING ARTERY: Heavily calcified LAD. There is mild  diffuse disease until the mid segment at the diagonal branch where  there is a heavily calcified 89% stenosis. The first septal   also has an 80% ostial stenosis. The diagonal branch has a severe 95%  stenosis with ulceration and a 90 degree takeoff from the LAD. This  vessel is probably 1.52 mm in caliber.  CIRCUMFLEX ARTERY: Circumflex has mild diffuse disease. OM1 has an  ostial 70% stenosis of a moderate size vessel.  RIGHT CORONARY ARTERY: Heavily calcified dominant right. There is a  mid vessel 60% stenosis.  HEMODYNAMICS: Aortic pressures is 160/70,  PERCUTANEOUS CORONARY INTERVENTION: After reviewing the coronary  angiography findings with the patient, we elected to treat the LAD. A  6 [place] JL 3.5 guiding catheter was used to engage the ostium of the  left main. This provided an adequate support for the procedure. A 14  wire was used to successfully the LAD lesion. The lesion  was  pre-dilated with a 2.5 compliant balloon. After adequate angioplasty  result, a 0.5 x 28 drug-eluting stent was deployed inside the lesion  using 14 gretchen atmosphere pressure. A guideline her catheter was used  and was necessary to deliver the LAD stent. We attempted to cross the  lesion back and postdilated with a NC balloon both a Medtronic as well  as a Alkol balloon and were unable to get the balloon to the site  even with use of a guideline a catheter.       Physical Examination  Review of Systems   Vitals: /50 (BP Location: Right arm, Patient Position: Sitting, Cuff Size: Adult Regular)   Pulse 59   Resp 16   Wt 54 kg (119 lb)   BMI 21.08 kg/m    BMI= Body mass index is 21.08 kg/m .  Wt Readings from Last 3 Encounters:   03/18/25 54 kg (119 lb)   02/24/25 53.5 kg (118 lb)   11/26/24 54.4 kg (120 lb)           ENT/Mouth: membranes moist, no oral lesions or bleeding gums.      EYES:  no scleral icterus, normal conjunctivae       Chest/Lungs:   lungs are clear to auscultation, no rales or wheezing,  equal chest wall expansion    Cardiovascular:   Regular with occasional ectopy. Normal first and second heart sounds with no murmurs, rubs, or gallops; the carotid, radial and posterior tibial pulses are intact, trace edema in left lower extremity, no edema noted in right lower extremity       Extremities: no cyanosis or clubbing   Skin: no xanthelasma, warm.    Neurologic: no tremors     Psychiatric: alert and oriented x3, calm        Please refer above for cardiac ROS details.        Medical History  Surgical History Family History Social History   Past Medical History:   Diagnosis Date    Asthma     Chest pain 11/2018    burning sensation - indigestion    Colitis     COPD (chronic obstructive pulmonary disease) (H)     COPD (chronic obstructive pulmonary disease) (H)     Coronary artery disease due to calcified coronary lesion 11/01/2018    stent to LAD after NSTEMI    DNI (do not intubate)      "Dyslipidemia, goal LDL below 70     Essential hypertension     GERD (gastroesophageal reflux disease)     Glaucoma     HTN (hypertension)     Hyperlipidemia     Hypothyroid     NSTEMI (non-ST elevation myocardial infarction) (H) 2018    PAD (peripheral artery disease)     Paroxysmal atrial fibrillation (H) 2018    Paroxysmal atrial fibrillation (H) 2018    Partial retinal artery occlusion 2021    Skin cancer     Smoker     ongoing - \"6-8 cigarettes QD\"     Past Surgical History:   Procedure Laterality Date    CATARACT EXTRACTION      cataract removal      COLONOSCOPY  2018    [facility]    COLONOSCOPY N/A 2018    COLONOSCOPY with polypectomy; Damien Denton MD;  [facility];  Service: Gastroenterology    CORONARY STENT PLACEMENT  2018    at [facility] in [facility] BALLOON DILATION AND/OR STENT PLACEMENT OF VESSEL  2018    stent to LAD    ESOPHAGOSCOPY, GASTROSCOPY, DUODENOSCOPY (EGD), COMBINED  2018    [facility]    ESOPHAGOSCOPY, GASTROSCOPY, DUODENOSCOPY (EGD), COMBINED N/A 8/15/2019    Procedure: ESOPHAGOGASTRODUODENOSCOPY, WITH BIOPSY;  Surgeon: Demario Clayton DO;  Location: [facility] [place]    ESOPHAGOSCOPY, GASTROSCOPY, DUODENOSCOPY (EGD), COMBINED N/A 12/10/2018    Damien Denton MD; [facility];  Service: Gastroenterology    IR ABDOMINAL AORTOGRAM  7/3/2006    IR EXTREMITY ANGIOGRAM BILATERAL  7/3/2006    IR MISCELLANEOUS PROCEDURE  7/3/2006    IR MISCELLANEOUS PROCEDURE  2006     Family History   Problem Relation Age of Onset    Sudden Death Mother 55.00        no autopsy    Goiter Mother     Stomach Cancer Father 72.00    No Known Problems Brother     Other - See Comments Brother          in [place]    Ulcers Son         Gastric ulcer was perforated; had surgery. His wife passed suddenly at 49 at home in 2016.    Atrial fibrillation Son         Social History     Socioeconomic History    Marital status:      Spouse name: Not on file "    Number of children: 2    Years of education: Not on file    Highest education level: Not on file   Occupational History    Not on file   Tobacco Use    Smoking status: Every Day     Current packs/day: 1.00     Average packs/day: 1 pack/day for 60.0 years (60.0 ttl pk-yrs)     Types: Cigarettes    Smokeless tobacco: Never    Tobacco comments:     down to 0.3 ppd since 2017; is down to 6-8 cigarettes a day   Vaping Use    Vaping status: Never Used   Substance and Sexual Activity    Alcohol use: No     Alcohol/week: 1.0 standard drink of alcohol    Drug use: No    Sexual activity: Not Currently     Partners: Male   Other Topics Concern    Not on file   Social History Narrative    Zee lives with her son, Braden, and his wife.     Social Drivers of Health     Financial Resource Strain: Not on file   Food Insecurity: Not on file   Transportation Needs: Not on file   Physical Activity: Not on file   Stress: Not on file   Social Connections: Not on file   Interpersonal Safety: Not on file   Housing Stability: Not on file           Medications  Allergies   Current Outpatient Medications   Medication Sig Dispense Refill    acebutolol (SECTRAL) 400 MG capsule Take 400 mg by mouth daily      acetaminophen (TYLENOL) 500 MG tablet Take 500-1,000 mg by mouth every 6 hours as needed for mild pain      albuterol (PROAIR HFA/PROVENTIL HFA/VENTOLIN HFA) 108 (90 Base) MCG/ACT inhaler Inhale 2 puffs into the lungs every 4 hours as needed for shortness of breath or wheezing. OFFICE VISIT NEEDED FOR ANY FURTHER REFILLS 18 g 4    amLODIPine (NORVASC) 10 MG tablet Take 10 mg by mouth daily for blood pressure      aspirin 81 MG EC tablet Take 81 mg by mouth daily      brimonidine (ALPHAGAN-P) 0.15 % ophthalmic solution Place 1 drop into both eyes 3 times daily      dorzolamide (TRUSOPT) 2 % ophthalmic solution Place 1 drop into both eyes 3 times daily      latanoprost (XALATAN) 0.005 % ophthalmic solution Place 1 drop into both eyes  "every evening      levothyroxine (SYNTHROID/LEVOTHROID) 100 MCG tablet Take 1 tablet by mouth every morning (before breakfast)      losartan (COZAAR) 100 MG tablet Take 1 tablet (100 mg) by mouth daily 30 tablet 0    metoprolol succinate ER (TOPROL XL) 25 MG 24 hr tablet TAKE 1 TABLET(25 MG) BY MOUTH DAILY. NEED FOLLOW UP 90 tablet 0    rosuvastatin (CRESTOR) 5 MG tablet Take 5 mg by mouth three times a week Monday, Wednesday, Friday  0    tiotropium-olodaterol (STIOLTO RESPIMAT) 2.5-2.5 MCG/ACT AERS Inhale 2 puffs into the lungs daily. 4 g 11    triamcinolone (ARISTOCORT HP) 0.5 % external cream Apply sparingly twice daily as needed to spot treat itchy areas on arms, legs. 30 g 4       Allergies   Allergen Reactions    Atorvastatin Muscle Pain (Myalgia)     Was fine while taking simvastatin 20 mg daily.    Zoledronic Acid Other (See Comments)     SEVERE REACTION        Adhesive Tape Rash          Lab Results    Chemistry/lipid CBC Cardiac Enzymes/BNP/TSH/INR   Recent Labs   Lab Test 11/02/22  1212   CHOL 152   HDL 85   LDL 52   TRIG 75     Recent Labs   Lab Test 11/02/22  1212 07/07/21  1130 05/11/20  1201   LDL 52 62 59     Recent Labs   Lab Test 12/04/23  1739      POTASSIUM 4.3   CHLORIDE 98   CO2 26   *   BUN 14.2   CR 0.87   GFRESTIMATED 66   KARI 9.2     Recent Labs   Lab Test 12/04/23  1739 05/11/23  1431 03/30/23  1112   CR 0.87 0.80 0.88     No results for input(s): \"A1C\" in the last [id number] hours.       Recent Labs   Lab Test 12/04/23  1739   WBC 8.3   HGB 14.7   HCT 46.4   *        Recent Labs   Lab Test 12/04/23  1739 05/15/23  1723 05/11/23  1431   HGB 14.7 12.2 12.5    Recent Labs   Lab Test 05/28/18  2049   TROPONINI 0.02     Recent Labs   Lab Test 12/19/22  1749 05/28/18  2049   BNP  --  374*   NTBNPI 1,666  --      Recent Labs   Lab Test 03/30/23  1112   TSH 1.22     Recent Labs   Lab Test 08/14/19  1852 12/12/18  0535 12/10/18  1427   INR 1.06 1.19* 3.13*          Yazmin KRUEGER" JORDAN Flower

## 2025-03-18 ENCOUNTER — ORDERS ONLY (AUTO-RELEASED) (OUTPATIENT)
Dept: CARDIOLOGY | Facility: CLINIC | Age: 85
End: 2025-03-18

## 2025-03-18 ENCOUNTER — OFFICE VISIT (OUTPATIENT)
Dept: CARDIOLOGY | Facility: CLINIC | Age: 85
End: 2025-03-18
Payer: COMMERCIAL

## 2025-03-18 VITALS
DIASTOLIC BLOOD PRESSURE: 50 MMHG | BODY MASS INDEX: 21.08 KG/M2 | SYSTOLIC BLOOD PRESSURE: 134 MMHG | WEIGHT: 119 LBS | HEART RATE: 59 BPM | RESPIRATION RATE: 16 BRPM

## 2025-03-18 DIAGNOSIS — I25.10 CORONARY ARTERY DISEASE INVOLVING NATIVE CORONARY ARTERY OF NATIVE HEART WITHOUT ANGINA PECTORIS: Primary | ICD-10-CM

## 2025-03-18 DIAGNOSIS — I48.0 PAROXYSMAL ATRIAL FIBRILLATION (H): ICD-10-CM

## 2025-03-18 DIAGNOSIS — I10 ESSENTIAL HYPERTENSION: ICD-10-CM

## 2025-03-18 DIAGNOSIS — E78.5 HYPERLIPIDEMIA LDL GOAL <70: ICD-10-CM

## 2025-03-18 PROCEDURE — 99214 OFFICE O/P EST MOD 30 MIN: CPT | Performed by: STUDENT IN AN ORGANIZED HEALTH CARE EDUCATION/TRAINING PROGRAM

## 2025-03-18 PROCEDURE — 3075F SYST BP GE 130 - 139MM HG: CPT | Performed by: STUDENT IN AN ORGANIZED HEALTH CARE EDUCATION/TRAINING PROGRAM

## 2025-03-18 PROCEDURE — 3078F DIAST BP <80 MM HG: CPT | Performed by: STUDENT IN AN ORGANIZED HEALTH CARE EDUCATION/TRAINING PROGRAM

## 2025-03-18 NOTE — PATIENT INSTRUCTIONS
Zee Mireles,    It was a pleasure to see you today at the [facility].     My recommendations after this visit include:    - Continue current medications.   - Schedule echocardiogram  - Zio patch to be mailed out and worn for 1 week  - Recommend starting eliquis 2.5 mg twice daily- given history of GI bleed feel you'd be a good candidate for watchman. Let us know if you are interested  - Lipids check through your PCP  - Encourage smoking cessation  - Follow up with Dr. Stover in 6 months    - Please call [phone], if you have any questions or concerns      Yazmin Flower PA-C

## 2025-03-18 NOTE — LETTER
3/18/2025    Jessica Fitzgerald MD  [address]  [place] [address]    RE: Zee Mireles       Dear Colleague,     I had the pleasure of seeing Zee Mireles in the [facility].    HEART CARE ENCOUNTER NOTE      [facility]  [phone]      Assessment/Recommendations   Assessment:   Coronary artery disease: Status post NSTEMI with AGATHA to mid LAD 11/29/2018.  Coronary angiogram at that time also showed stenoses in ostial first obtuse marginal artery and mid RCA.  Patient denies any anginal symptoms  Paroxysmal atrial fibrillation: First noted in 2018 when patient was in the hospital for NSTEMI.  Patient had recurrence 8/2024 and presented to the ED with palpitations seen to be in A-fib with RVR with rates in the 140s.  Patient was cardioverted given symptoms less than 24 hours.  Patient is not on anticoagulation given history of GI bleed originally started on Eliquis.  Discussed restarting Eliquis 2.5 mg twice daily adjusted for age and weight but patient is very hesitant.  Discussed watchman as well which she will discuss with family.  Will obtain Zio patch to see if patient is going in and out of atrial fibrillation as she may only be symptomatic when having high rates.  PVCs: Occasional PVCs seen on monitor in the hospital.  Patient notes she occasionally is symptomatic with these.  Obtaining Zio patch for problem above will try further quantify these.  Essential hypertension: Controlled on metoprolol 25 mg daily, amlodipine 10 mg daily, losartan 100 mg daily.  CMP 2/24/25 stable  Hyperlipidemia: On rosuvastatin 5 mg 3 times a week.  Last lipids 4/15/24 showed LDL 69  Peripheral arterial disease: Status post stenting of right external iliac/right common femoral artery 7/3/2006.    Moderate stenosis of right internal carotid artery: 50 to 69% stenosis noted on carotid ultrasound 7/5/2019  COPD: Patient is still smoking 6-8 cigarettes a day.  Not interested  in quitting at this time.    Plan:   Continue current medications  Schedule echocardiogram  Zio patch to be mailed out worn for 1 week to look for any recurrence of atrial fibrillation as patient may only be symptomatic with RVR  Strongly recommended restarting Eliquis 2.5 mg daily.  Given history of GI bleed feel patient would be a good candidate for watchman.  Patient will discuss with family and let us know if  Encourage smoking cessation  Lipid check through PCP at upcoming annual      Follow up in 6 months with Dr. Stover, sooner with any pending results if needed     History of Present Illness/Subjective    HPI: Zee Mireles is a 83 year old female with PMHx of CAD status post NSTEMI with AGATHA to mid LAD 11/29/2018, PAD status post stenting of the right external iliac/common femoral artery 7/3/2006, paroxysmal A-fib only seen at the time of NSTEMI in 2018 and not on anticoagulation due to prior GI bleed, hypertension, hyperlipidemia presents for follow-up.  Patient had dealt with elevated blood pressure in the past leading to ER visits due to chest tightness that improved after lowering blood pressure with diltiazem.  I last saw patient 2/23/2024 where she had been doing well.  Had noted shortness of breath with going up and down stairs that was not a new symptom.  Patient presented to the ED in August for palpitations.  EKG showed atrial fibrillation with rates in the 140s and patient was cardioverted.  Patient again presented to the ED 2/24/2025 with irregular heartbeat as well as constipation.  EKG showed sinus rhythm with rate of 67 bpm with unchanged ST segment changes.  Occasional PVCs seen on the monitor.    Patient notes other than the atrial fibrillation episode in August she has been doing well.  Patient notes overnight she started feeling weak and that something was different in her body.  Maybe felt her heart racing.  Patient presented to the ED that morning and notes feeling better after the  cardioversion.  Patient notes feeling winded when going up stairs but that this is not new and that she can keep going and push through the shortness of breath.  Patient notes that it is more her leg weakness that limits her.  She tries to walk but her back starts hurting after walking 100 to 200 feet.  Patient uses the grocery cart at the store which is easier for her.  Does note feeling off balance and uses her cane.  Patient notes blood pressures ranging 130-140/60-70s at home.  Patient denies lightheadedness.  Notes some lower extremity edema in her left leg that she has been dealing with some cellulitis.          EKG 8/17/2024: Atrial fibrillation with heart rate 143 bpm    CXR 10/26/2022   No acute cardiopulmonary disease.     ECHO 7/11/2019  1. Normal resting left ventricular size, estimated EF . No resting regional wall motion abnormalities noted.  2. Mild concentric left ventricular hypertrophy.  3. Aortic valve sclerosis. No evidence of aortic stenosis. Mild aortic insufficiency.  4. Mitral annular calcification. Mild mitral regurgitation.  5. Mild tricuspid regurgitation. Estimated RA pressure 5-10 mm Hg based upon evaluation of the IVC. Calculated RV systolic pressure 27 mm Hg plus right atrial pressure (normal).  6. Structurally and functionally unremarkable pulmonic valve.  7. Patient incidentally noted to be in sinus rhythm during echocardiographic imaging.     Exercise ABIs 1/21/2019  1.  Resting ABIs on the right compatible with moderate arterial insufficiency that worsens with exercise.  2.  Resting ABIs on the left compatible with moderate arterial insufficiency that becomes borderline severe with exercise. Catheter angiogram with possible intervention could be performed for further evaluation and treatment.     Carotid ultrasound 7/5/2019   1. Right carotid: There has been significant interval softening calcified plaque formation within the distal right common carotid artery, bifurcation  and right proximal ICA relative to that of the prior exam from 2014. Currently velocity and ICA/CCA ratio suggests borderline 50-69% stenosis within the ICA. In a clinically symptomatic patient this may be hemodynamically significant.  2. Left carotid: There is moderate arteriosclerotic plaque formation identified within the bifurcation which is unchanged compared to prior exam. The estimated vascular stenosis of the left ICA is less than 50% which is not considered hemodynamically significant.  3. Vertebral arteries: Patent with normal cephalad flow direction.     Stress test 11/27/2018   1.  Myocardial perfusion imaging using single isotope technique  demonstrated mild to moderate mid to distal anteroseptal defect with  mild reversibility. (Normal wall motion in this area, however due to  mild reversibility cannot exclude mild ischemia in this territory)   2. Gated images demonstrated normal LV cavity size with hyperdynamic  LV systolic function and normal wall motion.  The left ventricular  systolic function is 81%.  3. Compared to the prior study from no prior study .     Cardiac cath 11/29/2018  LEFT MAIN CORONARY ARTERY: No significant disease  LEFT ANTERIOR DESCENDING ARTERY: Heavily calcified LAD. There is mild  diffuse disease until the mid segment at the diagonal branch where  there is a heavily calcified 89% stenosis. The first septal   also has an 80% ostial stenosis. The diagonal branch has a severe 95%  stenosis with ulceration and a 90 degree takeoff from the LAD. This  vessel is probably 1.52 mm in caliber.  CIRCUMFLEX ARTERY: Circumflex has mild diffuse disease. OM1 has an  ostial 70% stenosis of a moderate size vessel.  RIGHT CORONARY ARTERY: Heavily calcified dominant right. There is a  mid vessel 60% stenosis.  HEMODYNAMICS: Aortic pressures is 160/70,  PERCUTANEOUS CORONARY INTERVENTION: After reviewing the coronary  angiography findings with the patient, we elected to treat the LAD.  A  6 Estonian JL 3.5 guiding catheter was used to engage the ostium of the  left main. This provided an adequate support for the procedure. A 14  wire was used to successfully the LAD lesion. The lesion was  pre-dilated with a 2.5 compliant balloon. After adequate angioplasty  result, a 0.5 x 28 drug-eluting stent was deployed inside the lesion  using 14 gretchen atmosphere pressure. A guideline her catheter was used  and was necessary to deliver the LAD stent. We attempted to cross the  lesion back and postdilated with a NC balloon both a Medtronic as well  as a [place] balloon and were unable to get the balloon to the site  even with use of a guideline a catheter.       Physical Examination  Review of Systems   Vitals: /50 (BP Location: Right arm, Patient Position: Sitting, Cuff Size: Adult Regular)   Pulse 59   Resp 16   Wt 54 kg (119 lb)   BMI 21.08 kg/m    BMI= Body mass index is 21.08 kg/m .  Wt Readings from Last 3 Encounters:   03/18/25 54 kg (119 lb)   02/24/25 53.5 kg (118 lb)   11/26/24 54.4 kg (120 lb)           ENT/Mouth: membranes moist, no oral lesions or bleeding gums.      EYES:  no scleral icterus, normal conjunctivae       Chest/Lungs:   lungs are clear to auscultation, no rales or wheezing,  equal chest wall expansion    Cardiovascular:   Regular with occasional ectopy. Normal first and second heart sounds with no murmurs, rubs, or gallops; the carotid, radial and posterior tibial pulses are intact, trace edema in left lower extremity, no edema noted in right lower extremity       Extremities: no cyanosis or clubbing   Skin: no xanthelasma, warm.    Neurologic: no tremors     Psychiatric: alert and oriented x3, calm        Please refer above for cardiac ROS details.        Medical History  Surgical History Family History Social History   Past Medical History:   Diagnosis Date     Asthma      Chest pain 11/2018    burning sensation - indigestion     Colitis      COPD (chronic obstructive  "pulmonary disease) (H)      COPD (chronic obstructive pulmonary disease) (H)      Coronary artery disease due to calcified coronary lesion 2018    stent to LAD after NSTEMI     DNI (do not intubate)      Dyslipidemia, goal LDL below 70      Essential hypertension      GERD (gastroesophageal reflux disease)      Glaucoma      HTN (hypertension)      Hyperlipidemia      Hypothyroid      NSTEMI (non-ST elevation myocardial infarction) (H) 2018     PAD (peripheral artery disease)      Paroxysmal atrial fibrillation (H) 2018     Paroxysmal atrial fibrillation (H) 2018     Partial retinal artery occlusion 2021     Skin cancer      Smoker     ongoing - \"6-8 cigarettes QD\"     Past Surgical History:   Procedure Laterality Date     CATARACT EXTRACTION       cataract removal       COLONOSCOPY  2018    [facility]     COLONOSCOPY N/A 2018    COLONOSCOPY with polypectomy; Damien Denton MD;  [facility];  Service: Gastroenterology     CORONARY STENT PLACEMENT  2018    at [facility] BALLOON DILATION AND/OR STENT PLACEMENT OF VESSEL  2018    stent to LAD     ESOPHAGOSCOPY, GASTROSCOPY, DUODENOSCOPY (EGD), COMBINED  2018    [facility]     ESOPHAGOSCOPY, GASTROSCOPY, DUODENOSCOPY (EGD), COMBINED N/A 8/15/2019    Procedure: ESOPHAGOGASTRODUODENOSCOPY, WITH BIOPSY;  Surgeon: Demario Clayton DO;  Location: [facility]     ESOPHAGOSCOPY, GASTROSCOPY, DUODENOSCOPY (EGD), COMBINED N/A 12/10/2018    Damien Denton MD; [facility];  Service: Gastroenterology     IR ABDOMINAL AORTOGRAM  7/3/2006     IR EXTREMITY ANGIOGRAM BILATERAL  7/3/2006     IR MISCELLANEOUS PROCEDURE  7/3/2006     IR MISCELLANEOUS PROCEDURE  2006     Family History   Problem Relation Age of Onset     Sudden Death Mother 55.00        no autopsy     Goiter Mother      Stomach Cancer Father 72.00     No Known Problems Brother      Other - See Comments Brother          in [place]     " Ulcers Son         Gastric ulcer was perforated; had surgery. His wife passed suddenly at 49 at home in 2016.     Atrial fibrillation Son         Social History     Socioeconomic History     Marital status:      Spouse name: Not on file     Number of children: 2     Years of education: Not on file     Highest education level: Not on file   Occupational History     Not on file   Tobacco Use     Smoking status: Every Day     Current packs/day: 1.00     Average packs/day: 1 pack/day for 60.0 years (60.0 ttl pk-yrs)     Types: Cigarettes     Smokeless tobacco: Never     Tobacco comments:     down to 0.3 ppd since 2017; is down to 6-8 cigarettes a day   Vaping Use     Vaping status: Never Used   Substance and Sexual Activity     Alcohol use: No     Alcohol/week: 1.0 standard drink of alcohol     Drug use: No     Sexual activity: Not Currently     Partners: Male   Other Topics Concern     Not on file   Social History Narrative    Zee lives with her son, Braden, and his wife.     Social Drivers of Health     Financial Resource Strain: Not on file   Food Insecurity: Not on file   Transportation Needs: Not on file   Physical Activity: Not on file   Stress: Not on file   Social Connections: Not on file   Interpersonal Safety: Not on file   Housing Stability: Not on file           Medications  Allergies   Current Outpatient Medications   Medication Sig Dispense Refill     acebutolol (SECTRAL) 400 MG capsule Take 400 mg by mouth daily       acetaminophen (TYLENOL) 500 MG tablet Take 500-1,000 mg by mouth every 6 hours as needed for mild pain       albuterol (PROAIR HFA/PROVENTIL HFA/VENTOLIN HFA) 108 (90 Base) MCG/ACT inhaler Inhale 2 puffs into the lungs every 4 hours as needed for shortness of breath or wheezing. OFFICE VISIT NEEDED FOR ANY FURTHER REFILLS 18 g 4     amLODIPine (NORVASC) 10 MG tablet Take 10 mg by mouth daily for blood pressure       aspirin 81 MG EC tablet Take 81 mg by mouth daily        "brimonidine (ALPHAGAN-P) 0.15 % ophthalmic solution Place 1 drop into both eyes 3 times daily       dorzolamide (TRUSOPT) 2 % ophthalmic solution Place 1 drop into both eyes 3 times daily       latanoprost (XALATAN) 0.005 % ophthalmic solution Place 1 drop into both eyes every evening       levothyroxine (SYNTHROID/LEVOTHROID) 100 MCG tablet Take 1 tablet by mouth every morning (before breakfast)       losartan (COZAAR) 100 MG tablet Take 1 tablet (100 mg) by mouth daily 30 tablet 0     metoprolol succinate ER (TOPROL XL) 25 MG 24 hr tablet TAKE 1 TABLET(25 MG) BY MOUTH DAILY. NEED FOLLOW UP 90 tablet 0     rosuvastatin (CRESTOR) 5 MG tablet Take 5 mg by mouth three times a week Monday, Wednesday, Friday  0     tiotropium-olodaterol (STIOLTO RESPIMAT) 2.5-2.5 MCG/ACT AERS Inhale 2 puffs into the lungs daily. 4 g 11     triamcinolone (ARISTOCORT HP) 0.5 % external cream Apply sparingly twice daily as needed to spot treat itchy areas on arms, legs. 30 g 4       Allergies   Allergen Reactions     Atorvastatin Muscle Pain (Myalgia)     Was fine while taking simvastatin 20 mg daily.     Zoledronic Acid Other (See Comments)     SEVERE REACTION         Adhesive Tape Rash          Lab Results    Chemistry/lipid CBC Cardiac Enzymes/BNP/TSH/INR   Recent Labs   Lab Test 11/02/22  1212   CHOL 152   HDL 85   LDL 52   TRIG 75     Recent Labs   Lab Test 11/02/22  1212 07/07/21  1130 05/11/20  1201   LDL 52 62 59     Recent Labs   Lab Test 12/04/23  1739      POTASSIUM 4.3   CHLORIDE 98   CO2 26   *   BUN 14.2   CR 0.87   GFRESTIMATED 66   KARI 9.2     Recent Labs   Lab Test 12/04/23  1739 05/11/23  1431 03/30/23  1112   CR 0.87 0.80 0.88     No results for input(s): \"A1C\" in the last 43019 hours.       Recent Labs   Lab Test 12/04/23  1739   WBC 8.3   HGB 14.7   HCT 46.4   *        Recent Labs   Lab Test 12/04/23  1739 05/15/23  1723 05/11/23  1431   HGB 14.7 12.2 12.5    Recent Labs   Lab Test " 05/28/18  2049   TROPONINI 0.02     Recent Labs   Lab Test 12/19/22  1749 05/28/18  2049   BNP  --  374*   NTBNPI 1,666  --      Recent Labs   Lab Test 03/30/23  1112   TSH 1.22     Recent Labs   Lab Test 08/14/19  1852 12/12/18  0535 12/10/18  1427   INR 1.06 1.19* 3.13*          Yazmin Flower PA-C                                         Thank you for allowing me to participate in the care of your patient.      Sincerely,     Yazmin Flower PA-C     [facility]  cc:   Yazmin Flower PA-C  [facility] [place] [address]

## 2025-04-01 ENCOUNTER — TELEPHONE (OUTPATIENT)
Dept: PHARMACY | Facility: OTHER | Age: 85
End: 2025-04-01
Payer: COMMERCIAL

## 2025-04-01 NOTE — TELEPHONE ENCOUNTER
MTM Recruitment: [facility] insurance     Referral outreach attempt #2 on April 1, 2025      Outcome: left voicemail- Call back number [phone]    Yesenia Ervin, PharmD, [facility] HealthCP  Medication Therapy Management Pharmacist  [facility]  [phone]

## 2025-04-07 ENCOUNTER — VIRTUAL VISIT (OUTPATIENT)
Dept: PHARMACY | Facility: PHYSICIAN GROUP | Age: 85
End: 2025-04-07
Payer: COMMERCIAL

## 2025-04-07 DIAGNOSIS — E78.5 HYPERLIPIDEMIA LDL GOAL <70: ICD-10-CM

## 2025-04-07 DIAGNOSIS — I48.0 PAROXYSMAL ATRIAL FIBRILLATION (H): ICD-10-CM

## 2025-04-07 DIAGNOSIS — I10 ESSENTIAL HYPERTENSION: ICD-10-CM

## 2025-04-07 DIAGNOSIS — I25.84 CORONARY ARTERY DISEASE DUE TO CALCIFIED CORONARY LESION: ICD-10-CM

## 2025-04-07 DIAGNOSIS — R52 PAIN: ICD-10-CM

## 2025-04-07 DIAGNOSIS — H40.003 GLAUCOMA SUSPECT, BILATERAL: ICD-10-CM

## 2025-04-07 DIAGNOSIS — L28.1 PRURIGO NODULARIS: ICD-10-CM

## 2025-04-07 DIAGNOSIS — J44.9 CHRONIC OBSTRUCTIVE PULMONARY DISEASE, UNSPECIFIED COPD TYPE (H): Primary | ICD-10-CM

## 2025-04-07 DIAGNOSIS — E03.9 HYPOTHYROIDISM, UNSPECIFIED TYPE: ICD-10-CM

## 2025-04-07 DIAGNOSIS — I25.10 CORONARY ARTERY DISEASE DUE TO CALCIFIED CORONARY LESION: ICD-10-CM

## 2025-04-07 PROCEDURE — 99605 MTMS BY PHARM NP 15 MIN: CPT | Mod: 93 | Performed by: PHARMACIST

## 2025-04-07 RX ORDER — TIOTROPIUM BROMIDE AND OLODATEROL 3.124; 2.736 UG/1; UG/1
2 SPRAY, METERED RESPIRATORY (INHALATION) DAILY
Qty: 12 G | Refills: 0 | Status: CANCELLED | OUTPATIENT
Start: 2025-04-07

## 2025-04-07 RX ORDER — METOPROLOL SUCCINATE 25 MG/1
25 TABLET, EXTENDED RELEASE ORAL EVERY EVENING
COMMUNITY

## 2025-04-07 RX ORDER — LOSARTAN POTASSIUM 100 MG/1
100 TABLET ORAL EVERY EVENING
COMMUNITY

## 2025-04-07 NOTE — PATIENT INSTRUCTIONS
"Recommendations from today's [facility] visit:                                                       Medications:  - Continue current medications as prescribed  - Consider using [facility] for expensive medications (inhaler, eye drops). Included more information on costs and starting this below     Follow-Up:  - Eye doctor appointment next week  - Appointment with Dr. Fitzgerald later this month  - Follow up with heart doctor for monitor results  - Discuss Watchman implant procedure at September appointment  - [facility] pharmacist for medication review in 1 year or sooner if needed    [facility]  Save time with free home delivery from [facility]. You don t need to be a [facility] member to use this service. You may order an extended day supply of some medications and it may cost less than at a retail pharmacy.    Before [facility] can begin filling your prescriptions, you will need to have or create a [facility] account and create a [facility] patient profile. Go to the [facility] website and click \"Get Started\" or call [phone] (TT[employer] users call 577). You will need your pharmacy billing information -- RxBIN, RxPCN and Rx Group -- found on your member identification (ID) card to complete the patient profile.    Once you have an account and patient profile, choose one?of these ways to start getting your drugs at home:    Call in your prescriptions?at [phone] ([employer] users call [phone])  Submit your prescriptions at pharmacy[facility]  Ask your provider to contact [facility]  For refills of your mail order prescriptions, you have the option to sign up for an automatic refill reminder program. Typically, you should expect to receive your prescription drugs within 14 business days from the time the mail order pharmacy receives the order.        It was great speaking with you today.  I value your experience and would be very " thankful for your time in providing feedback in our clinic survey. In the next few days, you may receive an email or text message from [facility] with a link to a survey related to your clinical pharmacist.    To schedule another MTM appointment, please call [phone].    My Clinical Pharmacist's contact information:                                                      Please feel free to contact me with any questions or concerns you have.      Yesenia Ervin, Margarito, [facility] CenterCP  Medication Therapy Management Pharmacist  [facility]  [phone]

## 2025-04-07 NOTE — LETTER
"Recommended To-Do List      Prepared on: Apr 7, 2025       You can get the best results from your medications by completing the items on this \"To-Do List.\"      Bring your To-Do List when you go to your doctor. And, share it with your family or caregivers.    My To-Do List:  What we talked about: What I should do:    What my medicines are for, how to know if my medicines are working, made sure my medicines are safe for me and reviewed how to take my medicines.      - Continue current medications as prescribed  - Consider using [facility] for expensive medications (inhaler, eye drops). Included more information on costs and how to use this service.               "

## 2025-04-07 NOTE — LETTER
April 7, 2025  Zee J Aline  [address] [place] [address]    Dear Ms. Aline, [facility]     Thank you for talking with me on Apr 7, 2025 about your health and medications. As a follow-up to our conversation, I have included two documents:      Your Recommended To-Do List has steps you should take to get the best results from your medications.  Your Medication List will help you keep track of your medications and how to take them.    If you want to talk about these documents, please call Yesenia Ervin PharmD at phone: [phone], Monday-Friday 8-4:30pm.    I look forward to working with you and your doctors to make sure your medications work well for you.    Sincerely,  Yesenia Ervin PharmD  [facility] Pharmacist, [facility]

## 2025-04-07 NOTE — LETTER
_  Medication List        Prepared on: Apr 7, 2025     Bring your Medication List when you go to the doctor, hospital, or   emergency room. And, share it with your family or caregivers.     Note any changes to how you take your medications.  Cross out medications when you no longer use them.    Medication How I take it Why I use it Prescriber   acebutolol (SECTRAL) 400 MG capsule Take 400 mg by mouth daily. Heart Palpitations Jessica Fitzgerald MD   acetaminophen (TYLENOL) 500 MG tablet Take 500-1,000 mg by mouth every 6 hours as needed for mild pain Pain Patient Reported   albuterol (PROAIR HFA/PROVENTIL HFA/VENTOLIN HFA) 108 (90 Base) MCG/ACT inhaler Inhale 2 puffs into the lungs every 4 hours as needed for shortness of breath or wheezing.  COPD Janie Smith APRN CNP   amLODIPine (NORVASC) 10 MG tablet Take 10 mg by mouth daily. for blood pressure High Blood Pressure Jessica Fitzgerald MD   aspirin 81 MG EC tablet Take 81 mg by mouth daily Disease of the Peripheral Arteries; Heart Disease Jessica Fitzgerald MD   brimonidine (ALPHAGAN-P) 0.15 % ophthalmic solution Place 1 drop into both eyes 3 times daily Glaucoma Gayle Grissom MD   dorzolamide (TRUSOPT) 2 % ophthalmic solution Place 1 drop into both eyes 3 times daily Glaucoma Gayle Grissom MD   latanoprost (XALATAN) 0.005 % ophthalmic solution Place 1 drop into both eyes every evening Glaucoma Gayle Grissom MD   levothyroxine (SYNTHROID/LEVOTHROID) 100 MCG tablet Take 1 tablet by mouth every morning (before breakfast) Underactive Thyroid Jessica Fitzgerald MD   losartan (COZAAR) 100 MG tablet Take 100 mg by mouth every evening. High Blood Pressure Jessica Fitzgerald MD   metoprolol succinate ER (TOPROL XL) 25 MG 24 hr tablet Take 25 mg by mouth every evening. Atrial Fibrillation; High Blood Pressure Jessica Fitzgerald MD   rosuvastatin (CRESTOR) 5 MG tablet Take 5 mg by mouth three times a week Monday, Wednesday, Friday High Cholesterol, Disease of the  Peripheral Arteries; Heart Disease Jessica Fitzgerald MD   tiotropium-olodaterol (STIOLTO RESPIMAT) 2.5-2.5 MCG/ACT AERS Inhale 2 puffs into the lungs daily. COPD DIANA Thomas CNP   triamcinolone (ARISTOCORT HP) 0.5 % external cream Apply sparingly twice daily as needed to spot treat itchy areas on arms, legs. Prurigo Nodularis Melody Odonnell PA-C         Add new medications, over-the-counter drugs, herbals, vitamins, or  minerals in the blank rows below.    Medication How I take it Why I use it Prescriber                                      Allergies:      - Atorvastatin - Muscle Pain (Myalgia)  - Zoledronic Acid - Other (See Comments)  - Adhesive Tape - Rash        Side effects I have had:      Not on File        Other Information:              My notes and questions:

## 2025-04-07 NOTE — PROGRESS NOTES
Medication Therapy Management (MTM) Encounter    ASSESSMENT:                            Medication Adherence/Access:   Reviewed cost savings for higher cost medications through [facility].  _  Hypertension  Atrial Fibrillation  Coronary Artery Disease  Patient experienced irregular heartbeats 2 weeks ago, confirmed by cardiologist. Wore monitor for a week; pulse now regular. Awaiting follow-up results.  Home BP: afternoon 120-130/60-70 mmHg, morning 150/70 mmHg, decreasing post-medication.  Patient would benefit from:  - Continue current cardiac medications  - Continue aspirin  - Await heart monitor results  - Follow up with cardiologist in September re: Watchman implant  - Monitor for bleeding/bruising  _  Hypothyroidism  Stable. Last TSH within goal range.   _  COPD  Reports good efficacy with Stiolto inhaler despite high cost.  Patient would benefit from:  - Continue Stiolto as prescribed  - Inform patient about Medicare changes: flat co-pay after initial payment, lower cost with mail order  _  Hyperlipidemia  Last LDL in April: 69. Reports occasional, short-lived leg aches.  Patient would benefit from:  - Continue current regimen   - Monitor for side effects, especially leg pain  _  Glaucoma  Continues prescribed eye drops. Ophthalmologist appointment next week.  _  Dermatological Condition  Stable.   _  Pain  Stable.   _____________________  PLAN:                            Medications:  - Continue current medications as prescribed  - Consider using [facility] for expensive medications (inhaler, eye drops). Included more information on costs and starting this below     Follow-Up:  - Eye doctor appointment next week  - Appointment with Dr. Fitzgerald later this month  - Follow up with heart doctor for monitor results  - Discuss Watchman implant procedure at September appointment  - MTM pharmacist for medication review in 1 year or sooner if needed    SUBJECTIVE/OBJECTIVE:                           Zee Mireles is a 84 year old female seen for a follow-up visit.       Reason for visit: Medication review.    Allergies/ADRs: Reviewed in chart  Past Medical History: Reviewed in chart  Tobacco: She reports that she has been smoking cigarettes. She has a 60 pack-year smoking history. She has never used smokeless tobacco.Nicotine/Tobacco Cessation Plan  Information offered: Patient not interested at this time  Alcohol: none    Medication Adherence/Access:   No issues reported. She is retired nurse. A few of her medicines are higher cost- stiolto, eye drops. She was not aware of Medicare cost changes this year. She is not using preferred mail order but is interested in more information.     Hypertension  Atrial Fibrillation  Coronary Artery Disease  - Acebutolol 400 mg once daily in the morning  - Amlodipine 10 mg once daily in the morning  - Metoprolol succinate ER 25 mg once daily in the evening (7-8 PM)  - Losartan 100 mg once daily in the evening (7-8 PM)  - Aspirin 81 mg once daily  No side effects reported.   BP readings at home: -130/60-70, /70 (before meds)  Patient reports irregular heartbeats 2 weeks ago. Wore heart monitor for 1 week; awaiting results. Pulse now regular.  History of PACs  Not on anticoagulation due to GI bleeding in the past. Cardiology mentioned Watchman procedure as an option to consider.    Hypothyroidism  - Levothyroxine 100 mcg once daily  No issues reported    COPD  - Stiolto inhaler 2.5-5 mcg/act 2 puffs once daily  - Albuterol inhaler as needed   Follows with pulmonology.   Reports symptoms are well controlled with using Stiolto. No recent flare ups.   Continued tobacco use.     Hyperlipidemia  Peripheral Artery Disease   - Rosuvastatin 5 mg (Monday/Wednesday/Friday) in the evening  - Aspirin 81 mg once daily   Occasional leg aching, possibly related to cholesterol medication, but it is manageable with this dose frequency.     Glaucoma:   Brimonidine  0.15% 1 drop both eyes three times a day   Dorzolamide 2% 1 drop both eyes three times a day  Latanoprost 0.005% 1 drop each eye once daily   No issues reported. Follows with eye provider regularly.     Prurigo  - Triamcinolone 0.1% cream as needed in the evening  No specific issues reported. Condition has improved. Affected areas nearly resolved; current use once nightly.    Pain Management  - Acetaminophen 500 mg once daily in morning  Takes one tablet in the morning to help with aching. She does think it helps after she takes it. No other pain medicines. No issues reported.         Today's Vitals: There were no vitals taken for this visit.  ----------------      I spent 14 minutes with this patient today. All changes were made via collaborative practice agreement with Jessica Fitzgerald MD.     A summary of these recommendations was mailed to the patient.    Yesenia Ervin, PharmD, BCACP  Medication Therapy Management Pharmacist  [facility]  [phone]      Telemedicine Visit Details  The patient's medications can be safely assessed via a telemedicine encounter.  Type of service:  Telephone visit  Originating Location (pt. Location): Home    Distant Location (provider location):  On-site  Start Time:  8:33 AM  End Time:  8:47 AM     Medication Therapy Recommendations  No medication therapy recommendations to display

## 2025-04-08 LAB — CV ZIO PRELIM RESULTS: NORMAL

## 2025-04-16 ENCOUNTER — LAB REQUISITION (OUTPATIENT)
Dept: LAB | Facility: CLINIC | Age: 85
End: 2025-04-16

## 2025-04-16 DIAGNOSIS — I12.9 HYPERTENSIVE CHRONIC KIDNEY DISEASE WITH STAGE 1 THROUGH STAGE 4 CHRONIC KIDNEY DISEASE, OR UNSPECIFIED CHRONIC KIDNEY DISEASE: ICD-10-CM

## 2025-04-16 DIAGNOSIS — E03.9 HYPOTHYROIDISM, UNSPECIFIED: ICD-10-CM

## 2025-04-16 DIAGNOSIS — E78.5 HYPERLIPIDEMIA, UNSPECIFIED: ICD-10-CM

## 2025-04-16 LAB
ERYTHROCYTE [DISTWIDTH] IN BLOOD BY AUTOMATED COUNT: 15 % (ref 10–15)
HCT VFR BLD AUTO: 43.7 % (ref 35–47)
HGB BLD-MCNC: 13.9 G/DL (ref 11.7–15.7)
MCH RBC QN AUTO: 33.2 PG (ref 26.5–33)
MCHC RBC AUTO-ENTMCNC: 31.8 G/DL (ref 31.5–36.5)
MCV RBC AUTO: 104 FL (ref 78–100)
PLATELET # BLD AUTO: 277 10E3/UL (ref 150–450)
RBC # BLD AUTO: 4.19 10E6/UL (ref 3.8–5.2)
WBC # BLD AUTO: 8.2 10E3/UL (ref 4–11)

## 2025-04-16 PROCEDURE — 82465 ASSAY BLD/SERUM CHOLESTEROL: CPT | Performed by: FAMILY MEDICINE

## 2025-04-16 PROCEDURE — 84155 ASSAY OF PROTEIN SERUM: CPT | Performed by: FAMILY MEDICINE

## 2025-04-16 PROCEDURE — 84478 ASSAY OF TRIGLYCERIDES: CPT | Performed by: FAMILY MEDICINE

## 2025-04-16 PROCEDURE — 85018 HEMOGLOBIN: CPT | Performed by: FAMILY MEDICINE

## 2025-04-16 PROCEDURE — 82374 ASSAY BLOOD CARBON DIOXIDE: CPT | Performed by: FAMILY MEDICINE

## 2025-04-16 PROCEDURE — 85041 AUTOMATED RBC COUNT: CPT | Performed by: FAMILY MEDICINE

## 2025-04-16 PROCEDURE — 84443 ASSAY THYROID STIM HORMONE: CPT | Performed by: FAMILY MEDICINE

## 2025-04-17 LAB
ALBUMIN SERPL BCG-MCNC: 4.1 G/DL (ref 3.5–5.2)
ALP SERPL-CCNC: 65 U/L (ref 40–150)
ALT SERPL W P-5'-P-CCNC: 8 U/L (ref 0–50)
ANION GAP SERPL CALCULATED.3IONS-SCNC: 12 MMOL/L (ref 7–15)
AST SERPL W P-5'-P-CCNC: 18 U/L (ref 0–45)
BILIRUB SERPL-MCNC: 0.4 MG/DL
BUN SERPL-MCNC: 11.6 MG/DL (ref 8–23)
CALCIUM SERPL-MCNC: 9.3 MG/DL (ref 8.8–10.4)
CHLORIDE SERPL-SCNC: 99 MMOL/L (ref 98–107)
CHOLEST SERPL-MCNC: 183 MG/DL
CREAT SERPL-MCNC: 0.82 MG/DL (ref 0.51–0.95)
EGFRCR SERPLBLD CKD-EPI 2021: 71 ML/MIN/1.73M2
FASTING STATUS PATIENT QL REPORTED: ABNORMAL
FASTING STATUS PATIENT QL REPORTED: NORMAL
GLUCOSE SERPL-MCNC: 100 MG/DL (ref 70–99)
HCO3 SERPL-SCNC: 25 MMOL/L (ref 22–29)
HDLC SERPL-MCNC: 96 MG/DL
LDLC SERPL CALC-MCNC: 73 MG/DL
NONHDLC SERPL-MCNC: 87 MG/DL
POTASSIUM SERPL-SCNC: 4.6 MMOL/L (ref 3.4–5.3)
PROT SERPL-MCNC: 6.7 G/DL (ref 6.4–8.3)
SODIUM SERPL-SCNC: 136 MMOL/L (ref 135–145)
TRIGL SERPL-MCNC: 72 MG/DL
TSH SERPL DL<=0.005 MIU/L-ACNC: 2.33 UIU/ML (ref 0.3–4.2)

## 2025-04-18 ENCOUNTER — HOSPITAL ENCOUNTER (OUTPATIENT)
Dept: CARDIOLOGY | Facility: HOSPITAL | Age: 85
Discharge: HOME | End: 2025-04-18
Attending: STUDENT IN AN ORGANIZED HEALTH CARE EDUCATION/TRAINING PROGRAM | Admitting: STUDENT IN AN ORGANIZED HEALTH CARE EDUCATION/TRAINING PROGRAM
Payer: COMMERCIAL

## 2025-04-18 DIAGNOSIS — I48.0 PAROXYSMAL ATRIAL FIBRILLATION (H): ICD-10-CM

## 2025-04-18 DIAGNOSIS — I25.10 CORONARY ARTERY DISEASE INVOLVING NATIVE CORONARY ARTERY OF NATIVE HEART WITHOUT ANGINA PECTORIS: ICD-10-CM

## 2025-04-18 PROCEDURE — 93306 TTE W/DOPPLER COMPLETE: CPT | Mod: 26 | Performed by: INTERNAL MEDICINE

## 2025-04-18 PROCEDURE — 255N000002 HC RX 255 OP 636: Performed by: STUDENT IN AN ORGANIZED HEALTH CARE EDUCATION/TRAINING PROGRAM

## 2025-04-18 RX ADMIN — PERFLUTREN 2 ML: 6.52 INJECTION, SUSPENSION INTRAVENOUS at 13:53

## 2025-04-21 NOTE — RESULT ENCOUNTER NOTE
Echocardiogram looks good with only mild aortic insufficiency and normal pumping function.  Left atrium is moderately to severely enlarged signifying patient might be having more atrial fibrillation or was in it for a longer time prior to diagnosis.  Will await heart monitor results.

## (undated) RX ORDER — LIDOCAINE HYDROCHLORIDE 10 MG/ML
INJECTION, SOLUTION EPIDURAL; INFILTRATION; INTRACAUDAL; PERINEURAL
Status: DISPENSED
Start: 2018-11-29

## (undated) RX ORDER — HEPARIN SODIUM 1000 [USP'U]/ML
INJECTION, SOLUTION INTRAVENOUS; SUBCUTANEOUS
Status: DISPENSED
Start: 2018-11-29

## (undated) RX ORDER — FENTANYL CITRATE 50 UG/ML
INJECTION, SOLUTION INTRAMUSCULAR; INTRAVENOUS
Status: DISPENSED
Start: 2018-11-29

## (undated) RX ORDER — REGADENOSON 0.08 MG/ML
INJECTION, SOLUTION INTRAVENOUS
Status: DISPENSED
Start: 2018-11-27

## (undated) RX ORDER — LIDOCAINE HYDROCHLORIDE 10 MG/ML
INJECTION, SOLUTION EPIDURAL; INFILTRATION; INTRACAUDAL; PERINEURAL
Status: DISPENSED
Start: 2019-08-15

## (undated) RX ORDER — PROPOFOL 10 MG/ML
INJECTION, EMULSION INTRAVENOUS
Status: DISPENSED
Start: 2024-08-17

## (undated) RX ORDER — PROPOFOL 10 MG/ML
INJECTION, EMULSION INTRAVENOUS
Status: DISPENSED
Start: 2019-08-15